# Patient Record
Sex: FEMALE | Race: WHITE | NOT HISPANIC OR LATINO | Employment: OTHER | URBAN - METROPOLITAN AREA
[De-identification: names, ages, dates, MRNs, and addresses within clinical notes are randomized per-mention and may not be internally consistent; named-entity substitution may affect disease eponyms.]

---

## 2017-02-21 ENCOUNTER — APPOINTMENT (OUTPATIENT)
Dept: MEDICAL GROUP | Facility: MEDICAL CENTER | Age: 77
End: 2017-02-21
Payer: MEDICARE

## 2017-03-16 ENCOUNTER — HOSPITAL ENCOUNTER (OUTPATIENT)
Dept: LAB | Facility: MEDICAL CENTER | Age: 77
End: 2017-03-16
Attending: INTERNAL MEDICINE
Payer: MEDICARE

## 2017-03-16 DIAGNOSIS — E03.9 HYPOTHYROIDISM, UNSPECIFIED TYPE: ICD-10-CM

## 2017-03-16 DIAGNOSIS — I10 ESSENTIAL HYPERTENSION: ICD-10-CM

## 2017-03-16 DIAGNOSIS — N39.0 URINARY TRACT INFECTION WITHOUT HEMATURIA, SITE UNSPECIFIED: ICD-10-CM

## 2017-03-16 DIAGNOSIS — E78.5 HYPERLIPIDEMIA, UNSPECIFIED HYPERLIPIDEMIA TYPE: ICD-10-CM

## 2017-03-16 LAB
ANION GAP SERPL CALC-SCNC: 8 MMOL/L (ref 0–11.9)
APPEARANCE UR: ABNORMAL
BILIRUB UR QL STRIP.AUTO: NEGATIVE
BUN SERPL-MCNC: 15 MG/DL (ref 8–22)
CALCIUM SERPL-MCNC: 9.1 MG/DL (ref 8.5–10.5)
CHLORIDE SERPL-SCNC: 105 MMOL/L (ref 96–112)
CHOLEST SERPL-MCNC: 142 MG/DL (ref 100–199)
CO2 SERPL-SCNC: 23 MMOL/L (ref 20–33)
COLOR UR AUTO: YELLOW
CREAT SERPL-MCNC: 0.97 MG/DL (ref 0.5–1.4)
CULTURE IF INDICATED INDCX: YES UA CULTURE
EPITHELIAL CELLS 1715: ABNORMAL /HPF
GLUCOSE SERPL-MCNC: 78 MG/DL (ref 65–99)
GLUCOSE UR STRIP.AUTO-MCNC: NEGATIVE MG/DL
HDLC SERPL-MCNC: 42 MG/DL
KETONES UR STRIP.AUTO-MCNC: NEGATIVE MG/DL
LDLC SERPL CALC-MCNC: 76 MG/DL
LEUKOCYTE ESTERASE UR QL STRIP.AUTO: ABNORMAL
MICRO URNS: ABNORMAL
MUCOUS THREADS URNS QL MICRO: ABNORMAL /HPF
NITRITE UR QL STRIP.AUTO: NEGATIVE
PH UR: 5.5 [PH]
POTASSIUM SERPL-SCNC: 3.6 MMOL/L (ref 3.6–5.5)
PROT UR QL STRIP: NEGATIVE MG/DL
RBC #/AREA URNS HPF: ABNORMAL /HPF
RBC UR QL AUTO: NEGATIVE
SODIUM SERPL-SCNC: 136 MMOL/L (ref 135–145)
SP GR UR STRIP.AUTO: 1.01
TRIGL SERPL-MCNC: 121 MG/DL (ref 0–149)
TSH SERPL DL<=0.005 MIU/L-ACNC: 13.02 UIU/ML (ref 0.3–3.7)
WBC #/AREA URNS HPF: ABNORMAL /HPF

## 2017-03-16 PROCEDURE — 87086 URINE CULTURE/COLONY COUNT: CPT

## 2017-03-16 PROCEDURE — 84443 ASSAY THYROID STIM HORMONE: CPT

## 2017-03-16 PROCEDURE — 36415 COLL VENOUS BLD VENIPUNCTURE: CPT

## 2017-03-16 PROCEDURE — 80048 BASIC METABOLIC PNL TOTAL CA: CPT

## 2017-03-16 PROCEDURE — 81001 URINALYSIS AUTO W/SCOPE: CPT

## 2017-03-16 PROCEDURE — 80061 LIPID PANEL: CPT

## 2017-03-17 ENCOUNTER — OFFICE VISIT (OUTPATIENT)
Dept: MEDICAL GROUP | Facility: MEDICAL CENTER | Age: 77
End: 2017-03-17
Payer: MEDICARE

## 2017-03-17 VITALS
WEIGHT: 212 LBS | TEMPERATURE: 97.6 F | HEIGHT: 62 IN | DIASTOLIC BLOOD PRESSURE: 78 MMHG | BODY MASS INDEX: 39.01 KG/M2 | SYSTOLIC BLOOD PRESSURE: 134 MMHG | OXYGEN SATURATION: 97 % | HEART RATE: 86 BPM | RESPIRATION RATE: 16 BRPM

## 2017-03-17 DIAGNOSIS — R30.0 DYSURIA: ICD-10-CM

## 2017-03-17 DIAGNOSIS — I10 ESSENTIAL HYPERTENSION: ICD-10-CM

## 2017-03-17 DIAGNOSIS — R73.9 HYPERGLYCEMIA: ICD-10-CM

## 2017-03-17 DIAGNOSIS — E66.9 OBESITY (BMI 30-39.9): ICD-10-CM

## 2017-03-17 DIAGNOSIS — J30.1 HAY FEVER: ICD-10-CM

## 2017-03-17 DIAGNOSIS — E03.9 HYPOTHYROIDISM, UNSPECIFIED TYPE: ICD-10-CM

## 2017-03-17 DIAGNOSIS — R10.11 RUQ PAIN: ICD-10-CM

## 2017-03-17 DIAGNOSIS — G47.30 SLEEP APNEA, UNSPECIFIED TYPE: ICD-10-CM

## 2017-03-17 DIAGNOSIS — F03.90 DEMENTIA WITHOUT BEHAVIORAL DISTURBANCE, UNSPECIFIED DEMENTIA TYPE: ICD-10-CM

## 2017-03-17 DIAGNOSIS — E78.5 HYPERLIPIDEMIA, UNSPECIFIED HYPERLIPIDEMIA TYPE: ICD-10-CM

## 2017-03-17 DIAGNOSIS — G60.9 IDIOPATHIC PERIPHERAL NEUROPATHY: ICD-10-CM

## 2017-03-17 PROCEDURE — 1036F TOBACCO NON-USER: CPT | Performed by: INTERNAL MEDICINE

## 2017-03-17 PROCEDURE — G8482 FLU IMMUNIZE ORDER/ADMIN: HCPCS | Performed by: INTERNAL MEDICINE

## 2017-03-17 PROCEDURE — 1101F PT FALLS ASSESS-DOCD LE1/YR: CPT | Performed by: INTERNAL MEDICINE

## 2017-03-17 PROCEDURE — 4040F PNEUMOC VAC/ADMIN/RCVD: CPT | Performed by: INTERNAL MEDICINE

## 2017-03-17 PROCEDURE — G8419 CALC BMI OUT NRM PARAM NOF/U: HCPCS | Performed by: INTERNAL MEDICINE

## 2017-03-17 PROCEDURE — G8432 DEP SCR NOT DOC, RNG: HCPCS | Performed by: INTERNAL MEDICINE

## 2017-03-17 PROCEDURE — 99214 OFFICE O/P EST MOD 30 MIN: CPT | Performed by: INTERNAL MEDICINE

## 2017-03-17 RX ORDER — LEVOTHYROXINE SODIUM 137 UG/1
137 TABLET ORAL
Qty: 90 TAB | Refills: 3 | Status: SHIPPED | OUTPATIENT
Start: 2017-03-17 | End: 2018-04-21 | Stop reason: SDUPTHER

## 2017-03-17 NOTE — ASSESSMENT & PLAN NOTE
She denies any significant further dysuria. There are a few red cells and a few white cells in the urine but otherwise no significant abnormality.

## 2017-03-17 NOTE — ASSESSMENT & PLAN NOTE
Blood sugar was elevated at 109 previously but currently is 70. She has tried to cut back a little on sweets.

## 2017-03-17 NOTE — ASSESSMENT & PLAN NOTE
She remains significantly overweight with BMI 38.78. We reviewed the importance of weight loss program. She is not very careful about her diet and does not exercise much. She will be more active with this spring weather.

## 2017-03-17 NOTE — MR AVS SNAPSHOT
"Margaret Lockhart   3/17/2017 8:20 AM   Office Visit   MRN: [id number]    Department:  [address] [place] [address]   Dept Phone:  [phone]    Description:  Female : 1940   Provider:  Rodrigo Ozuna M.D.           Reason for Visit     Follow-Up discuss labs      Allergies as of 3/17/2017     Allergen Noted Reactions    Neomycin-Polymyxin B 06/10/2008   Rash    Sulfa Drugs 2011         You were diagnosed with     Essential hypertension   [4957822]   No medication change. Continue low-salt diet. Work at weight loss and exercise.    Hypothyroidism, unspecified type   [4679315]   Increase levothyroxine to 137 µg daily. Check TSH at next visit.    Hyperlipidemia, unspecified hyperlipidemia type   [7622123]   No medication change. We reviewed appropriate diet, weight loss, and exercise.    Dysuria   [788.1.ICD-9-CM]   If she has further urinary complaints, she will contact us and we will check another urinalysis.    RUQ pain   [917836]   She will pay attention to aggravating and alleviating factors and look for other patterns. She will keep us informed.    Hyperglycemia   [911304]   She will avoid concentrated sweets. She will work at weight loss.    Obesity (BMI 30-39.9)   [043280]   We discussed importance of weight loss including appropriate diet and exercise.    Dementia without behavioral disturbance, unspecified dementia type   [0741245]   She will report progression of any further memory disturbances.    Hay fever   [659434]   Continue antihistamine as needed.    Idiopathic peripheral neuropathy   [614618]   She was cautioned to check her feet each evening when she removes her socks.    Sleep apnea, unspecified type   [9117438]   We discussed importance of adequate treatment of sleep apnea.      Vital Signs     Blood Pressure Pulse Temperature Respirations Height Weight    134/78 mmHg 86 36.4 °C (97.6 °F) 16 1.575 m (5' 2\") 96.163 kg (212 lb)    Body Mass Index Oxygen Saturation Smoking Status         "    38.77 kg/m2 97% Former Smoker         Basic Information     Date Of Birth Sex Race Ethnicity Preferred Language    1940 Female White Non- English      Problem List              ICD-10-CM Priority Class Noted - Resolved    Dementia F03.90 Low  5/29/2011 - Present    Hypothyroidism E03.9 Low  5/29/2011 - Present    Peripheral neuropathy (CMS-HCC) G62.9 Low  5/29/2011 - Present    Routine general medical examination at a health care facility Z00.00   7/20/2015 - Present    Obesity (BMI 30-39.9) E66.9   7/20/2015 - Present    Sleep apnea G47.30   7/20/2015 - Present    Flu vaccine need Z23   11/24/2015 - Present    Hyperlipidemia E78.5   11/24/2015 - Present    Essential hypertension I10   11/24/2015 - Present    Hyperglycemia R73.9   11/24/2015 - Present    RUQ pain R10.11   2/29/2016 - Present    Hay fever J30.1   4/19/2016 - Present    Need for varicella vaccine Z23   10/18/2016 - Present    Dysuria R30.0   10/18/2016 - Present    Upper respiratory tract infection J06.9   11/16/2016 - Present      Health Maintenance        Date Due Completion Dates    MAMMOGRAM 11/24/2016 11/24/2015 (Postponed), 6/22/2010, 6/22/2010, 1/9/2008, 1/9/2008    Override on 11/24/2015: Postponed (Has not had one since 2010)    IMM PNEUMOCOCCAL 65+ (ADULT) LOW/MEDIUM RISK SERIES (2 of 2 - PPSV23) 4/19/2017 4/19/2016    BONE DENSITY 11/24/2020 11/24/2015 (Postponed), 6/22/2010, 1/9/2008    Override on 11/24/2015: Postponed (Has not had one since 2010)    IMM DTaP/Tdap/Td Vaccine (2 - Td) 5/6/2023 5/6/2013    COLONOSCOPY 11/24/2025 11/24/2015 (Postponed)    Override on 11/24/2015: Postponed (Does not remember)            Current Immunizations     13-VALENT PCV PREVNAR 4/19/2016    Influenza TIV (IM) 10/1/2010    Influenza Vaccine Adult HD 9/27/2016, 11/24/2015  8:50 AM, 11/13/2013    Influenza Vaccine Quad Inj (Preserved) 11/9/2012, 10/9/2011    Tdap Vaccine 5/6/2013    Varicella Vaccine Live 10/18/2016      Below and/or  attached are the medications your provider expects you to take. Review all of your home medications and newly ordered medications with your provider and/or pharmacist. Follow medication instructions as directed by your provider and/or pharmacist. Please keep your medication list with you and share with your provider. Update the information when medications are discontinued, doses are changed, or new medications (including over-the-counter products) are added; and carry medication information at all times in the event of emergency situations     Allergies:  NEOMYCIN-POLYMYXIN B - Rash     SULFA DRUGS - (reactions not documented)               Medications  Valid as of: March 17, 2017 -  8:42 AM    Generic Name Brand Name Tablet Size Instructions for use    Acetaminophen (Tab) TYLENOL 500 MG Take 500 mg by mouth 3 times a day as needed. Indications: Pain        Aspirin (Tab) aspirin 81 MG Take 81 mg by mouth every morning.        Aspirin-Acetaminophen-Caffeine (Tab) EXCEDRIN 250-250-65 MG Take 1 Tab by mouth 2 times a day as needed. Indications: Mild Pain        Atorvastatin Calcium (Tab) LIPITOR 10 MG TAKE ONE TABLET BY MOUTH ONE TIME DAILY        Ciprofloxacin HCl (Tab) CIPRO 500 MG Take 1 Tab by mouth 2 times a day.        Doxycycline Hyclate (Tab) VIBRAMYCIN 100 MG Take 1 Tab by mouth 2 times a day.        Fenofibrate (Tab) TRIGLIDE 160 MG TAKE ONE TABLET BY MOUTH ONE TIME DAILY        Influenza Vac Split High-Dose (Suspension Prefilled Syringe) FLUZONE HIGH-DOSE 0.5 ML 0.5 mg by Intramuscular route Once.        Lactobacillus Rhamnosus (GG) (Cap) CULTURELLE 10 B CELL Take 1 Cap by mouth every day.        Levothyroxine Sodium (Tab) SYNTHROID 137 MCG Take 1 Tab by mouth Every morning on an empty stomach.        Lisinopril (Tab) PRINIVIL 10 MG TAKE ONE TABLET BY MOUTH ONE TIME DAILY        Potassium Chloride (Cap CR) Potassium Chloride 10 MEQ Take 10 mEq by mouth 2 Times a Day.        Triamterene-HCTZ (Tab)  MAXZIDE-25/DYAZIDE 37.5-25 MG Take one tablet by mouth twice daily        .                 Medicines prescribed today were sent to:     [id number] [place] [facility] [place], [place] - [address]    [address] [facility] [place] [address]    Phone: [phone] Fax: [phone]    Open 24 Hours?: No      Medication refill instructions:       If your prescription bottle indicates you have medication refills left, it is not necessary to call your provider’s office. Please contact your pharmacy and they will refill your medication.    If your prescription bottle indicates you do not have any refills left, you may request refills at any time through one of the following ways: The online [employer] system (except Urgent Care), by calling your provider’s office, or by asking your pharmacy to contact your provider’s office with a refill request. Medication refills are processed only during regular business hours and may not be available until the next business day. Your provider may request additional information or to have a follow-up visit with you prior to refilling your medication.   *Please Note: Medication refills are assigned a new Rx number when refilled electronically. Your pharmacy may indicate that no refills were authorized even though a new prescription for the same medication is available at the pharmacy. Please request the medicine by name with the pharmacy before contacting your provider for a refill.        Your To Do List     Future Labs/Procedures Complete By Expires    BASIC METABOLIC PANEL  As directed 3/18/2018    LIPID PROFILE  As directed 3/18/2018    TSH  As directed 3/18/2018         "[employer]t Access Code: Activation code not generated  Current [employer] Status: Active

## 2017-03-17 NOTE — ASSESSMENT & PLAN NOTE
She again reports episodes of mild right upper quadrant abdominal discomfort. She is not aware of aggravating or alleviating factors. She had no recent change in bowel habits. She otherwise denies indigestion.

## 2017-03-17 NOTE — ASSESSMENT & PLAN NOTE
She continues atorvastatin and fenofibrate. She is not very careful about her diet. She remains overweight. Cholesterol is 142, triglycerides 121, HDL 42, LDL 76.

## 2017-03-17 NOTE — ASSESSMENT & PLAN NOTE
She continues lisinopril and Dyazide for her blood pressure which is fairly stable. She denies lightheadedness.

## 2017-03-17 NOTE — PROGRESS NOTES
Subjective:     Chief Complaint   Patient presents with   • Follow-Up     discuss labs     Margaret Garcia is a 76 y.o. female here today for follow-up of her various health problems.    Dementia  She reports no significant further recent memory changes. She thinks she is fairly stable.    Dysuria  She denies any significant further dysuria. There are a few red cells and a few white cells in the urine but otherwise no significant abnormality.    Essential hypertension  She continues lisinopril and Dyazide for her blood pressure which is fairly stable. She denies lightheadedness.    Hay fever  She has mild hayfever symptoms that she treats with OTC antihistamine.    Hyperglycemia  Blood sugar was elevated at 109 previously but currently is 70. She has tried to cut back a little on sweets.    Hyperlipidemia  She continues atorvastatin and fenofibrate. She is not very careful about her diet. She remains overweight. Cholesterol is 142, triglycerides 121, HDL 42, LDL 76.    Hypothyroidism  She continues levothyroxine 112 µg daily. TSH however is elevated at 13.02.    Obesity (BMI 30-39.9)  She remains significantly overweight with BMI 38.78. We reviewed the importance of weight loss program. She is not very careful about her diet and does not exercise much. She will be more active with this spring weather.    Peripheral neuropathy  She reports that her peripheral neuropathy is not significantly changed from previous.    RUQ pain  She again reports episodes of mild right upper quadrant abdominal discomfort. She is not aware of aggravating or alleviating factors. She had no recent change in bowel habits. She otherwise denies indigestion.    Sleep apnea  Sleep apnea is unchanged.       Diagnoses of Essential hypertension, Hypothyroidism, unspecified type, Hyperlipidemia, unspecified hyperlipidemia type, Dysuria, RUQ pain, Hyperglycemia, Obesity (BMI 30-39.9), Dementia without behavioral disturbance, unspecified dementia type,  Hay fever, Idiopathic peripheral neuropathy, and Sleep apnea, unspecified type were pertinent to this visit.    Allergies: Neomycin-polymyxin b and Sulfa drugs  Current medicines (including changes today)  Current Outpatient Prescriptions   Medication Sig Dispense Refill   • levothyroxine (SYNTHROID) 137 MCG Tab Take 1 Tab by mouth Every morning on an empty stomach. 90 Tab 3   • lisinopril (PRINIVIL) 10 MG Tab TAKE ONE TABLET BY MOUTH ONE TIME DAILY 90 Tab 3   • atorvastatin (LIPITOR) 10 MG Tab TAKE ONE TABLET BY MOUTH ONE TIME DAILY 90 Tab 3   • fenofibrate (TRIGLIDE) 160 MG tablet TAKE ONE TABLET BY MOUTH ONE TIME DAILY 90 Tab 3   • acetaminophen (TYLENOL) 500 MG TABS Take 500 mg by mouth 3 times a day as needed. Indications: Pain     • aspirin 81 MG tablet Take 81 mg by mouth every morning.     • doxycycline (VIBRAMYCIN) 100 MG Tab Take 1 Tab by mouth 2 times a day. 20 Tab 0   • FLUZONE HIGH-DOSE 0.5 ML Suspension Prefilled Syringe injection 0.5 mg by Intramuscular route Once.  0   • triamterene-hctz (MAXZIDE-25/DYAZIDE) 37.5-25 MG TABS Take one tablet by mouth twice daily 180 Tab 3   • ciprofloxacin (CIPRO) 500 MG TABS Take 1 Tab by mouth 2 times a day. (Patient not taking: Reported on 10/18/2016) 10 Each 0   • lactobacillus rhamnosus, GG, (CULTURELLE) 10 B CELL CAPS Take 1 Cap by mouth every day. (Patient not taking: Reported on 10/18/2016) 14 Cap 0   • asa/apap/caffeine (EXCEDRIN) 250-250-65 MG TABS Take 1 Tab by mouth 2 times a day as needed. Indications: Mild Pain     • Potassium Chloride 10 MEQ CPCR Take 10 mEq by mouth 2 Times a Day.       No current facility-administered medications for this visit.       She  has a past medical history of Pneumonia; Neuropathy; High cholesterol; Hypothyroidism; Hypertension; Migraines; ASTHMA; Fall; Heart burn; Indigestion; Arthritis; Urinary bladder disorder; UTI (urinary tract infection); Snoring; Sleep apnea; Routine general medical examination at a [facility] care  "facility (7/20/2015); Obesity (BMI 30-39.9) (7/20/2015); URI (upper respiratory infection) (11/4/2015); Hyperglycemia (11/24/2015); Hay fever (4/19/2016); and Dysuria (10/18/2016).  Health Maintenance: She will be getting a mammogram in the near future.  ROS    Patient denies significant change in strength, weight or appetite.  No significant lightheadedness or headaches.  No change in vision, hearing, or swallowing.  No new dyspnea, coughing, chest pain, or palpitations.  No indigestion, abdominal pain, or change in bowel habits. Occasional right upper quadrant abdominal discomfort as noted. No recent changes.   No change in urinating.  No new ankle swelling.       Objective:     PE:  /78 mmHg  Pulse 86  Temp(Src) 36.4 °C (97.6 °F)  Resp 16  Ht 1.575 m (5' 2\")  Wt 96.163 kg (212 lb)  BMI 38.77 kg/m2  SpO2 97%   Neck is supple without significant lymphadenopathy or masses.  Lungs are clear with normal breath sounds without wheezes or rales .  Cardiovascular: peripheral circulation is satisfactory, heart sounds are unchanged and unremarkable.  Abdomen is soft, without masses or tenderness, with normal bowel sounds.  Extremities are without significant edema, cyanosis or deformity.      Assessment and Plan:   The following treatment plan was discussed  1. Essential hypertension  levothyroxine (SYNTHROID) 137 MCG Tab    BASIC METABOLIC PANEL    No medication change. Continue low-salt diet. Work at weight loss and exercise.   2. Hypothyroidism, unspecified type  TSH    Increase levothyroxine to 137 µg daily. Check TSH at next visit.   3. Hyperlipidemia, unspecified hyperlipidemia type  LIPID PROFILE    No medication change. We reviewed appropriate diet, weight loss, and exercise.   4. Dysuria      If she has further urinary complaints, she will contact us and we will check another urinalysis.   5. RUQ pain      She will pay attention to aggravating and alleviating factors and look for other patterns. She " will keep us informed.   6. Hyperglycemia      She will avoid concentrated sweets. She will work at weight loss.   7. Obesity (BMI 30-39.9)      We discussed importance of weight loss including appropriate diet and exercise.   8. Dementia without behavioral disturbance, unspecified dementia type      She will report progression of any further memory disturbances.   9. Hay fever      Continue antihistamine as needed.   10. Idiopathic peripheral neuropathy      She was cautioned to check her feet each evening when she removes her socks.   11. Sleep apnea, unspecified type      We discussed importance of adequate treatment of sleep apnea.       Followup: Return in about 7 months (around 10/17/2017), or H&P, for Long.

## 2017-03-18 LAB
BACTERIA UR CULT: NORMAL
SIGNIFICANT IND 70042: NORMAL
SOURCE SOURCE: NORMAL

## 2017-06-23 RX ORDER — FENOFIBRATE 160 MG/1
TABLET ORAL
Qty: 90 TAB | Refills: 3 | Status: SHIPPED | OUTPATIENT
Start: 2017-06-23 | End: 2018-09-29 | Stop reason: SDUPTHER

## 2017-07-24 RX ORDER — ATORVASTATIN CALCIUM 10 MG/1
TABLET, FILM COATED ORAL
Qty: 90 TAB | Refills: 3 | Status: SHIPPED | OUTPATIENT
Start: 2017-07-24 | End: 2018-12-18 | Stop reason: SDUPTHER

## 2017-09-20 ENCOUNTER — PATIENT MESSAGE (OUTPATIENT)
Dept: HEALTH INFORMATION MANAGEMENT | Facility: OTHER | Age: 77
End: 2017-09-20

## 2017-09-28 ENCOUNTER — PATIENT OUTREACH (OUTPATIENT)
Dept: HEALTH INFORMATION MANAGEMENT | Facility: OTHER | Age: 77
End: 2017-09-28

## 2017-09-28 NOTE — PROGRESS NOTES
Outcome: Left Message    Please transfer to Patient Outreach Team at [phone] when patient returns call.    Web[employer] Checked & Epic Updated:  yes    [facility] Verified: yes    Attempt # 1

## 2017-09-29 ENCOUNTER — HOSPITAL ENCOUNTER (OUTPATIENT)
Dept: LAB | Facility: MEDICAL CENTER | Age: 77
End: 2017-09-29
Attending: INTERNAL MEDICINE
Payer: MEDICARE

## 2017-09-29 DIAGNOSIS — E78.5 HYPERLIPIDEMIA, UNSPECIFIED HYPERLIPIDEMIA TYPE: ICD-10-CM

## 2017-09-29 DIAGNOSIS — I10 ESSENTIAL HYPERTENSION: ICD-10-CM

## 2017-09-29 DIAGNOSIS — E03.9 HYPOTHYROIDISM, UNSPECIFIED TYPE: ICD-10-CM

## 2017-09-29 LAB
ANION GAP SERPL CALC-SCNC: 7 MMOL/L (ref 0–11.9)
BUN SERPL-MCNC: 18 MG/DL (ref 8–22)
CALCIUM SERPL-MCNC: 9.7 MG/DL (ref 8.5–10.5)
CHLORIDE SERPL-SCNC: 106 MMOL/L (ref 96–112)
CHOLEST SERPL-MCNC: 119 MG/DL (ref 100–199)
CO2 SERPL-SCNC: 22 MMOL/L (ref 20–33)
CREAT SERPL-MCNC: 0.88 MG/DL (ref 0.5–1.4)
GFR SERPL CREATININE-BSD FRML MDRD: >60 ML/MIN/1.73 M 2
GLUCOSE SERPL-MCNC: 92 MG/DL (ref 65–99)
HDLC SERPL-MCNC: 41 MG/DL
LDLC SERPL CALC-MCNC: 54 MG/DL
POTASSIUM SERPL-SCNC: 4.2 MMOL/L (ref 3.6–5.5)
SODIUM SERPL-SCNC: 135 MMOL/L (ref 135–145)
TRIGL SERPL-MCNC: 118 MG/DL (ref 0–149)
TSH SERPL DL<=0.005 MIU/L-ACNC: 2.39 UIU/ML (ref 0.3–3.7)

## 2017-09-29 PROCEDURE — 36415 COLL VENOUS BLD VENIPUNCTURE: CPT

## 2017-09-29 PROCEDURE — 84443 ASSAY THYROID STIM HORMONE: CPT

## 2017-09-29 PROCEDURE — 80061 LIPID PANEL: CPT

## 2017-09-29 PROCEDURE — 80048 BASIC METABOLIC PNL TOTAL CA: CPT

## 2017-10-02 NOTE — PROGRESS NOTES
Outcome: Left Message    Please transfer to Patient Outreach Team at [phone] when patient returns call.      Attempt # 2

## 2017-10-03 NOTE — PROGRESS NOTES
Outcome: Left Message    Please transfer to Patient Outreach Team at [phone] when patient returns call.      Attempt #3

## 2017-10-04 NOTE — PROGRESS NOTES
Outcome: Left Message    Please transfer to Patient Outreach Team at [phone] when patient returns call.      Attempt #4

## 2017-11-06 ENCOUNTER — HOSPITAL ENCOUNTER (OUTPATIENT)
Facility: MEDICAL CENTER | Age: 77
End: 2017-11-06
Attending: NURSE PRACTITIONER
Payer: MEDICARE

## 2017-11-06 ENCOUNTER — OFFICE VISIT (OUTPATIENT)
Dept: MEDICAL GROUP | Facility: PHYSICIAN GROUP | Age: 77
End: 2017-11-06
Payer: MEDICARE

## 2017-11-06 VITALS
SYSTOLIC BLOOD PRESSURE: 134 MMHG | DIASTOLIC BLOOD PRESSURE: 76 MMHG | OXYGEN SATURATION: 98 % | HEART RATE: 85 BPM | BODY MASS INDEX: 36.19 KG/M2 | WEIGHT: 212 LBS | TEMPERATURE: 98.4 F | RESPIRATION RATE: 16 BRPM | HEIGHT: 64 IN

## 2017-11-06 DIAGNOSIS — R10.9 FLANK PAIN: ICD-10-CM

## 2017-11-06 DIAGNOSIS — R30.0 DYSURIA: ICD-10-CM

## 2017-11-06 LAB
APPEARANCE UR: CLEAR
BILIRUB UR STRIP-MCNC: NORMAL MG/DL
COLOR UR AUTO: YELLOW
GLUCOSE UR STRIP.AUTO-MCNC: NORMAL MG/DL
KETONES UR STRIP.AUTO-MCNC: NORMAL MG/DL
LEUKOCYTE ESTERASE UR QL STRIP.AUTO: NORMAL
NITRITE UR QL STRIP.AUTO: NORMAL
PH UR STRIP.AUTO: 6 [PH] (ref 5–8)
PROT UR QL STRIP: NORMAL MG/DL
RBC UR QL AUTO: NORMAL
SP GR UR STRIP.AUTO: 1.01
UROBILINOGEN UR STRIP-MCNC: NORMAL MG/DL

## 2017-11-06 PROCEDURE — 99214 OFFICE O/P EST MOD 30 MIN: CPT | Performed by: NURSE PRACTITIONER

## 2017-11-06 PROCEDURE — 81002 URINALYSIS NONAUTO W/O SCOPE: CPT | Performed by: NURSE PRACTITIONER

## 2017-11-06 PROCEDURE — 87086 URINE CULTURE/COLONY COUNT: CPT

## 2017-11-06 RX ORDER — NITROFURANTOIN 25; 75 MG/1; MG/1
100 CAPSULE ORAL 2 TIMES DAILY
Qty: 20 CAP | Refills: 0 | Status: SHIPPED | OUTPATIENT
Start: 2017-11-06 | End: 2017-12-06

## 2017-11-06 ASSESSMENT — PATIENT HEALTH QUESTIONNAIRE - PHQ9: CLINICAL INTERPRETATION OF PHQ2 SCORE: 0

## 2017-11-06 NOTE — ASSESSMENT & PLAN NOTE
Patient states she has bilateral flank pain that started on Saturday evening.  No fever, no chills.  Positive for nausea. Denies burning but has frequency .  No blood noted in urine.  States her last uti was in March.  Sees Dr. Ozuna

## 2017-11-06 NOTE — Clinical Note
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  No fever.  POCT urine with moderate 2+ bacteria.  Culture sent.  Macrobid bid.  I see that she has an appointment with you on 11/14/17 It was apleasure to see her today CALI Ramos.

## 2017-11-06 NOTE — PROGRESS NOTES
Chief Complaint   Patient presents with   • Flank Pain   • Nausea       Subjective:   Margaret Garcia is a 77 y.o. female Patient of Dr. Ozuna here today for evaluation and management of:    Dysuria  Patient states she has bilateral flank pain that started on Saturday evening.  No fever, no chills.  Positive for nausea. Denies burning but has frequency .  No blood noted in urine.  States her last uti was in March.  Sees Dr. Ozuna         Current medicines (including changes today)  Current Outpatient Prescriptions   Medication Sig Dispense Refill   • nitrofurantoin monohydr macro (MACROBID) 100 MG Cap Take 1 Cap by mouth 2 times a day. 20 Cap 0   • atorvastatin (LIPITOR) 10 MG Tab TAKE ONE TABLET BY MOUTH ONE TIME DAILY 90 Tab 3   • fenofibrate (TRIGLIDE) 160 MG tablet TAKE ONE TABLET BY MOUTH ONE TIME DAILY 90 Tab 3   • levothyroxine (SYNTHROID) 137 MCG Tab Take 1 Tab by mouth Every morning on an empty stomach. 90 Tab 3   • lisinopril (PRINIVIL) 10 MG Tab TAKE ONE TABLET BY MOUTH ONE TIME DAILY 90 Tab 3   • acetaminophen (TYLENOL) 500 MG TABS Take 500 mg by mouth 3 times a day as needed. Indications: Pain     • aspirin 81 MG tablet Take 81 mg by mouth every morning.     • triamterene-hctz (MAXZIDE-25/DYAZIDE) 37.5-25 MG TABS Take one tablet by mouth twice daily 180 Tab 3     No current facility-administered medications for this visit.      She  has a past medical history of Arthritis; ASTHMA; Dysuria (10/18/2016); Fall; Hay fever (4/19/2016); Heart burn; High cholesterol; UTI (urinary tract infection); Hyperglycemia (11/24/2015); Hypertension; Hypothyroidism; Indigestion; Migraines; Neuropathy; Obesity (BMI 30-39.9) (7/20/2015); Pneumonia; Routine general medical examination at a health care facility (7/20/2015); Sleep apnea; Snoring; URI (upper respiratory infection) (11/4/2015); and Urinary bladder disorder.    Kait stated in history of present illness  No chest pain, no shortness of breath, no abdominal  "pain       Objective:     Blood pressure 134/76, pulse 85, temperature 36.9 °C (98.4 °F), resp. rate 16, height 1.613 m (5' 3.5\"), weight 96.2 kg (212 lb), SpO2 98 %. Body mass index is 36.97 kg/m². Afebrile  Physical Exam:  Constitutional: Alert, no distress.  Skin: Warm, dry, good turgor,no cyanosis, no rashes in visible areas.  Eye: Equal, round and reactive, conjunctiva clear, lids normal.  Ears: No tenderness, no discharge.  External canals are without any significant edema or erythema.    Gross auditory acuity is intact.  Nose: symmetrical without tenderness, no discharge.  Mouth/Throat: lips without lesion.  Oropharynx clear.    Neck: Trachea midline, no masses, no obvious thyroid enlargement..  Range of motion within normal limits.  Neuro: Cranial nerves 2-12 grossly intact.  No sensory deficit.  Respiratory: Unlabored respiratory effort, lungs clear to auscultation, no wheezes, no ronchi.  Cardiovascular: Normal S1, S2, no murmur, no edema.  Abdomen: Soft, non-tender, no masses, no guarding,  no hepatosplenomegaly. No specific CVA tenderness but low backache reported and tenderness on deep palpation  Psych: Alert and oriented x3, normal affect and mood and judgement.        Assessment and Plan:   The following treatment plan was discussed    1. Flank pain  This is a new problem to me. Acute. Urinalysis in the office showed 2+ leukocytes and trace of blood. Culture sent. Macrobid 100 mg by mouth twice a day ×10 days. Patient has a follow-up appointment with her PCP on the 14th. ED precautions reviewed with patient. Monitor. Will call patient with results of culture. Also will send a copy of the culture results to Dr. Ozuna.  - POCT Urinalysis  - URINE CULTURE(NEW); Future    2. Dysuria  See problem #1.  - URINE CULTURE(NEW); Future      Followup: Return in about 2 weeks (around 11/20/2017) for UTI.         "

## 2017-11-08 ENCOUNTER — TELEPHONE (OUTPATIENT)
Dept: MEDICAL GROUP | Facility: PHYSICIAN GROUP | Age: 77
End: 2017-11-08

## 2017-11-08 LAB
BACTERIA UR CULT: NORMAL
SIGNIFICANT IND 70042: NORMAL
SITE SITE: NORMAL
SOURCE SOURCE: NORMAL

## 2017-11-08 NOTE — TELEPHONE ENCOUNTER
----- Message from MERCEDES Ramos sent at 11/8/2017 10:44 AM PST -----  Margaret  The culture is back.  Please finish the macrobit that we ordered.  Hope you are feeling better.  MERCEDES Ramos

## 2017-11-10 ENCOUNTER — TELEPHONE (OUTPATIENT)
Dept: MEDICAL GROUP | Facility: MEDICAL CENTER | Age: 77
End: 2017-11-10

## 2017-11-10 NOTE — TELEPHONE ENCOUNTER
Phone Number Called: [phone] (home)       Message: returned patients call regarding an appt.    Left Message for patient to call back: yes

## 2017-11-10 NOTE — TELEPHONE ENCOUNTER
Phone Number Called: ***    Message: ***    Left Message for patient to call back: {YES/NO:63}

## 2017-11-13 ENCOUNTER — TELEPHONE (OUTPATIENT)
Dept: MEDICAL GROUP | Facility: MEDICAL CENTER | Age: 77
End: 2017-11-13

## 2017-11-13 NOTE — TELEPHONE ENCOUNTER
Left message for patient to call back regarding [facility] pre-visit planning. Please transfer call to [phone].

## 2017-11-13 NOTE — TELEPHONE ENCOUNTER
Left message for patient to call back regarding [facility] pre-visit planning. Please transfer call to [phone].

## 2017-11-14 ENCOUNTER — OFFICE VISIT (OUTPATIENT)
Dept: MEDICAL GROUP | Facility: MEDICAL CENTER | Age: 77
End: 2017-11-14
Payer: MEDICARE

## 2017-11-14 VITALS
OXYGEN SATURATION: 93 % | HEART RATE: 93 BPM | TEMPERATURE: 98.3 F | HEIGHT: 61 IN | WEIGHT: 210.98 LBS | RESPIRATION RATE: 16 BRPM | BODY MASS INDEX: 39.83 KG/M2 | DIASTOLIC BLOOD PRESSURE: 64 MMHG | SYSTOLIC BLOOD PRESSURE: 130 MMHG

## 2017-11-14 DIAGNOSIS — E03.9 HYPOTHYROIDISM, UNSPECIFIED TYPE: ICD-10-CM

## 2017-11-14 DIAGNOSIS — R10.11 RUQ PAIN: ICD-10-CM

## 2017-11-14 DIAGNOSIS — Z91.81 AT MODERATE RISK FOR FALL: ICD-10-CM

## 2017-11-14 DIAGNOSIS — R30.0 DYSURIA: ICD-10-CM

## 2017-11-14 DIAGNOSIS — G47.30 SLEEP APNEA, UNSPECIFIED TYPE: ICD-10-CM

## 2017-11-14 DIAGNOSIS — Z23 NEED FOR PNEUMOCOCCAL VACCINE: ICD-10-CM

## 2017-11-14 DIAGNOSIS — R10.9 FLANK PAIN: ICD-10-CM

## 2017-11-14 DIAGNOSIS — I10 ESSENTIAL HYPERTENSION: ICD-10-CM

## 2017-11-14 DIAGNOSIS — E78.5 HYPERLIPIDEMIA, UNSPECIFIED HYPERLIPIDEMIA TYPE: ICD-10-CM

## 2017-11-14 DIAGNOSIS — R73.9 HYPERGLYCEMIA: ICD-10-CM

## 2017-11-14 DIAGNOSIS — G60.9 IDIOPATHIC PERIPHERAL NEUROPATHY: ICD-10-CM

## 2017-11-14 DIAGNOSIS — Z00.00 ROUTINE GENERAL MEDICAL EXAMINATION AT A HEALTH CARE FACILITY: ICD-10-CM

## 2017-11-14 DIAGNOSIS — F03.90 DEMENTIA WITHOUT BEHAVIORAL DISTURBANCE, UNSPECIFIED DEMENTIA TYPE: ICD-10-CM

## 2017-11-14 LAB
APPEARANCE UR: NORMAL
BILIRUB UR STRIP-MCNC: NORMAL MG/DL
COLOR UR AUTO: NORMAL
GLUCOSE UR STRIP.AUTO-MCNC: NORMAL MG/DL
KETONES UR STRIP.AUTO-MCNC: NORMAL MG/DL
LEUKOCYTE ESTERASE UR QL STRIP.AUTO: NORMAL
NITRITE UR QL STRIP.AUTO: NORMAL
PH UR STRIP.AUTO: 6 [PH] (ref 5–8)
PROT UR QL STRIP: NORMAL MG/DL
RBC UR QL AUTO: NORMAL
SP GR UR STRIP.AUTO: 1.01
UROBILINOGEN UR STRIP-MCNC: NORMAL MG/DL

## 2017-11-14 PROCEDURE — G0439 PPPS, SUBSEQ VISIT: HCPCS | Mod: 25 | Performed by: INTERNAL MEDICINE

## 2017-11-14 PROCEDURE — 90732 PPSV23 VACC 2 YRS+ SUBQ/IM: CPT | Performed by: INTERNAL MEDICINE

## 2017-11-14 PROCEDURE — 81002 URINALYSIS NONAUTO W/O SCOPE: CPT | Performed by: INTERNAL MEDICINE

## 2017-11-14 PROCEDURE — G0009 ADMIN PNEUMOCOCCAL VACCINE: HCPCS | Performed by: INTERNAL MEDICINE

## 2017-11-14 RX ORDER — LATANOPROST 50 UG/ML
SOLUTION/ DROPS OPHTHALMIC
Refills: 4 | COMMUNITY
Start: 2017-09-25 | End: 2021-07-19

## 2017-11-14 ASSESSMENT — PATIENT HEALTH QUESTIONNAIRE - PHQ9: CLINICAL INTERPRETATION OF PHQ2 SCORE: 0

## 2017-11-14 ASSESSMENT — PAIN SCALES - GENERAL: PAINLEVEL: 6=MODERATE PAIN

## 2017-11-14 NOTE — ASSESSMENT & PLAN NOTE
She remains significantly overweight with BMI 40.39 area. She does not exercise. She is not careful about her diet.

## 2017-11-14 NOTE — ASSESSMENT & PLAN NOTE
She has hyperlipidemia for which she takes atorvastatin. Cholesterol is 119, triglycerides 118, HDL 41, LDL 54. She is not particularly careful about her diet. She is overweight. We briefly reviewed appropriate diet. She will continue the atorvastatin.

## 2017-11-14 NOTE — ASSESSMENT & PLAN NOTE
She has sleep apnea but refuses any further evaluation or treatment. We briefly reviewed the importance of adequate treatment of this.

## 2017-11-14 NOTE — ASSESSMENT & PLAN NOTE
She continues lisinopril without difficulty. Blood pressure is satisfactory. She denies lightheadedness or headaches. We reviewed low salt diet.

## 2017-11-14 NOTE — ASSESSMENT & PLAN NOTE
She has some right upper quadrant abdominal discomfort that she's had for 1-2 weeks. She's actually had this previously. She is not aware of any aggravating or alleviating factors. She has had her gallbladder removed. We will check a hepatic panel.

## 2017-11-14 NOTE — PROGRESS NOTES
Chief Complaint   Patient presents with   • Annual Wellness Visit         HPI:  Margaret is a 77 y.o. here for Medicare Annual Wellness Visit, she is accompanied by her granddaughter.    Patient Active Problem List    Diagnosis Date Noted   • Dementia 05/29/2011     Priority: Low   • Hypothyroidism 05/29/2011     Priority: Low   • Peripheral neuropathy (CMS-HCC) 05/29/2011     Priority: Low   • Upper respiratory tract infection 11/16/2016   • Need for varicella vaccine 10/18/2016   • Dysuria 10/18/2016   • Hay fever 04/19/2016   • RUQ pain 02/29/2016   • Flu vaccine need 11/24/2015   • Hyperlipidemia 11/24/2015   • Essential hypertension 11/24/2015   • Hyperglycemia 11/24/2015   • Routine general medical examination at a health care facility 07/20/2015   • Obesity (BMI 30-39.9) 07/20/2015   • Sleep apnea 07/20/2015       Current Outpatient Prescriptions   Medication Sig Dispense Refill   • latanoprost (XALATAN) 0.005 % Solution INSTILL 1 DROP INTO BOTH EYES EVERY EVENING AT BEDTIME  4   • nitrofurantoin monohydr macro (MACROBID) 100 MG Cap Take 1 Cap by mouth 2 times a day. 20 Cap 0   • atorvastatin (LIPITOR) 10 MG Tab TAKE ONE TABLET BY MOUTH ONE TIME DAILY 90 Tab 3   • fenofibrate (TRIGLIDE) 160 MG tablet TAKE ONE TABLET BY MOUTH ONE TIME DAILY 90 Tab 3   • levothyroxine (SYNTHROID) 137 MCG Tab Take 1 Tab by mouth Every morning on an empty stomach. 90 Tab 3   • lisinopril (PRINIVIL) 10 MG Tab TAKE ONE TABLET BY MOUTH ONE TIME DAILY 90 Tab 3   • acetaminophen (TYLENOL) 500 MG TABS Take 500 mg by mouth 3 times a day as needed. Indications: Pain     • aspirin 81 MG tablet Take 81 mg by mouth every morning.     • triamterene-hctz (MAXZIDE-25/DYAZIDE) 37.5-25 MG TABS Take one tablet by mouth twice daily (Patient not taking: Reported on 11/14/2017) 180 Tab 3     No current facility-administered medications for this visit.         Patient is taking medications as noted in medication list.  Current supplements as per  medication list.     Allergies: Food; Neomycin-polymyxin b; Pcn [penicillins]; and Sulfa drugs    Current social contact/activities: Visit with family and friends,  bible study wkly/ Walking 10-15 minutes 2-3x per wk    Is patient current with immunizations? No, due for PNEUMOVAX (PPSV23). Patient is interested in receiving PNEUMOVAX (PPSV23) today.    She  reports that she has been smoking Cigarettes.  She has a 42.00 pack-year smoking history. She has never used smokeless tobacco. She reports that she does not drink alcohol or use drugs.  Ready to quit: Not Answered  Counseling given: Not Answered        DPA/Advanced directive: Patient has Advanced Directive, but it is not on file. Instructed to bring in a copy to scan into their chart.    ROS:    Gait: PT has a magana and a 4 wheel walker with a seat but does not use them.    Ostomy: no   Other tubes: no   Amputations: no   Chronic oxygen use no   Last eye exam  6 months ago   Wears hearing aids: no   : Reports incontinence. Frequency and urgency mild leakage.      Depression Screening    Little interest or pleasure in doing things?  0 - not at all  Feeling down, depressed, or hopeless? 0 - not at all  Patient Health Questionnaire Score: 0    If depressive symptoms identified deferred to follow up visit unless specifically addressed in assessment and plan.    Interpretation of PHQ-9 Total Score   Score Severity   1-4 No Depression   5-9 Mild Depression   10-14 Moderate Depression   15-19 Moderately Severe Depression   20-27 Severe Depression    Screening for Cognitive Impairment    Three Minute Recall (apple, watch, thais)  2/3    Draw clock face with all 12 numbers set to the hand to show 10 minutes past 11 o'clock  1 4/5  If cognitive concerns identified, deferred for follow up unless specifically addressed in assessment and plan.    Fall Risk Assessment    Has the patient had two or more falls in the last year or any fall with injury in the last year?  Yes  If  fall risk identified, deferred for follow up unless specifically addressed in assessment and plan.    Safety Assessment    Throw rugs on floor.  No  Handrails on all stairs.  Yes  Good lighting in all hallways.  Yes  Difficulty hearing.  No  Patient counseled about all safety risks that were identified.    Functional Assessment ADLs    Are there any barriers preventing you from cooking for yourself or meeting nutritional needs?  No.    Are there any barriers preventing you from driving safely or obtaining transportation?  No.    Are there any barriers preventing you from using a telephone or calling for help?  No.    Are there any barriers preventing you from shopping?  No.    Are there any barriers preventing you from taking care of your own finances?  No.    Are there any barriers preventing you from managing your medications?  No.    Are you currently engaging any exercise or physical activity?  Yes.       Health Maintenance Summary                MAMMOGRAM Overdue 11/24/2016      Postponed 11/24/2015 Has not had one since 2010     Patient has more history with this topic...    IMM PNEUMOCOCCAL 65+ (ADULT) LOW/MEDIUM RISK SERIES Overdue 4/19/2017      Done 4/19/2016 Imm Admin: Pneumococcal Conjugate Vaccine (Prevnar/PCV-13)    IMM INFLUENZA Overdue 9/1/2017      Done 9/27/2016 Imm Admin: Influenza Vaccine Adult HD     Patient has more history with this topic...    Annual Wellness Visit Overdue 10/19/2017      Not specified 10/18/2016     BONE DENSITY Next Due 11/24/2020      Postponed 11/24/2015 Has not had one since 2010     Patient has more history with this topic...    IMM DTaP/Tdap/Td Vaccine Next Due 5/6/2023      Done 5/6/2013 Imm Admin: Tdap Vaccine    COLONOSCOPY Next Due 11/24/2025      Postponed 11/24/2015 Does not remember          Patient Care Team:  Rodrigo Ozuna M.D. as PCP - General  Tello Chilel M.D. as Consulting Physician (Ophthalmology)    Social History   Substance Use Topics   •  "Smoking status: Current Every Day Smoker     Packs/day: 0.75     Years: 56.00     Types: Cigarettes   • Smokeless tobacco: Never Used      Comment: started smoking at age 12   • Alcohol use No     Family History   Problem Relation Age of Onset   • Stroke Mother    • Hyperlipidemia Mother    • Hypertension Mother    • Arthritis Mother    • Diabetes Father    • Lung Disease Father      pneumonia   • Arthritis Sister    • Rheumatologic Disease Sister    • Hypertension Sister    • Hyperlipidemia Sister    • Other Sister      Anusha/Fibermyalgia   • Hyperlipidemia Brother    • Hypertension Brother    • Lung Disease Maternal Grandmother      pneumonia   • Stroke Maternal Grandfather    • Cancer Maternal Grandfather      skin    • No Known Problems Paternal Grandmother    • No Known Problems Paternal Grandfather      She  has a past medical history of Arthritis; ASTHMA; Dysuria (10/18/2016); Fall; Hay fever (4/19/2016); Heart burn; High cholesterol; UTI (urinary tract infection); Hyperglycemia (11/24/2015); Hypertension; Hypothyroidism; Indigestion; Migraines; Neuropathy; Obesity (BMI 30-39.9) (7/20/2015); Pneumonia; Routine general medical examination at a health care facility (7/20/2015); Sleep apnea; Snoring; URI (upper respiratory infection) (11/4/2015); and Urinary bladder disorder.   Past Surgical History:   Procedure Laterality Date   • NIRU BY LAPAROSCOPY  9/2/2011    Performed by KAYLEIGH PORRAS at SURGERY SAME DAY [facility] ORS   • LUMBAR DECOMPRESSION  6/29/2009    Performed by ANG YAN at SURGERY [facility] ORS   • LUMBAR LAMINECTOMY DISKECTOMY  6/29/2009    Performed by ANG YAN at SURGERY [facility] ORS   • ABDOMINAL HYSTERECTOMY TOTAL     • GYN SURGERY      hysterectomy   • HEMORRHOIDECTOMY             Exam:     Blood pressure 130/64, pulse 93, temperature 36.8 °C (98.3 °F), resp. rate 16, height 1.539 m (5' 0.6\"), weight 95.7 kg (210 lb 15.7 oz), SpO2 93 %. Body mass index is 40.39 " kg/m².    Hearing excellent.    Dentition fair  Alert, oriented in no acute distress.  Eye contact is good, speech goal directed, affect calm.Neck is supple and without lymphadenopathy. Lungs are clear without rales or wheeze. Cardiovascular peripheral circulation is satisfactory. Heart sounds are unremarkable. Abdomen is soft and without masses or tenderness. There are normal bowel sounds. Extremities are without significant edema, cyanosis, or deformity. Neurologic exam was unremarkable.      Assessment and Plan. The following treatment and monitoring plan is recommended:    Sleep apnea  She has sleep apnea but refuses any further evaluation or treatment. We briefly reviewed the importance of adequate treatment of this.    RUQ pain  She has some right upper quadrant abdominal discomfort that she's had for 1-2 weeks. She's actually had this previously. She is not aware of any aggravating or alleviating factors. She has had her gallbladder removed. We will check a hepatic panel.    Peripheral neuropathy  She reports that her peripheral neuropathy is unchanged and not really bothering her.    Hypothyroidism  She continues levothyroxine. Clinically she is euthyroid.    Hyperlipidemia  She has hyperlipidemia for which she takes atorvastatin. Cholesterol is 119, triglycerides 118, HDL 41, LDL 54. She is not particularly careful about her diet. She is overweight. We briefly reviewed appropriate diet. She will continue the atorvastatin.    Hyperglycemia  She has had elevated blood sugars in the past but currently blood sugar is normal.    Flank pain  She recently had some flank pain. She went to urgent care and was told that she had a urinary infection. This was treated with nitrofurantoin. She still has some mild discomfort. We checked a repeat urinalysis today which does not show evidence of infection.    Essential hypertension  She continues lisinopril without difficulty. Blood pressure is satisfactory. She denies  lightheadedness or headaches. We reviewed low salt diet.    Dementia  She reports that her dementia is stable.    Class 3 obesity due to excess calories without serious comorbidity with body mass index (BMI) of 40.0 to 44.9 in adult (CMS-[facility], located within [facility])  She remains significantly overweight with BMI 40.39 area. She does not exercise. She is not careful about her diet.    At moderate risk for fall  She has fallen occasionally. We took this occasion to discuss importance of not following and things she could do to avoid falling.            Services suggested: No services needed at this time  Health Care Screening recommendations as per orders if indicated.  Referrals offered: PT/OT/Nutrition counseling/Behavioral Health/Smoking cessation as per orders if indicated.    Discussion today about general wellness and lifestyle habits:    · Prevent falls and reduce trip hazards; Cautioned about securing or removing rugs.  · Have a working fire alarm and carbon monoxide detector;   · Engage in regular physical activity and social activities       Follow-up: 4 months or sooner as needed..

## 2017-11-14 NOTE — ASSESSMENT & PLAN NOTE
She has fallen occasionally. We took this occasion to discuss importance of not following and things she could do to avoid falling.

## 2017-11-14 NOTE — ASSESSMENT & PLAN NOTE
She recently had some flank pain. She went to urgent care and was told that she had a urinary infection. This was treated with nitrofurantoin. She still has some mild discomfort. We checked a repeat urinalysis today which does not show evidence of infection.

## 2017-11-15 ENCOUNTER — HOSPITAL ENCOUNTER (OUTPATIENT)
Dept: LAB | Facility: MEDICAL CENTER | Age: 77
End: 2017-11-15
Attending: INTERNAL MEDICINE
Payer: MEDICARE

## 2017-11-15 DIAGNOSIS — R10.11 RUQ PAIN: ICD-10-CM

## 2017-11-15 LAB
ALBUMIN SERPL BCP-MCNC: 3.5 G/DL (ref 3.2–4.9)
ALP SERPL-CCNC: 62 U/L (ref 30–99)
ALT SERPL-CCNC: 13 U/L (ref 2–50)
AST SERPL-CCNC: 16 U/L (ref 12–45)
BILIRUB CONJ SERPL-MCNC: 0.2 MG/DL (ref 0.1–0.5)
BILIRUB INDIRECT SERPL-MCNC: 0.5 MG/DL (ref 0–1)
BILIRUB SERPL-MCNC: 0.7 MG/DL (ref 0.1–1.5)
PROT SERPL-MCNC: 7 G/DL (ref 6–8.2)

## 2017-11-15 PROCEDURE — 80076 HEPATIC FUNCTION PANEL: CPT

## 2017-11-15 PROCEDURE — 36415 COLL VENOUS BLD VENIPUNCTURE: CPT

## 2017-11-20 ENCOUNTER — TELEPHONE (OUTPATIENT)
Dept: MEDICAL GROUP | Facility: MEDICAL CENTER | Age: 77
End: 2017-11-20

## 2017-11-20 DIAGNOSIS — Z72.0 TOBACCO USE: ICD-10-CM

## 2017-11-20 NOTE — TELEPHONE ENCOUNTER
1. Caller Name: Margaret                      Call Back Number: [phone] (home)        2. Message: patient would like to discuss her lab results, and a referral to quit smoking she wants to attend the classes    3. Patient approves office to leave a detailed voicemail/MyChart message: N\A

## 2017-11-23 DIAGNOSIS — I10 ESSENTIAL HYPERTENSION: ICD-10-CM

## 2017-11-27 RX ORDER — LISINOPRIL 10 MG/1
TABLET ORAL
Qty: 90 TAB | Refills: 3 | Status: SHIPPED | OUTPATIENT
Start: 2017-11-27 | End: 2018-12-18 | Stop reason: SDUPTHER

## 2017-12-06 ENCOUNTER — NON-PROVIDER VISIT (OUTPATIENT)
Dept: VASCULAR LAB | Facility: MEDICAL CENTER | Age: 77
End: 2017-12-06
Attending: INTERNAL MEDICINE
Payer: MEDICARE

## 2017-12-06 ENCOUNTER — HOSPITAL ENCOUNTER (OUTPATIENT)
Dept: OTHER | Facility: MEDICAL CENTER | Age: 77
End: 2017-12-06
Attending: INTERNAL MEDICINE
Payer: MEDICARE

## 2017-12-06 PROCEDURE — S9453 SMOKING CESSATION CLASS: HCPCS

## 2017-12-06 PROCEDURE — 99407 BEHAV CHNG SMOKING > 10 MIN: CPT | Performed by: PHARMACIST

## 2017-12-06 RX ORDER — VARENICLINE TARTRATE 1 MG/1
1 TABLET, FILM COATED ORAL 2 TIMES DAILY
Qty: 60 TAB | Refills: 3 | Status: SHIPPED | OUTPATIENT
Start: 2017-12-06 | End: 2019-12-11

## 2017-12-06 NOTE — PROGRESS NOTES
"Outpatient Pharmacotherapy Program         Smoking Cessation Note        Visit time 9:06am to 9:18am    Reason for Visit: Patient presents for smoking cessation pharmacotherapy  Initial Visit    HPI:    Age at Which Started Smoking: \"Very young age\"  Average number of cigarettes smoked per day: 1/2 ppd  Additional Smoking Hx: First cigarette within 30 minutes of waking and right before bed.  Pertinent Psych Hx: None  Recent quit attempts: Quit for six years and resumed smoking ~10 years ago.  Medications reviewed and reconciled     Assessment:    Tobacco use disorder, willing to make quit attempt with a combination of pharmacological and behavioral therapy.    Plan:    Behavioral Modification Recommended: Specifically [facility] Smoking Cessation Classes    Quit Date: Within the next two weeks.  Encouraged to set a definitive date, even if date is same as next follow up visit.    Chantix            Start Pack  Patient quite eager to quit smoking as she sees this a burden to her health and well being.  Praised patient for taking these steps to improve overall health.  She does not have a definitive quit date, but is agreeable to making the date of next visit her official quit date.   She states she has no specific triggers, just an \"overall bad habit\" she would like to see gone.  She has tried bupropion in the past and felt it helped very little.   No history of psychiatric illness.  Willing to begin chantix and set quit date of 12-20-17.    Potential side-effects of all prescribed pharmacotherapy reviewed with patient who verbalizes understanding of the risks and benefits of this therapy.    Follow-up :  In pharmacotherapy clinic:  Date: Wednesday December 20, 2017 @ 10:30 am    Lior Harper    cc:  Dr Michael Bloch    [facility] [id number] used in order to bill claim properly               "

## 2017-12-13 ENCOUNTER — HOSPITAL ENCOUNTER (OUTPATIENT)
Dept: OTHER | Facility: MEDICAL CENTER | Age: 77
End: 2017-12-13
Attending: INTERNAL MEDICINE
Payer: MEDICARE

## 2017-12-13 PROCEDURE — S9453 SMOKING CESSATION CLASS: HCPCS

## 2017-12-13 NOTE — RESPIRATORY CARE
"Patient attended week 2 Quit Tobacco class from 11 am to 12 pm.  Patient has not quit smoking. Patient has set a Quit Date for 12/18/2017.Patient plans on using Chantix. This week we covered \"Why Do I Smoke?\" The Smokers ([place]) Self-Test and review the results. The patient received the second part of their personalized Tobacco Quit Plan and Trigger List to complete.  We will see her back next week for week 3.  "

## 2017-12-27 ENCOUNTER — HOSPITAL ENCOUNTER (OUTPATIENT)
Dept: OTHER | Facility: MEDICAL CENTER | Age: 77
End: 2017-12-27
Attending: INTERNAL MEDICINE
Payer: MEDICARE

## 2017-12-27 PROCEDURE — S9453 SMOKING CESSATION CLASS: HCPCS

## 2018-01-30 ENCOUNTER — HOSPITAL ENCOUNTER (OUTPATIENT)
Facility: MEDICAL CENTER | Age: 78
End: 2018-01-30
Attending: NURSE PRACTITIONER
Payer: MEDICARE

## 2018-01-30 PROCEDURE — 87186 SC STD MICRODIL/AGAR DIL: CPT

## 2018-01-30 PROCEDURE — 87086 URINE CULTURE/COLONY COUNT: CPT

## 2018-01-30 PROCEDURE — 87077 CULTURE AEROBIC IDENTIFY: CPT

## 2018-02-02 LAB
BACTERIA UR CULT: ABNORMAL
BACTERIA UR CULT: ABNORMAL
SIGNIFICANT IND 70042: ABNORMAL
SITE SITE: ABNORMAL
SOURCE SOURCE: ABNORMAL

## 2018-03-14 ENCOUNTER — PATIENT OUTREACH (OUTPATIENT)
Dept: HEALTH INFORMATION MANAGEMENT | Facility: OTHER | Age: 78
End: 2018-03-14

## 2018-03-14 NOTE — PROGRESS NOTES
1. Attempt #:FINAL    2. [facility] Verified: yes    3. Verify PCP: yes    4. Care Team Updated:       •   DME Company (gait device, O2, CPAP, etc.): NO       •   Other Specialists (eye doctor, derm, GYN, cardiology, endo, etc): NO    5.  Reviewed/Updated the following with patient:       •   Communication Preference Obtained? YES       •   Preferred Pharmacy? YES       •   Preferred Lab? YES       •   Family History (document living status of immediate family members and if + hx of cancer, diabetes, hypertension, hyperlipidemia, heart attack, stroke) YES. Was Abstract Encounter opened and chart updated? YES    6. [employer] Activation: already active    7. [employer] Daniel: yes    8. Annual Wellness Visit Scheduling  Scheduling Status:Scheduled      9. Care Gap Scheduling (Attempt to Schedule EACH Overdue Care Gap!)     Health Maintenance Due   Topic Date Due   • MAMMOGRAM  11/24/2016        Scheduled patient for Annual Wellness Visit and Mammogram    10. Patient was advised: “This is a free wellness visit. The provider will screen for medical conditions to help you stay healthy. If you have other concerns to address you may be asked to discuss these at a separate visit or there may be an additional fee.”     11. Patient was informed to arrive 15 min prior to their scheduled appointment and bring in their medication bottles.

## 2018-03-20 ENCOUNTER — APPOINTMENT (OUTPATIENT)
Dept: MEDICAL GROUP | Facility: MEDICAL CENTER | Age: 78
End: 2018-03-20
Payer: MEDICARE

## 2018-04-09 ENCOUNTER — HOSPITAL ENCOUNTER (OUTPATIENT)
Dept: RADIOLOGY | Facility: MEDICAL CENTER | Age: 78
End: 2018-04-09
Attending: INTERNAL MEDICINE
Payer: MEDICARE

## 2018-04-09 DIAGNOSIS — Z12.39 SCREENING FOR BREAST CANCER: ICD-10-CM

## 2018-04-09 PROCEDURE — 77063 BREAST TOMOSYNTHESIS BI: CPT

## 2018-04-21 DIAGNOSIS — I10 ESSENTIAL HYPERTENSION: ICD-10-CM

## 2018-04-23 RX ORDER — LEVOTHYROXINE SODIUM 137 UG/1
TABLET ORAL
Qty: 90 TAB | Refills: 3 | Status: SHIPPED | OUTPATIENT
Start: 2018-04-23 | End: 2019-09-23 | Stop reason: SDUPTHER

## 2018-04-27 ENCOUNTER — OFFICE VISIT (OUTPATIENT)
Dept: MEDICAL GROUP | Facility: MEDICAL CENTER | Age: 78
End: 2018-04-27
Payer: MEDICARE

## 2018-04-27 VITALS
HEART RATE: 79 BPM | BODY MASS INDEX: 38.46 KG/M2 | DIASTOLIC BLOOD PRESSURE: 76 MMHG | HEIGHT: 62 IN | RESPIRATION RATE: 16 BRPM | TEMPERATURE: 98.3 F | OXYGEN SATURATION: 92 % | WEIGHT: 209 LBS | SYSTOLIC BLOOD PRESSURE: 124 MMHG

## 2018-04-27 DIAGNOSIS — I10 ESSENTIAL HYPERTENSION: ICD-10-CM

## 2018-04-27 DIAGNOSIS — F03.90 DEMENTIA WITHOUT BEHAVIORAL DISTURBANCE, UNSPECIFIED DEMENTIA TYPE: ICD-10-CM

## 2018-04-27 DIAGNOSIS — Z91.81 AT MODERATE RISK FOR FALL: ICD-10-CM

## 2018-04-27 DIAGNOSIS — G60.9 IDIOPATHIC PERIPHERAL NEUROPATHY: ICD-10-CM

## 2018-04-27 DIAGNOSIS — E03.9 HYPOTHYROIDISM, UNSPECIFIED TYPE: ICD-10-CM

## 2018-04-27 DIAGNOSIS — R73.9 HYPERGLYCEMIA: ICD-10-CM

## 2018-04-27 DIAGNOSIS — R60.9 EDEMA, UNSPECIFIED TYPE: ICD-10-CM

## 2018-04-27 DIAGNOSIS — E66.9 OBESITY (BMI 30-39.9): ICD-10-CM

## 2018-04-27 DIAGNOSIS — R60.1 GENERALIZED EDEMA: ICD-10-CM

## 2018-04-27 DIAGNOSIS — E78.5 HYPERLIPIDEMIA, UNSPECIFIED HYPERLIPIDEMIA TYPE: ICD-10-CM

## 2018-04-27 DIAGNOSIS — R10.9 FLANK PAIN: ICD-10-CM

## 2018-04-27 DIAGNOSIS — G47.30 SLEEP APNEA, UNSPECIFIED TYPE: ICD-10-CM

## 2018-04-27 DIAGNOSIS — Z00.00 ROUTINE GENERAL MEDICAL EXAMINATION AT A HEALTH CARE FACILITY: ICD-10-CM

## 2018-04-27 DIAGNOSIS — E66.01 MORBID OBESITY (HCC): ICD-10-CM

## 2018-04-27 PROCEDURE — G0439 PPPS, SUBSEQ VISIT: HCPCS | Performed by: INTERNAL MEDICINE

## 2018-04-27 RX ORDER — TRIAMTERENE AND HYDROCHLOROTHIAZIDE 37.5; 25 MG/1; MG/1
1 TABLET ORAL
Qty: 30 TAB | Refills: 0 | Status: SHIPPED | OUTPATIENT
Start: 2018-04-27 | End: 2018-05-23 | Stop reason: SDUPTHER

## 2018-04-27 ASSESSMENT — PATIENT HEALTH QUESTIONNAIRE - PHQ9: CLINICAL INTERPRETATION OF PHQ2 SCORE: 0

## 2018-04-27 ASSESSMENT — ACTIVITIES OF DAILY LIVING (ADL): BATHING_REQUIRES_ASSISTANCE: 0

## 2018-04-27 NOTE — PROGRESS NOTES
Chief Complaint   Patient presents with   • Annual Wellness Visit         HPI:  Margaret is a 77 y.o. here for Medicare Annual Wellness Visit    Patient Active Problem List    Diagnosis Date Noted   • Dementia 05/29/2011     Priority: Low   • Hypothyroidism 05/29/2011     Priority: Low   • Peripheral neuropathy (CMS-HCC) 05/29/2011     Priority: Low   • Generalized edema 04/27/2018   • Morbid obesity (CMS-HCC) 04/27/2018   • Body mass index 40.0-44.9, adult (CMS-HCC) 04/27/2018   • Flank pain 11/14/2017   • At moderate risk for fall 11/14/2017   • Need for varicella vaccine 10/18/2016   • Dysuria 10/18/2016   • Hay fever 04/19/2016   • RUQ pain 02/29/2016   • Flu vaccine need 11/24/2015   • Hyperlipidemia 11/24/2015   • Essential hypertension 11/24/2015   • Hyperglycemia 11/24/2015   • Routine general medical examination at a health care facility 07/20/2015   • Obesity (BMI 30-39.9) 07/20/2015   • Sleep apnea 07/20/2015       Current Outpatient Prescriptions   Medication Sig Dispense Refill   • triamterene-hctz (MAXZIDE-25/DYAZIDE) 37.5-25 MG Tab Take 1 Tab by mouth 1 time daily as needed. 30 Tab 0   • levothyroxine (SYNTHROID) 137 MCG Tab TAKE 1 TABLET BY MOUTH EVERY MORNING ON AN EMPTY STOMACH. 90 Tab 3   • lisinopril (PRINIVIL) 10 MG Tab TAKE 1 TABLET BY MOUTH EVERY DAY 90 Tab 3   • atorvastatin (LIPITOR) 10 MG Tab TAKE ONE TABLET BY MOUTH ONE TIME DAILY 90 Tab 3   • fenofibrate (TRIGLIDE) 160 MG tablet TAKE ONE TABLET BY MOUTH ONE TIME DAILY 90 Tab 3   • aspirin 81 MG tablet Take 81 mg by mouth every morning.     • varenicline (CHANTIX STARTING MONTH PAK) 0.5 MG X 11 & 1 MG X 42 tablet Take according to package instructions 56 Tab 0   • varenicline (CHANTIX) 1 MG tablet Take 1 Tab by mouth 2 times a day. 60 Tab 3   • latanoprost (XALATAN) 0.005 % Solution INSTILL 1 DROP INTO BOTH EYES EVERY EVENING AT BEDTIME  4   • acetaminophen (TYLENOL) 500 MG TABS Take 500 mg by mouth 3 times a day as needed. Indications: Pain        No current facility-administered medications for this visit.         Patient is taking medications as noted in medication list.  Current supplements as per medication list.     Allergies: Food; Neomycin-polymyxin b; Pcn [penicillins]; and Sulfa drugs    Current social contact/activities: Pt visits with friends and family, weeekly bible study, bernadine with the girls once a month   Patient's perception of their health: excellent    Is patient current with immunizations? Yes.    She  reports that she has been smoking Cigarettes.  She has a 28.00 pack-year smoking history. She has never used smokeless tobacco. She reports that she does not drink alcohol or use drugs.  Ready to quit: No  Counseling given: Yes        DPA/Advanced directive: Patient has Advanced Directive, but it is not on file. Instructed to bring in a copy to scan into their chart.    ROS:    Gait: Uses a cane   Ostomy: No   Other tubes: No   Amputations: No   Chronic oxygen use: No   Last eye exam: 2 weeks ago   Wears hearing aids: No   : Reports urinary leakage during the last 6 months that has somewhat interfered with their daily activities or sleep.      Depression Screening  Little interest or pleasure in doing things?  0 - not at all  Feeling down, depressed, or hopeless? 0 - not at all  Patient Health Questionnaire Score: 0    If depressive symptoms identified deferred to follow up visit unless specifically addressed in assessment and plan.    Interpretation of PHQ-9 Total Score   Score Severity   1-4 No Depression   5-9 Mild Depression   10-14 Moderate Depression   15-19 Moderately Severe Depression   20-27 Severe Depression    Screening for Cognitive Impairment  Three Minute Recall (apple, watch, thais)  3/3 Village, Kitchen, Baby  Draw clock face with all 12 numbers set to the hand to show 10 minutes past 11 o'clock    5/5 Time 3:40  If cognitive concerns identified, deferred for follow up unless specifically addressed in assessment and  plan.    Fall Risk Assessment  Has the patient had two or more falls in the last year or any fall with injury in the last year?  Yes  If fall risk identified, deferred for follow up unless specifically addressed in assessment and plan.    Safety Assessment  Throw rugs on floor.  No  Handrails on all stairs.  Yes  Good lighting in all hallways.  Yes  Difficulty hearing.  No  Patient counseled about all safety risks that were identified.    Functional Assessment ADLs  Are there any barriers preventing you from cooking for yourself or meeting nutritional needs?  No.    Are there any barriers preventing you from driving safely or obtaining transportation?  No.    Are there any barriers preventing you from using a telephone or calling for help?  No.    Are there any barriers preventing you from shopping?  No.    Are there any barriers preventing you from taking care of your own finances?  No.    Are there any barriers preventing you from managing your medications?  No.    Are there any barriers preventing you from showering, bathing or dressing yourself?  No.    Are you currently engaging any exercise or physical activity?  Yes.  Pt reports walking weekly  For 40 mins    Health Maintenance Summary                Annual Wellness Visit Next Due 11/15/2018      Not specified 11/14/2017      Patient has more history with this topic...    MAMMOGRAM Next Due 4/9/2019      Done 4/9/2018 MA-MAMMO SCREENING BILAT W/TOMOSYNTHESIS W/CAD     Patient has more history with this topic...    BONE DENSITY Next Due 11/24/2020      Postponed 11/24/2015 Has not had one since 2010     Patient has more history with this topic...    IMM DTaP/Tdap/Td Vaccine Next Due 5/6/2023      Done 5/6/2013 Imm Admin: Tdap Vaccine    COLONOSCOPY Next Due 11/24/2025      Postponed 11/24/2015 Does not remember          Patient Care Team:  Rodrigo Ozuna M.D. as PCP - General  Tello Chilel M.D. as Consulting Physician (Ophthalmology)    Social History    Substance Use Topics   • Smoking status: Current Every Day Smoker     Packs/day: 0.50     Years: 56.00     Types: Cigarettes   • Smokeless tobacco: Never Used      Comment: started smoking at age 12   • Alcohol use No     Family History   Problem Relation Age of Onset   • Stroke Mother    • Hyperlipidemia Mother    • Hypertension Mother    • Arthritis Mother    • Diabetes Father    • Lung Disease Father      pneumonia   • Arthritis Sister    • Rheumatologic Disease Sister    • Hypertension Sister    • Hyperlipidemia Sister    • Other Sister      Anusha/Fibermyalgia   • Hyperlipidemia Brother    • Hypertension Brother    • Arthritis Brother    • Lung Disease Maternal Grandmother      pneumonia   • Stroke Maternal Grandfather    • Cancer Maternal Grandfather      skin    • No Known Problems Paternal Grandmother    • No Known Problems Paternal Grandfather    • Hyperlipidemia Daughter    • Diabetes Daughter    • No Known Problems Son      She  has a past medical history of Arthritis; ASTHMA; Body mass index 40.0-44.9, adult (CMS-[facility]) (4/27/2018); Dysuria (10/18/2016); Fall; Generalized edema (4/27/2018); Hay fever (4/19/2016); Heart burn; High cholesterol; UTI (urinary tract infection); Hyperglycemia (11/24/2015); Hypertension; Hypothyroidism; Indigestion; Migraines; Neuropathy; Obesity (BMI 30-39.9) (7/20/2015); Pneumonia; Routine general medical examination at a health care facility (7/20/2015); Sleep apnea; Snoring; URI (upper respiratory infection) (11/4/2015); and Urinary bladder disorder.   Past Surgical History:   Procedure Laterality Date   • NIRU BY LAPAROSCOPY  9/2/2011    Performed by KAYLEIGH PORRAS at SURGERY SAME DAY [facility] ORS   • LUMBAR DECOMPRESSION  6/29/2009    Performed by ANG YAN at SURGERY [facility] ORS   • LUMBAR LAMINECTOMY DISKECTOMY  6/29/2009    Performed by ANG YAN at SURGERY [facility] ORS   • ABDOMINAL HYSTERECTOMY TOTAL     • GYN SURGERY      hysterectomy   •  "HEMORRHOIDECTOMY           Exam:   Blood pressure 124/76, pulse 79, temperature 36.8 °C (98.3 °F), resp. rate 16, height 1.57 m (5' 1.81\"), weight 94.8 kg (209 lb), SpO2 92 %. Body mass index is 38.46 kg/m².    Hearing excellent.    Dentition upper dentures  Alert, oriented in no acute distress.  Eye contact is good, speech goal directed, affect calm.  Neck is supple and without lymphadenopathy.  Lungs are clear without significant rales or wheeze.  Cardiovascular peripheral circulation is satisfactory.  Heart sounds are unremarkable.  Abdomen is soft without obvious masses or tenderness.  There are normal bowel sounds.  Breasts, pelvic, and rectal exams were not performed.  Extremities had 1+ pedal edema bilaterally.  There was no calf or thigh tenderness to palpation.  Homans sign was negative.  Brief neurologic exam was unremarkable with essentially normal sensation, strength, gait, and cerebellar functions.      Assessment and Plan. The following treatment and monitoring plan is recommended:    Obesity (BMI 30-39.9)  She remains significantly overweight with BMI 38.46.  She is quite sedentary.  She is not very careful about her diet.    Sleep apnea  She has obstructive sleep apnea but refuses to use CPAP.  She understands the significance of this.    Hyperlipidemia  She has not had a recent lipid panel.  Lipid panel in September however was satisfactory.  She continues taking atorvastatin without difficulty along with fenofibrate.    Essential hypertension  Blood pressure today is satisfactory.  She continues lisinopril 10 mg daily.    Hyperglycemia  She has a history of hyperglycemia but has not had a recent sugar drawn.  Glucose in September was satisfactory.    At moderate risk for fall  She reports that she has fallen.  We reviewed a regimen to avoid falling including use of a cane.    Dementia  Her dementia remains mild.  Her granddaughter who accompanies her reports that it is " stable.    Hypothyroidism  She continues levothyroxine.  Clinically she is euthyroid.  We will check TSH at least yearly.  Last TSH in September was satisfactory.    Peripheral neuropathy  She has mild peripheral neuropathy symptoms that are unchanged recently.  She was encouraged to be sure to wear good shoes.    Generalized edema  Over the last couple of days she has noticed swelling of both feet.  She does not think she has been getting more salt than usual but is uncertain.  She is quite sedentary.  She leaves her legs dependent.  She has had this problem in the past for which she takes Dyazide occasionally.  We will check a BMP.    At this time she does not have morbid obesity and her body mass index is lower than 40.0.    Services suggested: No services needed at this time  Health Care Screening: Age-appropriate preventive services recommended by USPTF and ACIP covered by Medicare were discussed today. Services ordered if indicated and agreed upon by the patient.  Referrals offered: PT/OT/Nutrition counseling/Behavioral Health/Smoking cessation as per orders if indicated.    Discussion today about general wellness and lifestyle habits:    · Prevent falls and reduce trip hazards; Cautioned about securing or removing rugs.  · Have a working fire alarm and carbon monoxide detector;   · Engage in regular physical activity and social activities     Follow-up: 4 months if not sooner.

## 2018-04-27 NOTE — ASSESSMENT & PLAN NOTE
She has not had a recent lipid panel.  Lipid panel in September however was satisfactory.  She continues taking atorvastatin without difficulty along with fenofibrate.

## 2018-04-27 NOTE — ASSESSMENT & PLAN NOTE
She remains significantly overweight with BMI 38.46.  She is quite sedentary.  She is not very careful about her diet.

## 2018-04-27 NOTE — ASSESSMENT & PLAN NOTE
She continues levothyroxine.  Clinically she is euthyroid.  We will check TSH at least yearly.  Last TSH in September was satisfactory.

## 2018-04-27 NOTE — ASSESSMENT & PLAN NOTE
She has obstructive sleep apnea but refuses to use CPAP.  She understands the significance of this.

## 2018-04-27 NOTE — ASSESSMENT & PLAN NOTE
She has a history of hyperglycemia but has not had a recent sugar drawn.  Glucose in September was satisfactory.

## 2018-04-27 NOTE — ASSESSMENT & PLAN NOTE
She has mild peripheral neuropathy symptoms that are unchanged recently.  She was encouraged to be sure to wear good shoes.

## 2018-04-27 NOTE — ASSESSMENT & PLAN NOTE
Over the last couple of days she has noticed swelling of both feet.  She does not think she has been getting more salt than usual but is uncertain.  She is quite sedentary.  She believes her legs dependent.  She has had this problem in the past for which she takes Dyazide occasionally.

## 2018-05-01 ENCOUNTER — HOSPITAL ENCOUNTER (OUTPATIENT)
Dept: LAB | Facility: MEDICAL CENTER | Age: 78
End: 2018-05-01
Attending: INTERNAL MEDICINE
Payer: MEDICARE

## 2018-05-01 DIAGNOSIS — I10 ESSENTIAL HYPERTENSION: ICD-10-CM

## 2018-05-01 LAB
ANION GAP SERPL CALC-SCNC: 10 MMOL/L (ref 0–11.9)
BUN SERPL-MCNC: 19 MG/DL (ref 8–22)
CALCIUM SERPL-MCNC: 10 MG/DL (ref 8.5–10.5)
CHLORIDE SERPL-SCNC: 103 MMOL/L (ref 96–112)
CO2 SERPL-SCNC: 21 MMOL/L (ref 20–33)
CREAT SERPL-MCNC: 0.89 MG/DL (ref 0.5–1.4)
GLUCOSE SERPL-MCNC: 101 MG/DL (ref 65–99)
POTASSIUM SERPL-SCNC: 4.5 MMOL/L (ref 3.6–5.5)
SODIUM SERPL-SCNC: 134 MMOL/L (ref 135–145)

## 2018-05-01 PROCEDURE — 80048 BASIC METABOLIC PNL TOTAL CA: CPT

## 2018-05-01 PROCEDURE — 36415 COLL VENOUS BLD VENIPUNCTURE: CPT

## 2018-05-23 DIAGNOSIS — R60.9 EDEMA, UNSPECIFIED TYPE: ICD-10-CM

## 2018-05-24 RX ORDER — TRIAMTERENE AND HYDROCHLOROTHIAZIDE 37.5; 25 MG/1; MG/1
1 TABLET ORAL
Qty: 90 TAB | Refills: 0 | Status: SHIPPED | OUTPATIENT
Start: 2018-05-24 | End: 2018-08-31 | Stop reason: SDUPTHER

## 2018-06-05 ENCOUNTER — HOSPITAL ENCOUNTER (OUTPATIENT)
Dept: RADIOLOGY | Facility: MEDICAL CENTER | Age: 78
End: 2018-06-05
Attending: NURSE PRACTITIONER
Payer: MEDICARE

## 2018-06-05 DIAGNOSIS — R31.9 HEMATURIA, UNSPECIFIED TYPE: ICD-10-CM

## 2018-06-05 PROCEDURE — 700117 HCHG RX CONTRAST REV CODE 255: Performed by: NURSE PRACTITIONER

## 2018-06-05 PROCEDURE — 74178 CT ABD&PLV WO CNTR FLWD CNTR: CPT

## 2018-06-05 RX ADMIN — IOHEXOL 100 ML: 350 INJECTION, SOLUTION INTRAVENOUS at 10:29

## 2019-02-01 ENCOUNTER — HOSPITAL ENCOUNTER (OUTPATIENT)
Dept: LAB | Facility: MEDICAL CENTER | Age: 79
End: 2019-02-01
Attending: INTERNAL MEDICINE
Payer: MEDICARE

## 2019-02-01 DIAGNOSIS — E78.5 HYPERLIPIDEMIA, UNSPECIFIED HYPERLIPIDEMIA TYPE: ICD-10-CM

## 2019-02-01 DIAGNOSIS — I10 ESSENTIAL HYPERTENSION: ICD-10-CM

## 2019-02-01 DIAGNOSIS — E03.9 HYPOTHYROIDISM, UNSPECIFIED TYPE: ICD-10-CM

## 2019-02-01 LAB
ANION GAP SERPL CALC-SCNC: 6 MMOL/L (ref 0–11.9)
BUN SERPL-MCNC: 13 MG/DL (ref 8–22)
CALCIUM SERPL-MCNC: 9.4 MG/DL (ref 8.5–10.5)
CHLORIDE SERPL-SCNC: 105 MMOL/L (ref 96–112)
CHOLEST SERPL-MCNC: 110 MG/DL (ref 100–199)
CO2 SERPL-SCNC: 23 MMOL/L (ref 20–33)
CREAT SERPL-MCNC: 0.87 MG/DL (ref 0.5–1.4)
FASTING STATUS PATIENT QL REPORTED: NORMAL
GLUCOSE SERPL-MCNC: 102 MG/DL (ref 65–99)
HDLC SERPL-MCNC: 36 MG/DL
LDLC SERPL CALC-MCNC: 50 MG/DL
POTASSIUM SERPL-SCNC: 4 MMOL/L (ref 3.6–5.5)
SODIUM SERPL-SCNC: 134 MMOL/L (ref 135–145)
TRIGL SERPL-MCNC: 118 MG/DL (ref 0–149)
TSH SERPL DL<=0.005 MIU/L-ACNC: 1.38 UIU/ML (ref 0.38–5.33)

## 2019-02-01 PROCEDURE — 36415 COLL VENOUS BLD VENIPUNCTURE: CPT

## 2019-02-01 PROCEDURE — 84443 ASSAY THYROID STIM HORMONE: CPT

## 2019-02-01 PROCEDURE — 80048 BASIC METABOLIC PNL TOTAL CA: CPT

## 2019-02-01 PROCEDURE — 80061 LIPID PANEL: CPT

## 2019-03-08 ENCOUNTER — OFFICE VISIT (OUTPATIENT)
Dept: URGENT CARE | Facility: PHYSICIAN GROUP | Age: 79
End: 2019-03-08
Payer: MEDICARE

## 2019-03-08 ENCOUNTER — HOSPITAL ENCOUNTER (OUTPATIENT)
Facility: MEDICAL CENTER | Age: 79
End: 2019-03-08
Attending: NURSE PRACTITIONER
Payer: MEDICARE

## 2019-03-08 VITALS
HEIGHT: 63 IN | WEIGHT: 212 LBS | TEMPERATURE: 97.4 F | HEART RATE: 86 BPM | OXYGEN SATURATION: 98 % | BODY MASS INDEX: 37.56 KG/M2 | DIASTOLIC BLOOD PRESSURE: 74 MMHG | SYSTOLIC BLOOD PRESSURE: 128 MMHG

## 2019-03-08 DIAGNOSIS — R10.9 FLANK PAIN: ICD-10-CM

## 2019-03-08 DIAGNOSIS — M79.89 LEG SWELLING: ICD-10-CM

## 2019-03-08 DIAGNOSIS — R10.9 FLANK PAIN: Primary | ICD-10-CM

## 2019-03-08 LAB
APPEARANCE UR: CLEAR
BILIRUB UR STRIP-MCNC: NEGATIVE MG/DL
COLOR UR AUTO: YELLOW
GLUCOSE UR STRIP.AUTO-MCNC: NEGATIVE MG/DL
KETONES UR STRIP.AUTO-MCNC: NEGATIVE MG/DL
LEUKOCYTE ESTERASE UR QL STRIP.AUTO: NEGATIVE
NITRITE UR QL STRIP.AUTO: NEGATIVE
PH UR STRIP.AUTO: 5.5 [PH] (ref 5–8)
PROT UR QL STRIP: NEGATIVE MG/DL
RBC UR QL AUTO: NEGATIVE
SP GR UR STRIP.AUTO: 1.01
UROBILINOGEN UR STRIP-MCNC: 1 MG/DL

## 2019-03-08 PROCEDURE — 87086 URINE CULTURE/COLONY COUNT: CPT

## 2019-03-08 PROCEDURE — 81002 URINALYSIS NONAUTO W/O SCOPE: CPT | Performed by: NURSE PRACTITIONER

## 2019-03-08 PROCEDURE — 99213 OFFICE O/P EST LOW 20 MIN: CPT | Performed by: NURSE PRACTITIONER

## 2019-03-08 ASSESSMENT — ENCOUNTER SYMPTOMS
WEAKNESS: 0
CARDIOVASCULAR NEGATIVE: 1
RESPIRATORY NEGATIVE: 1
BACK PAIN: 1
VOMITING: 0
ABDOMINAL PAIN: 0
SHORTNESS OF BREATH: 0
NAUSEA: 0
FLANK PAIN: 1
FEVER: 0

## 2019-03-08 ASSESSMENT — PAIN SCALES - GENERAL: PAINLEVEL: 4=SLIGHT-MODERATE PAIN

## 2019-03-08 NOTE — PROGRESS NOTES
Subjective:     Margaret Garcia is a 78 y.o. female who presents for Pain (pain in kidney area x 3 days, discolored urine)       Back Pain   This is a new problem. The problem has been gradually improving since onset. The pain is mild. Pertinent negatives include no abdominal pain, chest pain, dysuria, fever or weakness.   Patient reports discolored urine and bilateral flank pain which has been improving. Denies dysuria, frequency, urgency, fevers, nausea, or vomiting. Pt reports hx of UTIs. Also reporting bilateral lower leg swelling x 1 day. Reports a history of bilateral lower extremity edema that has required diuretics in the past. Denies chest pain or SOB. Had labs done 2/1/2019 ordered by her PCP which were unremarkable.    PMH:  has a past medical history of Arthritis; ASTHMA; Body mass index 40.0-44.9, adult (HCC) (4/27/2018); Dysuria (10/18/2016); Fall; Generalized edema (4/27/2018); Hay fever (4/19/2016); Heart burn; High cholesterol; UTI (urinary tract infection); Hyperglycemia (11/24/2015); Hypertension; Hypothyroidism; Indigestion; Migraines; Neuropathy; Obesity (BMI 30-39.9) (7/20/2015); Pneumonia; Routine general medical examination at a health care facility (7/20/2015); Sleep apnea; Snoring; URI (upper respiratory infection) (11/4/2015); and Urinary bladder disorder.    MEDS:   Current Outpatient Prescriptions:   •  lisinopril (PRINIVIL) 10 MG Tab, TAKE 1 TABLET BY MOUTH EVERY DAY, Disp: 90 Tab, Rfl: 3  •  atorvastatin (LIPITOR) 10 MG Tab, TAKE ONE TABLET BY MOUTH ONE TIME DAILY, Disp: 90 Tab, Rfl: 3  •  fenofibrate (TRIGLIDE) 160 MG tablet, TAKE ONE TABLET BY MOUTH ONE TIME DAILY, Disp: 90 Tab, Rfl: 3  •  levothyroxine (SYNTHROID) 137 MCG Tab, TAKE 1 TABLET BY MOUTH EVERY MORNING ON AN EMPTY STOMACH., Disp: 90 Tab, Rfl: 3  •  acetaminophen (TYLENOL) 500 MG TABS, Take 500 mg by mouth 3 times a day as needed. Indications: Pain, Disp: , Rfl:   •  aspirin 81 MG tablet, Take 81 mg by mouth every morning.,  Disp: , Rfl:   •  triamterene/hctz (MAXZIDE-25/DYAZIDE) 37.5-25 MG Cap, TAKE ONE CAPSULE BY MOUTH EVERY DAY AS NEEDED (Patient not taking: Reported on 3/8/2019), Disp: 90 Cap, Rfl: 3  •  varenicline (CHANTIX STARTING MONTH PAK) 0.5 MG X 11 & 1 MG X 42 tablet, Take according to package instructions (Patient not taking: Reported on 3/8/2019), Disp: 56 Tab, Rfl: 0  •  varenicline (CHANTIX) 1 MG tablet, Take 1 Tab by mouth 2 times a day. (Patient not taking: Reported on 3/8/2019), Disp: 60 Tab, Rfl: 3  •  latanoprost (XALATAN) 0.005 % Solution, INSTILL 1 DROP INTO BOTH EYES EVERY EVENING AT BEDTIME, Disp: , Rfl: 4    ALLERGIES:   Allergies   Allergen Reactions   • Food Hives and Nausea     oranges   • Neomycin-Polymyxin B Rash   • Pcn [Penicillins]    • Sulfa Drugs      SURGHX:   Past Surgical History:   Procedure Laterality Date   • NIRU BY LAPAROSCOPY  9/2/2011    Performed by KAYLEIGH PORRAS at SURGERY SAME DAY [facility] ORS   • LUMBAR DECOMPRESSION  6/29/2009    Performed by ANG YAN at SURGERY [facility] ORS   • LUMBAR LAMINECTOMY DISKECTOMY  6/29/2009    Performed by ANG YAN at SURGERY [facility] ORS   • ABDOMINAL HYSTERECTOMY TOTAL     • GYN SURGERY      hysterectomy   • HEMORRHOIDECTOMY       SOCHX:  reports that she has been smoking Cigarettes.  She has a 28.00 pack-year smoking history. She has never used smokeless tobacco. She reports that she does not drink alcohol or use drugs.     FH: Reviewed with patient, not pertinent to this visit.     Review of Systems   Constitutional: Negative for fever.   Respiratory: Negative.  Negative for shortness of breath.    Cardiovascular: Negative.  Negative for chest pain.   Gastrointestinal: Negative for abdominal pain, nausea and vomiting.   Genitourinary: Positive for flank pain. Negative for dysuria.   Musculoskeletal: Positive for back pain.   Neurological: Negative for weakness.   All other systems reviewed and are negative.    Objective:     /74  "(BP Location: Left arm, Patient Position: Sitting, BP Cuff Size: Adult)   Pulse 86   Temp 36.3 °C (97.4 °F) (Temporal)   Ht 1.6 m (5' 3\")   Wt 96.2 kg (212 lb)   SpO2 98%   BMI 37.55 kg/m²     Physical Exam   Constitutional: She is oriented to person, place, and time. She appears well-developed and well-nourished. She is cooperative.  Non-toxic appearance. No distress.   HENT:   Head: Normocephalic and atraumatic.   Right Ear: External ear normal.   Left Ear: External ear normal.   Nose: Nose normal.   Mouth/Throat: Mucous membranes are normal.   Eyes: Conjunctivae and EOM are normal.   Neck: Normal range of motion.   Cardiovascular: Normal rate, regular rhythm, normal heart sounds and normal pulses.    Pulmonary/Chest: Effort normal and breath sounds normal. No respiratory distress. She has no decreased breath sounds.   Abdominal: Soft. Bowel sounds are normal. There is no tenderness. There is no CVA tenderness.   Musculoskeletal: Normal range of motion. She exhibits edema (bilateral lower leg mild). She exhibits no deformity.   Neurological: She is alert and oriented to person, place, and time. She has normal strength. No sensory deficit.   Skin: Skin is warm, dry and intact. Capillary refill takes less than 2 seconds.   Psychiatric: She has a normal mood and affect. Her behavior is normal.   Vitals reviewed.    UA:    Component Results     Component Value Ref Range & Units Status   POC Color Yellow  Negative Final   POC Appearance Clear  Negative Final   POC Leukocyte Esterase Negative  Negative Final   POC Nitrites Negative  Negative Final   POC Urobiligen 1.0  Negative (0.2) mg/dL Final   POC Protein Negative  Negative mg/dL Final   POC Urine PH 5.5  5.0 - 8.0 Final   POC Blood Negative  Negative Final   POC Specific Gravity 1.010  <1.005 - >1.030 Final   POC Ketones Negative  Negative mg/dL Final   POC Bilirubin Negative  Negative mg/dL Final   POC Glucose Negative  Negative mg/dL Final      "   Assessment/Plan:     1. Flank pain  - POCT Urinalysis  - URINE CULTURE(NEW); Future    2. Leg swelling    UA unremarkable for UTI. Urine culture pending. History of leg edema. Currently asymptomatic. Advised keeping legs elevated on pillows. Recommended short-term follow-up with PCP.     Patient advised to: Return for 1) Symptoms don't improve or worsen, or go to ER, 2) Follow up with primary care in 7-10 days.    Differential diagnosis, natural history, supportive care, and indications for immediate follow-up discussed. All questions answered. Patient agrees with the plan of care.

## 2019-03-11 LAB
BACTERIA UR CULT: NORMAL
SIGNIFICANT IND 70042: NORMAL
SITE SITE: NORMAL
SOURCE SOURCE: NORMAL

## 2019-04-26 ENCOUNTER — PATIENT OUTREACH (OUTPATIENT)
Dept: HEALTH INFORMATION MANAGEMENT | Facility: OTHER | Age: 79
End: 2019-04-26

## 2019-04-26 NOTE — PROGRESS NOTES
1. HealthConnect Verified: yes    2. Verify PCP: yes    3. Review and add  to Care Team: yes    4. WebIZ Checked & Epic Updated: Yes  WebIZ Recommendations: SHINGRIX (Shingles)  Is patient due for Tdap? NO  Is patient due for Shingles? NO    5. Reviewed/Updated the following with patient:       •   Communication Preference Obtained? YES  • [facility] Activation: already active       •   E-Mail Address Obtained? YES       •   Appointment Day and Time Preferences? NO       •   Preferred Pharmacy? YES       •   Preferred Lab? YES    6. Care Gap Scheduling (Attempt to Schedule EACH Overdue Care Gap!)    Scheduled patient for Mammogram

## 2019-04-29 ENCOUNTER — OFFICE VISIT (OUTPATIENT)
Dept: MEDICAL GROUP | Facility: MEDICAL CENTER | Age: 79
End: 2019-04-29
Payer: MEDICARE

## 2019-04-29 VITALS
HEIGHT: 63 IN | WEIGHT: 209 LBS | OXYGEN SATURATION: 99 % | HEART RATE: 82 BPM | BODY MASS INDEX: 37.03 KG/M2 | TEMPERATURE: 97.2 F | SYSTOLIC BLOOD PRESSURE: 122 MMHG | DIASTOLIC BLOOD PRESSURE: 72 MMHG

## 2019-04-29 DIAGNOSIS — R60.1 GENERALIZED EDEMA: ICD-10-CM

## 2019-04-29 DIAGNOSIS — Z72.0 TOBACCO USE: ICD-10-CM

## 2019-04-29 DIAGNOSIS — I10 ESSENTIAL HYPERTENSION: ICD-10-CM

## 2019-04-29 DIAGNOSIS — R60.0 EDEMA LEG: ICD-10-CM

## 2019-04-29 DIAGNOSIS — E03.9 HYPOTHYROIDISM, UNSPECIFIED TYPE: ICD-10-CM

## 2019-04-29 DIAGNOSIS — R60.9 EDEMA, UNSPECIFIED TYPE: ICD-10-CM

## 2019-04-29 DIAGNOSIS — E66.9 OBESITY (BMI 30-39.9): ICD-10-CM

## 2019-04-29 PROCEDURE — 99214 OFFICE O/P EST MOD 30 MIN: CPT | Performed by: INTERNAL MEDICINE

## 2019-04-29 RX ORDER — POTASSIUM CHLORIDE 750 MG/1
10 TABLET, EXTENDED RELEASE ORAL DAILY
Qty: 30 EACH | Refills: 0 | Status: SHIPPED | OUTPATIENT
Start: 2019-04-29 | End: 2019-05-22 | Stop reason: SDUPTHER

## 2019-04-29 RX ORDER — TRIAMTERENE AND HYDROCHLOROTHIAZIDE 37.5; 25 MG/1; MG/1
1 CAPSULE ORAL
Qty: 90 CAP | Refills: 3 | Status: SHIPPED | OUTPATIENT
Start: 2019-04-29 | End: 2020-06-29

## 2019-04-29 ASSESSMENT — PATIENT HEALTH QUESTIONNAIRE - PHQ9: CLINICAL INTERPRETATION OF PHQ2 SCORE: 0

## 2019-04-30 ENCOUNTER — APPOINTMENT (OUTPATIENT)
Dept: RADIOLOGY | Facility: MEDICAL CENTER | Age: 79
End: 2019-04-30
Attending: NURSE PRACTITIONER
Payer: MEDICARE

## 2019-04-30 NOTE — ASSESSMENT & PLAN NOTE
She reports that over the last week she has noticed edema of her hands and feet along with some mild exertional dyspnea.  Today she thinks the edema is a little better.  She has had bouts of edema previously that have been treated with diuretics successfully.  She reports that her mother and sister have had the same problem.  She denies any chest pain or palpitations.  She does not think she has been getting any more salt than usual.  She reports that she feels better if she lays down.

## 2019-04-30 NOTE — PROGRESS NOTES
Subjective:     Chief Complaint   Patient presents with   • Eye Swelling     x1 week   • Hand Swelling     x1 week   • Foot Swelling     x1 week    • Shortness of Breath     x1 week    • Hypertension     Margaret Garcia is a 78 y.o. female here today for evaluation of edema especially in her legs but also to some extent in her hands and face.  She is accompanied by her daughter.    Essential hypertension  She has hypertension for which she is taking lisinopril with good control of blood pressure.  She was taking Dyazide but has stopped that.  She denies lightheadedness or headaches.    Generalized edema  She reports that over the last week she has noticed edema of her hands and feet along with some mild exertional dyspnea.  Today she thinks the edema is a little better.  She has had bouts of edema previously that have been treated with diuretics successfully.  She reports that her mother and sister have had the same problem.  She denies any chest pain or palpitations.  She does not think she has been getting any more salt than usual.  She reports that she feels better if she lays down.    Hypothyroidism  She has a history of hypothyroidism for which she takes levothyroxine.  She has not skipped any doses of that medication recently.  She did have a normal TSH in February.  Clinically she is euthyroid.    Obesity (BMI 30-39.9)  She remains quite obese with BMI 37.02.  Actually her weight is down 3 pounds compared to March.    Tobacco use  She unfortunately still smokes but reports that she has set a quit date and she thinks she can actually quit.       Diagnoses of Edema, unspecified type, Hypothyroidism, unspecified type, Obesity (BMI 30-39.9), Essential hypertension, Generalized edema, Tobacco use, and Edema leg were pertinent to this visit.    Allergies: Food; Neomycin-polymyxin b; Pcn [penicillins]; and Sulfa drugs  Current medicines (including changes today)  Current Outpatient Prescriptions   Medication Sig  Dispense Refill   • triamterene/hctz (MAXZIDE-25/DYAZIDE) 37.5-25 MG Cap Take 1 Cap by mouth every day. 90 Cap 3   • potassium chloride SA (KLOR-CON M10) 10 MEQ Tab CR Take 1 Tab by mouth every day for 30 days. 30 Each 0   • lisinopril (PRINIVIL) 10 MG Tab TAKE 1 TABLET BY MOUTH EVERY DAY 90 Tab 3   • atorvastatin (LIPITOR) 10 MG Tab TAKE ONE TABLET BY MOUTH ONE TIME DAILY 90 Tab 3   • fenofibrate (TRIGLIDE) 160 MG tablet TAKE ONE TABLET BY MOUTH ONE TIME DAILY 90 Tab 3   • levothyroxine (SYNTHROID) 137 MCG Tab TAKE 1 TABLET BY MOUTH EVERY MORNING ON AN EMPTY STOMACH. 90 Tab 3   • acetaminophen (TYLENOL) 500 MG TABS Take 500 mg by mouth 3 times a day as needed. Indications: Pain     • aspirin 81 MG tablet Take 81 mg by mouth every morning.     • varenicline (CHANTIX STARTING MONTH PAK) 0.5 MG X 11 & 1 MG X 42 tablet Take according to package instructions (Patient not taking: Reported on 3/8/2019) 56 Tab 0   • varenicline (CHANTIX) 1 MG tablet Take 1 Tab by mouth 2 times a day. (Patient not taking: Reported on 3/8/2019) 60 Tab 3   • latanoprost (XALATAN) 0.005 % Solution INSTILL 1 DROP INTO BOTH EYES EVERY EVENING AT BEDTIME  4     No current facility-administered medications for this visit.        She  has a past medical history of Arthritis; ASTHMA; Body mass index 40.0-44.9, adult ([facility]) (4/27/2018); Dysuria (10/18/2016); Fall; Generalized edema (4/27/2018); Hay fever (4/19/2016); Heart burn; High cholesterol; UTI (urinary tract infection); Hyperglycemia (11/24/2015); Hypertension; Hypothyroidism; Indigestion; Migraines; Neuropathy ([facility]); Obesity (BMI 30-39.9) (7/20/2015); Pneumonia; Routine general medical examination at a health care facility (7/20/2015); Sleep apnea; Snoring; Tobacco use (4/29/2019); URI (upper respiratory infection) (11/4/2015); and Urinary bladder disorder.    ROS    Patient denies significant change in strength, weight or appetite.  No significant lightheadedness or headaches.  No change in  "vision, hearing, or swallowing.  No new dyspnea, coughing, chest pain, or palpitations.  She does have some chronic dyspnea that might be slightly worse she thinks.  No indigestion, abdominal pain, or change in bowel habits.  No change in urinating.  Edema most prominently in her lower legs and hands and to some extent her face..       Objective:     PE:  /72 (BP Location: Left arm, Patient Position: Sitting)   Pulse 82   Temp 36.2 °C (97.2 °F)   Ht 1.6 m (5' 3\")   Wt 94.8 kg (209 lb)   SpO2 99%   BMI 37.02 kg/m²    Neck is supple without significant lymphadenopathy or masses.  Lungs are clear with normal breath sounds without wheezes or rales .  Cardiovascular: peripheral circulation is satisfactory, heart sounds are unchanged and unremarkable.  Abdomen is soft, without masses or tenderness, with normal bowel sounds.  Extremities reveal 2+ ankle edema and trace to 1+ edema in her hands.  Her face appears puffy.    Assessment and Plan:   The following treatment plan was discussed  1. Edema, unspecified type  Basic Metabolic Panel    EC-ECHOCARDIOGRAM LTD W/O CONT    triamterene/hctz (MAXZIDE-25/DYAZIDE) 37.5-25 MG Cap    She was educated again in a low-salt diet.  She will restart taking Dyazide.  When sitting she will get her legs elevated.  We need to check renal and thyroid function.we also will check an echocardiogram to evaluate cardiac function.   2. Hypothyroidism, unspecified type  TSH    With a new onset of edema, we will check another TSH.   3. Obesity (BMI 30-39.9)      She again was encouraged in a weight loss program.   4. Essential hypertension      Continue lisinopril and low-salt diet and restart Dyazide.  She was cautioned about low blood pressure.   5. Generalized edema     6. Tobacco use      She was strongly encouraged to stop smoking.   7. Edema leg  potassium chloride SA (KLOR-CON M10) 10 MEQ Tab CR       Followup: She has an appointment with us next week and will keep that " appointment.

## 2019-04-30 NOTE — ASSESSMENT & PLAN NOTE
She has hypertension for which she is taking lisinopril with good control of blood pressure.  She was taking Dyazide but has stopped that.  She denies lightheadedness or headaches.

## 2019-04-30 NOTE — ASSESSMENT & PLAN NOTE
She has a history of hypothyroidism for which she takes levothyroxine.  She has not skipped any doses of that medication recently.  She did have a normal TSH in February.  Clinically she is euthyroid.

## 2019-04-30 NOTE — ASSESSMENT & PLAN NOTE
She unfortunately still smokes but reports that she has set a quit date and she thinks she can actually quit.

## 2019-05-06 DIAGNOSIS — I10 ESSENTIAL HYPERTENSION: ICD-10-CM

## 2019-05-06 RX ORDER — ATORVASTATIN CALCIUM 10 MG/1
10 TABLET, FILM COATED ORAL DAILY
Qty: 100 TAB | Refills: 3 | Status: SHIPPED | OUTPATIENT
Start: 2019-05-06 | End: 2020-07-10

## 2019-05-06 RX ORDER — LISINOPRIL 10 MG/1
10 TABLET ORAL
Qty: 100 TAB | Refills: 3 | Status: SHIPPED | OUTPATIENT
Start: 2019-05-06 | End: 2020-07-10

## 2019-05-09 ENCOUNTER — HOSPITAL ENCOUNTER (OUTPATIENT)
Dept: RADIOLOGY | Facility: MEDICAL CENTER | Age: 79
End: 2019-05-09
Attending: NURSE PRACTITIONER
Payer: MEDICARE

## 2019-05-09 ENCOUNTER — HOSPITAL ENCOUNTER (OUTPATIENT)
Dept: LAB | Facility: MEDICAL CENTER | Age: 79
End: 2019-05-09
Attending: INTERNAL MEDICINE
Payer: MEDICARE

## 2019-05-09 ENCOUNTER — HOSPITAL ENCOUNTER (OUTPATIENT)
Dept: RADIOLOGY | Facility: MEDICAL CENTER | Age: 79
End: 2019-05-09
Attending: INTERNAL MEDICINE
Payer: MEDICARE

## 2019-05-09 DIAGNOSIS — M54.5 LOW BACK PAIN, UNSPECIFIED BACK PAIN LATERALITY, UNSPECIFIED CHRONICITY, WITH SCIATICA PRESENCE UNSPECIFIED: ICD-10-CM

## 2019-05-09 DIAGNOSIS — Z12.39 BREAST CANCER SCREENING: ICD-10-CM

## 2019-05-09 DIAGNOSIS — E03.9 HYPOTHYROIDISM, UNSPECIFIED TYPE: ICD-10-CM

## 2019-05-09 DIAGNOSIS — R60.9 EDEMA, UNSPECIFIED TYPE: ICD-10-CM

## 2019-05-09 LAB
ANION GAP SERPL CALC-SCNC: 9 MMOL/L (ref 0–11.9)
BUN SERPL-MCNC: 28 MG/DL (ref 8–22)
CALCIUM SERPL-MCNC: 9.7 MG/DL (ref 8.5–10.5)
CHLORIDE SERPL-SCNC: 105 MMOL/L (ref 96–112)
CO2 SERPL-SCNC: 21 MMOL/L (ref 20–33)
CREAT SERPL-MCNC: 1.01 MG/DL (ref 0.5–1.4)
GLUCOSE SERPL-MCNC: 93 MG/DL (ref 65–99)
POTASSIUM SERPL-SCNC: 4.4 MMOL/L (ref 3.6–5.5)
SODIUM SERPL-SCNC: 135 MMOL/L (ref 135–145)
TSH SERPL DL<=0.005 MIU/L-ACNC: 1.62 UIU/ML (ref 0.38–5.33)

## 2019-05-09 PROCEDURE — 36415 COLL VENOUS BLD VENIPUNCTURE: CPT

## 2019-05-09 PROCEDURE — 72148 MRI LUMBAR SPINE W/O DYE: CPT

## 2019-05-09 PROCEDURE — 77063 BREAST TOMOSYNTHESIS BI: CPT

## 2019-05-09 PROCEDURE — 72110 X-RAY EXAM L-2 SPINE 4/>VWS: CPT

## 2019-05-09 PROCEDURE — 80048 BASIC METABOLIC PNL TOTAL CA: CPT

## 2019-05-09 PROCEDURE — 84443 ASSAY THYROID STIM HORMONE: CPT

## 2019-05-13 ENCOUNTER — OFFICE VISIT (OUTPATIENT)
Dept: MEDICAL GROUP | Facility: MEDICAL CENTER | Age: 79
End: 2019-05-13
Payer: MEDICARE

## 2019-05-13 VITALS
TEMPERATURE: 98.5 F | OXYGEN SATURATION: 99 % | BODY MASS INDEX: 36.5 KG/M2 | WEIGHT: 206 LBS | DIASTOLIC BLOOD PRESSURE: 78 MMHG | HEART RATE: 82 BPM | HEIGHT: 63 IN | SYSTOLIC BLOOD PRESSURE: 120 MMHG

## 2019-05-13 DIAGNOSIS — I10 ESSENTIAL HYPERTENSION: ICD-10-CM

## 2019-05-13 DIAGNOSIS — R73.9 HYPERGLYCEMIA: ICD-10-CM

## 2019-05-13 DIAGNOSIS — Z72.0 TOBACCO USE: ICD-10-CM

## 2019-05-13 DIAGNOSIS — R53.83 OTHER FATIGUE: ICD-10-CM

## 2019-05-13 DIAGNOSIS — R60.1 GENERALIZED EDEMA: ICD-10-CM

## 2019-05-13 DIAGNOSIS — E66.9 OBESITY (BMI 30-39.9): ICD-10-CM

## 2019-05-13 DIAGNOSIS — N18.30 CHRONIC RENAL IMPAIRMENT, STAGE 3 (MODERATE) (HCC): ICD-10-CM

## 2019-05-13 PROCEDURE — 99214 OFFICE O/P EST MOD 30 MIN: CPT | Performed by: INTERNAL MEDICINE

## 2019-05-14 NOTE — PROGRESS NOTES
Subjective:     Chief Complaint   Patient presents with   • Labs Only     Margaret Garcia is a 78 y.o. female here today for follow-up of her hypertension and edema and evaluation of hyperglycemia and fatigue and renal insufficiency.  She is accompanied by her daughter.    Chronic renal impairment, stage 3 (moderate) (HCC)  Her most recent GFR is 53.  She is not very good at keeping up with drinking her fluids.  She is not taking any nephrotoxic medications.    Essential hypertension  Blood pressure is satisfactory.  She has lost 3 pounds.  She denies lightheadedness or headaches.    Generalized edema  She reports that she feels like her edema has improved quite a bit.  As noted, she has lost 3 pounds.  She is tolerating the Dyazide without difficulty.  She denies lightheadedness.    Hyperglycemia  Blood sugar was elevated previously but current glucose is 93.    Obesity (BMI 30-39.9)  She remains significantly overweight with BMI 36.49.  Weight is down 3 pounds from 2 weeks ago.    Other fatigue  She continues to report some fatigue.  TSH is normal at 1.62.  She denies a history of anemia.    Tobacco use  She unfortunately still smokes and thinks she might quit some time in the near future.       Diagnoses of Essential hypertension, Other fatigue, Chronic renal impairment, stage 3 (moderate) (HCC), Obesity (BMI 30-39.9), Hyperglycemia, Generalized edema, and Tobacco use were pertinent to this visit.    Allergies: Food; Neomycin-polymyxin b; Pcn [penicillins]; and Sulfa drugs  Current medicines (including changes today)  Current Outpatient Prescriptions   Medication Sig Dispense Refill   • lisinopril (PRINIVIL) 10 MG Tab Take 1 Tab by mouth every day. 100 Tab 3   • atorvastatin (LIPITOR) 10 MG Tab Take 1 Tab by mouth every day. 100 Tab 3   • triamterene/hctz (MAXZIDE-25/DYAZIDE) 37.5-25 MG Cap Take 1 Cap by mouth every day. 90 Cap 3   • potassium chloride SA (KLOR-CON M10) 10 MEQ Tab CR Take 1 Tab by mouth every day  for 30 days. 30 Each 0   • fenofibrate (TRIGLIDE) 160 MG tablet TAKE ONE TABLET BY MOUTH ONE TIME DAILY 90 Tab 3   • levothyroxine (SYNTHROID) 137 MCG Tab TAKE 1 TABLET BY MOUTH EVERY MORNING ON AN EMPTY STOMACH. 90 Tab 3   • latanoprost (XALATAN) 0.005 % Solution INSTILL 1 DROP INTO BOTH EYES EVERY EVENING AT BEDTIME  4   • acetaminophen (TYLENOL) 500 MG TABS Take 500 mg by mouth 3 times a day as needed. Indications: Pain     • aspirin 81 MG tablet Take 81 mg by mouth every morning.     • varenicline (CHANTIX STARTING MONTH PAK) 0.5 MG X 11 & 1 MG X 42 tablet Take according to package instructions (Patient not taking: Reported on 5/13/2019) 56 Tab 0   • varenicline (CHANTIX) 1 MG tablet Take 1 Tab by mouth 2 times a day. (Patient not taking: Reported on 5/13/2019) 60 Tab 3     No current facility-administered medications for this visit.        She  has a past medical history of Arthritis; ASTHMA; Body mass index 40.0-44.9, adult ([facility]) (4/27/2018); Chronic renal impairment, stage 3 (moderate) ([facility]) (5/13/2019); Dysuria (10/18/2016); Fall; Generalized edema (4/27/2018); Hay fever (4/19/2016); Heart burn; High cholesterol; UTI (urinary tract infection); Hyperglycemia (11/24/2015); Hypertension; Hypothyroidism; Indigestion; Migraines; Neuropathy ([facility]); Obesity (BMI 30-39.9) (7/20/2015); Pneumonia; Routine general medical examination at a health care facility (7/20/2015); Sleep apnea; Snoring; Tobacco use (4/29/2019); URI (upper respiratory infection) (11/4/2015); and Urinary bladder disorder.    ROS    Patient denies significant change in strength, weight or appetite.  No significant lightheadedness or headaches.  No change in vision, hearing, or swallowing.  No new dyspnea, coughing, chest pain, or palpitations.  No indigestion, abdominal pain, or change in bowel habits.  No change in urinating.  No new ankle swelling.       Objective:     PE:  /78 (BP Location: Left arm, Patient Position: Sitting)   Pulse 82   " Temp 36.9 °C (98.5 °F)   Ht 1.6 m (5' 3\")   Wt 93.4 kg (206 lb)   SpO2 99%   BMI 36.49 kg/m²    Neck is supple without significant lymphadenopathy or masses.  Lungs are clear with normal breath sounds without wheezes or rales .  Cardiovascular: peripheral circulation is satisfactory, heart sounds are unchanged and unremarkable.  Abdomen is soft, without masses or tenderness, with normal bowel sounds.  Extremities are without significant edema, cyanosis or deformity.      Assessment and Plan:   The following treatment plan was discussed  1. Essential hypertension  Basic Metabolic Panel    No medication change.  Continue following low-salt diet which we discussed.  Work at further weight loss.   2. Other fatigue  CBC WITH DIFFERENTIAL    She was encouraged to exercise as tolerated.  Lose weight.  Check CBC at next visit.   3. Chronic renal impairment, stage 3 (moderate) (HCC)  Basic Metabolic Panel    She was strongly encouraged to keep up with her fluids.  We will recheck renal function at next visit.   4. Obesity (BMI 30-39.9)      She was encouraged in a weight loss program.   5. Hyperglycemia      She was encouraged to avoid concentrated sweets.   6. Generalized edema      For now she will continue Dyazide.  She was further encouraged and low-salt diet.   7. Tobacco use      She was strongly encouraged again to stop use of tobacco.       Followup: Return in about 3 months (around 8/13/2019).           "

## 2019-05-20 ENCOUNTER — APPOINTMENT (OUTPATIENT)
Dept: CARDIOLOGY | Facility: MEDICAL CENTER | Age: 79
End: 2019-05-20
Attending: INTERNAL MEDICINE
Payer: MEDICARE

## 2019-06-06 ENCOUNTER — HOSPITAL ENCOUNTER (OUTPATIENT)
Dept: CARDIOLOGY | Facility: MEDICAL CENTER | Age: 79
End: 2019-06-06
Attending: INTERNAL MEDICINE
Payer: MEDICARE

## 2019-06-06 DIAGNOSIS — R60.9 EDEMA, UNSPECIFIED TYPE: ICD-10-CM

## 2019-06-06 LAB
LV EJECT FRACT  99904: 60
LV EJECT FRACT MOD 2C 99903: 54.94
LV EJECT FRACT MOD 4C 99902: 69.87
LV EJECT FRACT MOD BP 99901: 62.25

## 2019-06-06 PROCEDURE — 93306 TTE W/DOPPLER COMPLETE: CPT | Mod: 26 | Performed by: INTERNAL MEDICINE

## 2019-06-06 PROCEDURE — 93306 TTE W/DOPPLER COMPLETE: CPT

## 2019-08-14 NOTE — ASSESSMENT & PLAN NOTE
Blood pressure is satisfactory.  She has lost 3 pounds.  She denies lightheadedness or headaches.  
Blood sugar was elevated previously but current glucose is 93.  
Her most recent GFR is 53.  She is not very good at keeping up with drinking her fluids.  She is not taking any nephrotoxic medications.  
She continues to report some fatigue.  TSH is normal at 1.62.  She denies a history of anemia.  
She remains significantly overweight with BMI 36.49.  Weight is down 3 pounds from 2 weeks ago.  
She reports that she feels like her edema has improved quite a bit.  As noted, she has lost 3 pounds.  She is tolerating the Dyazide without difficulty.  She denies lightheadedness.  
She unfortunately still smokes and thinks she might quit some time in the near future.  
Home

## 2019-08-17 ENCOUNTER — HOSPITAL ENCOUNTER (OUTPATIENT)
Dept: LAB | Facility: MEDICAL CENTER | Age: 79
End: 2019-08-17
Attending: INTERNAL MEDICINE
Payer: MEDICARE

## 2019-08-17 DIAGNOSIS — N18.30 CHRONIC RENAL IMPAIRMENT, STAGE 3 (MODERATE) (HCC): ICD-10-CM

## 2019-08-17 DIAGNOSIS — R53.83 OTHER FATIGUE: ICD-10-CM

## 2019-08-17 DIAGNOSIS — I10 ESSENTIAL HYPERTENSION: ICD-10-CM

## 2019-08-17 LAB
ANION GAP SERPL CALC-SCNC: 8 MMOL/L (ref 0–11.9)
BASOPHILS # BLD AUTO: 1.2 % (ref 0–1.8)
BASOPHILS # BLD: 0.08 K/UL (ref 0–0.12)
BUN SERPL-MCNC: 32 MG/DL (ref 8–22)
CALCIUM SERPL-MCNC: 9.7 MG/DL (ref 8.5–10.5)
CHLORIDE SERPL-SCNC: 108 MMOL/L (ref 96–112)
CO2 SERPL-SCNC: 22 MMOL/L (ref 20–33)
CREAT SERPL-MCNC: 1.22 MG/DL (ref 0.5–1.4)
EOSINOPHIL # BLD AUTO: 0.39 K/UL (ref 0–0.51)
EOSINOPHIL NFR BLD: 5.8 % (ref 0–6.9)
ERYTHROCYTE [DISTWIDTH] IN BLOOD BY AUTOMATED COUNT: 45.9 FL (ref 35.9–50)
FASTING STATUS PATIENT QL REPORTED: NORMAL
GLUCOSE SERPL-MCNC: 97 MG/DL (ref 65–99)
HCT VFR BLD AUTO: 43.9 % (ref 37–47)
HGB BLD-MCNC: 14.5 G/DL (ref 12–16)
IMM GRANULOCYTES # BLD AUTO: 0.05 K/UL (ref 0–0.11)
IMM GRANULOCYTES NFR BLD AUTO: 0.7 % (ref 0–0.9)
LYMPHOCYTES # BLD AUTO: 2.18 K/UL (ref 1–4.8)
LYMPHOCYTES NFR BLD: 32.2 % (ref 22–41)
MCH RBC QN AUTO: 31.5 PG (ref 27–33)
MCHC RBC AUTO-ENTMCNC: 33 G/DL (ref 33.6–35)
MCV RBC AUTO: 95.4 FL (ref 81.4–97.8)
MONOCYTES # BLD AUTO: 0.73 K/UL (ref 0–0.85)
MONOCYTES NFR BLD AUTO: 10.8 % (ref 0–13.4)
NEUTROPHILS # BLD AUTO: 3.33 K/UL (ref 2–7.15)
NEUTROPHILS NFR BLD: 49.3 % (ref 44–72)
NRBC # BLD AUTO: 0 K/UL
NRBC BLD-RTO: 0 /100 WBC
PLATELET # BLD AUTO: 296 K/UL (ref 164–446)
PMV BLD AUTO: 9.2 FL (ref 9–12.9)
POTASSIUM SERPL-SCNC: 3.7 MMOL/L (ref 3.6–5.5)
RBC # BLD AUTO: 4.6 M/UL (ref 4.2–5.4)
SODIUM SERPL-SCNC: 138 MMOL/L (ref 135–145)
WBC # BLD AUTO: 6.8 K/UL (ref 4.8–10.8)

## 2019-08-17 PROCEDURE — 85025 COMPLETE CBC W/AUTO DIFF WBC: CPT

## 2019-08-17 PROCEDURE — 36415 COLL VENOUS BLD VENIPUNCTURE: CPT

## 2019-08-17 PROCEDURE — 80048 BASIC METABOLIC PNL TOTAL CA: CPT

## 2019-08-20 ENCOUNTER — OFFICE VISIT (OUTPATIENT)
Dept: MEDICAL GROUP | Facility: MEDICAL CENTER | Age: 79
End: 2019-08-20
Payer: MEDICARE

## 2019-08-20 VITALS
TEMPERATURE: 97.7 F | DIASTOLIC BLOOD PRESSURE: 76 MMHG | SYSTOLIC BLOOD PRESSURE: 124 MMHG | HEART RATE: 67 BPM | HEIGHT: 63 IN | OXYGEN SATURATION: 99 % | BODY MASS INDEX: 36.93 KG/M2 | WEIGHT: 208.4 LBS

## 2019-08-20 DIAGNOSIS — E03.9 HYPOTHYROIDISM, UNSPECIFIED TYPE: ICD-10-CM

## 2019-08-20 DIAGNOSIS — R73.9 HYPERGLYCEMIA: ICD-10-CM

## 2019-08-20 DIAGNOSIS — I10 ESSENTIAL HYPERTENSION: ICD-10-CM

## 2019-08-20 DIAGNOSIS — E66.9 OBESITY (BMI 30-39.9): ICD-10-CM

## 2019-08-20 DIAGNOSIS — E78.5 HYPERLIPIDEMIA, UNSPECIFIED HYPERLIPIDEMIA TYPE: ICD-10-CM

## 2019-08-20 DIAGNOSIS — N18.30 CHRONIC RENAL IMPAIRMENT, STAGE 3 (MODERATE) (HCC): ICD-10-CM

## 2019-08-20 DIAGNOSIS — Z72.0 TOBACCO USE: ICD-10-CM

## 2019-08-20 DIAGNOSIS — F03.90 DEMENTIA WITHOUT BEHAVIORAL DISTURBANCE, UNSPECIFIED DEMENTIA TYPE: ICD-10-CM

## 2019-08-20 PROCEDURE — 99214 OFFICE O/P EST MOD 30 MIN: CPT | Performed by: INTERNAL MEDICINE

## 2019-08-20 PROCEDURE — 8041 PR SCP AHA: Performed by: INTERNAL MEDICINE

## 2019-08-20 NOTE — PROGRESS NOTES
Subjective:       Margaret Garcia is a 78 y.o. female here today for follow-up of her hypertension and hyperlipidemia and hyperglycemia and tobacco use and chronic renal insufficiency and dementia and hypothyroidism and obesity.    Obesity (BMI 30-39.9)  She remains significantly overweight with BMI 36.92.  She does not exercise significantly.    Hyperlipidemia  She continues atorvastatin and great.  Prior lipid panel was in fairly good range.  She is not very careful about her diet.  She remains significantly overweight.    Essential hypertension  Blood pressure is under good control with lisinopril and Dyazide.  She denies significant lightheadedness.  She is not particularly careful about avoiding salt.    Hyperglycemia  Blood sugar has been elevated but most recent glucose is 97.  She does eat sweets.    Tobacco use  She unfortunately still smokes and is not particularly interested in quitting.  Her great granddaughter however has been pushing her to quit because her house stinks.    Chronic renal impairment, stage 3 (moderate) (HCC)  She has chronic renal insufficiency with most recent and are 42.  She is not particularly careful about remaining well-hydrated.    Dementia  She reports that her dementia or memory disturbance problem remains fairly stable and mild.    Hypothyroidism  She continues levothyroxine and is clinically euthyroid.       Diagnoses of Essential hypertension, Hyperlipidemia, unspecified hyperlipidemia type, Tobacco use, Hyperglycemia, Chronic renal impairment, stage 3 (moderate) (HCC), Obesity (BMI 30-39.9), Dementia without behavioral disturbance, unspecified dementia type, and Hypothyroidism, unspecified type were pertinent to this visit.    Allergies: Food; Neomycin-polymyxin b; Pcn [penicillins]; and Sulfa drugs  Current medicines (including changes today)  Current Outpatient Medications   Medication Sig Dispense Refill   • Mirabegron ER 25 MG TABLET SR 24 HR Take 25 mg by mouth every  day. 30 Tab 11   • potassium chloride SA (K-DUR) 10 MEQ Tab CR TAKE 1 TABLET BY MOUTH EVERY  Tab 3   • lisinopril (PRINIVIL) 10 MG Tab Take 1 Tab by mouth every day. 100 Tab 3   • atorvastatin (LIPITOR) 10 MG Tab Take 1 Tab by mouth every day. 100 Tab 3   • triamterene/hctz (MAXZIDE-25/DYAZIDE) 37.5-25 MG Cap Take 1 Cap by mouth every day. 90 Cap 3   • fenofibrate (TRIGLIDE) 160 MG tablet TAKE ONE TABLET BY MOUTH ONE TIME DAILY 90 Tab 3   • levothyroxine (SYNTHROID) 137 MCG Tab TAKE 1 TABLET BY MOUTH EVERY MORNING ON AN EMPTY STOMACH. 90 Tab 3   • varenicline (CHANTIX STARTING MONTH PAK) 0.5 MG X 11 & 1 MG X 42 tablet Take according to package instructions (Patient not taking: Reported on 5/13/2019) 56 Tab 0   • varenicline (CHANTIX) 1 MG tablet Take 1 Tab by mouth 2 times a day. (Patient not taking: Reported on 5/13/2019) 60 Tab 3   • latanoprost (XALATAN) 0.005 % Solution INSTILL 1 DROP INTO BOTH EYES EVERY EVENING AT BEDTIME  4   • acetaminophen (TYLENOL) 500 MG TABS Take 500 mg by mouth 3 times a day as needed. Indications: Pain     • aspirin 81 MG tablet Take 81 mg by mouth every morning.       No current facility-administered medications for this visit.        She  has a past medical history of Arthritis, ASTHMA, Body mass index 40.0-44.9, adult ([facility]) (4/27/2018), Chronic renal impairment, stage 3 (moderate) ([facility]) (5/13/2019), Dysuria (10/18/2016), Fall, Generalized edema (4/27/2018), Hay fever (4/19/2016), Heart burn, High cholesterol, UTI (urinary tract infection), Hyperglycemia (11/24/2015), Hypertension, Hypothyroidism, Indigestion, Migraines, Neuropathy ([facility]), Obesity (BMI 30-39.9) (7/20/2015), Pneumonia, Routine general medical examination at a health care facility (7/20/2015), Sleep apnea, Snoring, Tobacco use (4/29/2019), URI (upper respiratory infection) (11/4/2015), and Urinary bladder disorder.      Annual Health Assessment Questions:    1.  Are you currently engaging in any exercise or  "physical activity? Yes    2.  How would you describe your mood or emotional well-being today? good    3.  Have you had any falls in the last year? No    4.  Have you noticed any problems with your balance or had difficulty walking? Yes    5.  In the last six months have you experienced any leakage of urine? Yes    6. DPA/Advanced Directive: Patient does not have an Advanced Directive.  A packet and workshop information was given on Advanced Directives.    ROS    Patient denies significant change in strength, weight or appetite.  No significant lightheadedness or headaches.  No change in vision, hearing, or swallowing.  No new dyspnea, coughing, chest pain, or palpitations.  No indigestion, abdominal pain, or change in bowel habits.  No change in urinating.  No new ankle swelling.       Objective:     PE:  /76 (BP Location: Left arm, Patient Position: Sitting, BP Cuff Size: Adult)   Pulse 67   Temp 36.5 °C (97.7 °F) (Temporal)   Ht 1.6 m (5' 3\")   Wt 94.5 kg (208 lb 6.4 oz)   SpO2 99%   BMI 36.92 kg/m²    Neck is supple without significant lymphadenopathy or masses.  Lungs are clear with normal breath sounds without wheezes or rales .  Cardiovascular: peripheral circulation is satisfactory, heart sounds are unchanged and unremarkable.  Abdomen is soft, without masses or tenderness, with normal bowel sounds.  Extremities are without significant edema, cyanosis or deformity.      Assessment and Plan:   The following treatment plan was discussed  1. Essential hypertension  Basic Metabolic Panel    No medication change.  Work on low-salt diet as well as weight loss and exercise.   2. Hyperlipidemia, unspecified hyperlipidemia type  Lipid Profile    No medication change.  Get back onto more appropriate diet and work at weight loss and exercise.   3. Tobacco use      She was strongly encouraged to stop use of tobacco.  We discussed various techniques of quitting.   4. Hyperglycemia      Continue avoidance of " concentrated sweets.   5. Chronic renal impairment, stage 3 (moderate) (HCC)      She will remain well-hydrated.  She will avoid nephrotoxic medications.   6. Obesity (BMI 30-39.9)      She was strongly encouraged in weight loss program including appropriate diet and exercise.   7. Dementia without behavioral disturbance, unspecified dementia type      She will report any significant deterioration in mental capacity.   8. Hypothyroidism, unspecified type      Continue levothyroxine, check TSH at least yearly.       Followup: Return in about 4 months (around 12/20/2019), or health  wellness, for Long.

## 2019-08-20 NOTE — ASSESSMENT & PLAN NOTE
She unfortunately still smokes and is not particularly interested in quitting.  Her great granddaughter however has been pushing her to quit because her house stinks.

## 2019-08-20 NOTE — ASSESSMENT & PLAN NOTE
She continues atorvastatin and great.  Prior lipid panel was in fairly good range.  She is not very careful about her diet.  She remains significantly overweight.

## 2019-08-20 NOTE — ASSESSMENT & PLAN NOTE
Blood pressure is under good control with lisinopril and Dyazide.  She denies significant lightheadedness.  She is not particularly careful about avoiding salt.

## 2019-08-20 NOTE — ASSESSMENT & PLAN NOTE
She has chronic renal insufficiency with most recent and are 42.  She is not particularly careful about remaining well-hydrated.

## 2019-08-20 NOTE — PROGRESS NOTES
Annual Health Assessment Questions:    1.  Are you currently engaging in any exercise or physical activity? Yes    2.  How would you describe your mood or emotional well-being today? good    3.  Have you had any falls in the last year? No    4.  Have you noticed any problems with your balance or had difficulty walking? Yes    5.  In the last six months have you experienced any leakage of urine? Yes    6. DPA/Advanced Directive: Patient does not have an Advanced Directive.  A packet and workshop information was given on Advanced Directives.

## 2019-09-09 ENCOUNTER — OFFICE VISIT (OUTPATIENT)
Dept: MEDICAL GROUP | Facility: MEDICAL CENTER | Age: 79
End: 2019-09-09
Payer: MEDICARE

## 2019-09-09 VITALS
OXYGEN SATURATION: 100 % | HEIGHT: 63 IN | BODY MASS INDEX: 35.97 KG/M2 | DIASTOLIC BLOOD PRESSURE: 70 MMHG | HEART RATE: 78 BPM | SYSTOLIC BLOOD PRESSURE: 120 MMHG | TEMPERATURE: 97.3 F | WEIGHT: 203 LBS

## 2019-09-09 DIAGNOSIS — E66.9 OBESITY (BMI 30-39.9): ICD-10-CM

## 2019-09-09 DIAGNOSIS — K55.9 ISCHEMIC BOWEL DISEASE (HCC): ICD-10-CM

## 2019-09-09 DIAGNOSIS — Z72.0 TOBACCO USE: ICD-10-CM

## 2019-09-09 DIAGNOSIS — I10 ESSENTIAL HYPERTENSION: ICD-10-CM

## 2019-09-09 PROCEDURE — 99214 OFFICE O/P EST MOD 30 MIN: CPT | Performed by: INTERNAL MEDICINE

## 2019-09-09 RX ORDER — ASPIRIN 81 MG/1
TABLET, CHEWABLE ORAL
Refills: 0 | COMMUNITY
Start: 2019-08-28 | End: 2021-07-30

## 2019-09-09 RX ORDER — METRONIDAZOLE 500 MG/1
500 TABLET ORAL
Refills: 0 | COMMUNITY
Start: 2019-08-28 | End: 2019-11-02

## 2019-09-09 RX ORDER — CIPROFLOXACIN 250 MG/1
250 TABLET, FILM COATED ORAL
Refills: 0 | COMMUNITY
Start: 2019-08-28 | End: 2019-11-02

## 2019-09-09 RX ORDER — FENOFIBRATE 145 MG/1
145 TABLET, COATED ORAL
Refills: 0 | COMMUNITY
Start: 2019-08-28 | End: 2019-11-06

## 2019-09-09 RX ORDER — ATORVASTATIN CALCIUM 40 MG/1
40 TABLET, FILM COATED ORAL
Refills: 0 | COMMUNITY
Start: 2019-08-28 | End: 2019-12-11

## 2019-09-09 RX ORDER — INFLUENZA VACCINE, ADJUVANTED 15; 15; 15 UG/.5ML; UG/.5ML; UG/.5ML
INJECTION, SUSPENSION INTRAMUSCULAR
Refills: 0 | COMMUNITY
Start: 2019-08-23 | End: 2019-12-11

## 2019-09-09 NOTE — ASSESSMENT & PLAN NOTE
She remains significantly overweight with BMI 35.96.  Actually since last month she has lost 5 pounds.  Between now and then however she was in the hospital with abdominal pain that was caused apparently by ischemic bowel.

## 2019-09-09 NOTE — PROGRESS NOTES
Subjective:     Chief Complaint   Patient presents with   • Hospital Follow-up   • Hypertension   • Medication Management     Wants to try Chantix again      Margaret Garcia is a 78 y.o. female here today for follow-up from recent hospitalization at [facility] for abdominal pain that was found to be ischemic bowel.  We are following up also hypertension and her obesity and her tobacco use.    Obesity (BMI 30-39.9)  She remains significantly overweight with BMI 35.96.  Actually since last month she has lost 5 pounds.  Between now and then however she was in the hospital with abdominal pain that was caused apparently by ischemic bowel.    Essential hypertension  Hypertension is fairly well controlled with lisinopril and Dyazide.  She denies lightheadedness or headaches.    Tobacco use  She still smokes.  She was on Chantix previously and would like to get back on that.  She is more interested in quitting now since she had the attack of ischemic bowel.    Ischemic bowel disease (HCC)  She was hospitalized at [facility] from August 24-28 with abdominal pain that was found to be from ischemic colitis.  She is scheduled to see a vascular surgeon.  She still smokes but now is more serious about quitting.  Although her cholesterol has been satisfactory at 110 with triglycerides 118, HDL 36, LDL 50, her dose of atorvastatin has been increased to 40 mg daily.  She currently has no further abdominal pain.  She is having regular bowel movements.  She was started on a low fiber diet.       Diagnoses of Ischemic bowel disease (HCC), Essential hypertension, Obesity (BMI 30-39.9), and Tobacco use were pertinent to this visit.    Allergies: Food; Neomycin-polymyxin b; Pcn [penicillins]; and Sulfa drugs  Current medicines (including changes today)  Current Outpatient Medications   Medication Sig Dispense Refill   • varenicline (CHANTIX STARTING MONTH PAK) 0.5 MG X 11 & 1 MG X 42 tablet Take  according to package instructions 56 Tab 0   • varenicline (CHANTIX STARTING MONTH CRISTELA) 0.5 MG X 11 & 1 MG X 42 tablet Take according to package instructions 56 Tab 0   • potassium chloride SA (K-DUR) 10 MEQ Tab CR TAKE 1 TABLET BY MOUTH EVERY  Tab 3   • lisinopril (PRINIVIL) 10 MG Tab Take 1 Tab by mouth every day. 100 Tab 3   • atorvastatin (LIPITOR) 10 MG Tab Take 1 Tab by mouth every day. 100 Tab 3   • triamterene/hctz (MAXZIDE-25/DYAZIDE) 37.5-25 MG Cap Take 1 Cap by mouth every day. 90 Cap 3   • fenofibrate (TRIGLIDE) 160 MG tablet TAKE ONE TABLET BY MOUTH ONE TIME DAILY 90 Tab 3   • levothyroxine (SYNTHROID) 137 MCG Tab TAKE 1 TABLET BY MOUTH EVERY MORNING ON AN EMPTY STOMACH. 90 Tab 3   • latanoprost (XALATAN) 0.005 % Solution INSTILL 1 DROP INTO BOTH EYES EVERY EVENING AT BEDTIME  4   • acetaminophen (TYLENOL) 500 MG TABS Take 500 mg by mouth 3 times a day as needed. Indications: Pain     • aspirin 81 MG tablet Take 81 mg by mouth every morning.     • Mirabegron ER 25 MG TABLET SR 24 HR Take 25 mg by mouth every day. (Patient not taking: Reported on 9/9/2019) 30 Tab 11   • varenicline (CHANTIX) 1 MG tablet Take 1 Tab by mouth 2 times a day. (Patient not taking: Reported on 5/13/2019) 60 Tab 3     No current facility-administered medications for this visit.        She  has a past medical history of Arthritis, ASTHMA, Body mass index 40.0-44.9, adult ([facility]) (4/27/2018), Chronic renal impairment, stage 3 (moderate) ([facility]) (5/13/2019), Dysuria (10/18/2016), Fall, Generalized edema (4/27/2018), Hay fever (4/19/2016), Heart burn, High cholesterol, UTI (urinary tract infection), Hyperglycemia (11/24/2015), Hypertension, Hypothyroidism, Indigestion, Ischemic bowel disease ([facility]) (9/9/2019), Migraines, Neuropathy ([facility]), Obesity (BMI 30-39.9) (7/20/2015), Pneumonia, Routine general medical examination at a health care facility (7/20/2015), Sleep apnea, Snoring, Tobacco use (4/29/2019), URI (upper respiratory  "infection) (11/4/2015), and Urinary bladder disorder.    ROS    Patient denies significant change in strength, weight or appetite.  No significant lightheadedness or headaches.  No change in vision, hearing, or swallowing.  No new dyspnea, coughing, chest pain, or palpitations.  No indigestion, abdominal pain, or change in bowel habits.  No change in urinating.  No new ankle swelling.       Objective:     PE:  /70 (BP Location: Left arm, Patient Position: Sitting)   Pulse 78   Temp 36.3 °C (97.3 °F)   Ht 1.6 m (5' 3\")   Wt 92.1 kg (203 lb)   SpO2 100%   BMI 35.96 kg/m²    Neck is supple without significant lymphadenopathy or masses.  Lungs are clear with normal breath sounds without wheezes or rales .  Cardiovascular: peripheral circulation is satisfactory, heart sounds are unchanged and unremarkable.  Abdomen is soft, without masses or tenderness, with normal bowel sounds.  Extremities are without significant edema, cyanosis or deformity.      Assessment and Plan:   The following treatment plan was discussed  1. Ischemic bowel disease (HCC)      Proceed with vascular surgeon consult.  Continue low fiber diet for now.  Stop smoking.   2. Essential hypertension      No medication change.  Continue low-salt diet.   3. Obesity (BMI 30-39.9)      Work at weight loss with appropriate diet and exercise as tolerated.   4. Tobacco use      We again discussed importance of quitting.  I sent in another prescription for Chantix.  We discussed techniques of quitting.       Followup: Return in about 4 weeks (around 10/7/2019) for Short.     She definitely is homebound in that it is very difficult for her to get around.  She definitely will benefit from PT and probably OT.  She probably will benefit from medication management also.  I certainly agree with home health.           "

## 2019-09-09 NOTE — ASSESSMENT & PLAN NOTE
She was hospitalized at [facility] from August 24-28 with abdominal pain that was found to be from ischemic colitis.  She is scheduled to see a vascular surgeon.  She still smokes but now is more serious about quitting.  Although her cholesterol has been satisfactory at 110 with triglycerides 118, HDL 36, LDL 50, her dose of atorvastatin has been increased to 40 mg daily.  She currently has no further abdominal pain.  She is having regular bowel movements.  She was started on a low fiber diet.

## 2019-09-09 NOTE — ASSESSMENT & PLAN NOTE
She still smokes.  She was on Chantix previously and would like to get back on that.  She is more interested in quitting now since she had the attack of ischemic bowel.

## 2019-09-09 NOTE — ASSESSMENT & PLAN NOTE
Hypertension is fairly well controlled with lisinopril and Dyazide.  She denies lightheadedness or headaches.

## 2019-09-23 DIAGNOSIS — I10 ESSENTIAL HYPERTENSION: ICD-10-CM

## 2019-09-23 RX ORDER — LEVOTHYROXINE SODIUM 137 UG/1
137 TABLET ORAL EVERY MORNING
Qty: 90 TAB | Refills: 3 | Status: SHIPPED | OUTPATIENT
Start: 2019-09-23 | End: 2020-11-02

## 2019-10-14 ENCOUNTER — OFFICE VISIT (OUTPATIENT)
Dept: MEDICAL GROUP | Facility: MEDICAL CENTER | Age: 79
End: 2019-10-14
Payer: MEDICARE

## 2019-10-14 VITALS
HEIGHT: 62 IN | SYSTOLIC BLOOD PRESSURE: 126 MMHG | WEIGHT: 204 LBS | RESPIRATION RATE: 15 BRPM | OXYGEN SATURATION: 99 % | TEMPERATURE: 98.3 F | DIASTOLIC BLOOD PRESSURE: 68 MMHG | BODY MASS INDEX: 37.54 KG/M2 | HEART RATE: 82 BPM

## 2019-10-14 DIAGNOSIS — R11.0 NAUSEA: ICD-10-CM

## 2019-10-14 DIAGNOSIS — Z91.81 AT MODERATE RISK FOR FALL: ICD-10-CM

## 2019-10-14 DIAGNOSIS — E66.9 OBESITY (BMI 30-39.9): ICD-10-CM

## 2019-10-14 DIAGNOSIS — R73.9 HYPERGLYCEMIA: ICD-10-CM

## 2019-10-14 DIAGNOSIS — Z72.0 TOBACCO USE: ICD-10-CM

## 2019-10-14 DIAGNOSIS — I10 ESSENTIAL HYPERTENSION: ICD-10-CM

## 2019-10-14 DIAGNOSIS — R10.11 RUQ PAIN: ICD-10-CM

## 2019-10-14 DIAGNOSIS — R10.11 RIGHT UPPER QUADRANT ABDOMINAL PAIN: ICD-10-CM

## 2019-10-14 DIAGNOSIS — F03.90 DEMENTIA WITHOUT BEHAVIORAL DISTURBANCE, UNSPECIFIED DEMENTIA TYPE: ICD-10-CM

## 2019-10-14 PROCEDURE — 99214 OFFICE O/P EST MOD 30 MIN: CPT | Performed by: INTERNAL MEDICINE

## 2019-10-14 NOTE — PROGRESS NOTES
Subjective:     Chief Complaint   Patient presents with   • Nausea     Since Hospitalization     Margaret Garcia is a 79 y.o. female here today for follow-up of her hypertension and obesity and hyperglycemia and abdominal discomfort and deconditioning and tobacco use and nausea.    Obesity (BMI 30-39.9)  She remains significantly overweight with BMI 37.31.  She does not exercise.  She is not particularly careful about her diet.    Essential hypertension  Her blood pressure remains under fairly good control with lisinopril and Dyazide.  She denies significant lightheadedness.    Hyperglycemia  She is not particularly careful about avoiding sweets.  She does not exercise.    RUQ pain  She again notes right mid abdominal discomfort.  She is not particularly aware of aggravating or alleviating factors.  This does not seem affected by food.  She reports no recent change in bowel habits.  She does however report that she has nausea practically all of the time.  If she takes Zofran 4 mg once a day, the nausea resolves.  Dr. Bell does not think she has ischemic bowel and has referred her to GI for further evaluation..    At moderate risk for fall  She remains a little unsteady on her feet.  She does not exercise.  She is rather sedentary.  She has not fallen since her last appointment.    Tobacco use  She still is smoking but is down to 1/2 pack/day.  She also is using Chantix.  She does not seem very interested in quitting.    Nausea  As noted above she has nausea almost all of the time but if she takes Zofran 4 mg once a day, the nausea is relieved for the rest of the day.    Dementia  Her daughter reports that the dementia is about the same.  A significant component of this seems to be laziness.       Diagnoses of Right upper quadrant abdominal pain, Nausea, At moderate risk for fall, Essential hypertension, Dementia without behavioral disturbance, unspecified dementia type (HCC), Obesity (BMI 30-39.9), Tobacco use,  Hyperglycemia, and RUQ pain were pertinent to this visit.    Allergies: Food; Neomycin-polymyxin b; Pcn [penicillins]; and Sulfa drugs  Current medicines (including changes today)  Current Outpatient Medications   Medication Sig Dispense Refill   • levothyroxine (SYNTHROID) 137 MCG Tab Take 1 Tab by mouth every morning. ON A EMPTY STOMACH 90 Tab 3   • aspirin (ASA) 81 MG Chew Tab chewable tablet CHEW ONE TABLET BY MOUTH DAILY  0   • fenofibrate (TRICOR) 145 MG Tab Take 145 mg by mouth every day.  0   • potassium chloride SA (K-DUR) 10 MEQ Tab CR TAKE 1 TABLET BY MOUTH EVERY  Tab 3   • lisinopril (PRINIVIL) 10 MG Tab Take 1 Tab by mouth every day. 100 Tab 3   • atorvastatin (LIPITOR) 10 MG Tab Take 1 Tab by mouth every day. 100 Tab 3   • triamterene/hctz (MAXZIDE-25/DYAZIDE) 37.5-25 MG Cap Take 1 Cap by mouth every day. 90 Cap 3   • varenicline (CHANTIX) 1 MG tablet Take 1 Tab by mouth 2 times a day. 60 Tab 3   • latanoprost (XALATAN) 0.005 % Solution INSTILL 1 DROP INTO BOTH EYES EVERY EVENING AT BEDTIME  4   • acetaminophen (TYLENOL) 500 MG TABS Take 500 mg by mouth 3 times a day as needed. Indications: Pain     • aspirin 81 MG tablet Take 81 mg by mouth every morning.     • ondansetron (ZOFRAN) 4 MG Tab tablet TAKE 1 TABLET BY MOUTH EVERY 6 HOURS AS NEEDED FOR NAUSEA AND VOMITING 20 Tab 3   • varenicline (CHANTIX STARTING MONTH CRISTELA) 0.5 MG X 11 & 1 MG X 42 tablet Take according to package instructions (Patient not taking: Reported on 10/14/2019) 56 Tab 0   • varenicline (CHANTIX STARTING MONTH PAK) 0.5 MG X 11 & 1 MG X 42 tablet Take according to package instructions (Patient not taking: Reported on 10/14/2019) 56 Tab 0   • FLUAD 0.5 ML Suspension Prefilled Syringe TO BE ADMINISTERED BY PHARMACIST FOR IMMUNIZATION  0   • atorvastatin (LIPITOR) 40 MG Tab Take 40 mg by mouth every day.  0   • ciprofloxacin (CIPRO) 250 MG Tab Take 250 mg by mouth. FOR 3 DAYS  0   • metroNIDAZOLE (FLAGYL) 500 MG Tab Take 500 mg  "by mouth. FOR 3 DAYS  0   • Mirabegron ER 25 MG TABLET SR 24 HR Take 25 mg by mouth every day. (Patient not taking: Reported on 9/9/2019) 30 Tab 11   • fenofibrate (TRIGLIDE) 160 MG tablet TAKE ONE TABLET BY MOUTH ONE TIME DAILY (Patient not taking: Reported on 10/14/2019) 90 Tab 3     No current facility-administered medications for this visit.        She  has a past medical history of Arthritis, ASTHMA, Body mass index 40.0-44.9, adult ([facility]) (4/27/2018), Chronic renal impairment, stage 3 (moderate) ([facility]) (5/13/2019), Dysuria (10/18/2016), Fall, Generalized edema (4/27/2018), Hay fever (4/19/2016), Heart burn, High cholesterol, UTI (urinary tract infection), Hyperglycemia (11/24/2015), Hypertension, Hypothyroidism, Indigestion, Ischemic bowel disease ([facility]) (9/9/2019), Migraines, Nausea (10/14/2019), Neuropathy ([facility]), Obesity (BMI 30-39.9) (7/20/2015), Pneumonia, Routine general medical examination at a health care facility (7/20/2015), Sleep apnea, Snoring, Tobacco use (4/29/2019), URI (upper respiratory infection) (11/4/2015), and Urinary bladder disorder.    ROS    Patient denies significant change in strength, weight or appetite.  No significant lightheadedness or headaches.  No change in vision, hearing, or swallowing.  No new dyspnea, coughing, chest pain, or palpitations.  No indigestion, abdominal pain, or change in bowel habits except as noted above with some discomfort and the right upper mid abdomen and nausea that is fairly easily treated with Zofran 1 tablet daily.  No recent change in bowel habits..  No change in urinating.  No new ankle swelling.       Objective:     PE:  /68 (BP Location: Left arm, Patient Position: Sitting, BP Cuff Size: Adult)   Pulse 82   Temp 36.8 °C (98.3 °F) (Temporal)   Resp 15   Ht 1.575 m (5' 2\")   Wt 92.5 kg (204 lb)   SpO2 99%   BMI 37.31 kg/m²    Neck is supple without significant lymphadenopathy or masses.  Lungs are clear with normal breath sounds without " wheezes or rales .  Cardiovascular: peripheral circulation is satisfactory, heart sounds are unchanged and unremarkable.  Abdomen is soft, without masses or tenderness, with normal bowel sounds.  Extremities are without significant edema, cyanosis or deformity.      Assessment and Plan:   The following treatment plan was discussed  1. Right upper quadrant abdominal pain  REFERRAL TO GASTROENTEROLOGY    Further evaluation per GI.  They were encouraged to pay attention to aggravating and alleviating factors and write that down.   2. Nausea  REFERRAL TO GASTROENTEROLOGY    Continue Zofran 1 tablet daily.   3. At moderate risk for fall      She was encouraged to build up her strength and do some balancing exercises.   4. Essential hypertension      No medication change.  Follow low-salt diet.   5. Dementia without behavioral disturbance, unspecified dementia type (HCC)      She was encouraged to do some form of physical exercise and work also at mental exercise.   6. Obesity (BMI 30-39.9)      She was encouraged in a weight loss program with appropriate diet and exercise.   7. Tobacco use      She was strongly admonished to stop use of tobacco completely.   8. Hyperglycemia      She was encouraged to avoid concentrated sweets.  She was encouraged in an exercise program.   9. RUQ pain         Followup: 3 months if not sooner and periodic health evaluation at that time.

## 2019-10-14 NOTE — ASSESSMENT & PLAN NOTE
As noted above she has nausea almost all of the time but if she takes Zofran 4 mg once a day, the nausea is relieved for the rest of the day.

## 2019-10-14 NOTE — ASSESSMENT & PLAN NOTE
Her blood pressure remains under fairly good control with lisinopril and Dyazide.  She denies significant lightheadedness.

## 2019-10-14 NOTE — ASSESSMENT & PLAN NOTE
She remains a little unsteady on her feet.  She does not exercise.  She is rather sedentary.  She has not fallen since her last appointment.

## 2019-10-14 NOTE — ASSESSMENT & PLAN NOTE
She still is smoking but is down to 1/2 pack/day.  She also is using Chantix.  She does not seem very interested in quitting.

## 2019-10-14 NOTE — ASSESSMENT & PLAN NOTE
She remains significantly overweight with BMI 37.31.  She does not exercise.  She is not particularly careful about her diet.

## 2019-10-14 NOTE — ASSESSMENT & PLAN NOTE
Her daughter reports that the dementia is about the same.  A significant component of this seems to be laziness.

## 2019-10-14 NOTE — ASSESSMENT & PLAN NOTE
She again notes right mid abdominal discomfort.  She is not particularly aware of aggravating or alleviating factors.  This does not seem affected by food.  She reports no recent change in bowel habits.  She does however report that she has nausea practically all of the time.  If she takes Zofran 4 mg once a day, the nausea resolves.  Dr. Bell does not think she has ischemic bowel and has referred her to GI for further evaluation..

## 2019-11-02 ENCOUNTER — OFFICE VISIT (OUTPATIENT)
Dept: URGENT CARE | Facility: PHYSICIAN GROUP | Age: 79
End: 2019-11-02
Payer: MEDICARE

## 2019-11-02 ENCOUNTER — HOSPITAL ENCOUNTER (OUTPATIENT)
Facility: MEDICAL CENTER | Age: 79
End: 2019-11-02
Attending: PHYSICIAN ASSISTANT
Payer: MEDICARE

## 2019-11-02 VITALS
SYSTOLIC BLOOD PRESSURE: 130 MMHG | TEMPERATURE: 97.6 F | OXYGEN SATURATION: 98 % | BODY MASS INDEX: 37.17 KG/M2 | DIASTOLIC BLOOD PRESSURE: 60 MMHG | HEIGHT: 62 IN | WEIGHT: 202 LBS | HEART RATE: 90 BPM

## 2019-11-02 DIAGNOSIS — N12 PYELONEPHRITIS: Primary | ICD-10-CM

## 2019-11-02 DIAGNOSIS — N12 PYELONEPHRITIS: ICD-10-CM

## 2019-11-02 LAB
APPEARANCE UR: NORMAL
BILIRUB UR STRIP-MCNC: NEGATIVE MG/DL
COLOR UR AUTO: YELLOW
GLUCOSE UR STRIP.AUTO-MCNC: NEGATIVE MG/DL
KETONES UR STRIP.AUTO-MCNC: NEGATIVE MG/DL
LEUKOCYTE ESTERASE UR QL STRIP.AUTO: NEGATIVE
NITRITE UR QL STRIP.AUTO: NEGATIVE
PH UR STRIP.AUTO: 5 [PH] (ref 5–8)
PROT UR QL STRIP: NEGATIVE MG/DL
RBC UR QL AUTO: NORMAL
SP GR UR STRIP.AUTO: 1.02
UROBILINOGEN UR STRIP-MCNC: 0.2 MG/DL

## 2019-11-02 PROCEDURE — 87086 URINE CULTURE/COLONY COUNT: CPT

## 2019-11-02 PROCEDURE — 99214 OFFICE O/P EST MOD 30 MIN: CPT | Performed by: PHYSICIAN ASSISTANT

## 2019-11-02 PROCEDURE — 81002 URINALYSIS NONAUTO W/O SCOPE: CPT | Performed by: PHYSICIAN ASSISTANT

## 2019-11-02 RX ORDER — CIPROFLOXACIN 500 MG/1
500 TABLET, FILM COATED ORAL EVERY 12 HOURS
Qty: 14 TAB | Refills: 0 | Status: SHIPPED | OUTPATIENT
Start: 2019-11-02 | End: 2019-11-09

## 2019-11-02 RX ORDER — CEFTRIAXONE 1 G/1
1 INJECTION, POWDER, FOR SOLUTION INTRAMUSCULAR; INTRAVENOUS ONCE
Status: COMPLETED | OUTPATIENT
Start: 2019-11-02 | End: 2019-11-02

## 2019-11-02 RX ADMIN — Medication 2.1 ML: at 12:18

## 2019-11-02 RX ADMIN — CEFTRIAXONE 1 G: 1 INJECTION, POWDER, FOR SOLUTION INTRAMUSCULAR; INTRAVENOUS at 12:17

## 2019-11-02 ASSESSMENT — ENCOUNTER SYMPTOMS
NAUSEA: 0
VOMITING: 0
FEVER: 0
FLANK PAIN: 1
CHILLS: 0

## 2019-11-02 NOTE — PROGRESS NOTES
Subjective:   Margaret Garcia is a 79 y.o. female who presents for UTI (Pt reports lower back pain. onset one day. Pt denes, hematuria, dysuria, discharge or foul odor.)        This is a new problem.  Patient presents complaining of bilateral flank pain and fatigue x1 day.  She denies hematuria, dysuria, change in frequency, increased urgency.  She states that she has had numerous kidney infections in the past and has been hospitalized for these.  States that only signs of UTI are typically flank pain and changes in cognition.  She has not noticed any changes in mental status.  Her daughter who is present with her also states that her behavior is quite normal.  Denies fevers, chills, nausea, vomiting.  No other aggravating or alleviating factors.  Patient has been drinking increased fluids.  She does urinate a lot, but states that she is not urinating more than usual.    Review of Systems   Constitutional: Positive for malaise/fatigue. Negative for chills and fever.   Gastrointestinal: Negative for nausea and vomiting.   Genitourinary: Positive for flank pain. Negative for dysuria, frequency, hematuria and urgency.       PMH:  has a past medical history of Arthritis, ASTHMA, Body mass index 40.0-44.9, adult ([facility]) (4/27/2018), Chronic renal impairment, stage 3 (moderate) ([facility]) (5/13/2019), Dysuria (10/18/2016), Fall, Generalized edema (4/27/2018), Hay fever (4/19/2016), Heart burn, High cholesterol, UTI (urinary tract infection), Hyperglycemia (11/24/2015), Hypertension, Hypothyroidism, Indigestion, Ischemic bowel disease ([facility]) (9/9/2019), Migraines, Nausea (10/14/2019), Neuropathy ([facility]), Obesity (BMI 30-39.9) (7/20/2015), Pneumonia, Routine general medical examination at a health care facility (7/20/2015), Sleep apnea, Snoring, Tobacco use (4/29/2019), URI (upper respiratory infection) (11/4/2015), and Urinary bladder disorder.  MEDS:   Current Outpatient Medications:   •  ciprofloxacin (CIPRO) 500 MG Tab, Take 1  Tab by mouth every 12 hours for 7 days., Disp: 14 Tab, Rfl: 0  •  ondansetron (ZOFRAN) 4 MG Tab tablet, TAKE 1 TABLET BY MOUTH EVERY 6 HOURS AS NEEDED FOR NAUSEA AND VOMITING, Disp: 20 Tab, Rfl: 3  •  levothyroxine (SYNTHROID) 137 MCG Tab, Take 1 Tab by mouth every morning. ON A EMPTY STOMACH, Disp: 90 Tab, Rfl: 3  •  fenofibrate (TRICOR) 145 MG Tab, Take 145 mg by mouth every day., Disp: , Rfl: 0  •  lisinopril (PRINIVIL) 10 MG Tab, Take 1 Tab by mouth every day., Disp: 100 Tab, Rfl: 3  •  atorvastatin (LIPITOR) 10 MG Tab, Take 1 Tab by mouth every day., Disp: 100 Tab, Rfl: 3  •  triamterene/hctz (MAXZIDE-25/DYAZIDE) 37.5-25 MG Cap, Take 1 Cap by mouth every day., Disp: 90 Cap, Rfl: 3  •  latanoprost (XALATAN) 0.005 % Solution, INSTILL 1 DROP INTO BOTH EYES EVERY EVENING AT BEDTIME, Disp: , Rfl: 4  •  acetaminophen (TYLENOL) 500 MG TABS, Take 500 mg by mouth 3 times a day as needed. Indications: Pain, Disp: , Rfl:   •  aspirin 81 MG tablet, Take 81 mg by mouth every morning., Disp: , Rfl:   •  varenicline (CHANTIX STARTING MONTH CRISTELA) 0.5 MG X 11 & 1 MG X 42 tablet, Take according to package instructions (Patient not taking: Reported on 10/14/2019), Disp: 56 Tab, Rfl: 0  •  varenicline (CHANTIX STARTING MONTH PAK) 0.5 MG X 11 & 1 MG X 42 tablet, Take according to package instructions (Patient not taking: Reported on 10/14/2019), Disp: 56 Tab, Rfl: 0  •  aspirin (ASA) 81 MG Chew Tab chewable tablet, CHEW ONE TABLET BY MOUTH DAILY, Disp: , Rfl: 0  •  FLUAD 0.5 ML Suspension Prefilled Syringe, TO BE ADMINISTERED BY PHARMACIST FOR IMMUNIZATION, Disp: , Rfl: 0  •  atorvastatin (LIPITOR) 40 MG Tab, Take 40 mg by mouth every day., Disp: , Rfl: 0  •  Mirabegron ER 25 MG TABLET SR 24 HR, Take 25 mg by mouth every day. (Patient not taking: Reported on 9/9/2019), Disp: 30 Tab, Rfl: 11  •  potassium chloride SA (K-DUR) 10 MEQ Tab CR, TAKE 1 TABLET BY MOUTH EVERY DAY, Disp: 100 Tab, Rfl: 3  •  fenofibrate (TRIGLIDE) 160 MG  "tablet, TAKE ONE TABLET BY MOUTH ONE TIME DAILY (Patient not taking: Reported on 10/14/2019), Disp: 90 Tab, Rfl: 3  •  varenicline (CHANTIX) 1 MG tablet, Take 1 Tab by mouth 2 times a day. (Patient not taking: Reported on 11/2/2019), Disp: 60 Tab, Rfl: 3    Current Facility-Administered Medications:   •  cefTRIAXone (ROCEPHIN) injection 1 g, 1 g, Intramuscular, Once, Santiago Lombardo P.A.-C.  •  lidocaine (XYLOCAINE) 1 % injection 2.1 mL, 2.1 mL, Injection, Once, Santiago Lombardo P.A.-C.  ALLERGIES:   Allergies   Allergen Reactions   • Food Hives and Nausea     oranges   • Neomycin-Polymyxin B Rash   • Pcn [Penicillins]    • Sulfa Drugs      SURGHX:   Past Surgical History:   Procedure Laterality Date   • NIRU BY LAPAROSCOPY  9/2/2011    Performed by KAYLEIGH PORRAS at SURGERY SAME DAY [facility]   • LUMBAR DECOMPRESSION  6/29/2009    Performed by ANG YAN at SURGERY [facility]   • LUMBAR LAMINECTOMY DISKECTOMY  6/29/2009    Performed by ANG YAN at SURGERY [facility]   • ABDOMINAL HYSTERECTOMY TOTAL     • GYN SURGERY      hysterectomy   • HEMORRHOIDECTOMY       SOCHX:  reports that she has been smoking cigarettes. She has a 14.00 pack-year smoking history. She has never used smokeless tobacco. She reports that she does not drink alcohol or use drugs.  FH: Family history was reviewed, no pertinent findings to report   Objective:   /60 (BP Location: Left arm, Patient Position: Sitting, BP Cuff Size: Large adult)   Pulse 90   Temp 36.4 °C (97.6 °F) (Temporal)   Ht 1.575 m (5' 2\")   Wt 91.6 kg (202 lb)   SpO2 98%   BMI 36.95 kg/m²   Physical Exam   Constitutional: She is oriented to person, place, and time. She appears well-developed and well-nourished.  Non-toxic appearance. No distress.   HENT:   Head: Normocephalic and atraumatic.   Right Ear: External ear normal.   Left Ear: External ear normal.   Nose: Nose normal.   Eyes: Conjunctivae and lids are normal.   Neck: Neck supple. "   Cardiovascular: Normal rate and regular rhythm.   Pulmonary/Chest: Effort normal and breath sounds normal. No respiratory distress.   Abdominal: Soft. Normal appearance. There is no tenderness. There is CVA tenderness (right more than left). There is no rigidity, no rebound and no guarding.   Musculoskeletal:   Normal range of motion. Exhibits no edema and no tenderness.    Neurological: She is alert and oriented to person, place, and time. No cranial nerve deficit or sensory deficit.   Skin: Skin is warm, dry and intact. Capillary refill takes less than 2 seconds.   Psychiatric: She has a normal mood and affect. Her speech is normal and behavior is normal. Judgment and thought content normal. Cognition and memory are normal.   Vitals reviewed.        Assessment/Plan:   1. Pyelonephritis  - Urine Culture; Future  - ciprofloxacin (CIPRO) 500 MG Tab; Take 1 Tab by mouth every 12 hours for 7 days.  Dispense: 14 Tab; Refill: 0  - cefTRIAXone (ROCEPHIN) injection 1 g  - lidocaine (XYLOCAINE) 1 % injection 2.1 mL  - POCT Urinalysis    Results for orders placed or performed in visit on 11/02/19   POCT Urinalysis   Result Value Ref Range    POC Color YELLOW Negative    POC Appearance TURBID Negative    POC Leukocyte Esterase NEGATIVE Negative    POC Nitrites NEGATIVE Negative    POC Urobiligen 0.2 Negative (0.2) mg/dL    POC Protein NEGATIVE Negative mg/dL    POC Urine PH 5.0 5.0 - 8.0    POC Blood TRACE-INTACT Negative    POC Specific Gravity 1.025 <1.005 - >1.030    POC Ketones NEGATIVE Negative mg/dL    POC Bilirubin NEGATIVE Negative mg/dL    POC Glucose NEGATIVE Negative mg/dL     History and presentation concerning for pyelonephritis.  Patient denies history of kidney stones and current symptoms consistent with other episodes of pyelo.  I will treat and send urine for culture.  Continue to drink plenty of fluids.  Strict ED precautions.    Patient does have history of CKD-stage III.  Labs reviewed.  GFR from  8/17/2019 is 42.  Antibiotics do not require renal dose suggesting.  If patient's symptoms do not improve in 48 hours or do not fully resolve she is advised that she needs to be medically reevaluated.  If she experiences increased fatigue, fevers, worsening pain, changes in cognition or other concerning symptoms she should go to the ED for reevaluation.  Both patient and daughter verbalized understanding of follow-up and ED precautions.    Differential diagnosis, natural history, supportive care, and indications for immediate follow-up discussed.

## 2019-11-04 LAB
BACTERIA UR CULT: NORMAL
SIGNIFICANT IND 70042: NORMAL
SITE SITE: NORMAL
SOURCE SOURCE: NORMAL

## 2019-11-04 NOTE — RESULT ENCOUNTER NOTE
Patient contacted with results.  I received her voicemail.  She was advised that culture is negative and that if her symptoms worsen.all or are not improved that she does need to be reevaluated.  She should contact PCP or me with any questions or concerns but was advised that I will be out of the office for the next 2 days.

## 2019-11-06 RX ORDER — FENOFIBRATE 160 MG/1
TABLET ORAL
Qty: 100 TAB | Refills: 3 | Status: SHIPPED | OUTPATIENT
Start: 2019-11-06 | End: 2020-12-29

## 2019-12-06 ENCOUNTER — HOSPITAL ENCOUNTER (OUTPATIENT)
Dept: CARDIOLOGY | Facility: MEDICAL CENTER | Age: 79
End: 2019-12-06
Attending: INTERNAL MEDICINE
Payer: MEDICARE

## 2019-12-06 DIAGNOSIS — R10.13 ABDOMINAL PAIN, EPIGASTRIC: ICD-10-CM

## 2019-12-06 DIAGNOSIS — R11.2 NAUSEA AND VOMITING, INTRACTABILITY OF VOMITING NOT SPECIFIED, UNSPECIFIED VOMITING TYPE: ICD-10-CM

## 2019-12-06 DIAGNOSIS — E66.9 OBESITY, UNSPECIFIED CLASSIFICATION, UNSPECIFIED OBESITY TYPE, UNSPECIFIED WHETHER SERIOUS COMORBIDITY PRESENT: ICD-10-CM

## 2019-12-06 LAB
LV EJECT FRACT  99904: 65
LV EJECT FRACT MOD 2C 99903: 70.07
LV EJECT FRACT MOD 4C 99902: 70.91
LV EJECT FRACT MOD BP 99901: 70.61

## 2019-12-06 PROCEDURE — 93306 TTE W/DOPPLER COMPLETE: CPT | Mod: 26 | Performed by: INTERNAL MEDICINE

## 2019-12-06 PROCEDURE — 93306 TTE W/DOPPLER COMPLETE: CPT

## 2019-12-11 VITALS — HEIGHT: 62 IN | WEIGHT: 201.28 LBS | BODY MASS INDEX: 37.04 KG/M2

## 2019-12-11 DIAGNOSIS — Z01.812 PRE-OPERATIVE LABORATORY EXAMINATION: ICD-10-CM

## 2019-12-11 DIAGNOSIS — Z01.810 PRE-OPERATIVE CARDIOVASCULAR EXAMINATION: ICD-10-CM

## 2019-12-11 LAB
ANION GAP SERPL CALC-SCNC: 9 MMOL/L (ref 0–11.9)
BUN SERPL-MCNC: 21 MG/DL (ref 8–22)
CALCIUM SERPL-MCNC: 9.2 MG/DL (ref 8.5–10.5)
CHLORIDE SERPL-SCNC: 108 MMOL/L (ref 96–112)
CO2 SERPL-SCNC: 20 MMOL/L (ref 20–33)
CREAT SERPL-MCNC: 1.3 MG/DL (ref 0.5–1.4)
EKG IMPRESSION: NORMAL
ERYTHROCYTE [DISTWIDTH] IN BLOOD BY AUTOMATED COUNT: 46.2 FL (ref 35.9–50)
GLUCOSE SERPL-MCNC: 106 MG/DL (ref 65–99)
HCT VFR BLD AUTO: 41.1 % (ref 37–47)
HGB BLD-MCNC: 13.7 G/DL (ref 12–16)
MCH RBC QN AUTO: 31.6 PG (ref 27–33)
MCHC RBC AUTO-ENTMCNC: 33.3 G/DL (ref 33.6–35)
MCV RBC AUTO: 94.9 FL (ref 81.4–97.8)
PLATELET # BLD AUTO: 328 K/UL (ref 164–446)
PMV BLD AUTO: 9.5 FL (ref 9–12.9)
POTASSIUM SERPL-SCNC: 4.3 MMOL/L (ref 3.6–5.5)
RBC # BLD AUTO: 4.33 M/UL (ref 4.2–5.4)
SODIUM SERPL-SCNC: 137 MMOL/L (ref 135–145)
WBC # BLD AUTO: 6.7 K/UL (ref 4.8–10.8)

## 2019-12-11 PROCEDURE — 85027 COMPLETE CBC AUTOMATED: CPT

## 2019-12-11 PROCEDURE — 80048 BASIC METABOLIC PNL TOTAL CA: CPT

## 2019-12-11 PROCEDURE — 36415 COLL VENOUS BLD VENIPUNCTURE: CPT

## 2019-12-11 RX ORDER — IBUPROFEN 200 MG
200 TABLET ORAL EVERY 6 HOURS PRN
COMMUNITY
End: 2021-07-19

## 2019-12-11 NOTE — OR NURSING
Pre admit apt: Pt. Instructed to continue regularly prescribed medications through day before surgery.  Instructed to take the following medications, the day of surgery, with a sip of water per anesthesia protocol:tylenol, levothyroxine

## 2019-12-13 ENCOUNTER — HOSPITAL ENCOUNTER (OUTPATIENT)
Facility: MEDICAL CENTER | Age: 79
End: 2019-12-13
Attending: INTERNAL MEDICINE | Admitting: INTERNAL MEDICINE
Payer: MEDICARE

## 2019-12-13 DIAGNOSIS — Z01.812 PRE-OPERATIVE LABORATORY EXAMINATION: ICD-10-CM

## 2019-12-13 DIAGNOSIS — Z01.810 PRE-OPERATIVE CARDIOVASCULAR EXAMINATION: ICD-10-CM

## 2019-12-13 LAB — PATHOLOGY CONSULT NOTE: NORMAL

## 2019-12-13 PROCEDURE — 160204 HCHG ENDO MINUTES - 1ST 30 MINS LEVEL 5: Performed by: INTERNAL MEDICINE

## 2019-12-13 PROCEDURE — 160025 RECOVERY II MINUTES (STATS): Performed by: INTERNAL MEDICINE

## 2019-12-13 PROCEDURE — 160209 HCHG ENDO MINUTES - EA ADDL 1 MIN LEVEL 5: Performed by: INTERNAL MEDICINE

## 2019-12-13 PROCEDURE — 700111 HCHG RX REV CODE 636 W/ 250 OVERRIDE (IP)

## 2019-12-13 PROCEDURE — 160046 HCHG PACU - 1ST 60 MINS PHASE II: Performed by: INTERNAL MEDICINE

## 2019-12-13 PROCEDURE — 700101 HCHG RX REV CODE 250

## 2019-12-13 PROCEDURE — 500066 HCHG BITE BLOCK, ECT: Performed by: INTERNAL MEDICINE

## 2019-12-13 PROCEDURE — 160009 HCHG ANES TIME/MIN: Performed by: INTERNAL MEDICINE

## 2019-12-13 PROCEDURE — 88305 TISSUE EXAM BY PATHOLOGIST: CPT

## 2019-12-13 PROCEDURE — 160002 HCHG RECOVERY MINUTES (STAT): Performed by: INTERNAL MEDICINE

## 2019-12-13 PROCEDURE — 160048 HCHG OR STATISTICAL LEVEL 1-5: Performed by: INTERNAL MEDICINE

## 2019-12-13 PROCEDURE — 88312 SPECIAL STAINS GROUP 1: CPT

## 2019-12-13 PROCEDURE — 160035 HCHG PACU - 1ST 60 MINS PHASE I: Performed by: INTERNAL MEDICINE

## 2019-12-13 NOTE — OP REPORT
Date of Procedure: 12/13/19     Procedure performed:  1.EGD with Biopsy   2.Colonoscopy   ====================================  Anesthesiologist:  Dr.English ANDERSON   Surgeon: Scottie Angel M.D.   -----------------------------------------------------------------  Preoperative diagnosis:   1. Abdominal pain   2. Abnormal imaging of the abdomen     Postoperative diagnosis:  1. Gastric ulcer   2. Irregular Z line   3. Right sided diverticulosis   ------------------------------------------------------------------    Impression:    EGD:     The Squamo-columnar junction was measured at 36 cm.  The gastric folds were measured at 36 cm  The diaphragmatic hiatus was measured at 36 cm  The Z line appear slightly irregular with few small islands of murcia's appearing mucosa   -s/p biopsy     Colonoscopy:      ANTRUM: A 6 mm linear partially healed ulcer was found in the antrum with associated erythema and erosions.  -S/p Biopsy     Recommendations:    1. I will follow up pathology.   2. Rpt screening colonoscopy in 10 yrs   3.Cont PPI for now  4.Follow up in the clinic as directed / Tre us PRN       ====================================  Indication:   Abdominal pain with h/o rectal bleeding.CT abd reveal colitis     -------------------------    Medications:  The patient was performed under general anesthesia with monitored anesthesia care.  Propofol sedation was administered under discretion of anesthesiology.  Please refer to their notes for more details.     Universal Precautions:  [place] Precautions were followed throughout the entire procedure.  The patient was continuously monitored on Blood pressure, pulse oximetry, and heart rate monitoring though out the procedure and afterwards. A time out was taken prior to the start of the procedure to identify the patient's name, date of birth, ID badge, and appropriate procedure site/procedure type.    Informed Consent:  Informed consent was obtained from the patient after the  risks, benefits, and indications were explained to the patient in detail.  The alternatives to the procedure were explained as well.  Risks such as bleeding, infection, perforation, complications with sedation, and need for open surgery for complications were explained in detail prior to obtaining consent.  The patient was given opportunity to ask questions.  If need be these same risks, benefits, indication, and complications were explained to the patients family member prior to obtaining consent for the procedure.      EGD & COLONOSCOPY  Description of procedure:    Patient was brought to endoscopy operating room and met by staff and physicians.  After procedure was confirmed and appropriate procedure was identified preparations were made for sedation.  A time out was taken to identify the patients name, procedure site/type, and ID badge.  The patient was then sedated, and an oral bite block was placed between the incisors.  When the patient was appropriately sedated the GIF Olympus forward-viewing upper endoscope was used to perform the procedure.    The endoscope was passed under direct visualization to the level of the 2nd portion of the duodenum.    Hypopharynx:  Limited views of the hypopharynx, vocal cords and piriform sinuses revealed no abnormalities.These views were limited and were not comprehensive as a flexible endoscopy was used.    Esophagus:  The lining of the upper esophagus was normal in appearance. There was no difficulty passing the endoscope.    The Squamo-columnar junction was measured at 36 cm.  The gastric folds were measured at 36 cm  The diaphragmatic hiatus was measured at 36 cm  The Z line appear slightly irregular with few small islands of murcia's appearing mucosa   This was biopsied using a cold forceps     There was no evidence of Murcia's, esophagitis or hiatal hernia.       Stomach:  Careful examination was made of the body and antrum of the stomach.  Detailed retroflexed views  and photographs were obtained of the Fundus and cardia as well.  BODY: No abnormalities identified  ANTRUM: A 6 mm linear partially healed ulcer was found in the antrum with associated erythema and erosions. This was biopsied using a cold forceps    Fundus: no abnormalities identified  Cardia: no abnormalities identified    Duodenum:  The endoscope was passed through the pylorus into the duodenal bulb which was examined in detail. Further advancement allowed for examination of the 1st and 2nd portion of the duodenum.  DUODENAL BULB: Normal in appearance  1st & 2nd PORTION OF DUODENUM:  Normal in appearance    ====Colonoscopy======  Patient remained under anesthesia.  The bed was rotated for the colonoscopy.  The Upper endoscope was removed from the central processor and taken for cleaning.  A clean colonoscope was attached to the central processor for colonoscopy.  Patients ID and records were checked to confirm second procedure - colonoscopy.    The perianal skin appear erythematous , with evidence of chronic irritation   A linear 4 cm erosion with active oozing was seen extending posteriorly from the posterior aspect of the anal orifice       The colonoscope was passed under direct visualization to the level of the cecum.  The cecum was identified by the ileocecal valve and the appendiceal orifice.  Photographs were obtained.  Attempts were made to intubate the terminal ileum in retrograde.  No colonic masses appreciated on rectal exam.    Terminal Ileum intubation:  Not done     In order to further evaluate for right sided lesions - retroflexed views within the right colon were attempted.  See photographs.    Right sided retroflexion: Done       The colonoscope was carefully withdrawn with attention directed to all folds, haustra, and mucosa with attention directed to all folds, haustra, and mucosa.    PREP QUALITY:  Adequate  WITHDRAWAL TIME (FOR SCREENING COLONOSCOPY):    FINDINGS:   Cecum:  unremarkable  Ascending colon: Few small mouthed diverticula seen in the ascending colon   Transverse colon: unremarkable  Descending colon: unremarkable  Sigmoid colon: unremarkable    The rectum was then evaluated in forward and retroflexion.    Rectum: Normal

## 2020-03-24 RX ORDER — ONDANSETRON 4 MG/1
TABLET, FILM COATED ORAL
Qty: 20 TAB | Refills: 3 | Status: SHIPPED | OUTPATIENT
Start: 2020-03-24 | End: 2020-09-02

## 2020-03-26 ENCOUNTER — OFFICE VISIT (OUTPATIENT)
Dept: URGENT CARE | Facility: PHYSICIAN GROUP | Age: 80
End: 2020-03-26
Payer: MEDICARE

## 2020-03-26 ENCOUNTER — HOSPITAL ENCOUNTER (OUTPATIENT)
Facility: MEDICAL CENTER | Age: 80
End: 2020-03-26
Attending: NURSE PRACTITIONER
Payer: MEDICARE

## 2020-03-26 VITALS
WEIGHT: 197 LBS | OXYGEN SATURATION: 99 % | BODY MASS INDEX: 36.25 KG/M2 | HEIGHT: 62 IN | TEMPERATURE: 96.8 F | HEART RATE: 82 BPM | RESPIRATION RATE: 16 BRPM | DIASTOLIC BLOOD PRESSURE: 56 MMHG | SYSTOLIC BLOOD PRESSURE: 102 MMHG

## 2020-03-26 DIAGNOSIS — R30.0 DYSURIA: ICD-10-CM

## 2020-03-26 DIAGNOSIS — N10 PYELONEPHRITIS, ACUTE: ICD-10-CM

## 2020-03-26 LAB
APPEARANCE UR: CLEAR
BILIRUB UR STRIP-MCNC: NEGATIVE MG/DL
COLOR UR AUTO: YELLOW
GLUCOSE UR STRIP.AUTO-MCNC: NEGATIVE MG/DL
KETONES UR STRIP.AUTO-MCNC: NEGATIVE MG/DL
LEUKOCYTE ESTERASE UR QL STRIP.AUTO: NEGATIVE
NITRITE UR QL STRIP.AUTO: POSITIVE
PH UR STRIP.AUTO: 5 [PH] (ref 5–8)
PROT UR QL STRIP: NEGATIVE MG/DL
RBC UR QL AUTO: NEGATIVE
SP GR UR STRIP.AUTO: 1.02
UROBILINOGEN UR STRIP-MCNC: 0.2 MG/DL

## 2020-03-26 PROCEDURE — 87086 URINE CULTURE/COLONY COUNT: CPT

## 2020-03-26 PROCEDURE — 99214 OFFICE O/P EST MOD 30 MIN: CPT | Performed by: NURSE PRACTITIONER

## 2020-03-26 PROCEDURE — 81002 URINALYSIS NONAUTO W/O SCOPE: CPT | Performed by: NURSE PRACTITIONER

## 2020-03-26 RX ORDER — CEFDINIR 300 MG/1
300 CAPSULE ORAL 2 TIMES DAILY
Qty: 20 CAP | Refills: 0 | Status: SHIPPED | OUTPATIENT
Start: 2020-03-26 | End: 2020-08-28

## 2020-03-26 RX ORDER — KETOROLAC TROMETHAMINE 30 MG/ML
30 INJECTION, SOLUTION INTRAMUSCULAR; INTRAVENOUS ONCE
Status: COMPLETED | OUTPATIENT
Start: 2020-03-26 | End: 2020-03-26

## 2020-03-26 RX ADMIN — KETOROLAC TROMETHAMINE 30 MG: 30 INJECTION, SOLUTION INTRAMUSCULAR; INTRAVENOUS at 10:12

## 2020-03-26 ASSESSMENT — ENCOUNTER SYMPTOMS
VOMITING: 0
HEADACHES: 0
NAUSEA: 1
ABDOMINAL PAIN: 1
ORTHOPNEA: 0
FEVER: 0
FLANK PAIN: 1
DIARRHEA: 0
DIZZINESS: 1
BACK PAIN: 1
CHILLS: 0

## 2020-03-26 NOTE — PROGRESS NOTES
Subjective:      Margaret Garcia is a 79 y.o. female who presents with Dysuria            HPI New. 79 year old female with right flank pain for 2 days as well as nausea. She denies fever, chills, vomiting or diarrhea. She denies any urinary symptoms but reports history of pyelo. Her daughter wanted her to go to ED however patient is refusing at this time due to Covid-19. Her vitals here are stable at this time and she is afebrile.  She has taken some tylenol for this.          Food; Neomycin-polymyxin b; Pcn [penicillins]; and Sulfa drugs  Current Outpatient Medications on File Prior to Visit   Medication Sig Dispense Refill   • fenofibrate (TRIGLIDE) 160 MG tablet TAKE 1 TABLET BY MOUTH EVERY  Tab 3   • levothyroxine (SYNTHROID) 137 MCG Tab Take 1 Tab by mouth every morning. ON A EMPTY STOMACH 90 Tab 3   • aspirin (ASA) 81 MG Chew Tab chewable tablet CHEW ONE TABLET BY MOUTH DAILY  0   • lisinopril (PRINIVIL) 10 MG Tab Take 1 Tab by mouth every day. 100 Tab 3   • atorvastatin (LIPITOR) 10 MG Tab Take 1 Tab by mouth every day. 100 Tab 3   • acetaminophen (TYLENOL) 500 MG TABS Take 500 mg by mouth 3 times a day as needed. Indications: Pain     • ondansetron (ZOFRAN) 4 MG Tab tablet TAKE 1 TABLET BY MOUTH EVERY 6 HOURS AS NEEDED FOR NAUSEA AND VOMITING 20 Tab 3   • ibuprofen (MOTRIN) 200 MG Tab Take 200 mg by mouth every 6 hours as needed.     • Mirabegron ER 25 MG TABLET SR 24 HR Take 25 mg by mouth every day. (Patient not taking: Reported on 9/9/2019) 30 Tab 11   • potassium chloride SA (K-DUR) 10 MEQ Tab CR TAKE 1 TABLET BY MOUTH EVERY  Tab 3   • triamterene/hctz (MAXZIDE-25/DYAZIDE) 37.5-25 MG Cap Take 1 Cap by mouth every day. 90 Cap 3   • latanoprost (XALATAN) 0.005 % Solution INSTILL 1 DROP INTO BOTH EYES EVERY EVENING AT BEDTIME  4     No current facility-administered medications on file prior to visit.      Social History     Socioeconomic History   • Marital status: Single     Spouse name:  Not on file   • Number of children: Not on file   • Years of education: Not on file   • Highest education level: Not on file   Occupational History   • Not on file   Social Needs   • Financial resource strain: Not on file   • Food insecurity     Worry: Not on file     Inability: Not on file   • Transportation needs     Medical: Not on file     Non-medical: Not on file   Tobacco Use   • Smoking status: Current Every Day Smoker     Packs/day: 1.00     Years: 56.00     Pack years: 56.00     Types: Cigarettes   • Smokeless tobacco: Never Used   • Tobacco comment: started smoking at age 12, little mor than half a pack   Substance and Sexual Activity   • Alcohol use: No   • Drug use: No   • Sexual activity: Never     Partners: Male     Birth control/protection: Post-Menopausal   Lifestyle   • Physical activity     Days per week: Not on file     Minutes per session: Not on file   • Stress: Not on file   Relationships   • Social connections     Talks on phone: Not on file     Gets together: Not on file     Attends Holiness service: Not on file     Active member of club or organization: Not on file     Attends meetings of clubs or organizations: Not on file     Relationship status: Not on file   • Intimate partner violence     Fear of current or ex partner: Not on file     Emotionally abused: Not on file     Physically abused: Not on file     Forced sexual activity: Not on file   Other Topics Concern   • Not on file   Social History Narrative   • Not on file     Breast Cancer-related family history is not on file.    Review of Systems   Constitutional: Negative for chills and fever.   Cardiovascular: Negative for chest pain and orthopnea.   Gastrointestinal: Positive for abdominal pain and nausea. Negative for diarrhea and vomiting.   Genitourinary: Positive for flank pain. Negative for dysuria, frequency, hematuria and urgency.   Musculoskeletal: Positive for back pain.   Neurological: Positive for dizziness. Negative for  "headaches.          Objective:     /56   Pulse 82   Temp 36 °C (96.8 °F) (Temporal)   Resp 16   Ht 1.575 m (5' 2\")   Wt 89.4 kg (197 lb)   SpO2 99%   BMI 36.03 kg/m²      Physical Exam  Vitals signs reviewed.   Constitutional:       General: She is not in acute distress.     Appearance: She is well-developed.   Cardiovascular:      Rate and Rhythm: Normal rate and regular rhythm.      Heart sounds: Normal heart sounds. No murmur.   Pulmonary:      Effort: Pulmonary effort is normal. No respiratory distress.      Breath sounds: Normal breath sounds.   Abdominal:      General: Bowel sounds are normal.      Palpations: Abdomen is soft.      Tenderness: There is abdominal tenderness. There is right CVA tenderness.      Comments: She has tenderness along side as well.   Musculoskeletal: Normal range of motion.      Comments: Moves all 4 extremities normally   Skin:     General: Skin is warm and dry.   Neurological:      Mental Status: She is alert and oriented to person, place, and time.   Psychiatric:         Behavior: Behavior normal.         Thought Content: Thought content normal.                 Assessment/Plan:       1. Pyelonephritis, acute  cefTRIAXone (ROCEPHIN) 500 mg, lidocaine (XYLOCAINE) 1 % 1.8 mL for IM use    cefdinir (OMNICEF) 300 MG Cap    ketorolac (TORADOL) injection 30 mg   2. Dysuria  POCT Urinalysis    URINE CULTURE(NEW)     Urine with positive nitrites and leuks.  Rocephin and omnicef.  toradol  VERY STRICT ED precautions reviewed with both patient and daughter (vomiting, worsening of symptoms, fever). They have voiced understanding of this.    "

## 2020-03-28 LAB
BACTERIA UR CULT: NORMAL
SIGNIFICANT IND 70042: NORMAL
SITE SITE: NORMAL
SOURCE SOURCE: NORMAL

## 2020-06-05 ENCOUNTER — APPOINTMENT (OUTPATIENT)
Dept: MEDICAL GROUP | Facility: MEDICAL CENTER | Age: 80
End: 2020-06-05
Payer: MEDICARE

## 2020-06-05 ENCOUNTER — OFFICE VISIT (OUTPATIENT)
Dept: MEDICAL GROUP | Facility: MEDICAL CENTER | Age: 80
End: 2020-06-05
Payer: MEDICARE

## 2020-06-05 VITALS
TEMPERATURE: 97.3 F | OXYGEN SATURATION: 100 % | SYSTOLIC BLOOD PRESSURE: 118 MMHG | HEIGHT: 62 IN | HEART RATE: 78 BPM | WEIGHT: 199 LBS | BODY MASS INDEX: 36.62 KG/M2 | DIASTOLIC BLOOD PRESSURE: 70 MMHG

## 2020-06-05 DIAGNOSIS — E66.9 OBESITY (BMI 30-39.9): ICD-10-CM

## 2020-06-05 DIAGNOSIS — G47.30 SLEEP APNEA, UNSPECIFIED TYPE: ICD-10-CM

## 2020-06-05 DIAGNOSIS — R55 SYNCOPE, UNSPECIFIED SYNCOPE TYPE: ICD-10-CM

## 2020-06-05 DIAGNOSIS — Z72.0 TOBACCO USE: ICD-10-CM

## 2020-06-05 DIAGNOSIS — I10 ESSENTIAL HYPERTENSION: ICD-10-CM

## 2020-06-05 PROCEDURE — 99214 OFFICE O/P EST MOD 30 MIN: CPT | Performed by: INTERNAL MEDICINE

## 2020-06-05 ASSESSMENT — PATIENT HEALTH QUESTIONNAIRE - PHQ9: CLINICAL INTERPRETATION OF PHQ2 SCORE: 0

## 2020-06-05 NOTE — ASSESSMENT & PLAN NOTE
She has hypertension for which she is on lisinopril and Dyazide.  She denies lightheadedness.  Blood pressure currently is quite satisfactory.

## 2020-06-05 NOTE — PROGRESS NOTES
Subjective:     Chief Complaint   Patient presents with   • Follow-Up     [facility]     Margaret Garcia is a 79 y.o. female here today for Follow-up from recent syncopal episode and she wants referral to cardiology for an abnormal echocardiogram.    Obesity (BMI 30-39.9)  She remains significantly overweight with BMI 36.4.  Weight is fairly stable.    Sleep apnea  She does have a history of sleep apnea.  She does have some fatigue during the day.    Essential hypertension  She has hypertension for which she is on lisinopril and Dyazide.  She denies lightheadedness.  Blood pressure currently is quite satisfactory.    Tobacco use  She unfortunately still smokes about a pack per day.  She is not interested in quitting.    Syncope  She reports that on May 27 she was in [place].  It was a hot day with temperature over 100.  She was standing beside the car smoking when she suddenly lost consciousness.  She denied lightheadedness or palpitations or feelings of near syncope.  She was evaluated at [facility] overnight.  Heart rhythm was good.CT and CT angiograms were normal.She had an echocardiogram.  She was told that the echocardiogram showed some abnormality and that she should see a cardiologist.  The echo report that we have however is not mention a significant abnormality.  She denies other recent syncopal episodes or palpitations.  She thinks that she remains well-hydrated.  As noted, she is on antihypertensive medications, lisinopril and Dyazide.  She is quite overweight.       Diagnoses of Syncope, unspecified syncope type, Obesity (BMI 30-39.9), Sleep apnea, unspecified type, Essential hypertension, and Tobacco use were pertinent to this visit.    Allergies: Food; Neomycin-polymyxin b; Pcn [penicillins]; and Sulfa drugs  Current medicines (including changes today)  Current Outpatient Medications   Medication Sig Dispense Refill   • ondansetron (ZOFRAN) 4 MG Tab tablet TAKE 1 TABLET BY MOUTH EVERY 6 HOURS AS  NEEDED FOR NAUSEA AND VOMITING 20 Tab 3   • ibuprofen (MOTRIN) 200 MG Tab Take 200 mg by mouth every 6 hours as needed.     • fenofibrate (TRIGLIDE) 160 MG tablet TAKE 1 TABLET BY MOUTH EVERY  Tab 3   • levothyroxine (SYNTHROID) 137 MCG Tab Take 1 Tab by mouth every morning. ON A EMPTY STOMACH 90 Tab 3   • aspirin (ASA) 81 MG Chew Tab chewable tablet CHEW ONE TABLET BY MOUTH DAILY  0   • potassium chloride SA (K-DUR) 10 MEQ Tab CR TAKE 1 TABLET BY MOUTH EVERY  Tab 3   • lisinopril (PRINIVIL) 10 MG Tab Take 1 Tab by mouth every day. 100 Tab 3   • atorvastatin (LIPITOR) 10 MG Tab Take 1 Tab by mouth every day. 100 Tab 3   • triamterene/hctz (MAXZIDE-25/DYAZIDE) 37.5-25 MG Cap Take 1 Cap by mouth every day. 90 Cap 3   • latanoprost (XALATAN) 0.005 % Solution INSTILL 1 DROP INTO BOTH EYES EVERY EVENING AT BEDTIME  4   • acetaminophen (TYLENOL) 500 MG TABS Take 500 mg by mouth 3 times a day as needed. Indications: Pain     • cefdinir (OMNICEF) 300 MG Cap Take 1 Cap by mouth 2 times a day. (Patient not taking: Reported on 6/5/2020) 20 Cap 0   • Mirabegron ER 25 MG TABLET SR 24 HR Take 25 mg by mouth every day. (Patient not taking: Reported on 9/9/2019) 30 Tab 11     No current facility-administered medications for this visit.        She  has a past medical history of Arthritis, ASTHMA, Body mass index 40.0-44.9, adult ([facility]) (4/27/2018), Chronic renal impairment, stage 3 (moderate) ([facility]) (5/13/2019), Dental disorder, Dysuria (10/18/2016), Fall, Generalized edema (4/27/2018), Glaucoma, Hay fever (4/19/2016), Heart burn, High cholesterol, UTI (urinary tract infection), Hyperglycemia (11/24/2015), Hypertension, Hypothyroidism, Indigestion, Ischemic bowel disease ([facility]) (9/9/2019), Migraines, Nausea (10/14/2019), Neuropathy ([facility]), Obesity (BMI 30-39.9) (7/20/2015), Pneumonia, Routine general medical examination at a health care facility (7/20/2015), Sleep apnea (12/2019), Snoring, Syncope (6/5/2020), Tobacco use  "(4/29/2019), URI (upper respiratory infection) (11/4/2015), and Urinary bladder disorder.  Health Maintenance:   ROS  Patient denies significant change in strength, weight or appetite.  No significant lightheadedness or headaches.  No change in vision, hearing, or swallowing.  No new dyspnea, coughing, chest pain, or palpitations.  No indigestion, abdominal pain, or change in bowel habits.  No change in urinating.  No new ankle swelling.       Objective:     PE:  /70 (BP Location: Left arm, Patient Position: Sitting)   Pulse 78   Temp 36.3 °C (97.3 °F)   Ht 1.575 m (5' 2\")   Wt 90.3 kg (199 lb)   SpO2 100%   BMI 36.40 kg/m²    Neck is supple without significant lymphadenopathy or masses.  Lungs are clear with normal breath sounds without wheezes or rales .  Cardiovascular: peripheral circulation is satisfactory, heart sounds are unchanged and unremarkable.  Abdomen is soft, without masses or tenderness, with normal bowel sounds.  Extremities are without significant edema, cyanosis or deformity.      Assessment and Plan:   The following treatment plan was discussed  1. Syncope, unspecified syncope type  REFERRAL TO CARDIOLOGY    Refer to cardiology.She will report any palpitations or near syncope symptoms or loss of consciousness.She will not drive.   2. Obesity (BMI 30-39.9)      She was encouraged in a weight loss program including appropriate diet and exercise.   3. Sleep apnea, unspecified type      She was encouraged to Use CPAP.   4. Essential hypertension      She will check her blood pressures at home and if they remain low, decrease lisinopril to 5 mg or hold Dyazide.   5. Tobacco use      She was strongly encouraged to stop use of tobacco.       Followup: 2 to 3 months for health  wellness evaluation.           "

## 2020-06-05 NOTE — ASSESSMENT & PLAN NOTE
She reports that on May 27 she was in [place].  It was a hot day with temperature over 100.  She was standing beside the car smoking when she suddenly lost consciousness.  She denied lightheadedness or palpitations or feelings of near syncope.  She was evaluated at [facility] overnight.  Heart rhythm was good.CT and CT angiograms were normal.She had an echocardiogram.  She was told that the echocardiogram showed some abnormality and that she should see a cardiologist.  The echo report that we have however is not mention a significant abnormality.  She denies other recent syncopal episodes or palpitations.  She thinks that she remains well-hydrated.  As noted, she is on antihypertensive medications, lisinopril and Dyazide.  She is quite overweight.

## 2020-06-29 DIAGNOSIS — R60.0 EDEMA LEG: ICD-10-CM

## 2020-06-29 DIAGNOSIS — R60.9 EDEMA, UNSPECIFIED TYPE: ICD-10-CM

## 2020-06-29 RX ORDER — TRIAMTERENE AND HYDROCHLOROTHIAZIDE 37.5; 25 MG/1; MG/1
CAPSULE ORAL
Qty: 90 CAP | Refills: 3 | Status: SHIPPED | OUTPATIENT
Start: 2020-06-29 | End: 2021-06-18

## 2020-06-29 RX ORDER — POTASSIUM CHLORIDE 750 MG/1
TABLET, EXTENDED RELEASE ORAL
Qty: 90 TAB | Refills: 4 | Status: SHIPPED | OUTPATIENT
Start: 2020-06-29 | End: 2020-09-02

## 2020-07-10 ENCOUNTER — TELEPHONE (OUTPATIENT)
Dept: MEDICAL GROUP | Facility: MEDICAL CENTER | Age: 80
End: 2020-07-10

## 2020-07-10 DIAGNOSIS — I10 ESSENTIAL HYPERTENSION: ICD-10-CM

## 2020-07-10 RX ORDER — LISINOPRIL 10 MG/1
TABLET ORAL
Qty: 100 TAB | Refills: 3 | Status: SHIPPED | OUTPATIENT
Start: 2020-07-10 | End: 2021-07-22

## 2020-07-10 RX ORDER — PROMETHAZINE HYDROCHLORIDE 25 MG/1
25 TABLET ORAL EVERY 6 HOURS PRN
Qty: 30 TAB | Refills: 0 | Status: SHIPPED | OUTPATIENT
Start: 2020-07-10 | End: 2020-12-14

## 2020-07-10 RX ORDER — ATORVASTATIN CALCIUM 10 MG/1
TABLET, FILM COATED ORAL
Qty: 100 TAB | Refills: 3 | Status: SHIPPED | OUTPATIENT
Start: 2020-07-10 | End: 2021-07-22

## 2020-07-10 NOTE — TELEPHONE ENCOUNTER
She will call [phone] to follow-up on the cardiology consultation.  We will try her on Phenergan for the nausea.

## 2020-07-10 NOTE — TELEPHONE ENCOUNTER
Patient would like an echocardiogram order placed. She stated she should have been called but they have not contacted her yet. Also her zofran medication is not covered by her insurance and would like to see if there is something else you can send in.

## 2020-07-23 ENCOUNTER — HOSPITAL ENCOUNTER (OUTPATIENT)
Dept: LAB | Facility: MEDICAL CENTER | Age: 80
End: 2020-07-23
Attending: INTERNAL MEDICINE
Payer: MEDICARE

## 2020-07-23 DIAGNOSIS — I10 ESSENTIAL HYPERTENSION: ICD-10-CM

## 2020-07-23 DIAGNOSIS — E78.5 HYPERLIPIDEMIA, UNSPECIFIED HYPERLIPIDEMIA TYPE: ICD-10-CM

## 2020-07-23 LAB
ANION GAP SERPL CALC-SCNC: 14 MMOL/L (ref 7–16)
BUN SERPL-MCNC: 22 MG/DL (ref 8–22)
CALCIUM SERPL-MCNC: 9.1 MG/DL (ref 8.5–10.5)
CHLORIDE SERPL-SCNC: 99 MMOL/L (ref 96–112)
CHOLEST SERPL-MCNC: 106 MG/DL (ref 100–199)
CO2 SERPL-SCNC: 19 MMOL/L (ref 20–33)
CREAT SERPL-MCNC: 1.34 MG/DL (ref 0.5–1.4)
GLUCOSE SERPL-MCNC: 87 MG/DL (ref 65–99)
HDLC SERPL-MCNC: 32 MG/DL
LDLC SERPL CALC-MCNC: 39 MG/DL
POTASSIUM SERPL-SCNC: 3.9 MMOL/L (ref 3.6–5.5)
SODIUM SERPL-SCNC: 132 MMOL/L (ref 135–145)
TRIGL SERPL-MCNC: 176 MG/DL (ref 0–149)

## 2020-07-23 PROCEDURE — 80048 BASIC METABOLIC PNL TOTAL CA: CPT

## 2020-07-23 PROCEDURE — 80061 LIPID PANEL: CPT

## 2020-07-23 PROCEDURE — 36415 COLL VENOUS BLD VENIPUNCTURE: CPT

## 2020-07-27 ENCOUNTER — TELEPHONE (OUTPATIENT)
Dept: MEDICAL GROUP | Facility: MEDICAL CENTER | Age: 80
End: 2020-07-27

## 2020-07-27 NOTE — TELEPHONE ENCOUNTER
Pt called and LM that you wanted her to have an ECHO however I do not see the order?  Please advise thank you

## 2020-08-19 ENCOUNTER — OFFICE VISIT (OUTPATIENT)
Dept: URGENT CARE | Facility: PHYSICIAN GROUP | Age: 80
End: 2020-08-19
Payer: MEDICARE

## 2020-08-19 VITALS
SYSTOLIC BLOOD PRESSURE: 108 MMHG | TEMPERATURE: 98 F | OXYGEN SATURATION: 100 % | BODY MASS INDEX: 33.66 KG/M2 | HEIGHT: 63 IN | DIASTOLIC BLOOD PRESSURE: 68 MMHG | WEIGHT: 190 LBS | HEART RATE: 78 BPM | RESPIRATION RATE: 22 BRPM

## 2020-08-19 DIAGNOSIS — R30.0 DYSURIA: ICD-10-CM

## 2020-08-19 DIAGNOSIS — R10.9 FLANK PAIN: ICD-10-CM

## 2020-08-19 LAB
APPEARANCE UR: CLEAR
BILIRUB UR STRIP-MCNC: NORMAL MG/DL
COLOR UR AUTO: YELLOW
GLUCOSE UR STRIP.AUTO-MCNC: NORMAL MG/DL
KETONES UR STRIP.AUTO-MCNC: NORMAL MG/DL
LEUKOCYTE ESTERASE UR QL STRIP.AUTO: NORMAL
NITRITE UR QL STRIP.AUTO: NORMAL
PH UR STRIP.AUTO: 5 [PH] (ref 5–8)
PROT UR QL STRIP: NORMAL MG/DL
RBC UR QL AUTO: NORMAL
SP GR UR STRIP.AUTO: 1.02
UROBILINOGEN UR STRIP-MCNC: 0.2 MG/DL

## 2020-08-19 PROCEDURE — 81002 URINALYSIS NONAUTO W/O SCOPE: CPT | Performed by: NURSE PRACTITIONER

## 2020-08-19 PROCEDURE — 99214 OFFICE O/P EST MOD 30 MIN: CPT | Performed by: NURSE PRACTITIONER

## 2020-08-19 RX ORDER — CEFDINIR 300 MG/1
300 CAPSULE ORAL 2 TIMES DAILY
Qty: 14 CAP | Refills: 0 | Status: SHIPPED | OUTPATIENT
Start: 2020-08-19 | End: 2020-08-26

## 2020-08-19 RX ORDER — PHENAZOPYRIDINE HYDROCHLORIDE 200 MG/1
200 TABLET, FILM COATED ORAL 3 TIMES DAILY PRN
Qty: 10 TAB | Refills: 0 | Status: SHIPPED | OUTPATIENT
Start: 2020-08-19 | End: 2021-05-08

## 2020-08-19 ASSESSMENT — PAIN SCALES - GENERAL: PAINLEVEL: 8=MODERATE-SEVERE PAIN

## 2020-08-19 ASSESSMENT — ENCOUNTER SYMPTOMS
CHILLS: 0
FLANK PAIN: 0
BACK PAIN: 1
FEVER: 0

## 2020-08-19 NOTE — PROGRESS NOTES
Subjective:      Margaret Garcia is a 79 y.o. female who presents with Back Pain (R/L side back pain, started this morning)    Past Medical History:   Diagnosis Date   • Arthritis     knees, and hips-osteo   • ASTHMA    • Body mass index 40.0-44.9, adult ([facility]) 4/27/2018   • Chronic renal impairment, stage 3 (moderate) ([facility]) 5/13/2019   • Dental disorder     upper   • Dysuria 10/18/2016   • Fall    • Generalized edema 4/27/2018   • Glaucoma     bilateral   • Hay fever 4/19/2016   • Heart burn    • High cholesterol    • Hx: UTI (urinary tract infection)    • Hyperglycemia 11/24/2015   • Hypertension    • Hypothyroidism    • Indigestion    • Ischemic bowel disease ([facility]) 9/9/2019   • Migraines     pt has similar symptoms with migraines not for a long time though   • Nausea 10/14/2019   • Neuropathy ([facility])    • Obesity (BMI 30-39.9) 7/20/2015   • Pneumonia    • Routine general medical examination at a health care facility 7/20/2015    7/20/15   • Sleep apnea 12/2019    Had sleep study, didn't tolerate cpap   • Snoring    • Syncope 6/5/2020   • Tobacco use 4/29/2019   • URI (upper respiratory infection) 11/4/2015   • Urinary bladder disorder     hx of uti's     Social History     Socioeconomic History   • Marital status: Single     Spouse name: Not on file   • Number of children: Not on file   • Years of education: Not on file   • Highest education level: Not on file   Occupational History   • Not on file   Social Needs   • Financial resource strain: Not on file   • Food insecurity     Worry: Not on file     Inability: Not on file   • Transportation needs     Medical: Not on file     Non-medical: Not on file   Tobacco Use   • Smoking status: Current Every Day Smoker     Packs/day: 1.00     Years: 56.00     Pack years: 56.00     Types: Cigarettes   • Smokeless tobacco: Never Used   • Tobacco comment: started smoking at age 12, little mor than half a pack   Substance and Sexual Activity   • Alcohol use: No   • Drug use: No    • Sexual activity: Never     Partners: Male     Birth control/protection: Post-Menopausal   Lifestyle   • Physical activity     Days per week: Not on file     Minutes per session: Not on file   • Stress: Not on file   Relationships   • Social connections     Talks on phone: Not on file     Gets together: Not on file     Attends Christian service: Not on file     Active member of club or organization: Not on file     Attends meetings of clubs or organizations: Not on file     Relationship status: Not on file   • Intimate partner violence     Fear of current or ex partner: Not on file     Emotionally abused: Not on file     Physically abused: Not on file     Forced sexual activity: Not on file   Other Topics Concern   • Not on file   Social History Narrative   • Not on file     Family History   Problem Relation Age of Onset   • Stroke Mother    • Hyperlipidemia Mother    • Hypertension Mother    • Arthritis Mother    • Diabetes Father    • Lung Disease Father         pneumonia   • Arthritis Sister    • Rheumatologic Disease Sister    • Hypertension Sister    • Hyperlipidemia Sister    • Other Sister         Anusha/Fibermyalgia   • Hyperlipidemia Brother    • Hypertension Brother    • Arthritis Brother    • Lung Disease Maternal Grandmother         pneumonia   • Stroke Maternal Grandfather    • Cancer Maternal Grandfather         skin    • No Known Problems Paternal Grandmother    • No Known Problems Paternal Grandfather    • Hyperlipidemia Daughter    • Diabetes Daughter    • No Known Problems Son        Allergie: Food, Neomycin-polymyxin b, Pcn [penicillins], Penicillin g, and Sulfa drugs    Patient is an 79-year-old female with positive prior history of urinary tract infections and pyelonephritis.  Patient states she is having urgency and dysuria.  Endorses bilateral flank pain.  Lower back pain is reproduced with certain ranges of motion per patient.          Back Pain  This is a new problem. The current episode  "started yesterday. The problem occurs constantly. Pain location: flank. The quality of the pain is described as aching. The pain does not radiate. Pertinent negatives include no dysuria or fever. She has tried nothing for the symptoms. The treatment provided no relief.       Review of Systems   Constitutional: Negative for chills and fever.   Genitourinary: Negative for dysuria, flank pain, frequency, hematuria and urgency.   Musculoskeletal: Positive for back pain.   All other systems reviewed and are negative.         Objective:     /68   Pulse 78   Temp 36.7 °C (98 °F) (Temporal)   Resp (!) 22   Ht 1.588 m (5' 2.5\")   Wt 86.2 kg (190 lb)   SpO2 100%   BMI 34.20 kg/m²      Physical Exam  Vitals signs reviewed.   Constitutional:       Appearance: Normal appearance.   Cardiovascular:      Rate and Rhythm: Normal rate and regular rhythm.      Heart sounds: Normal heart sounds.   Pulmonary:      Effort: Pulmonary effort is normal.   Abdominal:      General: Abdomen is flat.      Tenderness: There is no right CVA tenderness, left CVA tenderness, guarding or rebound.      Comments: No CVA tenderness to the right or left side on percussion.  Pain is reproduced with range of motion including twisting   Musculoskeletal: Normal range of motion.   Skin:     General: Skin is warm and dry.   Neurological:      Mental Status: She is alert.   Psychiatric:         Mood and Affect: Mood normal.         Behavior: Behavior normal.         Thought Content: Thought content normal.         Judgment: Judgment normal.       UA: Small amount of leukocytes, trace hemolyzed blood          Assessment/Plan:   Dysuria  History of cystitis and pyelonephritis    Urine culture  Omnicef p.o.  Pyridium as needed  Push fluids  Strict ER precautions given for fever greater than 100.5, nausea/vomiting, developing flank pain or worsening of symptoms     There are no diagnoses linked to this encounter.    "

## 2020-08-24 RX ORDER — MIRABEGRON 25 MG/1
25 TABLET, FILM COATED, EXTENDED RELEASE ORAL DAILY
Qty: 30 TAB | Refills: 11 | Status: SHIPPED | OUTPATIENT
Start: 2020-08-24 | End: 2021-07-30

## 2020-08-28 ENCOUNTER — OFFICE VISIT (OUTPATIENT)
Dept: URGENT CARE | Facility: PHYSICIAN GROUP | Age: 80
End: 2020-08-28
Payer: MEDICARE

## 2020-08-28 ENCOUNTER — TELEPHONE (OUTPATIENT)
Dept: MEDICAL GROUP | Facility: MEDICAL CENTER | Age: 80
End: 2020-08-28

## 2020-08-28 ENCOUNTER — HOSPITAL ENCOUNTER (OUTPATIENT)
Facility: MEDICAL CENTER | Age: 80
End: 2020-08-28
Attending: FAMILY MEDICINE
Payer: MEDICARE

## 2020-08-28 VITALS
BODY MASS INDEX: 35.87 KG/M2 | HEART RATE: 85 BPM | DIASTOLIC BLOOD PRESSURE: 60 MMHG | TEMPERATURE: 97.8 F | SYSTOLIC BLOOD PRESSURE: 110 MMHG | RESPIRATION RATE: 22 BRPM | WEIGHT: 190 LBS | HEIGHT: 61 IN | OXYGEN SATURATION: 98 %

## 2020-08-28 DIAGNOSIS — N30.00 ACUTE CYSTITIS WITHOUT HEMATURIA: ICD-10-CM

## 2020-08-28 LAB
APPEARANCE UR: CLEAR
BILIRUB UR STRIP-MCNC: NORMAL MG/DL
COLOR UR AUTO: NORMAL
GLUCOSE UR STRIP.AUTO-MCNC: NORMAL MG/DL
KETONES UR STRIP.AUTO-MCNC: NORMAL MG/DL
LEUKOCYTE ESTERASE UR QL STRIP.AUTO: NORMAL
NITRITE UR QL STRIP.AUTO: NORMAL
PH UR STRIP.AUTO: 5 [PH] (ref 5–8)
PROT UR QL STRIP: NORMAL MG/DL
RBC UR QL AUTO: NORMAL
SP GR UR STRIP.AUTO: 1.01
UROBILINOGEN UR STRIP-MCNC: NORMAL MG/DL

## 2020-08-28 PROCEDURE — 99214 OFFICE O/P EST MOD 30 MIN: CPT | Performed by: FAMILY MEDICINE

## 2020-08-28 PROCEDURE — 81002 URINALYSIS NONAUTO W/O SCOPE: CPT | Performed by: FAMILY MEDICINE

## 2020-08-28 PROCEDURE — 87086 URINE CULTURE/COLONY COUNT: CPT

## 2020-08-28 RX ORDER — CIPROFLOXACIN 500 MG/1
500 TABLET, FILM COATED ORAL EVERY 12 HOURS
Qty: 14 TAB | Refills: 0 | Status: SHIPPED | OUTPATIENT
Start: 2020-08-28 | End: 2020-09-04

## 2020-08-28 ASSESSMENT — ENCOUNTER SYMPTOMS
BACK PAIN: 1
FEVER: 0
SHORTNESS OF BREATH: 0
VOMITING: 0

## 2020-08-28 NOTE — PROGRESS NOTES
Subjective:     Margaret Garcia is a 79 y.o. female who presents for Dysuria (lower back pain, came to  8/19 )    HPI  Pt presents for evaluation of a worsening problem   Pt with dysuria and low back pain for the past 10 days   Was seen in office same day the symptoms started and started on Omnicef  POC urine showed small LE and trace blood, and unfortunately culture was intended by provider, but never completed   Medication helped a little, however now worsening again  Having some pain in the right flank   Having dysuria worst at the end of urination   Dysuria is a burning type pain and can be moderate to severe  Has no associated abdominal pains or fevers    Review of Systems   Constitutional: Negative for fever.   Respiratory: Negative for shortness of breath.    Cardiovascular: Negative for chest pain.   Gastrointestinal: Negative for vomiting.   Genitourinary: Positive for dysuria.   Musculoskeletal: Positive for back pain.   Skin: Negative for rash.     PMH:  has a past medical history of Arthritis, ASTHMA, Body mass index 40.0-44.9, adult ([facility]) (4/27/2018), Chronic renal impairment, stage 3 (moderate) ([facility]) (5/13/2019), Dental disorder, Dysuria (10/18/2016), Fall, Generalized edema (4/27/2018), Glaucoma, Hay fever (4/19/2016), Heart burn, High cholesterol, UTI (urinary tract infection), Hyperglycemia (11/24/2015), Hypertension, Hypothyroidism, Indigestion, Ischemic bowel disease ([facility]) (9/9/2019), Migraines, Nausea (10/14/2019), Neuropathy ([facility]), Obesity (BMI 30-39.9) (7/20/2015), Pneumonia, Routine general medical examination at a health care facility (7/20/2015), Sleep apnea (12/2019), Snoring, Syncope (6/5/2020), Tobacco use (4/29/2019), URI (upper respiratory infection) (11/4/2015), and Urinary bladder disorder.  MEDS:   Current Outpatient Medications:   •  Mirabegron ER (MYRBETRIQ) 25 MG TABLET SR 24 HR, Take 25 mg by mouth every day., Disp: 30 Tab, Rfl: 11  •  phenazopyridine (PYRIDIUM) 200 MG Tab,  Take 1 Tab by mouth 3 times a day as needed., Disp: 10 Tab, Rfl: 0  •  lisinopril (PRINIVIL) 10 MG Tab, TAKE 1 TABLET BY MOUTH EVERY DAY, Disp: 100 Tab, Rfl: 3  •  atorvastatin (LIPITOR) 10 MG Tab, TAKE 1 TABLET BY MOUTH EVERY DAY, Disp: 100 Tab, Rfl: 3  •  promethazine (PHENERGAN) 25 MG Tab, Take 1 Tab by mouth every 6 hours as needed for Nausea/Vomiting., Disp: 30 Tab, Rfl: 0  •  potassium chloride SA (K-DUR) 10 MEQ Tab CR, TAKE 1 TABLET BY MOUTH EVERY DAY, Disp: 90 Tab, Rfl: 4  •  triamterene/hctz (MAXZIDE-25/DYAZIDE) 37.5-25 MG Cap, TAKE 1 CAPSULE BY MOUTH EVERY DAY, Disp: 90 Cap, Rfl: 3  •  ondansetron (ZOFRAN) 4 MG Tab tablet, TAKE 1 TABLET BY MOUTH EVERY 6 HOURS AS NEEDED FOR NAUSEA AND VOMITING, Disp: 20 Tab, Rfl: 3  •  ibuprofen (MOTRIN) 200 MG Tab, Take 200 mg by mouth every 6 hours as needed., Disp: , Rfl:   •  fenofibrate (TRIGLIDE) 160 MG tablet, TAKE 1 TABLET BY MOUTH EVERY DAY, Disp: 100 Tab, Rfl: 3  •  levothyroxine (SYNTHROID) 137 MCG Tab, Take 1 Tab by mouth every morning. ON A EMPTY STOMACH, Disp: 90 Tab, Rfl: 3  •  aspirin (ASA) 81 MG Chew Tab chewable tablet, CHEW ONE TABLET BY MOUTH DAILY, Disp: , Rfl: 0  •  latanoprost (XALATAN) 0.005 % Solution, INSTILL 1 DROP INTO BOTH EYES EVERY EVENING AT BEDTIME, Disp: , Rfl: 4  •  acetaminophen (TYLENOL) 500 MG TABS, Take 500 mg by mouth 3 times a day as needed. Indications: Pain, Disp: , Rfl:   ALLERGIES:   Allergies   Allergen Reactions   • Food Hives and Nausea     oranges   • Neomycin-Polymyxin B Rash   • Pcn [Penicillins]    • Penicillin G Rash   • Sulfa Drugs      SURGHX:   Past Surgical History:   Procedure Laterality Date   • GASTROSCOPY  12/13/2019    Procedure: GASTROSCOPY;  Surgeon: Scottie Angel M.D.;  Location: SURGERY [facility];  Service: Gastroenterology   • COLONOSCOPY  12/13/2019    Procedure: COLONOSCOPY;  Surgeon: Scottie Angel M.D.;  Location: SURGERY [facility];  Service: Gastroenterology   • NIRU BY LAPAROSCOPY   "9/2/2011    Performed by KAYLEIGH PORRAS at SURGERY SAME DAY [facility] ORS   • LUMBAR DECOMPRESSION  6/29/2009    Performed by ANG YAN at SURGERY [facility] ORS   • LUMBAR LAMINECTOMY DISKECTOMY  6/29/2009    Performed by ANG YAN at SURGERY [facility] ORS   • ABDOMINAL HYSTERECTOMY TOTAL     • GYN SURGERY      hysterectomy   • HEMORRHOIDECTOMY       SOCHX:  reports that she has been smoking cigarettes. She has a 56.00 pack-year smoking history. She has never used smokeless tobacco. She reports that she does not drink alcohol or use drugs.  FH: Family history was reviewed, not contributing to acute illness     Objective:   /60   Pulse 85   Temp 36.6 °C (97.8 °F) (Temporal)   Resp (!) 22   Ht 1.549 m (5' 1\")   Wt 86.2 kg (190 lb)   SpO2 98%   BMI 35.90 kg/m²     Physical Exam  Constitutional:       General: She is not in acute distress.     Appearance: She is well-developed. She is not diaphoretic.   HENT:      Head: Normocephalic and atraumatic.   Pulmonary:      Effort: Pulmonary effort is normal.   Abdominal:      General: Abdomen is flat. Bowel sounds are normal. There is no distension.      Palpations: Abdomen is soft.      Tenderness: There is no right CVA tenderness, left CVA tenderness, guarding or rebound.      Comments: + Mild suprapubic tenderness   Skin:     General: Skin is warm and dry.      Findings: No erythema.   Neurological:      Mental Status: She is alert and oriented to person, place, and time.      Sensory: No sensory deficit.   Psychiatric:         Mood and Affect: Mood normal.         Behavior: Behavior normal.         Thought Content: Thought content normal.         Judgment: Judgment normal.       Assessment/Plan:   Assessment    1. Acute cystitis without hematuria  - POCT Urinalysis  - Urine Culture; Future  - ciprofloxacin (CIPRO) 500 MG Tab; Take 1 Tab by mouth every 12 hours for 7 days.  Dispense: 14 Tab; Refill: 0    Patient with acute cystitis.  Has chronic " low back pain without reproducible CVA tenderness in office and do not suspect pyelonephritis.  Will treat with Cipro and send urine for culture.  Follow-up as needed.

## 2020-08-31 LAB
BACTERIA UR CULT: NORMAL
SIGNIFICANT IND 70042: NORMAL
SITE SITE: NORMAL
SOURCE SOURCE: NORMAL

## 2020-09-02 ENCOUNTER — OFFICE VISIT (OUTPATIENT)
Dept: MEDICAL GROUP | Facility: MEDICAL CENTER | Age: 80
End: 2020-09-02
Payer: MEDICARE

## 2020-09-02 VITALS
BODY MASS INDEX: 36.82 KG/M2 | DIASTOLIC BLOOD PRESSURE: 72 MMHG | SYSTOLIC BLOOD PRESSURE: 108 MMHG | OXYGEN SATURATION: 100 % | TEMPERATURE: 98.7 F | WEIGHT: 195 LBS | HEIGHT: 61 IN | RESPIRATION RATE: 16 BRPM | HEART RATE: 87 BPM

## 2020-09-02 DIAGNOSIS — M54.50 CHRONIC MIDLINE LOW BACK PAIN WITHOUT SCIATICA: ICD-10-CM

## 2020-09-02 DIAGNOSIS — E66.9 OBESITY (BMI 30-39.9): ICD-10-CM

## 2020-09-02 DIAGNOSIS — G89.29 CHRONIC MIDLINE LOW BACK PAIN WITHOUT SCIATICA: ICD-10-CM

## 2020-09-02 DIAGNOSIS — I10 ESSENTIAL HYPERTENSION: ICD-10-CM

## 2020-09-02 PROCEDURE — 99213 OFFICE O/P EST LOW 20 MIN: CPT | Performed by: INTERNAL MEDICINE

## 2020-09-02 RX ORDER — ONDANSETRON 4 MG/1
4 TABLET, ORALLY DISINTEGRATING ORAL PRN
COMMUNITY
End: 2022-07-20

## 2020-09-02 RX ORDER — POTASSIUM CHLORIDE 750 MG/1
10 TABLET, FILM COATED, EXTENDED RELEASE ORAL
COMMUNITY
End: 2022-04-01

## 2020-09-02 RX ORDER — PANTOPRAZOLE SODIUM 40 MG/1
40 TABLET, DELAYED RELEASE ORAL
COMMUNITY
End: 2022-01-25 | Stop reason: SDUPTHER

## 2020-09-03 NOTE — ASSESSMENT & PLAN NOTE
She has chronic midline low back pain that has been present for quite some time and is gotten worse recently.  She originally went to the neurosurgeons who sent her to Dr. Jeffery for pain control.  He sent her back to the neurosurgeons and they referred her back to Dr. Jeffery.  Apparently she cannot get into see Dr. Jeffery in the near future.  Her pain continues and she wonders what she could try next.  She reports that the pain is from degenerative disks in the lumbar region.

## 2020-09-03 NOTE — ASSESSMENT & PLAN NOTE
She remains significantly overweight with BMI 36.84.  Actually she has gained apparently 5 pounds in the last few days.

## 2020-09-03 NOTE — PROGRESS NOTES
Subjective:     Chief Complaint   Patient presents with   • Back Pain     Margaret Garcia is a 79 y.o. female here today for Follow-up of midline low back pain that seems to be worsening as well as follow-up of her hypertension.    Obesity (BMI 30-39.9)  She remains significantly overweight with BMI 36.84.  Actually she has gained apparently 5 pounds in the last few days.    Essential hypertension  Her hypertension remains under good control.  She denies significant lightheadedness.    Chronic midline low back pain without sciatica  She has chronic midline low back pain that has been present for quite some time and is gotten worse recently.  She originally went to the neurosurgeons who sent her to Dr. Jeffery for pain control.  He sent her back to the neurosurgeons and they referred her back to Dr. Jeffery.  Apparently she cannot get into see Dr. Jeffery in the near future.  Her pain continues and she wonders what she could try next.  She reports that the pain is from degenerative disks in the lumbar region.       Diagnoses of Essential hypertension, Obesity (BMI 30-39.9), and Chronic midline low back pain without sciatica were pertinent to this visit.    Allergies: Food, Neomycin-polymyxin b, Pcn [penicillins], Penicillin g, and Sulfa drugs  Current medicines (including changes today)  Current Outpatient Medications   Medication Sig Dispense Refill   • Mirabegron ER (MYRBETRIQ) 25 MG TABLET SR 24 HR Take 25 mg by mouth every day. 30 Tab 11   • phenazopyridine (PYRIDIUM) 200 MG Tab Take 1 Tab by mouth 3 times a day as needed. 10 Tab 0   • lisinopril (PRINIVIL) 10 MG Tab TAKE 1 TABLET BY MOUTH EVERY  Tab 3   • atorvastatin (LIPITOR) 10 MG Tab TAKE 1 TABLET BY MOUTH EVERY  Tab 3   • promethazine (PHENERGAN) 25 MG Tab Take 1 Tab by mouth every 6 hours as needed for Nausea/Vomiting. 30 Tab 0   • potassium chloride SA (K-DUR) 10 MEQ Tab CR TAKE 1 TABLET BY MOUTH EVERY DAY 90 Tab 4   • triamterene/hctz  (MAXZIDE-25/DYAZIDE) 37.5-25 MG Cap TAKE 1 CAPSULE BY MOUTH EVERY DAY 90 Cap 3   • ibuprofen (MOTRIN) 200 MG Tab Take 200 mg by mouth every 6 hours as needed.     • fenofibrate (TRIGLIDE) 160 MG tablet TAKE 1 TABLET BY MOUTH EVERY  Tab 3   • levothyroxine (SYNTHROID) 137 MCG Tab Take 1 Tab by mouth every morning. ON A EMPTY STOMACH 90 Tab 3   • aspirin (ASA) 81 MG Chew Tab chewable tablet CHEW ONE TABLET BY MOUTH DAILY  0   • latanoprost (XALATAN) 0.005 % Solution INSTILL 1 DROP INTO BOTH EYES EVERY EVENING AT BEDTIME  4   • ciprofloxacin (CIPRO) 500 MG Tab Take 1 Tab by mouth every 12 hours for 7 days. 14 Tab 0   • ondansetron (ZOFRAN) 4 MG Tab tablet TAKE 1 TABLET BY MOUTH EVERY 6 HOURS AS NEEDED FOR NAUSEA AND VOMITING 20 Tab 3   • acetaminophen (TYLENOL) 500 MG TABS Take 500 mg by mouth 3 times a day as needed. Indications: Pain       No current facility-administered medications for this visit.        She  has a past medical history of Arthritis, ASTHMA, Body mass index 40.0-44.9, adult ([facility]) (4/27/2018), Chronic renal impairment, stage 3 (moderate) ([facility]) (5/13/2019), Dental disorder, Dysuria (10/18/2016), Fall, Generalized edema (4/27/2018), Glaucoma, Hay fever (4/19/2016), Heart burn, High cholesterol, UTI (urinary tract infection), Hyperglycemia (11/24/2015), Hypertension, Hypothyroidism, Indigestion, Ischemic bowel disease ([facility]) (9/9/2019), Migraines, Nausea (10/14/2019), Neuropathy ([facility]), Obesity (BMI 30-39.9) (7/20/2015), Pneumonia, Routine general medical examination at a health care facility (7/20/2015), Sleep apnea (12/2019), Snoring, Syncope (6/5/2020), Tobacco use (4/29/2019), URI (upper respiratory infection) (11/4/2015), and Urinary bladder disorder.    ROS    Patient denies significant change in strength, weight or appetite.  No significant lightheadedness or headaches.  No change in vision, hearing, or swallowing.  No new dyspnea, coughing, chest pain, or palpitations.  No indigestion,  "abdominal pain, or change in bowel habits.  No change in urinating.  No new ankle swelling.       Objective:     PE:  /72 (BP Location: Left arm)   Pulse 87   Temp 37.1 °C (98.7 °F)   Resp 16   Ht 1.549 m (5' 1\")   Wt 88.5 kg (195 lb)   SpO2 100%   BMI 36.84 kg/m²   Neck is supple without significant lymphadenopathy or masses.  Lungs are clear with normal breath sounds without wheezes or rales .  Cardiovascular: peripheral circulation is satisfactory, heart sounds are unchanged and unremarkable.  Abdomen is soft, without masses or tenderness, with normal bowel sounds.  Extremities are without significant edema, cyanosis or deformity.      Assessment and Plan:   The following treatment plan was discussed  1. Essential hypertension      Continue same regimen, report any lightheadedness.  We may need to cut back on medication.   2. Obesity (BMI 30-39.9)      She was encouraged to check her weight daily and report any significant changes.  She was encouraged generally in a weight loss program.   3. Chronic midline low back pain without sciatica      Try Motrin with acetaminophen Not to exceed 3000 mg of acetaminophen.  We reviewed side effects to Motrin. salon pas lidocaine, or Voltaren gel.  Report back to Dr. Jeffery and neurosurgeons.       Followup: Return in about 4 months (around 1/2/2021), or health  wellness, for Long.           "

## 2020-09-09 ENCOUNTER — TELEPHONE (OUTPATIENT)
Dept: CARDIOLOGY | Facility: MEDICAL CENTER | Age: 80
End: 2020-09-09

## 2020-09-09 NOTE — TELEPHONE ENCOUNTER
Spoke with patient stated that she was seen at [facility] emergency room and had a eco  Done I will fax medical records request to have them fax to us.

## 2020-09-16 ENCOUNTER — OFFICE VISIT (OUTPATIENT)
Dept: CARDIOLOGY | Facility: MEDICAL CENTER | Age: 80
End: 2020-09-16
Payer: MEDICARE

## 2020-09-16 VITALS
BODY MASS INDEX: 36.25 KG/M2 | HEART RATE: 84 BPM | WEIGHT: 192 LBS | OXYGEN SATURATION: 100 % | DIASTOLIC BLOOD PRESSURE: 44 MMHG | SYSTOLIC BLOOD PRESSURE: 120 MMHG | HEIGHT: 61 IN

## 2020-09-16 DIAGNOSIS — I10 ESSENTIAL HYPERTENSION: ICD-10-CM

## 2020-09-16 DIAGNOSIS — Z72.0 TOBACCO USE: ICD-10-CM

## 2020-09-16 DIAGNOSIS — E78.5 HYPERLIPIDEMIA, UNSPECIFIED HYPERLIPIDEMIA TYPE: ICD-10-CM

## 2020-09-16 DIAGNOSIS — G47.30 SLEEP APNEA, UNSPECIFIED TYPE: ICD-10-CM

## 2020-09-16 DIAGNOSIS — R60.1 GENERALIZED EDEMA: ICD-10-CM

## 2020-09-16 DIAGNOSIS — R53.83 OTHER FATIGUE: ICD-10-CM

## 2020-09-16 DIAGNOSIS — R73.9 HYPERGLYCEMIA: ICD-10-CM

## 2020-09-16 DIAGNOSIS — I35.0 NONRHEUMATIC AORTIC VALVE STENOSIS: ICD-10-CM

## 2020-09-16 DIAGNOSIS — R55 SYNCOPE, UNSPECIFIED SYNCOPE TYPE: ICD-10-CM

## 2020-09-16 PROCEDURE — 99204 OFFICE O/P NEW MOD 45 MIN: CPT | Performed by: INTERNAL MEDICINE

## 2020-09-16 ASSESSMENT — ENCOUNTER SYMPTOMS
GASTROINTESTINAL NEGATIVE: 1
HEMOPTYSIS: 0
CONSTITUTIONAL NEGATIVE: 1
STRIDOR: 0
WEAKNESS: 0
DIZZINESS: 0
ORTHOPNEA: 0
CLAUDICATION: 0
PALPITATIONS: 0
EYES NEGATIVE: 1
LOSS OF CONSCIOUSNESS: 0
SORE THROAT: 0
WHEEZING: 0
COUGH: 0
NEUROLOGICAL NEGATIVE: 1
CHILLS: 0
PND: 0
RESPIRATORY NEGATIVE: 1
SPUTUM PRODUCTION: 0
CARDIOVASCULAR NEGATIVE: 1
SHORTNESS OF BREATH: 0
FEVER: 0
BRUISES/BLEEDS EASILY: 0
MUSCULOSKELETAL NEGATIVE: 1

## 2020-09-16 NOTE — PROGRESS NOTES
Chief Complaint   Patient presents with   • HTN (Controlled)     follow up       Subjective:   Margaret Garcia is a 79 y.o. female who presents today as a new consultation for syncope and aortic stenosis.  She is an 79-year-old female who takes diuretics and lisinopril for high blood pressure.  She was down in the [facility] area when outside was on a degrees out and had a syncopal episode where she fell on her face.  She was admitted to [facility] overnight.  They did not find an etiology for her syncope however they did an echo ECG CT scan.  The only remarkable finding was mild aortic stenosis on her echocardiogram.  She does smoke 1 pack/day for 65 years.  On exam she does have a murmur which is difficult to characterize given her COPD.  She is had no recurrent syncope.  Her blood pressure is controlled.  She has no extremity edema.  She never gets chest pain.    Past Medical History:   Diagnosis Date   • Arthritis     knees, and hips-osteo   • ASTHMA    • Body mass index 40.0-44.9, adult ([facility]) 4/27/2018   • Chronic renal impairment, stage 3 (moderate) ([facility]) 5/13/2019   • Dental disorder     upper   • Dysuria 10/18/2016   • Fall    • Generalized edema 4/27/2018   • Glaucoma     bilateral   • Hay fever 4/19/2016   • Heart burn    • High cholesterol    • Hx: UTI (urinary tract infection)    • Hyperglycemia 11/24/2015   • Hypertension    • Hypothyroidism    • Indigestion    • Ischemic bowel disease ([facility]) 9/9/2019   • Migraines     pt has similar symptoms with migraines not for a long time though   • Nausea 10/14/2019   • Neuropathy ([facility])    • Obesity (BMI 30-39.9) 7/20/2015   • Pneumonia    • Routine general medical examination at a health care facility 7/20/2015    7/20/15   • Sleep apnea 12/2019    Had sleep study, didn't tolerate cpap   • Snoring    • Syncope 6/5/2020   • Tobacco use 4/29/2019   • URI (upper respiratory infection) 11/4/2015   • Urinary bladder disorder     hx of uti's     Past  Surgical History:   Procedure Laterality Date   • GASTROSCOPY  12/13/2019    Procedure: GASTROSCOPY;  Surgeon: Ang Angel M.D.;  Location: SURGERY [facility];  Service: Gastroenterology   • COLONOSCOPY  12/13/2019    Procedure: COLONOSCOPY;  Surgeon: Ang Angel M.D.;  Location: SURGERY [facility];  Service: Gastroenterology   • NIRU BY LAPAROSCOPY  9/2/2011    Performed by KAYLEIGH PORRAS at SURGERY SAME DAY [facility] ORS   • LUMBAR DECOMPRESSION  6/29/2009    Performed by ANG YAN at SURGERY [facility] ORS   • LUMBAR LAMINECTOMY DISKECTOMY  6/29/2009    Performed by ANG YAN at SURGERY [facility] ORS   • ABDOMINAL HYSTERECTOMY TOTAL     • GYN SURGERY      hysterectomy   • HEMORRHOIDECTOMY       Family History   Problem Relation Age of Onset   • Stroke Mother    • Hyperlipidemia Mother    • Hypertension Mother    • Arthritis Mother    • Diabetes Father    • Lung Disease Father         pneumonia   • Arthritis Sister    • Rheumatologic Disease Sister    • Hypertension Sister    • Hyperlipidemia Sister    • Other Sister         Anusha/Fibermyalgia   • Hyperlipidemia Brother    • Hypertension Brother    • Arthritis Brother    • Lung Disease Maternal Grandmother         pneumonia   • Stroke Maternal Grandfather    • Cancer Maternal Grandfather         skin    • No Known Problems Paternal Grandmother    • No Known Problems Paternal Grandfather    • Hyperlipidemia Daughter    • Diabetes Daughter    • No Known Problems Son      Social History     Socioeconomic History   • Marital status: Single     Spouse name: Not on file   • Number of children: Not on file   • Years of education: Not on file   • Highest education level: Not on file   Occupational History   • Not on file   Social Needs   • Financial resource strain: Not on file   • Food insecurity     Worry: Not on file     Inability: Not on file   • Transportation needs     Medical: Not on file     Non-medical: Not on file   Tobacco Use    • Smoking status: Current Every Day Smoker     Packs/day: 1.00     Years: 56.00     Pack years: 56.00     Types: Cigarettes   • Smokeless tobacco: Never Used   • Tobacco comment: started smoking at age 12, little mor than half a pack   Substance and Sexual Activity   • Alcohol use: No   • Drug use: No   • Sexual activity: Never     Partners: Male     Birth control/protection: Post-Menopausal   Lifestyle   • Physical activity     Days per week: Not on file     Minutes per session: Not on file   • Stress: Not on file   Relationships   • Social connections     Talks on phone: Not on file     Gets together: Not on file     Attends [employer] service: Not on file     Active member of club or organization: Not on file     Attends meetings of clubs or organizations: Not on file     Relationship status: Not on file   • Intimate partner violence     Fear of current or ex partner: Not on file     Emotionally abused: Not on file     Physically abused: Not on file     Forced sexual activity: Not on file   Other Topics Concern   • Not on file   Social History Narrative   • Not on file     Allergies   Allergen Reactions   • Food Hives and Nausea     oranges   • Neomycin-Polymyxin B Rash   • Pcn [Penicillins]    • Penicillin G Rash   • Sulfa Drugs      Outpatient Encounter Medications as of 9/16/2020   Medication Sig Dispense Refill   • ondansetron (ZOFRAN ODT) 4 MG TABLET DISPERSIBLE Take 4 mg by mouth as needed. Prn nausea and or vomiting     • pantoprazole (PROTONIX) 40 MG Tablet Delayed Response Take 40 mg by mouth.     • potassium chloride ER (KLOR-CON) 10 MEQ tablet Take 10 mEq by mouth.     • Mirabegron ER (MYRBETRIQ) 25 MG TABLET SR 24 HR Take 25 mg by mouth every day. 30 Tab 11   • lisinopril (PRINIVIL) 10 MG Tab TAKE 1 TABLET BY MOUTH EVERY  Tab 3   • atorvastatin (LIPITOR) 10 MG Tab TAKE 1 TABLET BY MOUTH EVERY  Tab 3   • triamterene/hctz (MAXZIDE-25/DYAZIDE) 37.5-25 MG Cap TAKE 1 CAPSULE BY MOUTH EVERY  "DAY 90 Cap 3   • ibuprofen (MOTRIN) 200 MG Tab Take 200 mg by mouth every 6 hours as needed.     • fenofibrate (TRIGLIDE) 160 MG tablet TAKE 1 TABLET BY MOUTH EVERY  Tab 3   • levothyroxine (SYNTHROID) 137 MCG Tab Take 1 Tab by mouth every morning. ON A EMPTY STOMACH 90 Tab 3   • aspirin (ASA) 81 MG Chew Tab chewable tablet CHEW ONE TABLET BY MOUTH DAILY  0   • latanoprost (XALATAN) 0.005 % Solution INSTILL 1 DROP INTO BOTH EYES EVERY EVENING AT BEDTIME  4   • acetaminophen (TYLENOL) 500 MG TABS Take 500 mg by mouth 3 times a day as needed. Indications: Pain     • phenazopyridine (PYRIDIUM) 200 MG Tab Take 1 Tab by mouth 3 times a day as needed. (Patient not taking: Reported on 9/16/2020) 10 Tab 0   • promethazine (PHENERGAN) 25 MG Tab Take 1 Tab by mouth every 6 hours as needed for Nausea/Vomiting. (Patient not taking: Reported on 9/16/2020) 30 Tab 0     No facility-administered encounter medications on file as of 9/16/2020.      Review of Systems   Constitutional: Negative.  Negative for chills, fever and malaise/fatigue.   HENT: Negative.  Negative for sore throat.    Eyes: Negative.    Respiratory: Negative.  Negative for cough, hemoptysis, sputum production, shortness of breath, wheezing and stridor.    Cardiovascular: Negative.  Negative for chest pain, palpitations, orthopnea, claudication, leg swelling and PND.   Gastrointestinal: Negative.    Genitourinary: Negative.    Musculoskeletal: Negative.    Skin: Negative.    Neurological: Negative.  Negative for dizziness, loss of consciousness and weakness.   Endo/Heme/Allergies: Negative.  Does not bruise/bleed easily.   All other systems reviewed and are negative.       Objective:   /44 (BP Location: Left arm, Patient Position: Sitting)   Pulse 84   Ht 1.549 m (5' 1\")   Wt 87.1 kg (192 lb)   SpO2 100%   BMI 36.28 kg/m²     Physical Exam   Constitutional: She appears well-developed and well-nourished. No distress.   HENT:   Head: Normocephalic " and atraumatic.   Right Ear: External ear normal.   Left Ear: External ear normal.   Nose: Nose normal.   Mouth/Throat: No oropharyngeal exudate.   Eyes: Pupils are equal, round, and reactive to light. Conjunctivae and EOM are normal. Right eye exhibits no discharge. Left eye exhibits no discharge. No scleral icterus.   Neck: Neck supple. No JVD present.   Cardiovascular: Normal rate, regular rhythm and intact distal pulses. Exam reveals no gallop and no friction rub.   Murmur heard.  Pulmonary/Chest: Effort normal. No stridor. No respiratory distress. She has no wheezes. She has no rales. She exhibits no tenderness.   Abdominal: Soft. She exhibits no distension. There is no guarding.   Musculoskeletal: Normal range of motion.         General: No tenderness, deformity or edema.   Neurological: She is alert. She has normal reflexes. She displays normal reflexes. No cranial nerve deficit. She exhibits normal muscle tone. Coordination normal.   Skin: Skin is warm and dry. No rash noted. She is not diaphoretic. No erythema. No pallor.   Psychiatric: She has a normal mood and affect. Her behavior is normal. Judgment and thought content normal.   Nursing note and vitals reviewed.    Outside medical records: [facility] admission review and evaluated as above.      Assessment:     1. Essential hypertension     2. Generalized edema     3. Hyperglycemia  EC-ECHOCARDIOGRAM COMPLETE W/O CONT   4. Hyperlipidemia, unspecified hyperlipidemia type  EC-ECHOCARDIOGRAM COMPLETE W/O CONT   5. Other fatigue  EC-ECHOCARDIOGRAM COMPLETE W/O CONT   6. Syncope, unspecified syncope type  EC-ECHOCARDIOGRAM COMPLETE W/O CONT   7. Sleep apnea, unspecified type     8. Tobacco use     9. Nonrheumatic aortic valve stenosis         Medical Decision Making:  Today's Assessment / Status / Plan:     79-year-old female with high blood pressure on diuretics and lisinopril with a single syncopal event tobacco abuse and aortic stenosis.  For her syncope we  discussed the etiologies and the fact there is been no recurrence.  This does sound orthostatic in description and therefore we will hold off on further work-up.  For her aortic stenosis given the lack of ability to see the images and the prior echocardiogram as well as her murmur I like to get a repeat echocardiogram.  For her high blood pressure we will not stop these medications.  I did spend 5 minutes counseling her regarding smoking and smoking cessation.

## 2020-10-06 ENCOUNTER — HOSPITAL ENCOUNTER (OUTPATIENT)
Dept: CARDIOLOGY | Facility: MEDICAL CENTER | Age: 80
End: 2020-10-06
Attending: INTERNAL MEDICINE
Payer: MEDICARE

## 2020-10-06 DIAGNOSIS — R53.83 OTHER FATIGUE: ICD-10-CM

## 2020-10-06 DIAGNOSIS — E78.5 HYPERLIPIDEMIA, UNSPECIFIED HYPERLIPIDEMIA TYPE: ICD-10-CM

## 2020-10-06 DIAGNOSIS — R73.9 HYPERGLYCEMIA: ICD-10-CM

## 2020-10-06 DIAGNOSIS — R55 SYNCOPE, UNSPECIFIED SYNCOPE TYPE: ICD-10-CM

## 2020-10-06 PROCEDURE — 93306 TTE W/DOPPLER COMPLETE: CPT

## 2020-10-07 LAB
LV EJECT FRACT  99904: 65
LV EJECT FRACT MOD 2C 99903: 60.17
LV EJECT FRACT MOD 4C 99902: 72.94
LV EJECT FRACT MOD BP 99901: 66.62

## 2020-10-07 PROCEDURE — 93306 TTE W/DOPPLER COMPLETE: CPT | Mod: 26 | Performed by: INTERNAL MEDICINE

## 2020-10-30 DIAGNOSIS — I10 ESSENTIAL HYPERTENSION: ICD-10-CM

## 2020-11-02 RX ORDER — LEVOTHYROXINE SODIUM 137 UG/1
TABLET ORAL
Qty: 90 TAB | Refills: 3 | Status: SHIPPED | OUTPATIENT
Start: 2020-11-02 | End: 2021-10-25

## 2020-12-03 ENCOUNTER — HOSPITAL ENCOUNTER (OUTPATIENT)
Facility: MEDICAL CENTER | Age: 80
End: 2020-12-03
Attending: NURSE PRACTITIONER
Payer: MEDICARE

## 2020-12-03 ENCOUNTER — OFFICE VISIT (OUTPATIENT)
Dept: URGENT CARE | Facility: PHYSICIAN GROUP | Age: 80
End: 2020-12-03
Payer: MEDICARE

## 2020-12-03 VITALS
DIASTOLIC BLOOD PRESSURE: 64 MMHG | BODY MASS INDEX: 36.62 KG/M2 | OXYGEN SATURATION: 100 % | SYSTOLIC BLOOD PRESSURE: 114 MMHG | WEIGHT: 199 LBS | RESPIRATION RATE: 18 BRPM | TEMPERATURE: 97.3 F | HEART RATE: 83 BPM | HEIGHT: 62 IN

## 2020-12-03 DIAGNOSIS — N39.0 RECURRENT UTI: ICD-10-CM

## 2020-12-03 DIAGNOSIS — R39.89 SUSPECTED UTI: ICD-10-CM

## 2020-12-03 PROCEDURE — 87086 URINE CULTURE/COLONY COUNT: CPT

## 2020-12-03 PROCEDURE — 87077 CULTURE AEROBIC IDENTIFY: CPT

## 2020-12-03 PROCEDURE — 87186 SC STD MICRODIL/AGAR DIL: CPT

## 2020-12-03 PROCEDURE — 99214 OFFICE O/P EST MOD 30 MIN: CPT | Performed by: NURSE PRACTITIONER

## 2020-12-03 PROCEDURE — 81002 URINALYSIS NONAUTO W/O SCOPE: CPT | Performed by: NURSE PRACTITIONER

## 2020-12-03 RX ORDER — CIPROFLOXACIN 500 MG/1
500 TABLET, FILM COATED ORAL 2 TIMES DAILY
Qty: 14 TAB | Refills: 0 | Status: SHIPPED | OUTPATIENT
Start: 2020-12-03 | End: 2020-12-10

## 2020-12-03 ASSESSMENT — ENCOUNTER SYMPTOMS
MEMORY LOSS: 0
TINGLING: 0
FLANK PAIN: 1
SORE THROAT: 0
COUGH: 0
BACK PAIN: 0
CHILLS: 0
ORTHOPNEA: 0
NAUSEA: 1
VOMITING: 0
PALPITATIONS: 0
SHORTNESS OF BREATH: 0
CONSTIPATION: 0
HEARTBURN: 0
WHEEZING: 0
FEVER: 0
HEADACHES: 0
DIARRHEA: 0
MYALGIAS: 0
DIZZINESS: 0

## 2020-12-03 NOTE — PROGRESS NOTES
"Subjective:      Margaret Garcia is a 80 y.o. female who presents with Dysuria (freq, back pain, x1 day)            Dysuria   This is a new problem. Episode onset: In the past 2 days. The problem occurs every urination. The problem has been gradually worsening. The quality of the pain is described as burning. The pain is at a severity of 5/10. The pain is moderate. There has been no fever. She is not sexually active. There is no history of pyelonephritis. Associated symptoms include flank pain, frequency, hesitancy, nausea and urgency. Pertinent negatives include no chills, discharge, hematuria or vomiting. She has tried increased fluids for the symptoms. The treatment provided no relief. Her past medical history is significant for recurrent UTIs.     Patient reports hx of frequent UTI's. Has been followed by urology in the past.  Requesting referral back to see them again.  Review of Systems   Constitutional: Negative for chills, fever and malaise/fatigue.   HENT: Negative for ear pain and sore throat.    Respiratory: Negative for cough, shortness of breath and wheezing.    Cardiovascular: Negative for chest pain, palpitations, orthopnea and leg swelling.   Gastrointestinal: Positive for nausea. Negative for constipation, diarrhea, heartburn and vomiting.   Genitourinary: Positive for dysuria, flank pain, frequency, hesitancy and urgency. Negative for hematuria.   Musculoskeletal: Negative for back pain, joint pain and myalgias.   Skin: Negative for rash.   Neurological: Negative for dizziness, tingling and headaches.   Psychiatric/Behavioral: Negative for memory loss and suicidal ideas.   All other systems reviewed and are negative.         Objective:     /64   Pulse 83   Temp 36.3 °C (97.3 °F) (Temporal)   Resp 18   Ht 1.575 m (5' 2\")   Wt 90.3 kg (199 lb)   SpO2 100%   BMI 36.40 kg/m²      Physical Exam  Vitals signs reviewed.   Constitutional:       General: She is not in acute distress.     " Appearance: Normal appearance.   HENT:      Head: Normocephalic and atraumatic.      Right Ear: External ear normal.      Left Ear: External ear normal.   Eyes:      Pupils: Pupils are equal, round, and reactive to light.   Neck:      Musculoskeletal: Normal range of motion and neck supple.   Cardiovascular:      Rate and Rhythm: Normal rate and regular rhythm.      Pulses: Normal pulses.      Heart sounds: No friction rub. No gallop.    Pulmonary:      Effort: Pulmonary effort is normal. No respiratory distress.      Breath sounds: Normal breath sounds.   Abdominal:      General: Bowel sounds are normal. There is no distension.      Palpations: Abdomen is soft. There is no mass.      Tenderness: There is abdominal tenderness in the suprapubic area. There is no right CVA tenderness or left CVA tenderness.   Musculoskeletal: Normal range of motion.         General: No tenderness.      Right lower leg: No edema.      Left lower leg: No edema.   Skin:     General: Skin is warm and dry.   Neurological:      Mental Status: She is alert and oriented to person, place, and time.   Psychiatric:         Mood and Affect: Mood normal.         Behavior: Behavior normal.                 Assessment/Plan:        1. Suspected UTI  POCT Urinalysis    URINE CULTURE(NEW)    ciprofloxacin (CIPRO) 500 MG Tab   2. Recurrent UTI  REFERRAL TO UROLOGY     Discussed risk versus benefits of Ciprofloxacin.  Patient and daughter state that this is the only medication that has worked for her UTI.  Verbalized understanding risk.  Examination, patient reports flank pain however there is no CVA tenderness.  Trace leuks on UA.  Will send for culture and treat accordingly.  Advised probiotics.  Rest, fluids encouraged.  AVS with medical info given.  Pt was in full understanding and agreement with the plan.  Differential diagnosis, natural history, supportive care, and indications for immediate follow-up discussed. All questions answered. Patient  agrees with the plan of care.  Follow-up as needed if symptoms worsen or fail to improve.  ER precautions given regarding pyelonephritis including fevers greater than 101 and, vomiting and dehydration, increased back pain.

## 2020-12-14 RX ORDER — PROMETHAZINE HYDROCHLORIDE 25 MG/1
TABLET ORAL
Qty: 30 TAB | Refills: 0 | Status: SHIPPED | OUTPATIENT
Start: 2020-12-14 | End: 2021-08-11

## 2020-12-18 ENCOUNTER — HOSPITAL ENCOUNTER (OUTPATIENT)
Dept: LAB | Facility: MEDICAL CENTER | Age: 80
End: 2020-12-18
Attending: INTERNAL MEDICINE
Payer: MEDICARE

## 2020-12-18 LAB
ALBUMIN SERPL BCP-MCNC: 3.9 G/DL (ref 3.2–4.9)
ALBUMIN/GLOB SERPL: 1.5 G/DL
ALP SERPL-CCNC: 54 U/L (ref 30–99)
ALT SERPL-CCNC: 14 U/L (ref 2–50)
ANION GAP SERPL CALC-SCNC: 10 MMOL/L (ref 7–16)
AST SERPL-CCNC: 15 U/L (ref 12–45)
BASOPHILS # BLD AUTO: 0.7 % (ref 0–1.8)
BASOPHILS # BLD: 0.03 K/UL (ref 0–0.12)
BILIRUB SERPL-MCNC: 0.2 MG/DL (ref 0.1–1.5)
BUN SERPL-MCNC: 27 MG/DL (ref 8–22)
CALCIUM SERPL-MCNC: 9.4 MG/DL (ref 8.5–10.5)
CHLORIDE SERPL-SCNC: 106 MMOL/L (ref 96–112)
CO2 SERPL-SCNC: 19 MMOL/L (ref 20–33)
CREAT SERPL-MCNC: 1.57 MG/DL (ref 0.5–1.4)
CRP SERPL HS-MCNC: 0.19 MG/DL (ref 0–0.75)
EOSINOPHIL # BLD AUTO: 0.18 K/UL (ref 0–0.51)
EOSINOPHIL NFR BLD: 4.2 % (ref 0–6.9)
ERYTHROCYTE [DISTWIDTH] IN BLOOD BY AUTOMATED COUNT: 49.1 FL (ref 35.9–50)
GLOBULIN SER CALC-MCNC: 2.6 G/DL (ref 1.9–3.5)
GLUCOSE SERPL-MCNC: 83 MG/DL (ref 65–99)
HCT VFR BLD AUTO: 39.4 % (ref 37–47)
HGB BLD-MCNC: 12.8 G/DL (ref 12–16)
IMM GRANULOCYTES # BLD AUTO: 0.03 K/UL (ref 0–0.11)
IMM GRANULOCYTES NFR BLD AUTO: 0.7 % (ref 0–0.9)
LYMPHOCYTES # BLD AUTO: 1.11 K/UL (ref 1–4.8)
LYMPHOCYTES NFR BLD: 25.6 % (ref 22–41)
MCH RBC QN AUTO: 32.3 PG (ref 27–33)
MCHC RBC AUTO-ENTMCNC: 32.5 G/DL (ref 33.6–35)
MCV RBC AUTO: 99.5 FL (ref 81.4–97.8)
MONOCYTES # BLD AUTO: 0.4 K/UL (ref 0–0.85)
MONOCYTES NFR BLD AUTO: 9.2 % (ref 0–13.4)
NEUTROPHILS # BLD AUTO: 2.58 K/UL (ref 2–7.15)
NEUTROPHILS NFR BLD: 59.6 % (ref 44–72)
NRBC # BLD AUTO: 0 K/UL
NRBC BLD-RTO: 0 /100 WBC
PLATELET # BLD AUTO: 282 K/UL (ref 164–446)
PMV BLD AUTO: 9.6 FL (ref 9–12.9)
POTASSIUM SERPL-SCNC: 4.5 MMOL/L (ref 3.6–5.5)
PROT SERPL-MCNC: 6.5 G/DL (ref 6–8.2)
RBC # BLD AUTO: 3.96 M/UL (ref 4.2–5.4)
SODIUM SERPL-SCNC: 135 MMOL/L (ref 135–145)
WBC # BLD AUTO: 4.3 K/UL (ref 4.8–10.8)

## 2020-12-18 PROCEDURE — 80053 COMPREHEN METABOLIC PANEL: CPT

## 2020-12-18 PROCEDURE — 86140 C-REACTIVE PROTEIN: CPT

## 2020-12-18 PROCEDURE — 82784 ASSAY IGA/IGD/IGG/IGM EACH: CPT

## 2020-12-18 PROCEDURE — 85025 COMPLETE CBC W/AUTO DIFF WBC: CPT

## 2020-12-18 PROCEDURE — 84439 ASSAY OF FREE THYROXINE: CPT

## 2020-12-18 PROCEDURE — 84443 ASSAY THYROID STIM HORMONE: CPT

## 2020-12-18 PROCEDURE — 36415 COLL VENOUS BLD VENIPUNCTURE: CPT

## 2020-12-18 PROCEDURE — 83516 IMMUNOASSAY NONANTIBODY: CPT

## 2020-12-19 LAB
T4 FREE SERPL-MCNC: 2.4 NG/DL (ref 0.93–1.7)
TSH SERPL DL<=0.005 MIU/L-ACNC: 0.03 UIU/ML (ref 0.38–5.33)

## 2020-12-20 LAB — IGA SERPL-MCNC: 113 MG/DL (ref 68–408)

## 2020-12-21 LAB — GLIADIN PEPTIDE+TTG IGA+IGG SER QL IA: 5 UNITS (ref 0–19)

## 2020-12-28 ENCOUNTER — TELEMEDICINE (OUTPATIENT)
Dept: MEDICAL GROUP | Facility: MEDICAL CENTER | Age: 80
End: 2020-12-28
Payer: MEDICARE

## 2020-12-28 VITALS — BODY MASS INDEX: 33.86 KG/M2 | HEIGHT: 62 IN | WEIGHT: 184 LBS

## 2020-12-28 DIAGNOSIS — R73.9 HYPERGLYCEMIA: ICD-10-CM

## 2020-12-28 DIAGNOSIS — M54.50 CHRONIC MIDLINE LOW BACK PAIN WITHOUT SCIATICA: ICD-10-CM

## 2020-12-28 DIAGNOSIS — R53.83 OTHER FATIGUE: ICD-10-CM

## 2020-12-28 DIAGNOSIS — R11.0 NAUSEA: ICD-10-CM

## 2020-12-28 DIAGNOSIS — G89.29 CHRONIC MIDLINE LOW BACK PAIN WITHOUT SCIATICA: ICD-10-CM

## 2020-12-28 DIAGNOSIS — N18.32 CHRONIC RENAL IMPAIRMENT, STAGE 3B: ICD-10-CM

## 2020-12-28 DIAGNOSIS — I10 ESSENTIAL HYPERTENSION: ICD-10-CM

## 2020-12-28 DIAGNOSIS — R60.1 GENERALIZED EDEMA: ICD-10-CM

## 2020-12-28 DIAGNOSIS — E66.9 OBESITY (BMI 30-39.9): ICD-10-CM

## 2020-12-28 PROCEDURE — 99214 OFFICE O/P EST MOD 30 MIN: CPT | Mod: 95,CR | Performed by: INTERNAL MEDICINE

## 2020-12-28 ASSESSMENT — FIBROSIS 4 INDEX: FIB4 SCORE: 1.17

## 2020-12-29 RX ORDER — FENOFIBRATE 160 MG/1
TABLET ORAL
Qty: 90 TAB | Refills: 4 | Status: SHIPPED | OUTPATIENT
Start: 2020-12-29 | End: 2022-01-19

## 2020-12-29 NOTE — PROGRESS NOTES
Virtual Visit: Established Patient   This visit was conducted via Zoom  using secure and encrypted videoconferencing technology. The patient was in a private location in the state of [place].    The patient's identity was confirmed and verbal consent was obtained for this virtual visit.      CC:The urologist told her that she needed to see a nephrologist because of worsening renal failure.She has persistent nausea as well as ankle edema and hypertension is under fairly good control.  She remains quite overweight but is slowly losing weight.  She is fatigued.                                                                                                                                      HPI:   Margaret presents today with the following.    1. Chronic renal impairment, stage 3b  GFR is 32.  She continues taking Dyazide daily.  She is not particularly careful about the salt in her diet.  She does have some ankle swelling.    2. Chronic midline low back pain without sciatica  Her chronic back pain is essentially unchanged.  She takes Norco 1 or 2 tablets daily for that.  She is followed for that by Dr. Jeffery.    3. Essential hypertension  She reports that her hypertension is under good control.  She does not particularly have lightheadedness but she does at times feel a little unsteady on her feet.    4. Generalized edema  She has noticed leg edema as noted above.  She does not elevate her legs when sitting.  She is not particularly careful about avoiding salt.    5. Hyperglycemia  She has had elevated blood sugars but most recent glucose was normal at 83.    6. Nausea  She intermittently has nausea for which she takes Phenergan.  She is not particularly aware of whether the Norco is causing nausea.  She continues to slowly lose some weight.    7. Obesity (BMI 30-39.9)  She still remains overweight with BMI 33.65 but as noted she is slowly losing weight.  She does not have much appetite.    8. Other fatigue  She tends  to feel fatigued much of the time.  She does have a history of hypothyroidism and she is on levothyroxine.  TSH is a little low at 0.034 and free T4 is mildly elevated at 2.4.      Patient Active Problem List    Diagnosis Date Noted   • Dementia (HCC) 05/29/2011     Priority: Low   • Hypothyroidism 05/29/2011     Priority: Low   • Peripheral neuropathy 05/29/2011     Priority: Low   • Nonrheumatic aortic valve stenosis 09/16/2020   • Chronic midline low back pain without sciatica 09/02/2020   • Syncope 06/05/2020   • Nausea 10/14/2019   • Ischemic bowel disease (HCC) 09/09/2019   • Other fatigue 05/13/2019   • Chronic renal impairment, stage 3 (moderate) 05/13/2019   • Tobacco use 04/29/2019   • Generalized edema 04/27/2018   • Morbid obesity (HCC) 04/27/2018   • Flank pain 11/14/2017   • At moderate risk for fall 11/14/2017   • Need for varicella vaccine 10/18/2016   • Dysuria 10/18/2016   • Hay fever 04/19/2016   • RUQ pain 02/29/2016   • Flu vaccine need 11/24/2015   • Hyperlipidemia 11/24/2015   • Essential hypertension 11/24/2015   • Hyperglycemia 11/24/2015   • Routine general medical examination at a health care facility 07/20/2015   • Obesity (BMI 30-39.9) 07/20/2015   • Sleep apnea 07/20/2015       Current Outpatient Medications   Medication Sig Dispense Refill   • promethazine (PHENERGAN) 25 MG Tab TAKE 1 TABLET BY MOUTH EVERY 6 HOURS AS NEEDED FOR NAUSEA/VOMITING 30 Tab 0   • levothyroxine (SYNTHROID) 137 MCG Tab TAKE 1 TABLET BY MOUTH EVERY MORNING ON A EMPTY STOMACH 90 Tab 3   • ondansetron (ZOFRAN ODT) 4 MG TABLET DISPERSIBLE Take 4 mg by mouth as needed. Prn nausea and or vomiting     • pantoprazole (PROTONIX) 40 MG Tablet Delayed Response Take 40 mg by mouth.     • potassium chloride ER (KLOR-CON) 10 MEQ tablet Take 10 mEq by mouth.     • Mirabegron ER (MYRBETRIQ) 25 MG TABLET SR 24 HR Take 25 mg by mouth every day. 30 Tab 11   • phenazopyridine (PYRIDIUM) 200 MG Tab Take 1 Tab by mouth 3 times a day  "as needed. (Patient not taking: Reported on 9/16/2020) 10 Tab 0   • lisinopril (PRINIVIL) 10 MG Tab TAKE 1 TABLET BY MOUTH EVERY  Tab 3   • atorvastatin (LIPITOR) 10 MG Tab TAKE 1 TABLET BY MOUTH EVERY  Tab 3   • triamterene/hctz (MAXZIDE-25/DYAZIDE) 37.5-25 MG Cap TAKE 1 CAPSULE BY MOUTH EVERY DAY 90 Cap 3   • ibuprofen (MOTRIN) 200 MG Tab Take 200 mg by mouth every 6 hours as needed.     • fenofibrate (TRIGLIDE) 160 MG tablet TAKE 1 TABLET BY MOUTH EVERY  Tab 3   • aspirin (ASA) 81 MG Chew Tab chewable tablet CHEW ONE TABLET BY MOUTH DAILY  0   • latanoprost (XALATAN) 0.005 % Solution INSTILL 1 DROP INTO BOTH EYES EVERY EVENING AT BEDTIME  4   • acetaminophen (TYLENOL) 500 MG TABS Take 500 mg by mouth 3 times a day as needed. Indications: Pain       No current facility-administered medications for this visit.          Allergies as of 12/28/2020 - Reviewed 12/03/2020   Allergen Reaction Noted   • Food Hives and Nausea 11/14/2017   • Neomycin-polymyxin b Rash 06/10/2008   • Pcn [penicillins]  11/06/2017   • Penicillin g Rash 05/27/2020   • Sulfa drugs  08/30/2011        ROS:  Denies, chest pain, Shortness of breath Or palpitations.  She denies vertigo or significant new headaches.  She denies significant indigestion.  She does occasionally get some right lower quadrant abdominal discomfort.  That is treated with Norco and Motrin periodically.  She denies recent change in urinating.  She does have the above-noted ankle edema.    Ht 1.575 m (5' 2\")   Wt 83.5 kg (184 lb) Comment: patient stated  BMI 33.65 kg/m²       Physical Exam:  Constitutional: Alert, no distress, well-groomed.  Skin: No rashes in visible areas.  Eye: Round. Conjunctiva clear, lNo icterus.   ENMT: Lips pink without lesions, good dentition, moist mucous membranes. Phonation normal.  Neck: No masses, no thyromegaly. Moves freely without pain.  CV: Pulse as reported by patient  Respiratory: Unlabored respiratory effort, no cough " or audible wheeze  Psych: Alert and oriented x3, normal affect and mood.          Assessment and Plan.   80 y.o. female with the following issues.    1. Chronic renal impairment, stage 3b  She was encouraged to remain well-hydrated and to follow a low-salt diet so she could cut back on the Dyazide.  We will arrange for nephrology consult.  - REFERRAL TO NEPHROLOGY    2. Chronic midline low back pain without sciatica  Follow-up regularly with Dr. Jeffery.    3. Essential hypertension  Follow low-salt diet.    4. Generalized edema  Follow low-salt diet.  Elevate legs when sitting.    5. Hyperglycemia  Continue with avoidance of sweets especially on an empty stomach.    6. Nausea  Continue Phenergan for now.Continue Phenergan for now.    7. Obesity (BMI 30-39.9)  Check weight regularly and report back to us.    8. Other fatigue  Continue same dose of levothyroxine for now.  Follow-up with nephrology.      Follow-up in 2 to 3 months or sooner as needed and depending on results from nephrology.

## 2021-01-06 NOTE — TELEPHONE ENCOUNTER
1. Caller Name:Margaret Gracia                          Call Back Number: [phone] (home)       Patient called this morning, she states in her last appt you suggested to get an EKG but there is no order for it, and would like to know if you can please order it          How would the patient prefer to be contacted with a response: Phone call OK to leave a detailed message    
1

## 2021-01-11 DIAGNOSIS — Z23 NEED FOR VACCINATION: ICD-10-CM

## 2021-01-27 ENCOUNTER — TELEMEDICINE (OUTPATIENT)
Dept: NEPHROLOGY | Facility: MEDICAL CENTER | Age: 81
End: 2021-01-27
Payer: MEDICARE

## 2021-01-27 DIAGNOSIS — R19.7 DIARRHEA, UNSPECIFIED TYPE: ICD-10-CM

## 2021-01-27 DIAGNOSIS — G89.29 CHRONIC BACK PAIN, UNSPECIFIED BACK LOCATION, UNSPECIFIED BACK PAIN LATERALITY: ICD-10-CM

## 2021-01-27 DIAGNOSIS — I10 ESSENTIAL HYPERTENSION: ICD-10-CM

## 2021-01-27 DIAGNOSIS — M54.9 CHRONIC BACK PAIN, UNSPECIFIED BACK LOCATION, UNSPECIFIED BACK PAIN LATERALITY: ICD-10-CM

## 2021-01-27 DIAGNOSIS — N17.9 AKI (ACUTE KIDNEY INJURY) (HCC): ICD-10-CM

## 2021-01-27 PROCEDURE — 99204 OFFICE O/P NEW MOD 45 MIN: CPT | Performed by: INTERNAL MEDICINE

## 2021-01-27 ASSESSMENT — ENCOUNTER SYMPTOMS
DIARRHEA: 1
BACK PAIN: 1
VOMITING: 0
COUGH: 0
CHILLS: 0
SHORTNESS OF BREATH: 0
NAUSEA: 0
FEVER: 0

## 2021-01-27 NOTE — PROGRESS NOTES
Telemedicine: New Patient   This evaluation was conducted via Zoom using secure and encrypted videoconferencing technology. The patient was in a private location in the state of [place].    The patient's identity was confirmed and verbal consent was obtained for this virtual visit.    Subjective:     CC:   Chief Complaint   Patient presents with   • Hypertension   • Chronic Kidney Disease       Margaret Garcia is a 80 y.o. female presenting to establish care and to discuss the evaluation and management of: Acute kidney injury  The patient is an 80-year-old lady history of longstanding hypertension, chronic back pain, and chronic NSAID use, recently developed diarrhea for the last 3 months, patient has been evaluated by GI.  No recent exposure to IV contrast  She was found to have acute kidney injury, her creatinine increased to 1.5 mg/dL, patient has no hematuria no dysuria  Her blood pressure has been well controlled in the 110-120 range systolically  No chest pain no shortness of breath  Review of Systems   Constitutional: Negative for chills, fever and malaise/fatigue.   Respiratory: Negative for cough and shortness of breath.    Cardiovascular: Negative for chest pain and leg swelling.   Gastrointestinal: Positive for diarrhea. Negative for nausea and vomiting.   Genitourinary: Negative for dysuria, frequency and urgency.   Musculoskeletal: Positive for back pain.   All other systems reviewed and are negative.        Allergies   Allergen Reactions   • Food Hives and Nausea     oranges   • Neomycin-Polymyxin B Rash   • Pcn [Penicillins]    • Penicillin G Rash   • Sulfa Drugs        Current medicines (including changes today)  Current Outpatient Medications   Medication Sig Dispense Refill   • fenofibrate (TRIGLIDE) 160 MG tablet TAKE 1 TABLET BY MOUTH EVERY DAY 90 Tab 4   • promethazine (PHENERGAN) 25 MG Tab TAKE 1 TABLET BY MOUTH EVERY 6 HOURS AS NEEDED FOR NAUSEA/VOMITING 30 Tab 0   • levothyroxine (SYNTHROID)  137 MCG Tab TAKE 1 TABLET BY MOUTH EVERY MORNING ON A EMPTY STOMACH 90 Tab 3   • ondansetron (ZOFRAN ODT) 4 MG TABLET DISPERSIBLE Take 4 mg by mouth as needed. Prn nausea and or vomiting     • pantoprazole (PROTONIX) 40 MG Tablet Delayed Response Take 40 mg by mouth.     • potassium chloride ER (KLOR-CON) 10 MEQ tablet Take 10 mEq by mouth.     • Mirabegron ER (MYRBETRIQ) 25 MG TABLET SR 24 HR Take 25 mg by mouth every day. 30 Tab 11   • phenazopyridine (PYRIDIUM) 200 MG Tab Take 1 Tab by mouth 3 times a day as needed. (Patient not taking: Reported on 9/16/2020) 10 Tab 0   • lisinopril (PRINIVIL) 10 MG Tab TAKE 1 TABLET BY MOUTH EVERY  Tab 3   • atorvastatin (LIPITOR) 10 MG Tab TAKE 1 TABLET BY MOUTH EVERY  Tab 3   • triamterene/hctz (MAXZIDE-25/DYAZIDE) 37.5-25 MG Cap TAKE 1 CAPSULE BY MOUTH EVERY DAY 90 Cap 3   • ibuprofen (MOTRIN) 200 MG Tab Take 200 mg by mouth every 6 hours as needed.     • aspirin (ASA) 81 MG Chew Tab chewable tablet CHEW ONE TABLET BY MOUTH DAILY  0   • latanoprost (XALATAN) 0.005 % Solution INSTILL 1 DROP INTO BOTH EYES EVERY EVENING AT BEDTIME  4   • acetaminophen (TYLENOL) 500 MG TABS Take 500 mg by mouth 3 times a day as needed. Indications: Pain       No current facility-administered medications for this visit.        She  has a past medical history of Arthritis, ASTHMA, Body mass index 40.0-44.9, adult ([facility]) (4/27/2018), Chronic renal impairment, stage 3 (moderate) (5/13/2019), Dental disorder, Dysuria (10/18/2016), Fall, Generalized edema (4/27/2018), Glaucoma, Hay fever (4/19/2016), Heart burn, High cholesterol, UTI (urinary tract infection), Hyperglycemia (11/24/2015), Hypertension, Hypothyroidism, Indigestion, Ischemic bowel disease ([facility]) (9/9/2019), Migraines, Nausea (10/14/2019), Neuropathy ([facility]), Obesity (BMI 30-39.9) (7/20/2015), Pneumonia, Routine general medical examination at a health care facility (7/20/2015), Sleep apnea (12/2019), Snoring, Syncope (6/5/2020),  Tobacco use (2019), URI (upper respiratory infection) (2015), and Urinary bladder disorder.  She  has a past surgical history that includes gyn surgery; lumbar decompression (2009); hemorrhoidectomy; abdominal hysterectomy total; jodi by laparoscopy (2011); lumbar laminectomy diskectomy (2009); gastroscopy (2019); and colonoscopy (2019).      Family History   Problem Relation Age of Onset   • Stroke Mother    • Hyperlipidemia Mother    • Hypertension Mother    • Arthritis Mother    • Diabetes Father    • Lung Disease Father         pneumonia   • Arthritis Sister    • Rheumatologic Disease Sister    • Hypertension Sister    • Hyperlipidemia Sister    • Other Sister         Anusha/Fibermyalgia   • Hyperlipidemia Brother    • Hypertension Brother    • Arthritis Brother    • Lung Disease Maternal Grandmother         pneumonia   • Stroke Maternal Grandfather    • Cancer Maternal Grandfather         skin    • No Known Problems Paternal Grandmother    • No Known Problems Paternal Grandfather    • Hyperlipidemia Daughter    • Diabetes Daughter    • No Known Problems Son      Family Status   Relation Name Status   • Mo 87  at age 87        CVA   • Fa mid 60  at age 70        pneumonia   • Sis Neil 72 Alive   • Bro Jorge L 76 Alive   • MGMo 78  at age 78        pneumonia   • MGFa 66  at age 66   • PGMo     • PGFa     • Jennifer Madrigal 62 Alive   • Jennifer butler 52 Alive   • Son Arjun 59 Alive       Patient Active Problem List    Diagnosis Date Noted   • Dementia (HCC) 2011     Priority: Low   • Hypothyroidism 2011     Priority: Low   • Peripheral neuropathy 2011     Priority: Low   • Nonrheumatic aortic valve stenosis 2020   • Chronic midline low back pain without sciatica 2020   • Syncope 2020   • Nausea 10/14/2019   • Ischemic bowel disease (HCC) 2019   • Other fatigue 2019   • Chronic renal  impairment, stage 3 (moderate) 05/13/2019   • Tobacco use 04/29/2019   • Generalized edema 04/27/2018   • Morbid obesity (HCC) 04/27/2018   • Flank pain 11/14/2017   • At moderate risk for fall 11/14/2017   • Need for varicella vaccine 10/18/2016   • Dysuria 10/18/2016   • Hay fever 04/19/2016   • RUQ pain 02/29/2016   • Flu vaccine need 11/24/2015   • Hyperlipidemia 11/24/2015   • Essential hypertension 11/24/2015   • Hyperglycemia 11/24/2015   • Routine general medical examination at a health care facility 07/20/2015   • Obesity (BMI 30-39.9) 07/20/2015   • Sleep apnea 07/20/2015          Objective:   There were no vitals taken for this visit.    Physical Exam  Vitals obtained by patient:    Physical Exam:  Constitutional: Alert, no distress, well-groomed.  Skin: No rashes in visible areas.  Eye: Round. Conjunctiva clear, lids normal. No icterus.   ENMT: Lips pink without lesions, good dentition, moist mucous membranes. Phonation normal.  Neck: No masses, no thyromegaly. Moves freely without pain.  CV: Pulse as reported by patient  Respiratory: Unlabored respiratory effort, no cough or audible wheeze  Psych: Alert and oriented x3, normal affect and mood.   Past Medical History:   Diagnosis Date   • Arthritis     knees, and hips-osteo   • ASTHMA    • Body mass index 40.0-44.9, adult ([facility]) 4/27/2018   • Chronic renal impairment, stage 3 (moderate) 5/13/2019   • Dental disorder     upper   • Dysuria 10/18/2016   • Fall    • Generalized edema 4/27/2018   • Glaucoma     bilateral   • Hay fever 4/19/2016   • Heart burn    • High cholesterol    • Hx: UTI (urinary tract infection)    • Hyperglycemia 11/24/2015   • Hypertension    • Hypothyroidism    • Indigestion    • Ischemic bowel disease (HCC) 9/9/2019   • Migraines     pt has similar symptoms with migraines not for a long time though   • Nausea 10/14/2019   • Neuropathy ([facility])    • Obesity (BMI 30-39.9) 7/20/2015   • Pneumonia    • Routine general medical examination  at a health care facility 7/20/2015    7/20/15   • Sleep apnea 12/2019    Had sleep study, didn't tolerate cpap   • Snoring    • Syncope 6/5/2020   • Tobacco use 4/29/2019   • URI (upper respiratory infection) 11/4/2015   • Urinary bladder disorder     hx of uti's     Social History     Socioeconomic History   • Marital status: Single     Spouse name: Not on file   • Number of children: Not on file   • Years of education: Not on file   • Highest education level: Not on file   Occupational History   • Not on file   Social Needs   • Financial resource strain: Not on file   • Food insecurity     Worry: Not on file     Inability: Not on file   • Transportation needs     Medical: Not on file     Non-medical: Not on file   Tobacco Use   • Smoking status: Current Every Day Smoker     Packs/day: 1.00     Years: 56.00     Pack years: 56.00     Types: Cigarettes   • Smokeless tobacco: Never Used   • Tobacco comment: started smoking at age 12, little mor than half a pack   Substance and Sexual Activity   • Alcohol use: No   • Drug use: No   • Sexual activity: Never     Partners: Male     Birth control/protection: Post-Menopausal   Lifestyle   • Physical activity     Days per week: Not on file     Minutes per session: Not on file   • Stress: Not on file   Relationships   • Social connections     Talks on phone: Not on file     Gets together: Not on file     Attends Oriental orthodox service: Not on file     Active member of club or organization: Not on file     Attends meetings of clubs or organizations: Not on file     Relationship status: Not on file   • Intimate partner violence     Fear of current or ex partner: Not on file     Emotionally abused: Not on file     Physically abused: Not on file     Forced sexual activity: Not on file   Other Topics Concern   • Not on file   Social History Narrative   • Not on file     Family History   Problem Relation Age of Onset   • Stroke Mother    • Hyperlipidemia Mother    • Hypertension Mother     • Arthritis Mother    • Diabetes Father    • Lung Disease Father         pneumonia   • Arthritis Sister    • Rheumatologic Disease Sister    • Hypertension Sister    • Hyperlipidemia Sister    • Other Sister         Anusha/Fibermyalgia   • Hyperlipidemia Brother    • Hypertension Brother    • Arthritis Brother    • Lung Disease Maternal Grandmother         pneumonia   • Stroke Maternal Grandfather    • Cancer Maternal Grandfather         skin    • No Known Problems Paternal Grandmother    • No Known Problems Paternal Grandfather    • Hyperlipidemia Daughter    • Diabetes Daughter    • No Known Problems Son      Recent Labs     07/23/20  0843 12/18/20  1236   ALBUMIN  --  3.9   HDL 32*  --    TRIGLYCERIDE 176*  --    SODIUM 132* 135   POTASSIUM 3.9 4.5   CHLORIDE 99 106   CO2 19* 19*   BUN 22 27*   CREATININE 1.34 1.57*     I have reviewed the abdominal CT scan results from January 2021, which showed no hydronephrosis    Assessment and Plan:   The following treatment plan was discussed:     1. SHARIFA (acute kidney injury) (HCC)  The etiology is probably multifactorial secondary to volume depletion from diarrhea while patient is on chronic NSAIDs use and diuretics  Patient has no uremic symptoms  No acute need for dialysis  Recommend to discontinue NSAIDs  Stop diuretics  Check blood pressure at home regularly and call us with the results to see if we need to restart diuretics or even use them on as-needed basis  Recheck labs in 2 to 3 months  Avoid nephrotoxins  Renal dose on medication  2. Essential hypertension  Controlled  Discontinue diuretics  Continue low-sodium diet      3. Chronic back pain, unspecified back location, unspecified back pain laterality  Avoid nephrotoxins like NSAIDs  4. Diarrhea, unspecified type    Follow-up with GI    Follow-up: Return in about 3 months (around 4/27/2021).

## 2021-03-03 ENCOUNTER — HOSPITAL ENCOUNTER (OUTPATIENT)
Facility: MEDICAL CENTER | Age: 81
End: 2021-03-03
Attending: PHYSICIAN ASSISTANT
Payer: MEDICARE

## 2021-03-03 ENCOUNTER — OFFICE VISIT (OUTPATIENT)
Dept: URGENT CARE | Facility: PHYSICIAN GROUP | Age: 81
End: 2021-03-03
Payer: MEDICARE

## 2021-03-03 VITALS
TEMPERATURE: 97.8 F | OXYGEN SATURATION: 100 % | HEART RATE: 80 BPM | BODY MASS INDEX: 36.44 KG/M2 | RESPIRATION RATE: 18 BRPM | SYSTOLIC BLOOD PRESSURE: 158 MMHG | WEIGHT: 198 LBS | DIASTOLIC BLOOD PRESSURE: 80 MMHG | HEIGHT: 62 IN

## 2021-03-03 DIAGNOSIS — R30.0 DYSURIA: ICD-10-CM

## 2021-03-03 DIAGNOSIS — N30.00 ACUTE CYSTITIS WITHOUT HEMATURIA: ICD-10-CM

## 2021-03-03 DIAGNOSIS — Z87.440 HISTORY OF RECURRENT UTI (URINARY TRACT INFECTION): ICD-10-CM

## 2021-03-03 LAB
APPEARANCE UR: NORMAL
BILIRUB UR STRIP-MCNC: NORMAL MG/DL
COLOR UR AUTO: YELLOW
GLUCOSE UR STRIP.AUTO-MCNC: NORMAL MG/DL
KETONES UR STRIP.AUTO-MCNC: NORMAL MG/DL
LEUKOCYTE ESTERASE UR QL STRIP.AUTO: NORMAL
NITRITE UR QL STRIP.AUTO: NORMAL
PH UR STRIP.AUTO: 5 [PH] (ref 5–8)
PROT UR QL STRIP: NORMAL MG/DL
RBC UR QL AUTO: NORMAL
SP GR UR STRIP.AUTO: 1.02
UROBILINOGEN UR STRIP-MCNC: 0.2 MG/DL

## 2021-03-03 PROCEDURE — 99214 OFFICE O/P EST MOD 30 MIN: CPT | Performed by: PHYSICIAN ASSISTANT

## 2021-03-03 PROCEDURE — 87086 URINE CULTURE/COLONY COUNT: CPT

## 2021-03-03 PROCEDURE — 87186 SC STD MICRODIL/AGAR DIL: CPT

## 2021-03-03 PROCEDURE — 81002 URINALYSIS NONAUTO W/O SCOPE: CPT | Performed by: PHYSICIAN ASSISTANT

## 2021-03-03 PROCEDURE — 87077 CULTURE AEROBIC IDENTIFY: CPT | Mod: 91

## 2021-03-03 RX ORDER — CIPROFLOXACIN 250 MG/1
250 TABLET, FILM COATED ORAL 2 TIMES DAILY
Qty: 14 TABLET | Refills: 0 | Status: SHIPPED | OUTPATIENT
Start: 2021-03-03 | End: 2021-03-10

## 2021-03-03 ASSESSMENT — ENCOUNTER SYMPTOMS
FEVER: 0
CHILLS: 0
DIARRHEA: 0
FLANK PAIN: 1
VOMITING: 0
NAUSEA: 0
ABDOMINAL PAIN: 0

## 2021-03-03 ASSESSMENT — FIBROSIS 4 INDEX: FIB4 SCORE: 1.17

## 2021-03-03 NOTE — PROGRESS NOTES
"Subjective:   Margaret Garcia  is a 80 y.o. female who presents for Dysuria (onset yeserday)      Dysuria   Associated symptoms include flank pain, frequency and urgency. Pertinent negatives include no chills, hematuria, nausea or vomiting.   Patient comes clinic with family member present noting right flank pain and urinary urgency worsening her classic symptoms for recurrence of urinary tract infection.  She denies fevers chills nausea vomiting.  She denies much dysuria.  She denies abdominal pain.  Review of chart demonstrates past medical history of cultures positive with similar constellation of symptoms.  Patient reports well treated historically with Cipro.  Patient denies much change in urinary frequency.  Denies hematuria.  Notes a mild symptoms as early urinary tract infections as in the past.  Notes remote history of hospitalization with pyelonephritis and describes significantly more pain, body aches, lower urinary symptoms.    Review of Systems   Constitutional: Negative for chills and fever.   Gastrointestinal: Negative for abdominal pain, diarrhea, nausea and vomiting.   Genitourinary: Positive for flank pain, frequency and urgency. Negative for dysuria and hematuria.   Skin: Negative for rash.       Allergies   Allergen Reactions   • Food Hives and Nausea     oranges   • Neomycin-Polymyxin B Rash   • Pcn [Penicillins]    • Penicillin G Rash   • Sulfa Drugs         Objective:   /80   Pulse 80   Temp 36.6 °C (97.8 °F)   Resp 18   Ht 1.562 m (5' 1.5\")   Wt 89.8 kg (198 lb)   SpO2 100%   BMI 36.81 kg/m²     Physical Exam  Vitals and nursing note reviewed.   Constitutional:       General: She is not in acute distress.     Appearance: She is well-developed. She is not diaphoretic.   HENT:      Head: Normocephalic and atraumatic.      Right Ear: External ear normal.      Left Ear: External ear normal.      Nose: Nose normal.   Eyes:      General: Lids are normal. No scleral icterus.        " Right eye: No discharge.         Left eye: No discharge.      Conjunctiva/sclera: Conjunctivae normal.   Pulmonary:      Effort: Pulmonary effort is normal. No respiratory distress.   Abdominal:      Tenderness: There is right CVA tenderness ( mildly). There is no left CVA tenderness.   Musculoskeletal:         General: Normal range of motion.      Cervical back: Neck supple.   Skin:     General: Skin is warm and dry.      Coloration: Skin is not pale.      Findings: No erythema.   Neurological:      Mental Status: She is alert and oriented to person, place, and time. She is not disoriented.   Psychiatric:         Speech: Speech normal.         Behavior: Behavior normal.       Results for orders placed or performed in visit on 03/03/21   POCT Urinalysis   Result Value Ref Range    POC Color yellow Negative    POC Appearance cloudy Negative    POC Leukocyte Esterase small Negative    POC Nitrites neg Negative    POC Urobiligen 0.2 Negative (0.2) mg/dL    POC Protein neg Negative mg/dL    POC Urine PH 5.0 5.0 - 8.0    POC Blood neg Negative    POC Specific Gravity 1.025 <1.005 - >1.030    POC Ketones trace Negative mg/dL    POC Bilirubin neg Negative mg/dL    POC Glucose neg Negative mg/dL     Urine cx pending    Assessment/Plan:   1. Dysuria  - POCT Urinalysis  - URINE CULTURE(NEW); Future    2. History of recurrent UTI (urinary tract infection)  - URINE CULTURE(NEW); Future  - ciprofloxacin (CIPRO) 250 MG Tab; Take 1 tablet by mouth 2 times a day for 7 days.  Dispense: 14 tablet; Refill: 0    3. Acute cystitis without hematuria  - ciprofloxacin (CIPRO) 250 MG Tab; Take 1 tablet by mouth 2 times a day for 7 days.  Dispense: 14 tablet; Refill: 0  Supportive care is reviewed with patient/caregiver - recommend to push PO fluids and electrolytes, nsaids/tylenol, rest, full course of abx, probiotics w/ abx, azo/cran, observe for resolution  Return to clinic with lack of resolution or progression of symptoms.  Will call if  change of abx is indicated by urine cx  Ok to communicate results via MyChart    I have worn an N95 mask, gloves and eye protection for the entire encounter with this patient.     Differential diagnosis, natural history, supportive care, and indications for immediate follow-up discussed.

## 2021-03-05 ENCOUNTER — OFFICE VISIT (OUTPATIENT)
Dept: URGENT CARE | Facility: PHYSICIAN GROUP | Age: 81
End: 2021-03-05
Payer: MEDICARE

## 2021-03-05 VITALS
OXYGEN SATURATION: 99 % | TEMPERATURE: 97.5 F | DIASTOLIC BLOOD PRESSURE: 64 MMHG | WEIGHT: 198 LBS | HEIGHT: 62 IN | SYSTOLIC BLOOD PRESSURE: 128 MMHG | HEART RATE: 60 BPM | RESPIRATION RATE: 18 BRPM | BODY MASS INDEX: 36.44 KG/M2

## 2021-03-05 DIAGNOSIS — B96.20 PYELONEPHRITIS DUE TO ESCHERICHIA COLI: ICD-10-CM

## 2021-03-05 DIAGNOSIS — N28.9 IMPAIRED RENAL FUNCTION: ICD-10-CM

## 2021-03-05 DIAGNOSIS — N12 PYELONEPHRITIS DUE TO ESCHERICHIA COLI: ICD-10-CM

## 2021-03-05 DIAGNOSIS — N39.0 E. COLI UTI (URINARY TRACT INFECTION): ICD-10-CM

## 2021-03-05 DIAGNOSIS — B96.20 E. COLI UTI (URINARY TRACT INFECTION): ICD-10-CM

## 2021-03-05 PROBLEM — N39.41 URGE INCONTINENCE OF URINE: Status: ACTIVE | Noted: 2020-12-14

## 2021-03-05 PROCEDURE — 99214 OFFICE O/P EST MOD 30 MIN: CPT | Performed by: NURSE PRACTITIONER

## 2021-03-05 ASSESSMENT — ENCOUNTER SYMPTOMS
CHILLS: 0
HEADACHES: 0
DIARRHEA: 1
ABDOMINAL PAIN: 0
NAUSEA: 0
VOMITING: 0
FLANK PAIN: 1
FEVER: 0

## 2021-03-05 ASSESSMENT — FIBROSIS 4 INDEX: FIB4 SCORE: 1.17

## 2021-03-05 NOTE — PROGRESS NOTES
Subjective:     Margaret Garcia is a 80 y.o. female who presents for UTI (x1 week, back pain, worse pain than 3/3/21 when she was seen )      UTI  Pertinent negatives include no abdominal pain, chills, fever, headaches, nausea or vomiting.     Pt presents for evaluation of an existing problem.  Margaret is a pleasant 80-year-old female who presents to urgent care today with her family member.  She was seen in the clinic 2 days ago and treated for a UTI.  Urinary culture was positive for E. coli.  She was started on Cipro which she has been taking as prescribed.  She notes increased right-sided flank pain today.  Her pain level is an 8/10.  She notes that she does have chronic back pain but this is abnormal for her.  Her dysuria, urgency and frequency have improved.  She denies any fever or chills.  She is concerned that she is developing a kidney infection for which she has had in the past.  She does use hydrocodone which is prescribed by her pain management doctor for her back pain.  She has not used anything in addition to the pain medication already prescribed.  Pertinent medical history includes abnormal renal function for which she is following up with a nephrologist for.    Review of Systems   Constitutional: Negative for chills, fever and malaise/fatigue.   Gastrointestinal: Positive for diarrhea. Negative for abdominal pain, nausea and vomiting.   Genitourinary: Positive for flank pain. Negative for dysuria, frequency, hematuria and urgency.   Neurological: Negative for headaches.       PMH:   Past Medical History:   Diagnosis Date   • Arthritis     knees, and hips-osteo   • ASTHMA    • Body mass index 40.0-44.9, adult (HCC) 4/27/2018   • Chronic renal impairment, stage 3 (moderate) 5/13/2019   • Dental disorder     upper   • Dysuria 10/18/2016   • Fall    • Generalized edema 4/27/2018   • Glaucoma     bilateral   • Hay fever 4/19/2016   • Heart burn    • High cholesterol    • Hx: UTI (urinary tract  infection)    • Hyperglycemia 11/24/2015   • Hypertension    • Hypothyroidism    • Indigestion    • Ischemic bowel disease (HCC) 9/9/2019   • Migraines     pt has similar symptoms with migraines not for a long time though   • Nausea 10/14/2019   • Neuropathy (HCC)    • Obesity (BMI 30-39.9) 7/20/2015   • Pneumonia    • Routine general medical examination at a health care facility 7/20/2015    7/20/15   • Sleep apnea 12/2019    Had sleep study, didn't tolerate cpap   • Snoring    • Syncope 6/5/2020   • Tobacco use 4/29/2019   • URI (upper respiratory infection) 11/4/2015   • Urinary bladder disorder     hx of uti's     ALLERGIES:   Allergies   Allergen Reactions   • Food Hives and Nausea     oranges   • Neomycin-Polymyxin B Rash   • Pcn [Penicillins]    • Penicillin G Rash   • Sulfa Drugs      SURGHX:   Past Surgical History:   Procedure Laterality Date   • GASTROSCOPY  12/13/2019    Procedure: GASTROSCOPY;  Surgeon: Ang Angel M.D.;  Location: SURGERY [facility];  Service: Gastroenterology   • COLONOSCOPY  12/13/2019    Procedure: COLONOSCOPY;  Surgeon: Ang Angel M.D.;  Location: [facility];  Service: Gastroenterology   • NIRU BY LAPAROSCOPY  9/2/2011    Performed by KAYLEIGH PORRAS at SURGERY SAME DAY [facility] ORS   • LUMBAR DECOMPRESSION  6/29/2009    Performed by ANG YAN at SURGERY [facility] ORS   • LUMBAR LAMINECTOMY DISKECTOMY  6/29/2009    Performed by ANG YAN at SURGERY [facility] ORS   • ABDOMINAL HYSTERECTOMY TOTAL     • GYN SURGERY      hysterectomy   • HEMORRHOIDECTOMY       SOCHX:   Social History     Socioeconomic History   • Marital status: Single     Spouse name: Not on file   • Number of children: Not on file   • Years of education: Not on file   • Highest education level: Not on file   Occupational History   • Not on file   Tobacco Use   • Smoking status: Current Every Day Smoker     Packs/day: 1.00     Years: 56.00     Pack years: 56.00     Types:  Cigarettes   • Smokeless tobacco: Never Used   • Tobacco comment: started smoking at age 12, little mor than half a pack   Substance and Sexual Activity   • Alcohol use: No   • Drug use: No   • Sexual activity: Never     Partners: Male     Birth control/protection: Post-Menopausal   Other Topics Concern   • Not on file   Social History Narrative   • Not on file     Social Determinants of Health     Financial Resource Strain:    • Difficulty of Paying Living Expenses:    Food Insecurity:    • Worried About Running Out of Food in the Last Year:    • Ran Out of Food in the Last Year:    Transportation Needs:    • Lack of Transportation (Medical):    • Lack of Transportation (Non-Medical):    Physical Activity:    • Days of Exercise per Week:    • Minutes of Exercise per Session:    Stress:    • Feeling of Stress :    Social Connections:    • Frequency of Communication with Friends and Family:    • Frequency of Social Gatherings with Friends and Family:    • Attends Sikhism Services:    • Active Member of Clubs or Organizations:    • Attends Club or Organization Meetings:    • Marital Status:    Intimate Partner Violence:    • Fear of Current or Ex-Partner:    • Emotionally Abused:    • Physically Abused:    • Sexually Abused:      FH:   Family History   Problem Relation Age of Onset   • Stroke Mother    • Hyperlipidemia Mother    • Hypertension Mother    • Arthritis Mother    • Diabetes Father    • Lung Disease Father         pneumonia   • Arthritis Sister    • Rheumatologic Disease Sister    • Hypertension Sister    • Hyperlipidemia Sister    • Other Sister         Anusha/Fibermyalgia   • Hyperlipidemia Brother    • Hypertension Brother    • Arthritis Brother    • Lung Disease Maternal Grandmother         pneumonia   • Stroke Maternal Grandfather    • Cancer Maternal Grandfather         skin    • No Known Problems Paternal Grandmother    • No Known Problems Paternal Grandfather    • Hyperlipidemia Daughter    •  "Diabetes Daughter    • No Known Problems Son          Objective:   /64   Pulse 60   Temp 36.4 °C (97.5 °F) (Temporal)   Resp 18   Ht 1.562 m (5' 1.5\")   Wt 89.8 kg (198 lb)   SpO2 99%   BMI 36.81 kg/m²     Physical Exam  Vitals and nursing note reviewed.   Constitutional:       General: She is not in acute distress.     Appearance: Normal appearance. She is not ill-appearing.   HENT:      Head: Normocephalic and atraumatic.      Right Ear: External ear normal.      Left Ear: External ear normal.      Nose: No congestion or rhinorrhea.      Mouth/Throat:      Mouth: Mucous membranes are moist.   Eyes:      Extraocular Movements: Extraocular movements intact.      Pupils: Pupils are equal, round, and reactive to light.   Cardiovascular:      Rate and Rhythm: Normal rate and regular rhythm.      Pulses: Normal pulses.      Heart sounds: Normal heart sounds.   Pulmonary:      Effort: Pulmonary effort is normal.      Breath sounds: Normal breath sounds.   Abdominal:      General: Abdomen is flat. Bowel sounds are normal. There is no distension.      Palpations: Abdomen is soft. There is no mass.      Tenderness: There is no abdominal tenderness. There is no right CVA tenderness, left CVA tenderness, guarding or rebound.      Hernia: No hernia is present.   Musculoskeletal:         General: Normal range of motion.      Cervical back: Normal range of motion and neck supple.   Skin:     General: Skin is warm and dry.      Capillary Refill: Capillary refill takes less than 2 seconds.   Neurological:      General: No focal deficit present.      Mental Status: She is alert and oriented to person, place, and time. Mental status is at baseline.   Psychiatric:         Mood and Affect: Mood normal.         Behavior: Behavior normal.         Thought Content: Thought content normal.         Judgment: Judgment normal.         Assessment/Plan:   Assessment    1. Pyelonephritis due to Escherichia coli  cefTRIAXone " (ROCEPHIN) 1 g, lidocaine (XYLOCAINE) 1 % 3.6 mL for IM use   2. Impaired renal function     3. E. coli UTI (urinary tract infection)       POCT urine not collected as she was positive for UTI 2 days ago.  Rocephin injection 1 g given in clinic for concern over pyelonephritis.  She did wait in the clinic for 15 minutes following injection and she tolerated this well.  The bacteria did show sensitivity to Cipro so she will remain on this antibiotic.  Follow-up in clinic or in ER for worsening pain, fever, chills, nausea or vomiting.  She verbalized understanding.  AVS handout given and reviewed with patient. Pt educated on red flags and when to seek treatment back in ER or UC.

## 2021-03-09 ENCOUNTER — HOSPITAL ENCOUNTER (EMERGENCY)
Facility: MEDICAL CENTER | Age: 81
End: 2021-03-09
Attending: EMERGENCY MEDICINE
Payer: MEDICARE

## 2021-03-09 VITALS
SYSTOLIC BLOOD PRESSURE: 108 MMHG | HEIGHT: 62 IN | BODY MASS INDEX: 34.44 KG/M2 | RESPIRATION RATE: 18 BRPM | DIASTOLIC BLOOD PRESSURE: 54 MMHG | WEIGHT: 187.17 LBS | HEART RATE: 69 BPM | OXYGEN SATURATION: 99 % | TEMPERATURE: 97.9 F

## 2021-03-09 DIAGNOSIS — R10.9 FLANK PAIN: ICD-10-CM

## 2021-03-09 LAB
ALBUMIN SERPL BCP-MCNC: 4 G/DL (ref 3.2–4.9)
ALBUMIN/GLOB SERPL: 1.3 G/DL
ALP SERPL-CCNC: 86 U/L (ref 30–99)
ALT SERPL-CCNC: 13 U/L (ref 2–50)
ANION GAP SERPL CALC-SCNC: 12 MMOL/L (ref 7–16)
APPEARANCE UR: CLEAR
AST SERPL-CCNC: 19 U/L (ref 12–45)
BASOPHILS # BLD AUTO: 0.7 % (ref 0–1.8)
BASOPHILS # BLD: 0.03 K/UL (ref 0–0.12)
BILIRUB SERPL-MCNC: 0.3 MG/DL (ref 0.1–1.5)
BILIRUB UR QL STRIP.AUTO: NEGATIVE
BUN SERPL-MCNC: 26 MG/DL (ref 8–22)
CALCIUM SERPL-MCNC: 9.4 MG/DL (ref 8.5–10.5)
CHLORIDE SERPL-SCNC: 101 MMOL/L (ref 96–112)
CO2 SERPL-SCNC: 20 MMOL/L (ref 20–33)
COLOR UR: YELLOW
CREAT SERPL-MCNC: 1.13 MG/DL (ref 0.5–1.4)
EOSINOPHIL # BLD AUTO: 0.13 K/UL (ref 0–0.51)
EOSINOPHIL NFR BLD: 3.1 % (ref 0–6.9)
ERYTHROCYTE [DISTWIDTH] IN BLOOD BY AUTOMATED COUNT: 46.6 FL (ref 35.9–50)
GLOBULIN SER CALC-MCNC: 3 G/DL (ref 1.9–3.5)
GLUCOSE SERPL-MCNC: 87 MG/DL (ref 65–99)
GLUCOSE UR STRIP.AUTO-MCNC: NEGATIVE MG/DL
HCT VFR BLD AUTO: 44.5 % (ref 37–47)
HGB BLD-MCNC: 14.7 G/DL (ref 12–16)
IMM GRANULOCYTES # BLD AUTO: 0.05 K/UL (ref 0–0.11)
IMM GRANULOCYTES NFR BLD AUTO: 1.2 % (ref 0–0.9)
KETONES UR STRIP.AUTO-MCNC: NEGATIVE MG/DL
LEUKOCYTE ESTERASE UR QL STRIP.AUTO: NEGATIVE
LIPASE SERPL-CCNC: 30 U/L (ref 11–82)
LYMPHOCYTES # BLD AUTO: 1.11 K/UL (ref 1–4.8)
LYMPHOCYTES NFR BLD: 26.3 % (ref 22–41)
MCH RBC QN AUTO: 32.2 PG (ref 27–33)
MCHC RBC AUTO-ENTMCNC: 33 G/DL (ref 33.6–35)
MCV RBC AUTO: 97.4 FL (ref 81.4–97.8)
MICRO URNS: NORMAL
MONOCYTES # BLD AUTO: 0.52 K/UL (ref 0–0.85)
MONOCYTES NFR BLD AUTO: 12.3 % (ref 0–13.4)
NEUTROPHILS # BLD AUTO: 2.38 K/UL (ref 2–7.15)
NEUTROPHILS NFR BLD: 56.4 % (ref 44–72)
NITRITE UR QL STRIP.AUTO: NEGATIVE
NRBC # BLD AUTO: 0 K/UL
NRBC BLD-RTO: 0 /100 WBC
PH UR STRIP.AUTO: 5 [PH] (ref 5–8)
PLATELET # BLD AUTO: 247 K/UL (ref 164–446)
PMV BLD AUTO: 9.3 FL (ref 9–12.9)
POTASSIUM SERPL-SCNC: 4.3 MMOL/L (ref 3.6–5.5)
PROT SERPL-MCNC: 7 G/DL (ref 6–8.2)
PROT UR QL STRIP: NEGATIVE MG/DL
RBC # BLD AUTO: 4.57 M/UL (ref 4.2–5.4)
RBC UR QL AUTO: NEGATIVE
SODIUM SERPL-SCNC: 133 MMOL/L (ref 135–145)
SP GR UR STRIP.AUTO: 1.01
UROBILINOGEN UR STRIP.AUTO-MCNC: 0.2 MG/DL
WBC # BLD AUTO: 4.2 K/UL (ref 4.8–10.8)

## 2021-03-09 PROCEDURE — 700111 HCHG RX REV CODE 636 W/ 250 OVERRIDE (IP): Performed by: EMERGENCY MEDICINE

## 2021-03-09 PROCEDURE — 83690 ASSAY OF LIPASE: CPT

## 2021-03-09 PROCEDURE — 96374 THER/PROPH/DIAG INJ IV PUSH: CPT

## 2021-03-09 PROCEDURE — 80053 COMPREHEN METABOLIC PANEL: CPT

## 2021-03-09 PROCEDURE — 81003 URINALYSIS AUTO W/O SCOPE: CPT

## 2021-03-09 PROCEDURE — 85025 COMPLETE CBC W/AUTO DIFF WBC: CPT

## 2021-03-09 PROCEDURE — 36415 COLL VENOUS BLD VENIPUNCTURE: CPT

## 2021-03-09 PROCEDURE — 99284 EMERGENCY DEPT VISIT MOD MDM: CPT

## 2021-03-09 PROCEDURE — 96375 TX/PRO/DX INJ NEW DRUG ADDON: CPT

## 2021-03-09 RX ORDER — MORPHINE SULFATE 4 MG/ML
4 INJECTION, SOLUTION INTRAMUSCULAR; INTRAVENOUS ONCE
Status: COMPLETED | OUTPATIENT
Start: 2021-03-09 | End: 2021-03-09

## 2021-03-09 RX ORDER — ONDANSETRON 2 MG/ML
4 INJECTION INTRAMUSCULAR; INTRAVENOUS ONCE
Status: COMPLETED | OUTPATIENT
Start: 2021-03-09 | End: 2021-03-09

## 2021-03-09 RX ADMIN — MORPHINE SULFATE 4 MG: 4 INJECTION INTRAVENOUS at 13:32

## 2021-03-09 RX ADMIN — ONDANSETRON 4 MG: 2 INJECTION INTRAMUSCULAR; INTRAVENOUS at 13:32

## 2021-03-09 ASSESSMENT — ENCOUNTER SYMPTOMS
FLANK PAIN: 1
VOMITING: 0
DIARRHEA: 1
HEADACHES: 0
DIZZINESS: 0
NAUSEA: 1
SHORTNESS OF BREATH: 0
BACK PAIN: 1
CHILLS: 0
FEVER: 0
ABDOMINAL PAIN: 0
ROS GI COMMENTS: CHRONIC

## 2021-03-09 ASSESSMENT — FIBROSIS 4 INDEX: FIB4 SCORE: 1.17

## 2021-03-09 NOTE — ED NOTES
Discharge instructions given to pt. Prescriptions unchanged. Pt educated, verbalizes understanding. All belongings accounted for. Pt ambulated out of ED with steady gait to go home with family. PIV removed and dressing applied.

## 2021-03-09 NOTE — ED PROVIDER NOTES
ED Provider Note    ED Provider Note    Primary care provider: Rodrigo Ozuna M.D.  Means of arrival: POV  History obtained from: Patient, Granddaughter  History limited by: None    CHIEF COMPLAINT  Chief Complaint   Patient presents with   • Flank Pain     right side since  3/1, seen at  and started on Cipro BID 3/3 then again on 3/5 and given Rocephin IM, now presents here with unrelieved pain.         LUIS MIGUEL Garcia is a 80 y.o. female who presents to the Emergency Department accompanied by her granddaughter who gives much of the history.  This is an 80-year-old female who presents with right sided flank pain.  She had this pain going back to December 3 of last year.  At that time, she was treated for urinary tract infection.  She subsequently had persistent pain which has led to follow-up with urology, follow-up with GI, Dr. Courtney, who she had an endoscopy and a colonoscopy with.  She reports no significant findings.  She has had an outpatient CT scan of the abdomen and pelvis her daughter has the results of this on her phone.  She has also followed up with nephrology, Dr. Najjar.  Other than UTIs, that have been appropriately treated, no other etiologies have been identified as cause of her pain.  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  She is on Norco twice daily for chronic pain.  She can is a longtime smoker.  Has history of a cholecystectomy as well as a hysterectomy.  She denies any recent fever.  She has chronic, persistent intermittent nausea, no vomiting.  No change in bowel habits.  No chest pain or shortness of breath.  She has chronic lower extremity edema for which she takes a diuretic as well as potassium supplement.  Recently, she was seen in the urgent care and treated with oral Cipro then return to the urgent care with persistent pain prompting an injection of IM Rocephin and continued oral antibiotics.  Today she is here with  persistent pain.    REVIEW OF SYSTEMS  Review of Systems   Constitutional: Negative for chills and fever.   HENT: Negative for congestion.    Respiratory: Negative for shortness of breath.    Cardiovascular: Negative for chest pain.   Gastrointestinal: Positive for diarrhea and nausea. Negative for abdominal pain and vomiting.        Chronic   Genitourinary: Positive for flank pain. Negative for dysuria, frequency, hematuria and urgency.   Musculoskeletal: Positive for back pain.        Chronic   Neurological: Negative for dizziness and headaches.   All other systems reviewed and are negative.      PAST MEDICAL HISTORY   has a past medical history of Arthritis, ASTHMA, Body mass index 40.0-44.9, adult ([facility]) (4/27/2018), Chronic renal impairment, stage 3 (moderate) (5/13/2019), Dental disorder, Dysuria (10/18/2016), Fall, Generalized edema (4/27/2018), Glaucoma, Hay fever (4/19/2016), Heart burn, High cholesterol, UTI (urinary tract infection), Hyperglycemia (11/24/2015), Hypertension, Hypothyroidism, Indigestion, Ischemic bowel disease ([facility]) (9/9/2019), Migraines, Nausea (10/14/2019), Neuropathy ([facility]), Obesity (BMI 30-39.9) (7/20/2015), Pneumonia, Routine general medical examination at a health care facility (7/20/2015), Sleep apnea (12/2019), Snoring, Syncope (6/5/2020), Tobacco use (4/29/2019), URI (upper respiratory infection) (11/4/2015), and Urinary bladder disorder.    SURGICAL HISTORY   has a past surgical history that includes gyn surgery; lumbar decompression (6/29/2009); hemorrhoidectomy; abdominal hysterectomy total; jodi by laparoscopy (9/2/2011); lumbar laminectomy diskectomy (6/29/2009); gastroscopy (12/13/2019); and colonoscopy (12/13/2019).    SOCIAL HISTORY  Social History     Tobacco Use   • Smoking status: Current Every Day Smoker     Packs/day: 1.00     Years: 56.00     Pack years: 56.00     Types: Cigarettes   • Smokeless tobacco: Never Used   • Tobacco comment: started smoking at age 12,  little mor than half a pack   Substance Use Topics   • Alcohol use: No   • Drug use: No      Social History     Substance and Sexual Activity   Drug Use No       FAMILY HISTORY  Family History   Problem Relation Age of Onset   • Stroke Mother    • Hyperlipidemia Mother    • Hypertension Mother    • Arthritis Mother    • Diabetes Father    • Lung Disease Father         pneumonia   • Arthritis Sister    • Rheumatologic Disease Sister    • Hypertension Sister    • Hyperlipidemia Sister    • Other Sister         Anusha/Fibermyalgia   • Hyperlipidemia Brother    • Hypertension Brother    • Arthritis Brother    • Lung Disease Maternal Grandmother         pneumonia   • Stroke Maternal Grandfather    • Cancer Maternal Grandfather         skin    • No Known Problems Paternal Grandmother    • No Known Problems Paternal Grandfather    • Hyperlipidemia Daughter    • Diabetes Daughter    • No Known Problems Son        CURRENT MEDICATIONS  Home Medications     Reviewed by Fay Atkinson R.N. (Registered Nurse) on 03/09/21 at 1116  Med List Status: Partial   Medication Last Dose Status   acetaminophen (TYLENOL) 500 MG TABS  Active   aspirin (ASA) 81 MG Chew Tab chewable tablet  Active   atorvastatin (LIPITOR) 10 MG Tab  Active   ciprofloxacin (CIPRO) 250 MG Tab  Active   fenofibrate (TRIGLIDE) 160 MG tablet  Active   ibuprofen (MOTRIN) 200 MG Tab  Active   latanoprost (XALATAN) 0.005 % Solution  Active   levothyroxine (SYNTHROID) 137 MCG Tab  Active   lisinopril (PRINIVIL) 10 MG Tab  Active   Mirabegron ER (MYRBETRIQ) 25 MG TABLET SR 24 HR  Active   ondansetron (ZOFRAN ODT) 4 MG TABLET DISPERSIBLE  Active   pantoprazole (PROTONIX) 40 MG Tablet Delayed Response  Active   phenazopyridine (PYRIDIUM) 200 MG Tab  Active   potassium chloride ER (KLOR-CON) 10 MEQ tablet  Active   promethazine (PHENERGAN) 25 MG Tab  Active   triamterene/hctz (MAXZIDE-25/DYAZIDE) 37.5-25 MG Cap  Active                ALLERGIES  Allergies   Allergen Reactions  "  • Food Hives and Nausea     oranges   • Neomycin-Polymyxin B Rash   • Pcn [Penicillins]    • Penicillin G Rash   • Sulfa Drugs        PHYSICAL EXAM  VITAL SIGNS: /54   Pulse 69   Temp 36.6 °C (97.9 °F) (Temporal)   Resp 18   Ht 1.575 m (5' 2\")   Wt 84.9 kg (187 lb 2.7 oz)   SpO2 99%   BMI 34.23 kg/m²   Vitals reviewed.  Constitutional: Patient is oriented to person, place, and time. Appears well-developed and well-nourished. Mild distress.    Head: Normocephalic and atraumatic.   Ears: Normal external ears bilaterally.   Mouth/Throat: Oropharynx is clear and moist  Eyes: Conjunctivae are normal.   Neck: Normal range of motion.   Cardiovascular: Normal rate, regular rhythm and normal heart sounds.   Pulmonary/Chest: Effort normal and breath sounds normal. No respiratory distress, no wheezes, rhonchi, or rales.  Abdominal: Soft. Bowel sounds are normal. There is no tenderness. No rebound or guarding, or peritoneal signs. Right CVA tenderness.  Musculoskeletal: No edema and no tenderness except chronic pain across her lumbar area.  No overlying skin changes.   Lymphadenopathy: No cervical adenopathy.   Neurological: No focal deficits.   Skin: Skin is warm and dry. No erythema. No pallor.   Psychiatric: Patient has a normal mood and affect.     LABS  Results for orders placed or performed during the hospital encounter of 03/09/21   CBC WITH DIFFERENTIAL   Result Value Ref Range    WBC 4.2 (L) 4.8 - 10.8 K/uL    RBC 4.57 4.20 - 5.40 M/uL    Hemoglobin 14.7 12.0 - 16.0 g/dL    Hematocrit 44.5 37.0 - 47.0 %    MCV 97.4 81.4 - 97.8 fL    MCH 32.2 27.0 - 33.0 pg    MCHC 33.0 (L) 33.6 - 35.0 g/dL    RDW 46.6 35.9 - 50.0 fL    Platelet Count 247 164 - 446 K/uL    MPV 9.3 9.0 - 12.9 fL    Neutrophils-Polys 56.40 44.00 - 72.00 %    Lymphocytes 26.30 22.00 - 41.00 %    Monocytes 12.30 0.00 - 13.40 %    Eosinophils 3.10 0.00 - 6.90 %    Basophils 0.70 0.00 - 1.80 %    Immature Granulocytes 1.20 (H) 0.00 - 0.90 %    " Nucleated RBC 0.00 /100 WBC    Neutrophils (Absolute) 2.38 2.00 - 7.15 K/uL    Lymphs (Absolute) 1.11 1.00 - 4.80 K/uL    Monos (Absolute) 0.52 0.00 - 0.85 K/uL    Eos (Absolute) 0.13 0.00 - 0.51 K/uL    Baso (Absolute) 0.03 0.00 - 0.12 K/uL    Immature Granulocytes (abs) 0.05 0.00 - 0.11 K/uL    NRBC (Absolute) 0.00 K/uL   COMP METABOLIC PANEL   Result Value Ref Range    Sodium 133 (L) 135 - 145 mmol/L    Potassium 4.3 3.6 - 5.5 mmol/L    Chloride 101 96 - 112 mmol/L    Co2 20 20 - 33 mmol/L    Anion Gap 12.0 7.0 - 16.0    Glucose 87 65 - 99 mg/dL    Bun 26 (H) 8 - 22 mg/dL    Creatinine 1.13 0.50 - 1.40 mg/dL    Calcium 9.4 8.5 - 10.5 mg/dL    AST(SGOT) 19 12 - 45 U/L    ALT(SGPT) 13 2 - 50 U/L    Alkaline Phosphatase 86 30 - 99 U/L    Total Bilirubin 0.3 0.1 - 1.5 mg/dL    Albumin 4.0 3.2 - 4.9 g/dL    Total Protein 7.0 6.0 - 8.2 g/dL    Globulin 3.0 1.9 - 3.5 g/dL    A-G Ratio 1.3 g/dL   LIPASE   Result Value Ref Range    Lipase 30 11 - 82 U/L   URINALYSIS    Specimen: Urine, Clean Catch   Result Value Ref Range    Color Yellow     Character Clear     Specific Gravity 1.015 <1.035    Ph 5.0 5.0 - 8.0    Glucose Negative Negative mg/dL    Ketones Negative Negative mg/dL    Protein Negative Negative mg/dL    Bilirubin Negative Negative    Urobilinogen, Urine 0.2 Negative    Nitrite Negative Negative    Leukocyte Esterase Negative Negative    Occult Blood Negative Negative    Micro Urine Req see below    ESTIMATED GFR   Result Value Ref Range    GFR If  56 (A) >60 mL/min/1.73 m 2    GFR If Non  46 (A) >60 mL/min/1.73 m 2       All labs reviewed by me.    COURSE & MEDICAL DECISION MAKING  Pertinent Labs & Imaging studies reviewed. (See chart for details)    Obtained and reviewed past medical records.  Patient's last encounter was an outpatient visit for a UTI March 5.  Notable outpatient encounters, last ED visit was in 2012 for altered mental status.    12:48 PM - Patient seen and  examined at bedside.  This is a pleasant 80-year-old female.  She presents with her granddaughter who gives most of the history.  She has had ongoing right-sided flank pain since the beginning of December of last year.  She has had extensive work-up including endoscopy, colonoscopy, renal ultrasound, CT abdomen and pelvis as well as multiple courses of antibiotics.  No detection of kidney stone in prior evaluation.  She does have renal insufficiency.  She is also had intermittent diarrhea.  No report of fever.  IV has been established, labs drawn including CBC, chemistry and urinalysis has been collected.  Will be kept n.p.o. at this time treated with pain medication.    2:28PM patient is reevaluated at the bedside.  She is feeling better.  We discussed lab results which were overall unrevealing.  White blood cell count slightly low, there is no neutrophilic shift.  H&H 14 and 44.  Chemistry shows a slightly low sodium but was otherwise unrevealing.  Urine analysis is entirely normal.  I do not see indication for further imaging at this time.  Patient is eager to go home and I think this is a reasonable disposition.  We discussed the possibility of thoracic arthritis and degenerative disc disease possibly causing radicular pain.  She does not want to add any other prescription therapies at this time.  Discussed the possibility of treatment with a pulse dose of steroids.  She will follow up with her PCP.  She has normal vital signs.  She will be discharged to home in stable condition.    FINAL IMPRESSION  1. Flank pain     Chronic

## 2021-03-09 NOTE — ED TRIAGE NOTES
[facility]  80 y.o.  Chief Complaint   Patient presents with   • Flank Pain     right side since  3/1, seen at  and started on Cipro BID 3/3 then again on 3/5 and given Rocephin IM, now presents here with unrelieved pain.       Patient to triage from [facility] with above complaint.  Pt reports ongoing symptoms since 3/1. Pt denies burning or painful urination or fevers.  Vitals:    03/09/21 1046   BP: 130/60   Pulse: 75   Resp: 18   Temp: 36.6 °C (97.9 °F)   SpO2: 98%     Triage process explained to patient, apologized for wait time, and returned to [facility].  Pt informed to notify staff of any change in condition. NAD at this time.

## 2021-03-09 NOTE — DISCHARGE INSTRUCTIONS
Your labs and urine analysis, are reassuring today.  I would discuss with your primary care doctor, further evaluation for possible arthritis in your back, possibly causing radicular pain and other therapies for this.

## 2021-04-20 ENCOUNTER — OFFICE VISIT (OUTPATIENT)
Dept: MEDICAL GROUP | Facility: MEDICAL CENTER | Age: 81
End: 2021-04-20
Payer: MEDICARE

## 2021-04-20 VITALS
HEIGHT: 62 IN | RESPIRATION RATE: 18 BRPM | HEART RATE: 80 BPM | OXYGEN SATURATION: 99 % | BODY MASS INDEX: 34.23 KG/M2 | DIASTOLIC BLOOD PRESSURE: 66 MMHG | WEIGHT: 186 LBS | SYSTOLIC BLOOD PRESSURE: 108 MMHG | TEMPERATURE: 98.2 F

## 2021-04-20 DIAGNOSIS — J44.89 COPD (CHRONIC OBSTRUCTIVE PULMONARY DISEASE) WITH CHRONIC BRONCHITIS (HCC): ICD-10-CM

## 2021-04-20 DIAGNOSIS — F03.90 DEMENTIA WITHOUT BEHAVIORAL DISTURBANCE, UNSPECIFIED DEMENTIA TYPE: ICD-10-CM

## 2021-04-20 DIAGNOSIS — N18.32 CHRONIC RENAL IMPAIRMENT, STAGE 3B: ICD-10-CM

## 2021-04-20 DIAGNOSIS — E66.9 OBESITY (BMI 30-39.9): ICD-10-CM

## 2021-04-20 DIAGNOSIS — G89.29 CHRONIC MIDLINE LOW BACK PAIN WITHOUT SCIATICA: ICD-10-CM

## 2021-04-20 DIAGNOSIS — I35.0 NONRHEUMATIC AORTIC VALVE STENOSIS: ICD-10-CM

## 2021-04-20 DIAGNOSIS — M54.50 CHRONIC MIDLINE LOW BACK PAIN WITHOUT SCIATICA: ICD-10-CM

## 2021-04-20 DIAGNOSIS — Z72.0 TOBACCO USE: ICD-10-CM

## 2021-04-20 DIAGNOSIS — I10 ESSENTIAL HYPERTENSION: ICD-10-CM

## 2021-04-20 DIAGNOSIS — Z78.0 POST-MENOPAUSE: ICD-10-CM

## 2021-04-20 DIAGNOSIS — E78.5 HYPERLIPIDEMIA, UNSPECIFIED HYPERLIPIDEMIA TYPE: ICD-10-CM

## 2021-04-20 DIAGNOSIS — R53.1 WEAK: ICD-10-CM

## 2021-04-20 PROCEDURE — 99214 OFFICE O/P EST MOD 30 MIN: CPT | Performed by: INTERNAL MEDICINE

## 2021-04-20 RX ORDER — HYDROCODONE BITARTRATE AND ACETAMINOPHEN 5; 325 MG/1; MG/1
TABLET ORAL
COMMUNITY
End: 2021-05-08

## 2021-04-20 RX ORDER — POTASSIUM CHLORIDE 750 MG/1
TABLET, EXTENDED RELEASE ORAL
COMMUNITY
End: 2021-08-11

## 2021-04-20 RX ORDER — MIRABEGRON 50 MG/1
50 TABLET, FILM COATED, EXTENDED RELEASE ORAL
COMMUNITY

## 2021-04-20 RX ORDER — HYDROCODONE BITARTRATE AND ACETAMINOPHEN 7.5; 325 MG/1; MG/1
TABLET ORAL
COMMUNITY
Start: 2021-04-07 | End: 2021-08-11

## 2021-04-20 RX ORDER — POLYETHYLENE GLYCOL-3350 AND ELECTROLYTES 236; 6.74; 5.86; 2.97; 22.74 G/274.31G; G/274.31G; G/274.31G; G/274.31G; G/274.31G
POWDER, FOR SOLUTION ORAL
COMMUNITY
End: 2021-05-08

## 2021-04-20 ASSESSMENT — PAIN SCALES - GENERAL: PAINLEVEL: NO PAIN

## 2021-04-20 ASSESSMENT — PATIENT HEALTH QUESTIONNAIRE - PHQ9: CLINICAL INTERPRETATION OF PHQ2 SCORE: 0

## 2021-04-20 ASSESSMENT — FIBROSIS 4 INDEX: FIB4 SCORE: 1.75

## 2021-04-20 NOTE — ASSESSMENT & PLAN NOTE
She has chronic back pain for which she goes to pain management.  This significantly limits her mobility.  She wants to have a motorized scooter so she could get around more.

## 2021-04-20 NOTE — ASSESSMENT & PLAN NOTE
She has hyperlipidemia for which she is on fenofibrate and atorvastatin.  She is significantly overweight.  She is quite sedentary.

## 2021-04-20 NOTE — ASSESSMENT & PLAN NOTE
She remains significantly overweight with BMI 34.02.  Actually her weight is down 12 pounds from March.  She is quite sedentary.

## 2021-04-20 NOTE — ASSESSMENT & PLAN NOTE
She has chronic lung disease presumed secondary to tobacco use which she continues.  Oxygen saturation at rest is 99% with ambulation it drops fairly quickly to 76% and pulse rises to 132.

## 2021-04-20 NOTE — ASSESSMENT & PLAN NOTE
She has hypertension for which she is on lisinopril and Dyazide.  Blood pressure is under good control.  She denies lightheadedness.

## 2021-04-20 NOTE — ASSESSMENT & PLAN NOTE
She unfortunately still smokes.  She does have chronic lung disease.  She is not interested in quitting smoking.

## 2021-04-20 NOTE — PROGRESS NOTES
Subjective:     Chief Complaint   Patient presents with   • Evaluation Of Medication Change     DME O2 and power scooter      Margaret Garcia is a 80 y.o. female here today for Follow-up of her obesity and tobacco use and deconditioning and oxygen desaturation with minimal exertion and weakness.    Obesity (BMI 30-39.9)  She remains significantly overweight with BMI 34.02.  Actually her weight is down 12 pounds from March.  She is quite sedentary.    Hyperlipidemia  She has hyperlipidemia for which she is on fenofibrate and atorvastatin.  She is significantly overweight.  She is quite sedentary.    Essential hypertension  She has hypertension for which she is on lisinopril and Dyazide.  Blood pressure is under good control.  She denies lightheadedness.    Tobacco use  She unfortunately still smokes.  She does have chronic lung disease.  She is not interested in quitting smoking.    Chronic renal impairment, stage 3 (moderate) (HCC)  She has chronic renal insufficiency, most recent GFR is 46.    Chronic midline low back pain without sciatica  She has chronic back pain for which she goes to pain management.  This significantly limits her mobility.  She wants to have a motorized scooter so she could get around more.      Nonrheumatic aortic valve stenosis  She has mild aortic stenosis with good ejection fraction.    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  Oxygen saturation at rest is 99% with ambulation it drops fairly quickly to 76% and pulse rises to 132.  She would certainly benefit from supplemental oxygen especially with any significant activity.  Duration of need would be about 99 months.    Dementia  Dementia is relatively mild and remains fairly stable.     Limited Mobility   She has limited mobility because of obesity and COPD and deconditioning.  A cane and a wheelchair will not  adequately improve her mobility.    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    Allergies: Food, Neomycin-polymyxin b, Pcn [penicillins], Penicillin g, and Sulfa drugs  Current medicines (including changes today)  Current Outpatient Medications   Medication Sig Dispense Refill   • fenofibrate (TRIGLIDE) 160 MG tablet TAKE 1 TABLET BY MOUTH EVERY DAY 90 Tab 4   • promethazine (PHENERGAN) 25 MG Tab TAKE 1 TABLET BY MOUTH EVERY 6 HOURS AS NEEDED FOR NAUSEA/VOMITING 30 Tab 0   • levothyroxine (SYNTHROID) 137 MCG Tab TAKE 1 TABLET BY MOUTH EVERY MORNING ON A EMPTY STOMACH 90 Tab 3   • ondansetron (ZOFRAN ODT) 4 MG TABLET DISPERSIBLE Take 4 mg by mouth as needed. Prn nausea and or vomiting     • potassium chloride ER (KLOR-CON) 10 MEQ tablet Take 10 mEq by mouth.     • Mirabegron ER (MYRBETRIQ) 25 MG TABLET SR 24 HR Take 25 mg by mouth every day. 30 Tab 11   • lisinopril (PRINIVIL) 10 MG Tab TAKE 1 TABLET BY MOUTH EVERY  Tab 3   • atorvastatin (LIPITOR) 10 MG Tab TAKE 1 TABLET BY MOUTH EVERY  Tab 3   • triamterene/hctz (MAXZIDE-25/DYAZIDE) 37.5-25 MG Cap TAKE 1 CAPSULE BY MOUTH EVERY DAY 90 Cap 3   • aspirin (ASA) 81 MG Chew Tab chewable tablet CHEW ONE TABLET BY MOUTH DAILY  0   • latanoprost (XALATAN) 0.005 % Solution INSTILL 1 DROP INTO BOTH EYES EVERY EVENING AT BEDTIME  4   • acetaminophen (TYLENOL) 500 MG TABS Take 500 mg by mouth 3 times a day as needed. Indications: Pain     • HYDROcodone-acetaminophen (NORCO) 5-325 MG Tab per tablet hydrocodone 5 mg-acetaminophen 325 mg tablet   TAKE 1 TABLET BY MOUTH TWICE  A DAY AS NEEDED     • HYDROcodone-acetaminophen (NORCO) 7.5-325 MG per tablet TAKE 1 TABLET BY MOUTH THREE TIMES A DAY AS NEEDED FOR 30 DAYS SUPPLY. M47.8147     • influenza Vac High-Dose Quad (FLUZONE HIGH-DOSE QUADRIVALENT) 0.7 ML Suspension Prefilled Syringe injection Fluzone High-Dose Quad 2020-21 (PF) 240 mcg/0.7 mL IM syringe   PHARMACY ADMINISTERED     • polyethylene glycol-electrolytes (GAVILYTE-G) 236 GM Recon Soln GaviLyte-G 236 gram-22.74 gram-6.74 gram-5.86 gram oral solution   TAKE BY MOUTH AS DIRECTED. FOLLOW GI CONSULTANTS INSTRUCTION HANDOUT     • potassium chloride SA (K-DUR) 10 MEQ Tab CR potassium chloride ER 10 mEq tablet,extended release(part/cryst)     • Mirabegron ER (MYRBETRIQ) 50 MG TABLET SR 24 HR Myrbetriq 50 mg tablet,extended release   TAKE 1 TABLET BY MOUTH EVERY DAY     • pantoprazole (PROTONIX) 40 MG Tablet Delayed Response Take 40 mg by mouth.     • phenazopyridine (PYRIDIUM) 200 MG Tab Take 1 Tab by mouth 3 times a day as needed. (Patient not taking: Reported on 9/16/2020) 10 Tab 0   • ibuprofen (MOTRIN) 200 MG Tab Take 200 mg by mouth every 6 hours as needed.       No current facility-administered medications for this visit.       She  has a past medical history of Arthritis, ASTHMA, Body mass index 40.0-44.9, adult ([facility]) (4/27/2018), Chronic renal impairment, stage 3 (moderate) (5/13/2019), COPD (chronic obstructive pulmonary disease) with chronic bronchitis ([facility]) (4/20/2021), Dental disorder, Dysuria (10/18/2016), Fall, Generalized edema (4/27/2018), Glaucoma, Hay fever (4/19/2016), Heart burn, High cholesterol, UTI (urinary tract infection), Hyperglycemia (11/24/2015), Hypertension, Hypothyroidism, Indigestion, Ischemic bowel disease ([facility]) (9/9/2019), Migraines, Nausea (10/14/2019), Neuropathy ([facility]), Obesity (BMI 30-39.9) (7/20/2015), Pneumonia, Routine general medical examination at a health care facility (7/20/2015), Sleep apnea (12/2019), Snoring, Syncope (6/5/2020), Tobacco  "use (4/29/2019), URI (upper respiratory infection) (11/4/2015), and Urinary bladder disorder.    ROS    Patient denies significant change in strength, weight or appetite.  No significant lightheadedness or headaches.  No change in vision, hearing, or swallowing.  No new dyspnea, coughing, chest pain, or palpitations.  No indigestion, abdominal pain, or change in bowel habits.  No change in urinating.  No new ankle swelling.       Objective:     PE:  /66 (BP Location: Left arm, Patient Position: Sitting, BP Cuff Size: Adult)   Pulse 80   Temp 36.8 °C (98.2 °F) (Temporal)   Resp 18   Ht 1.575 m (5' 2\")   Wt 84.4 kg (186 lb)   SpO2 99%   BMI 34.02 kg/m²   Neck is supple without significant lymphadenopathy or masses.  Lungs are clear with normal breath sounds without wheezes or rales .  Cardiovascular: peripheral circulation is satisfactory, heart sounds are unchanged and unremarkable.  Abdomen is soft, without masses or tenderness, with normal bowel sounds.  Extremities are without significant edema, cyanosis or deformity.      Assessment and Plan:   The following treatment plan was discussed  1. Post-menopause  DS-BONE DENSITY STUDY (DEXA)   2. Chronic renal impairment, stage 3b      She was encouraged to remain well-hydrated.   3. Essential hypertension  Basic Metabolic Panel    No medication change.  Follow low-salt diet.   4. Hyperlipidemia, unspecified hyperlipidemia type  Lipid Profile    No medication change.  We reviewed appropriate diet.   5. Obesity (BMI 30-39.9)      She was encouraged in further weight loss through appropriate diet and exercise.   6. Nonrheumatic aortic valve stenosis      Follow-up with periodic echocardiograms.   7. Chronic midline low back pain without sciatica      Continue follow-up with pain management.  Pain significantly limits her ability to get around.  She is too weak to use a wheelchair or walker.  I think she is a candidate for motorized scooter.   8. Dementia " without behavioral disturbance, unspecified dementia type (HCC)      Daughter will report any significant exacerbation.   9. Tobacco use      She was strongly encouraged to stop use of tobacco.  She is not interested in quitting unfortunately.   10. 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)      She needs oxygen supplementation with any exertion.  I put in a request for supplemental oxygen at 2 L/min for the next 99 months to be used with any activity.   11.    Limited mobility   A motorized scooter would very significantly improve her mobility.  I have  placed a request for 1.  This will most likely be required for 99 months.      Followup: Return in about 3 months (around 7/20/2021), or welllness, for Long.

## 2021-04-26 ENCOUNTER — HOSPITAL ENCOUNTER (OUTPATIENT)
Dept: LAB | Facility: MEDICAL CENTER | Age: 81
End: 2021-04-26
Attending: INTERNAL MEDICINE
Payer: MEDICARE

## 2021-04-26 PROCEDURE — 85025 COMPLETE CBC W/AUTO DIFF WBC: CPT

## 2021-04-26 PROCEDURE — 80048 BASIC METABOLIC PNL TOTAL CA: CPT

## 2021-04-26 PROCEDURE — 36415 COLL VENOUS BLD VENIPUNCTURE: CPT

## 2021-04-27 LAB
ANION GAP SERPL CALC-SCNC: 10 MMOL/L (ref 7–16)
BASOPHILS # BLD AUTO: 0.6 % (ref 0–1.8)
BASOPHILS # BLD: 0.03 K/UL (ref 0–0.12)
BUN SERPL-MCNC: 28 MG/DL (ref 8–22)
CALCIUM SERPL-MCNC: 9.5 MG/DL (ref 8.5–10.5)
CHLORIDE SERPL-SCNC: 107 MMOL/L (ref 96–112)
CO2 SERPL-SCNC: 22 MMOL/L (ref 20–33)
CREAT SERPL-MCNC: 1.03 MG/DL (ref 0.5–1.4)
EOSINOPHIL # BLD AUTO: 0.14 K/UL (ref 0–0.51)
EOSINOPHIL NFR BLD: 2.7 % (ref 0–6.9)
ERYTHROCYTE [DISTWIDTH] IN BLOOD BY AUTOMATED COUNT: 45.5 FL (ref 35.9–50)
FASTING STATUS PATIENT QL REPORTED: NORMAL
GLUCOSE SERPL-MCNC: 82 MG/DL (ref 65–99)
HCT VFR BLD AUTO: 43.2 % (ref 37–47)
HGB BLD-MCNC: 14.4 G/DL (ref 12–16)
IMM GRANULOCYTES # BLD AUTO: 0.03 K/UL (ref 0–0.11)
IMM GRANULOCYTES NFR BLD AUTO: 0.6 % (ref 0–0.9)
LYMPHOCYTES # BLD AUTO: 1.12 K/UL (ref 1–4.8)
LYMPHOCYTES NFR BLD: 21.5 % (ref 22–41)
MCH RBC QN AUTO: 31.6 PG (ref 27–33)
MCHC RBC AUTO-ENTMCNC: 33.3 G/DL (ref 33.6–35)
MCV RBC AUTO: 94.7 FL (ref 81.4–97.8)
MONOCYTES # BLD AUTO: 0.64 K/UL (ref 0–0.85)
MONOCYTES NFR BLD AUTO: 12.3 % (ref 0–13.4)
NEUTROPHILS # BLD AUTO: 3.24 K/UL (ref 2–7.15)
NEUTROPHILS NFR BLD: 62.3 % (ref 44–72)
NRBC # BLD AUTO: 0 K/UL
NRBC BLD-RTO: 0 /100 WBC
PLATELET # BLD AUTO: 253 K/UL (ref 164–446)
PMV BLD AUTO: 9.7 FL (ref 9–12.9)
POTASSIUM SERPL-SCNC: 4.2 MMOL/L (ref 3.6–5.5)
RBC # BLD AUTO: 4.56 M/UL (ref 4.2–5.4)
SODIUM SERPL-SCNC: 139 MMOL/L (ref 135–145)
WBC # BLD AUTO: 5.2 K/UL (ref 4.8–10.8)

## 2021-04-28 ENCOUNTER — TELEMEDICINE (OUTPATIENT)
Dept: NEPHROLOGY | Facility: MEDICAL CENTER | Age: 81
End: 2021-04-28
Payer: MEDICARE

## 2021-04-28 VITALS — BODY MASS INDEX: 33.13 KG/M2 | HEIGHT: 62 IN | WEIGHT: 180 LBS

## 2021-04-28 DIAGNOSIS — N17.9 AKI (ACUTE KIDNEY INJURY) (HCC): ICD-10-CM

## 2021-04-28 DIAGNOSIS — I10 ESSENTIAL HYPERTENSION: ICD-10-CM

## 2021-04-28 DIAGNOSIS — N18.30 STAGE 3 CHRONIC KIDNEY DISEASE, UNSPECIFIED WHETHER STAGE 3A OR 3B CKD: ICD-10-CM

## 2021-04-28 PROCEDURE — 99214 OFFICE O/P EST MOD 30 MIN: CPT | Mod: 95,CR | Performed by: INTERNAL MEDICINE

## 2021-04-28 ASSESSMENT — ENCOUNTER SYMPTOMS
VOMITING: 0
CHILLS: 0
COUGH: 0
FEVER: 0
NAUSEA: 0
BACK PAIN: 1
DIARRHEA: 1
SHORTNESS OF BREATH: 0

## 2021-04-28 ASSESSMENT — FIBROSIS 4 INDEX: FIB4 SCORE: 1.71

## 2021-04-28 NOTE — PROGRESS NOTES
Telemedicine: Established Patient   This evaluation was conducted via Zoom using secure and encrypted videoconferencing technology. The patient was in a private location in the state of [place].    The patient's identity was confirmed and verbal consent was obtained for this virtual visit.    Subjective:   CC:   Chief Complaint   Patient presents with   • Hypertension   • Chronic Kidney Disease       Margaret Garcia is a 80 y.o. female presenting for evaluation and management of: Acute kidney injury, which was attributed to volume depletion and hemodynamic effect of NSAIDs  Kidney function has improved  Patient has no hematuria no dysuria  She continues to have occasional diarrhea  She has chronic back pain but no recent use of NSAIDs    Review of Systems   Constitutional: Negative for chills, fever and malaise/fatigue.   Respiratory: Negative for cough and shortness of breath.    Cardiovascular: Negative for chest pain and leg swelling.   Gastrointestinal: Positive for diarrhea. Negative for nausea and vomiting.   Genitourinary: Negative for dysuria, frequency and urgency.   Musculoskeletal: Positive for back pain.         Allergies   Allergen Reactions   • Food Hives and Nausea     oranges   • Neomycin-Polymyxin B Rash   • Pcn [Penicillins]    • Penicillin G Rash   • Sulfa Drugs        Current medicines (including changes today)  Current Outpatient Medications   Medication Sig Dispense Refill   • HYDROcodone-acetaminophen (NORCO) 5-325 MG Tab per tablet hydrocodone 5 mg-acetaminophen 325 mg tablet   TAKE 1 TABLET BY MOUTH TWICE A DAY AS NEEDED     • fenofibrate (TRIGLIDE) 160 MG tablet TAKE 1 TABLET BY MOUTH EVERY DAY 90 Tab 4   • promethazine (PHENERGAN) 25 MG Tab TAKE 1 TABLET BY MOUTH EVERY 6 HOURS AS NEEDED FOR NAUSEA/VOMITING 30 Tab 0   • levothyroxine (SYNTHROID) 137 MCG Tab TAKE 1 TABLET BY MOUTH EVERY MORNING ON A EMPTY STOMACH 90 Tab 3   • ondansetron (ZOFRAN ODT) 4 MG TABLET DISPERSIBLE Take 4 mg by  mouth as needed. Prn nausea and or vomiting     • pantoprazole (PROTONIX) 40 MG Tablet Delayed Response Take 40 mg by mouth.     • potassium chloride ER (KLOR-CON) 10 MEQ tablet Take 10 mEq by mouth.     • Mirabegron ER (MYRBETRIQ) 25 MG TABLET SR 24 HR Take 25 mg by mouth every day. 30 Tab 11   • lisinopril (PRINIVIL) 10 MG Tab TAKE 1 TABLET BY MOUTH EVERY  Tab 3   • atorvastatin (LIPITOR) 10 MG Tab TAKE 1 TABLET BY MOUTH EVERY  Tab 3   • triamterene/hctz (MAXZIDE-25/DYAZIDE) 37.5-25 MG Cap TAKE 1 CAPSULE BY MOUTH EVERY DAY 90 Cap 3   • aspirin (ASA) 81 MG Chew Tab chewable tablet CHEW ONE TABLET BY MOUTH DAILY  0   • latanoprost (XALATAN) 0.005 % Solution INSTILL 1 DROP INTO BOTH EYES EVERY EVENING AT BEDTIME  4   • acetaminophen (TYLENOL) 500 MG TABS Take 500 mg by mouth 3 times a day as needed. Indications: Pain     • HYDROcodone-acetaminophen (NORCO) 7.5-325 MG per tablet TAKE 1 TABLET BY MOUTH THREE TIMES A DAY AS NEEDED FOR 30 DAYS SUPPLY. M47.8147     • influenza Vac High-Dose Quad (FLUZONE HIGH-DOSE QUADRIVALENT) 0.7 ML Suspension Prefilled Syringe injection Fluzone High-Dose Quad 2020-21 (PF) 240 mcg/0.7 mL IM syringe   PHARMACY ADMINISTERED     • polyethylene glycol-electrolytes (GAVILYTE-G) 236 GM Recon Soln GaviLyte-G 236 gram-22.74 gram-6.74 gram-5.86 gram oral solution   TAKE BY MOUTH AS DIRECTED. FOLLOW GI CONSULTANTS INSTRUCTION HANDOUT     • potassium chloride SA (K-DUR) 10 MEQ Tab CR potassium chloride ER 10 mEq tablet,extended release(part/cryst)     • Mirabegron ER (MYRBETRIQ) 50 MG TABLET SR 24 HR Myrbetriq 50 mg tablet,extended release   TAKE 1 TABLET BY MOUTH EVERY DAY     • phenazopyridine (PYRIDIUM) 200 MG Tab Take 1 Tab by mouth 3 times a day as needed. (Patient not taking: Reported on 9/16/2020) 10 Tab 0   • ibuprofen (MOTRIN) 200 MG Tab Take 200 mg by mouth every 6 hours as needed.       No current facility-administered medications for this visit.       Patient Active  Problem List    Diagnosis Date Noted   • Dementia ([facility]) 05/29/2011     Priority: Low   • Hypothyroidism 05/29/2011     Priority: Low   • Peripheral neuropathy 05/29/2011     Priority: Low   • COPD (chronic obstructive pulmonary disease) with chronic bronchitis ([facility]) 04/20/2021   • Renal insufficiency syndrome 02/19/2021   • Urge incontinence of urine 12/14/2020   • Urinary tract infectious disease 12/14/2020   • Nonrheumatic aortic valve stenosis 09/16/2020   • Chronic midline low back pain without sciatica 09/02/2020   • Syncope 06/05/2020   • Nausea 10/14/2019   • Ischemic bowel disease ([facility]) 09/09/2019   • Other fatigue 05/13/2019   • Chronic renal impairment, stage 3 (moderate) 05/13/2019   • Tobacco use 04/29/2019   • Generalized edema 04/27/2018   • Morbid obesity ([facility]) 04/27/2018   • Flank pain 11/14/2017   • At moderate risk for fall 11/14/2017   • Need for varicella vaccine 10/18/2016   • Dysuria 10/18/2016   • Hay fever 04/19/2016   • RUQ pain 02/29/2016   • Flu vaccine need 11/24/2015   • Hyperlipidemia 11/24/2015   • Essential hypertension 11/24/2015   • Hyperglycemia 11/24/2015   • Routine general medical examination at a health care facility 07/20/2015   • Obesity (BMI 30-39.9) 07/20/2015   • Sleep apnea 07/20/2015       Family History   Problem Relation Age of Onset   • Stroke Mother    • Hyperlipidemia Mother    • Hypertension Mother    • Arthritis Mother    • Diabetes Father    • Lung Disease Father         pneumonia   • Arthritis Sister    • Rheumatologic Disease Sister    • Hypertension Sister    • Hyperlipidemia Sister    • Other Sister         Anusha/Fibermyalgia   • Hyperlipidemia Brother    • Hypertension Brother    • Arthritis Brother    • Lung Disease Maternal Grandmother         pneumonia   • Stroke Maternal Grandfather    • Cancer Maternal Grandfather         skin    • No Known Problems Paternal Grandmother    • No Known Problems Paternal Grandfather    • Hyperlipidemia Daughter    •  "Diabetes Daughter    • No Known Problems Son        She  has a past medical history of Arthritis, ASTHMA, Body mass index 40.0-44.9, adult ([facility]) (4/27/2018), Chronic renal impairment, stage 3 (moderate) (5/13/2019), COPD (chronic obstructive pulmonary disease) with chronic bronchitis ([facility]) (4/20/2021), Dental disorder, Dysuria (10/18/2016), Fall, Generalized edema (4/27/2018), Glaucoma, Hay fever (4/19/2016), Heart burn, High cholesterol, UTI (urinary tract infection), Hyperglycemia (11/24/2015), Hypertension, Hypothyroidism, Indigestion, Ischemic bowel disease ([facility]) (9/9/2019), Migraines, Nausea (10/14/2019), Neuropathy ([facility]), Obesity (BMI 30-39.9) (7/20/2015), Pneumonia, Routine general medical examination at a health care facility (7/20/2015), Sleep apnea (12/2019), Snoring, Syncope (6/5/2020), Tobacco use (4/29/2019), URI (upper respiratory infection) (11/4/2015), and Urinary bladder disorder.  She  has a past surgical history that includes gyn surgery; lumbar decompression (6/29/2009); hemorrhoidectomy; abdominal hysterectomy total; jodi by laparoscopy (9/2/2011); lumbar laminectomy diskectomy (6/29/2009); gastroscopy (12/13/2019); and colonoscopy (12/13/2019).       Objective:   Ht 1.575 m (5' 2\")   Wt 81.6 kg (180 lb)   BMI 32.92 kg/m²     Physical Exam  Vitals obtained by patient:    Physical Exam:  Constitutional: Alert, no distress, well-groomed.  Skin: No rashes in visible areas.  Eye: Round. Conjunctiva clear, lids normal. No icterus.   ENMT: Lips pink without lesions, good dentition, moist mucous membranes. Phonation normal.  Neck: No masses, no thyromegaly. Moves freely without pain.  CV: Pulse as reported by patient  Respiratory: Unlabored respiratory effort, no cough or audible wheeze  Psych: Alert and oriented x3, normal affect and mood.   Past Medical History:   Diagnosis Date   • Arthritis     knees, and hips-osteo   • ASTHMA    • Body mass index 40.0-44.9, adult ([facility]) 4/27/2018   • Chronic " renal impairment, stage 3 (moderate) 5/13/2019   • COPD (chronic obstructive pulmonary disease) with chronic bronchitis ([facility]) 4/20/2021   • Dental disorder     upper   • Dysuria 10/18/2016   • Fall    • Generalized edema 4/27/2018   • Glaucoma     bilateral   • Hay fever 4/19/2016   • Heart burn    • High cholesterol    • Hx: UTI (urinary tract infection)    • Hyperglycemia 11/24/2015   • Hypertension    • Hypothyroidism    • Indigestion    • Ischemic bowel disease (HCC) 9/9/2019   • Migraines     pt has similar symptoms with migraines not for a long time though   • Nausea 10/14/2019   • Neuropathy ([facility])    • Obesity (BMI 30-39.9) 7/20/2015   • Pneumonia    • Routine general medical examination at a health care facility 7/20/2015    7/20/15   • Sleep apnea 12/2019    Had sleep study, didn't tolerate cpap   • Snoring    • Syncope 6/5/2020   • Tobacco use 4/29/2019   • URI (upper respiratory infection) 11/4/2015   • Urinary bladder disorder     hx of uti's     Social History     Socioeconomic History   • Marital status: Single     Spouse name: Not on file   • Number of children: Not on file   • Years of education: Not on file   • Highest education level: Not on file   Occupational History   • Not on file   Tobacco Use   • Smoking status: Current Every Day Smoker     Packs/day: 1.00     Years: 56.00     Pack years: 56.00     Types: Cigarettes   • Smokeless tobacco: Never Used   • Tobacco comment: started smoking at age 12, little mor than half a pack   Substance and Sexual Activity   • Alcohol use: No   • Drug use: No   • Sexual activity: Never     Partners: Male     Birth control/protection: Post-Menopausal   Other Topics Concern   • Not on file   Social History Narrative   • Not on file     Social Determinants of Health     Financial Resource Strain:    • Difficulty of Paying Living Expenses:    Food Insecurity:    • Worried About Running Out of Food in the Last Year:    • Ran Out of Food in the Last Year:     Transportation Needs:    • Lack of Transportation (Medical):    • Lack of Transportation (Non-Medical):    Physical Activity:    • Days of Exercise per Week:    • Minutes of Exercise per Session:    Stress:    • Feeling of Stress :    Social Connections:    • Frequency of Communication with Friends and Family:    • Frequency of Social Gatherings with Friends and Family:    • Attends Zoroastrianism Services:    • Active Member of Clubs or Organizations:    • Attends Club or Organization Meetings:    • Marital Status:    Intimate Partner Violence:    • Fear of Current or Ex-Partner:    • Emotionally Abused:    • Physically Abused:    • Sexually Abused:      Family History   Problem Relation Age of Onset   • Stroke Mother    • Hyperlipidemia Mother    • Hypertension Mother    • Arthritis Mother    • Diabetes Father    • Lung Disease Father         pneumonia   • Arthritis Sister    • Rheumatologic Disease Sister    • Hypertension Sister    • Hyperlipidemia Sister    • Other Sister         Anusha/Fibermyalgia   • Hyperlipidemia Brother    • Hypertension Brother    • Arthritis Brother    • Lung Disease Maternal Grandmother         pneumonia   • Stroke Maternal Grandfather    • Cancer Maternal Grandfather         skin    • No Known Problems Paternal Grandmother    • No Known Problems Paternal Grandfather    • Hyperlipidemia Daughter    • Diabetes Daughter    • No Known Problems Son      Recent Labs     07/23/20  0843 07/23/20  0843 12/18/20  1236 03/09/21  1248 04/26/21  1114   ALBUMIN  --   --  3.9 4.0  --    HDL 32*  --   --   --   --    TRIGLYCERIDE 176*  --   --   --   --    SODIUM 132*   < > 135 133* 139   POTASSIUM 3.9   < > 4.5 4.3 4.2   CHLORIDE 99   < > 106 101 107   CO2 19*   < > 19* 20 22   BUN 22   < > 27* 26* 28*   CREATININE 1.34   < > 1.57* 1.13 1.03    < > = values in this interval not displayed.       Assessment and Plan:   The following treatment plan was discussed:     1. SHARIFA (acute kidney injury)  (HCC)  Secondary to prerenal component  Improved    2. Stage 3 chronic kidney disease, unspecified whether stage 3a or 3b CKD  Secondary to hypertensive nephrosclerosis  No uremic symptoms  Renal dose of medication  Avoid nephrotoxins  Continue same medication regimen  Check labs annually    3. Essential hypertension  Controlled as per patient and family  Continue same medication regimen  Continue low-sodium diet      Follow-up: Return in about 1 year (around 4/28/2022).

## 2021-05-06 ENCOUNTER — HOSPITAL ENCOUNTER (OUTPATIENT)
Dept: RADIOLOGY | Facility: MEDICAL CENTER | Age: 81
End: 2021-05-06
Attending: INTERNAL MEDICINE
Payer: MEDICARE

## 2021-05-06 DIAGNOSIS — Z78.0 POST-MENOPAUSE: ICD-10-CM

## 2021-05-06 PROCEDURE — 77080 DXA BONE DENSITY AXIAL: CPT

## 2021-05-08 ENCOUNTER — OFFICE VISIT (OUTPATIENT)
Dept: URGENT CARE | Facility: PHYSICIAN GROUP | Age: 81
End: 2021-05-08
Payer: MEDICARE

## 2021-05-08 VITALS
HEART RATE: 93 BPM | TEMPERATURE: 97.7 F | HEIGHT: 62 IN | DIASTOLIC BLOOD PRESSURE: 62 MMHG | BODY MASS INDEX: 34.19 KG/M2 | OXYGEN SATURATION: 98 % | WEIGHT: 185.8 LBS | SYSTOLIC BLOOD PRESSURE: 134 MMHG | RESPIRATION RATE: 18 BRPM
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
## 2021-05-08 PROCEDURE — 12001 RPR S/N/AX/GEN/TRNK 2.5CM/<: CPT | Performed by: NURSE PRACTITIONER

## 2021-05-08 RX ORDER — DOXYCYCLINE HYCLATE 100 MG
100 TABLET ORAL 2 TIMES DAILY
Qty: 10 TABLET | Refills: 0 | Status: SHIPPED | OUTPATIENT
Start: 2021-05-08 | End: 2021-05-13

## 2021-05-08 RX ORDER — METRONIDAZOLE 500 MG/1
500 TABLET ORAL 2 TIMES DAILY
Qty: 10 TABLET | Refills: 0 | Status: SHIPPED | OUTPATIENT
Start: 2021-05-08 | End: 2021-05-13

## 2021-05-08 ASSESSMENT — FIBROSIS 4 INDEX: FIB4 SCORE: 1.71

## 2021-05-08 ASSESSMENT — ENCOUNTER SYMPTOMS
FEVER: 0
CONSTITUTIONAL NEGATIVE: 1
NEUROLOGICAL NEGATIVE: 1
SENSORY CHANGE: 0
CHILLS: 0
WEAKNESS: 0

## 2021-05-08 ASSESSMENT — VISUAL ACUITY: OU: 1

## 2021-05-08 NOTE — PATIENT INSTRUCTIONS
Animal Bite, Adult  Animal bites range from mild to serious. An animal bite can result in any of these injuries:  · A scratch.  · A deep, open cut.  · A puncture of the skin.  · A crush injury.  · Tearing away of the skin or a body part.  · A bone injury.  A small bite from a house pet is usually less serious than a bite from a stray or wild animal, such as a raccoon, dotson, skunk, or bat. That is because stray and wild animals have a higher risk of carrying a serious infection called rabies, which can be passed to humans through a bite.  What increases the risk?  You are more likely to be bitten by an animal if:  · You are around unfamiliar pets.  · You disturb an animal when it is eating, sleeping, or caring for its babies.  · You are outdoors in a place where small, wild animals roam freely.  What are the signs or symptoms?  Common symptoms of an animal bite include:  · Pain.  · Bleeding.  · Swelling.  · Bruising.  How is this diagnosed?  This condition may be diagnosed based on a physical exam and medical history. Your health care provider will examine your wound and ask for details about the animal and how the bite happened. You may also have tests, such as:  · Blood tests to check for infection.  · X-rays to check for damage to bones or joints.  · Taking a fluid sample from your wound and checking it for infection (culture test).  How is this treated?  Treatment varies depending on the type of animal, where the bite is on your body, and your medical history. Treatment may include:  · Caring for the wound. This often includes cleaning the wound, rinsing out (flushing) the wound with saline solution, and applying a bandage (dressing). In some cases, the wound may be closed with stitches (sutures), staples, skin glue, or adhesive strips.  · Antibiotic medicine to prevent or treat infection. This medicine may be prescribed in pill or ointment form. If the bite area becomes infected, the medicine may be given through  an IV.  · A tetanus shot to prevent tetanus infection.  · Rabies treatment to prevent rabies infection. This will be done if the animal could have rabies.  · Surgery. This may be done if a bite gets infected or if there is damage that needs to be repaired.  Follow these instructions at home:  Wound care    · Follow instructions from your health care provider about how to take care of your wound. Make sure you:  ? Wash your hands with soap and water before you change your dressing. If soap and water are not available, use hand .  ? Change your dressing as told by your health care provider.  ? Leave sutures, skin glue, or adhesive strips in place. These skin closures may need to stay in place for 2 weeks or longer. If adhesive strip edges start to loosen and curl up, you may trim the loose edges. Do not remove adhesive strips completely unless your health care provider tells you to do that.  · Check your wound every day for signs of infection. Check for:  ? More redness, swelling, or pain.  ? More fluid or blood.  ? Warmth.  ? Pus or a bad smell.  Medicines  · Take or apply over-the-counter and prescription medicines only as told by your health care provider.  · If you were prescribed an antibiotic, take or apply it as told by your health care provider. Do not stop using the antibiotic even if your condition improves.  General instructions    · Keep the injured area raised (elevated) above the level of your heart while you are sitting or lying down, if this is possible.  · If directed, put ice on the injured area.  ? Put ice in a plastic bag.  ? Place a towel between your skin and the bag.  ? Leave the ice on for 20 minutes, 2-3 times per day.  · Keep all follow-up visits as told by your health care provider. This is important.  Contact a health care provider if:  · You have more redness, swelling, or pain around your wound.  · Your wound feels warm to the touch.  · You have a fever or chills.  · You have a  general feeling of sickness (malaise).  · You feel nauseous or you vomit.  · You have pain that does not get better.  Get help right away if:  · You have a red streak that leads away from your wound.  · You have non-clear fluid or more blood coming from your wound.  · There is pus or a bad smell coming from your wound.  · You have trouble moving your injured area.  · You have numbness or tingling that extends beyond the wound.  Summary  · Animal bites can range from mild to serious. An animal bite can cause a scratch on the skin, a deep open cut, a puncture of the skin, a crush injury, tearing away of the skin or a body part, or a bone injury.  · Your health care provider will examine your wound and ask for details about the animal and how the bite happened.  · You may also have tests such as a blood test, X-ray, or testing of a fluid sample from your wound (culture test).  · Treatment may include wound care, antibiotic medicine, a tetanus shot, and rabies treatment if the animal could have rabies.  This information is not intended to replace advice given to you by your health care provider. Make sure you discuss any questions you have with your health care provider.  Document Released: 09/04/2012 Document Revised: 12/13/2018 Document Reviewed: 06/28/2018  Elsevier Patient Education © 2020 [employer]

## 2021-05-08 NOTE — PROGRESS NOTES
Subjective:     Margaret Garcia is a 80 y.o. female who presents for Cat Bite (right forearm, hurt, swelling, x3-4 hours. )       Animal Bite  This is a new problem. Associated symptoms include a rash. Pertinent negatives include no chills, fever or weakness.      Granddaughter present.    Patient reports that earlier today about 4 hours ago she was bitten by her cat that she adopted from the [facility].  Reports that the cat is up-to-date on its immunizations.    Tdap received 5/27/2020 per chart review.    [facility]  Has a few skin tears and puncture wounds at the right forearm.  Has some tenderness.  Mild bleeding.  No decreased range of motion, sensory changes, or weakness.    Patient was screened prior to rooming and denied COVID-19 diagnosis or contact with a person who has been diagnosed or is suspected to have COVID-19. During this visit, appropriate PPE was worn, hand hygiene was performed, and the patient and any visitors were masked.     PMH:  has a past medical history of Arthritis, ASTHMA, Body mass index 40.0-44.9, adult ([facility]) (4/27/2018), Chronic renal impairment, stage 3 (moderate) (5/13/2019), COPD (chronic obstructive pulmonary disease) with chronic bronchitis ([facility]) (4/20/2021), Dental disorder, Dysuria (10/18/2016), Fall, Generalized edema (4/27/2018), Glaucoma, Hay fever (4/19/2016), Heart burn, High cholesterol, UTI (urinary tract infection), Hyperglycemia (11/24/2015), Hypertension, Hypothyroidism, Indigestion, Ischemic bowel disease (HCC) (9/9/2019), Migraines, Nausea (10/14/2019), Neuropathy ([facility]), Obesity (BMI 30-39.9) (7/20/2015), Pneumonia, Routine general medical examination at a health care facility (7/20/2015), Sleep apnea (12/2019), Snoring, Syncope (6/5/2020), Tobacco use (4/29/2019), URI (upper respiratory infection) (11/4/2015), and Urinary bladder disorder.    MEDS:   Current Outpatient Medications:   •  doxycycline (VIBRAMYCIN) 100 MG Tab, Take 1 tablet by  mouth 2 times a day for 5 days., Disp: 10 tablet, Rfl: 0  •  metroNIDAZOLE (FLAGYL) 500 MG Tab, Take 1 tablet by mouth 2 times a day for 5 days., Disp: 10 tablet, Rfl: 0  •  HYDROcodone-acetaminophen (NORCO) 7.5-325 MG per tablet, TAKE 1 TABLET BY MOUTH THREE TIMES A DAY AS NEEDED FOR 30 DAYS SUPPLY. M47.8147, Disp: , Rfl:   •  influenza Vac High-Dose Quad (FLUZONE HIGH-DOSE QUADRIVALENT) 0.7 ML Suspension Prefilled Syringe injection, Fluzone High-Dose Quad 2020-21 (PF) 240 mcg/0.7 mL IM syringe  PHARMACY ADMINISTERED, Disp: , Rfl:   •  potassium chloride SA (K-DUR) 10 MEQ Tab CR, potassium chloride ER 10 mEq tablet,extended release(part/cryst), Disp: , Rfl:   •  Mirabegron ER (MYRBETRIQ) 50 MG TABLET SR 24 HR, Myrbetriq 50 mg tablet,extended release  TAKE 1 TABLET BY MOUTH EVERY DAY, Disp: , Rfl:   •  fenofibrate (TRIGLIDE) 160 MG tablet, TAKE 1 TABLET BY MOUTH EVERY DAY, Disp: 90 Tab, Rfl: 4  •  promethazine (PHENERGAN) 25 MG Tab, TAKE 1 TABLET BY MOUTH EVERY 6 HOURS AS NEEDED FOR NAUSEA/VOMITING, Disp: 30 Tab, Rfl: 0  •  levothyroxine (SYNTHROID) 137 MCG Tab, TAKE 1 TABLET BY MOUTH EVERY MORNING ON A EMPTY STOMACH, Disp: 90 Tab, Rfl: 3  •  ondansetron (ZOFRAN ODT) 4 MG TABLET DISPERSIBLE, Take 4 mg by mouth as needed. Prn nausea and or vomiting, Disp: , Rfl:   •  pantoprazole (PROTONIX) 40 MG Tablet Delayed Response, Take 40 mg by mouth., Disp: , Rfl:   •  potassium chloride ER (KLOR-CON) 10 MEQ tablet, Take 10 mEq by mouth., Disp: , Rfl:   •  Mirabegron ER (MYRBETRIQ) 25 MG TABLET SR 24 HR, Take 25 mg by mouth every day., Disp: 30 Tab, Rfl: 11  •  lisinopril (PRINIVIL) 10 MG Tab, TAKE 1 TABLET BY MOUTH EVERY DAY, Disp: 100 Tab, Rfl: 3  •  atorvastatin (LIPITOR) 10 MG Tab, TAKE 1 TABLET BY MOUTH EVERY DAY, Disp: 100 Tab, Rfl: 3  •  triamterene/hctz (MAXZIDE-25/DYAZIDE) 37.5-25 MG Cap, TAKE 1 CAPSULE BY MOUTH EVERY DAY, Disp: 90 Cap, Rfl: 3  •  ibuprofen (MOTRIN) 200 MG Tab, Take 200 mg by mouth every 6 hours as  needed., Disp: , Rfl:   •  aspirin (ASA) 81 MG Chew Tab chewable tablet, CHEW ONE TABLET BY MOUTH DAILY, Disp: , Rfl: 0  •  latanoprost (XALATAN) 0.005 % Solution, INSTILL 1 DROP INTO BOTH EYES EVERY EVENING AT BEDTIME, Disp: , Rfl: 4  •  acetaminophen (TYLENOL) 500 MG TABS, Take 500 mg by mouth 3 times a day as needed. Indications: Pain, Disp: , Rfl:   •  HYDROcodone-acetaminophen (NORCO) 5-325 MG Tab per tablet, hydrocodone 5 mg-acetaminophen 325 mg tablet  TAKE 1 TABLET BY MOUTH TWICE A DAY AS NEEDED, Disp: , Rfl:   •  polyethylene glycol-electrolytes (GAVILYTE-G) 236 GM Recon Soln, GaviLyte-G 236 gram-22.74 gram-6.74 gram-5.86 gram oral solution  TAKE BY MOUTH AS DIRECTED. FOLLOW GI CONSULTANTS INSTRUCTION HANDOUT, Disp: , Rfl:   •  phenazopyridine (PYRIDIUM) 200 MG Tab, Take 1 Tab by mouth 3 times a day as needed. (Patient not taking: Reported on 9/16/2020), Disp: 10 Tab, Rfl: 0    ALLERGIES:   Allergies   Allergen Reactions   • Food Hives and Nausea     oranges   • Neomycin-Polymyxin B Rash   • Pcn [Penicillins]    • Penicillin G Rash   • Sulfa Drugs      SURGHX:   Past Surgical History:   Procedure Laterality Date   • GASTROSCOPY  12/13/2019    Procedure: GASTROSCOPY;  Surgeon: Ang Angel M.D.;  Location: [facility];  Service: Gastroenterology   • COLONOSCOPY  12/13/2019    Procedure: COLONOSCOPY;  Surgeon: Ang Angel M.D.;  Location: [facility];  Service: Gastroenterology   • NIRU BY LAPAROSCOPY  9/2/2011    Performed by KAYLEIGH PORRAS at SURGERY SAME DAY [facility] ORS   • LUMBAR DECOMPRESSION  6/29/2009    Performed by ANG YAN at SURGERY [facility] ORS   • LUMBAR LAMINECTOMY DISKECTOMY  6/29/2009    Performed by ANG YAN at SURGERY [facility] ORS   • ABDOMINAL HYSTERECTOMY TOTAL     • GYN SURGERY      hysterectomy   • HEMORRHOIDECTOMY       SOCHX:  reports that she has been smoking cigarettes. She has a 56.00 pack-year smoking history. She has never used  "smokeless tobacco. She reports that she does not drink alcohol and does not use drugs.     FH: Reviewed with patient, not pertinent to this visit.    Review of Systems   Constitutional: Negative.  Negative for chills, fever and malaise/fatigue.   Musculoskeletal:        Right forearm cat bite   Skin: Positive for rash.   Neurological: Negative.  Negative for sensory change and weakness.   All other systems reviewed and are negative.    Additional details per HPI.      Objective:     /62 (BP Location: Left arm, Patient Position: Sitting, BP Cuff Size: Adult)   Pulse 93   Temp 36.5 °C (97.7 °F) (Temporal)   Resp 18   Ht 1.575 m (5' 2\")   Wt 84.3 kg (185 lb 12.8 oz)   SpO2 98%   BMI 33.98 kg/m²     Physical Exam  Vitals reviewed.   Constitutional:       General: She is not in acute distress.     Appearance: She is well-developed. She is not ill-appearing or toxic-appearing.   HENT:      Head: Normocephalic.      Right Ear: External ear normal.      Left Ear: External ear normal.   Eyes:      General: Vision grossly intact.      Extraocular Movements: Extraocular movements intact.      Conjunctiva/sclera: Conjunctivae normal.   Cardiovascular:      Rate and Rhythm: Normal rate.      Pulses: Normal pulses.   Pulmonary:      Effort: Pulmonary effort is normal. No respiratory distress.   Musculoskeletal:         General: No deformity. Normal range of motion.      Right elbow: No swelling, deformity or lacerations. Normal range of motion. No tenderness.      Right forearm: Laceration (Two small skin tears with one 1 cm tear from cat bite proximally, mild bleeding) present. No swelling, deformity or bony tenderness.      Cervical back: Normal range of motion.   Skin:     General: Skin is warm and dry.      Capillary Refill: Capillary refill takes less than 2 seconds.      Coloration: Skin is not pale.   Neurological:      Mental Status: She is alert and oriented to person, place, and time.      Sensory: No " sensory deficit.      Motor: No weakness.      Coordination: Coordination normal.   Psychiatric:         Behavior: Behavior normal. Behavior is cooperative.       Assessment/Plan:     1. Cat bite, initial encounter  - doxycycline (VIBRAMYCIN) 100 MG Tab; Take 1 tablet by mouth 2 times a day for 5 days.  Dispense: 10 tablet; Refill: 0  - metroNIDAZOLE (FLAGYL) 500 MG Tab; Take 1 tablet by mouth 2 times a day for 5 days.  Dispense: 10 tablet; Refill: 0    Wound care performed.  Irrigated copiously with NS.  Skin flaps loosely approximated with Steri-Strips.    Prophylactic antibiotics electronically to pharmacy.  Allergy to penicillins and sulfa drugs noted.  Rx sent for doxycycline in conjunction with Flagyl per Up-to-Date recommendations. (source: https://www.[employer]/contents/animal-bites-dogs-cats-and-other-animals-evaluation-and-management)    Wound care as discussed.  Advised to monitor for signs and symptoms of infection.    Tetanus up-to-date.    May take OTC ibuprofen/acetaminophen as needed for pain.  May rest, ice, and elevate as needed.    Differential diagnosis, natural history, supportive care, over-the-counter symptom management per 's instructions, close monitoring, and indications for immediate follow-up discussed.     Patient advised to: Return for 1) Symptoms that worsen/don't improve, or go to ER, 2) Follow up with primary care in 7-10 days.    All questions answered. Patient agrees with the plan of care.    Discharge summary provided.     Billing note: 30 minutes was allotted and spent for patient care and coordination of care (not reported separately) including preparing for the visit, obtaining/reviewing history, checking vaccination records, performing an exam/evaluation, performing wound care (see above) developing a plan of care, reviewing the literature for best practices (abx prophylaxis, see above), counseling/educating the patient and family, developing/printing/going over  the discharge summary with the patient and family, updating the medical record, reconciling outside information, and documentation.

## 2021-06-17 ENCOUNTER — PHYSICAL THERAPY (OUTPATIENT)
Dept: PHYSICAL THERAPY | Facility: REHABILITATION | Age: 81
End: 2021-06-17
Attending: INTERNAL MEDICINE
Payer: MEDICARE

## 2021-06-17 DIAGNOSIS — E66.9 OBESITY (BMI 30-39.9): ICD-10-CM

## 2021-06-17 DIAGNOSIS — R53.1 WEAK: ICD-10-CM

## 2021-06-17 DIAGNOSIS — J44.89 COPD (CHRONIC OBSTRUCTIVE PULMONARY DISEASE) WITH CHRONIC BRONCHITIS (HCC): ICD-10-CM

## 2021-06-17 DIAGNOSIS — R60.9 EDEMA, UNSPECIFIED TYPE: ICD-10-CM

## 2021-06-17 PROCEDURE — 97542 WHEELCHAIR MNGMENT TRAINING: CPT

## 2021-06-17 ASSESSMENT — ENCOUNTER SYMPTOMS
PAIN SCALE AT LOWEST: 3
PAIN SCALE: 7
PAIN SCALE AT HIGHEST: 9
LOWER EXTREMITY EDEMA: 1

## 2021-06-17 NOTE — OP THERAPY EVALUATION
"  Outpatient Physical Therapy  WHEELCHAIR EVALUATION    [facility] [address]  [address]  [place] [address]  Phone:  [phone]  Fax:  [phone]    Date of Evaluation: 06/17/2021    Patient Name: Margaret Garcia  YOB: 1940  MRN: [id number]   Height: 1.575 m (5' 2\")   Weight: 84.3 kg (185 lb 12.8 oz)       Referring Provider: Rodrigo Ozuna M.D.  [address] [facility] [address]  [place] [address]   Referring Diagnosis Obesity (BMI 30-39.9) [E66.9];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) [J44.9];Weak [R53.1]     Time Calculation    Start time: 1315  Stop time: 1410 Time Calculation (min): 55 minutes           Pertinent medical history and general limitations:   Ms. Garcia presents to PT with her granddaughter, Janis, for mobility evaluation.  PMH notable for COPD secondary to tobacco use, chronic LBP, peripheral neuropathy, incontinence and mild dementia. She and her family have been noting decreased functional mobility over the last year.  Last MD note, reports desaturations with exertion and has recommended O2 supplementation. Is using 2L O2 at night, but still awaiting the portable tank.     Current level of functional mobility / ADLs:   Janis reports noticing the biggest decline in function when Ms. Garcia stopped driving about 1.5 yrs ago due to resultant decrease in her overall activity level.  Ms. Garcia lives alone in a mobile home with a ramp to enter.  She reports a relatively sedentary lifestyle, but independent with ADLs of grooming, dressing, cooking and light housework.  She gets help for cleaning her home every 2 weeks. LBP in standing limits her endurance for these tasks, so she completes them with sitting breaks. Otherwise she spends her day on the computer and playing games.  Her granddaughter takes her to the grocery store, where she ambulates by leaning on the cart to reduce LBP. Admits to 2 GLFs in the 6 " "months, without injury- denies tripping or dizziness. \"I just lost track of my feet.\" She does have 1-2 incontinence episodes a day, which is managed by medication and not related to functional inability to get to her bathroom.  She has multiple family members all located on the same property and she'd like to be better able to get to and from their houses.     Lower extremity edema and pressure ulcers     Positive for lower extremity edema (manages this with medication)    Pain     Current pain ratin    At best pain rating: 3    At worst pain ratin    Location: central lower back R>L    Progression: worsening    Pain comments:  Most triggered by vacuuming, mopping, prolonged walking.     Assistive device history:    Current assistive device(s) used: four-wheeled walker and single point cane (uses this for the community)    Active Range of Motion:   Active range of motion comments: UE AROM WFL bilat: elevation= 130, HBH= C7, HBB= L1    LE AROM WFL except lacks 10 deg hip extension bilat.  Resultant fwd trunk position in standing    Strength:   /Prehension (left):      strength: Within functional limits  /Prehension (right):      strength: Within functional limits  Strength comments: UE strength: grossly 4/5 throughout. LE strength 4/5 excep R hip abd= 3-/5, R hip ext= 3+/5    Vitals at rest: O2= 96%, HR= 82 bpm,     STS= 17 sec, O2= 95% and HR 92 bpm to follow    6 min walk (performed without AD): pt opted to stop @ 4:38 due to LBP and SOB, Vitals @ 3 min: O2= 96%, DB=600 bpm. @ completion O2= 93%, HR= 123 bpm. Total distance= 588 feet    Gait quality: R uncompensated trendelenburg. Resultant decreased R stance time and L foot clearance. Fwd trunk lean.    Tone:     Left upper extremity muscle tone: Normal    Right upper extremity muscle tone: Normal    Sensation   Upper extremity (left):     Light touch: Intact  Upper extremity (right):     Light touch: Intact  Lower extremity (left):     " Light touch: Intact  Lower extremity (right):     Light touch: Intact    Coordination   Upper extremity (left):     Fine motor: Within functional limits    Gross motor: Within functional limits  Upper extremity (right):     Fine motor: Within functional limits    Gross motor: Within functional limits  Lower extremity (left):     Rapid alternating movements: Within functional limits  Lower extremity (right):     Rapid alternating movements: Within functional limits    Equipment recommendations   Type/Model     Justification:   Ms. Garcia is an 80 y.o female who presents to PT with complaint of decreased mobility related to chronic LBP and COPD.  PT evaluation reveals decreased lumbopelvic control, decreased R hip strength with resultant deviations in gait pattern. 5x STS is slightly below norms at 17 seconds and 6 min walk notably below norms at 580 feet.  Standing and walking endurance is limited by inefficient trunk posture, poor posterior chain strength and shortness of breath.  A 4WW would be most appropriate for use in her home to maximize trunk control and allow hands free carrying of her portable O2.  She does already own a 4WW, which she is encouraged to increase use of. She would benefit from use of a power scooter, but for use in the community to improve access to her family support and independence with grocery shopping.     The patient has been determined to be independent and safe with the above equipment. It is my recommendation that they receive a power wheelchair with the above specifications for use in their home.  The use of this equipment will improve their independence and safety for mobility and ADLs in the home. The use of this equipment is considered a required lifetime medical need and will contribute to cost containment in the future.      The therapist performing the evaluation has no financial relationship with the vendor involved in the evaluation. Thank you in advance for the consideration  for the medical needs of this patient. If you have and questions regarding this equipment please do not hesitate to call me at [phone].            Functional Assessment Used    LEFS= 23/80    Electronically signed by Mitra Banerjee, PT, DPT on 6/17/2021    Referring provider co-signature:  I have reviewed this evaluation and recommendation(s) and my co-signature certifies my agreement with the contents.      Physician Signature: ________________________________ Date: ______________

## 2021-06-18 RX ORDER — TRIAMTERENE AND HYDROCHLOROTHIAZIDE 37.5; 25 MG/1; MG/1
CAPSULE ORAL
Qty: 90 CAPSULE | Refills: 0 | Status: SHIPPED | OUTPATIENT
Start: 2021-06-18 | End: 2021-09-10

## 2021-07-19 ENCOUNTER — OFFICE VISIT (OUTPATIENT)
Dept: URGENT CARE | Facility: PHYSICIAN GROUP | Age: 81
End: 2021-07-19
Payer: MEDICARE

## 2021-07-19 VITALS
BODY MASS INDEX: 34.36 KG/M2 | OXYGEN SATURATION: 99 % | RESPIRATION RATE: 12 BRPM | DIASTOLIC BLOOD PRESSURE: 62 MMHG | TEMPERATURE: 97.3 F | HEART RATE: 104 BPM | HEIGHT: 61 IN | WEIGHT: 182 LBS | SYSTOLIC BLOOD PRESSURE: 106 MMHG

## 2021-07-19 DIAGNOSIS — R53.1 WEAKNESS: ICD-10-CM

## 2021-07-19 DIAGNOSIS — I48.92 ATRIAL FLUTTER, UNSPECIFIED TYPE (HCC): ICD-10-CM

## 2021-07-19 DIAGNOSIS — R41.82 ALTERED MENTAL STATUS, UNSPECIFIED ALTERED MENTAL STATUS TYPE: ICD-10-CM

## 2021-07-19 DIAGNOSIS — R30.0 DYSURIA: ICD-10-CM

## 2021-07-19 PROCEDURE — 99215 OFFICE O/P EST HI 40 MIN: CPT | Performed by: PHYSICIAN ASSISTANT

## 2021-07-19 ASSESSMENT — FIBROSIS 4 INDEX: FIB4 SCORE: 1.71

## 2021-07-19 NOTE — PROGRESS NOTES
Subjective:   Margaret Garcia is a 80 y.o. female who presents for Dysuria (urinary [id number], altered mental state, difficulty walking )        HPI   The patient is brought into urgent care today by her granddaughter.  She has noticed altered mental status and weakness x1 day.  The patient has been complaining of urinary frequency, dysuria x1 day.  She has also noticed slurred speech.  She does note baseline dementia, described as mild.  She denies shortness of breath or chest pain.  No previous history of atrial fibrillation, arrhythmia.      PMH:  has a past medical history of Arthritis, ASTHMA, Body mass index 40.0-44.9, adult ([facility]) (4/27/2018), Chronic renal impairment, stage 3 (moderate) ([facility]) (5/13/2019), COPD (chronic obstructive pulmonary disease) with chronic bronchitis ([facility]) (4/20/2021), Dental disorder, Dysuria (10/18/2016), Fall, Generalized edema (4/27/2018), Glaucoma, Hay fever (4/19/2016), Heart burn, High cholesterol, UTI (urinary tract infection), Hyperglycemia (11/24/2015), Hypertension, Hypothyroidism, Indigestion, Ischemic bowel disease ([facility]) (9/9/2019), Migraines, Nausea (10/14/2019), Neuropathy ([facility]), Obesity (BMI 30-39.9) (7/20/2015), Pneumonia, Routine general medical examination at a health care facility (7/20/2015), Sleep apnea (12/2019), Snoring, Syncope (6/5/2020), Tobacco use (4/29/2019), URI (upper respiratory infection) (11/4/2015), and Urinary bladder disorder.  MEDS:   Current Outpatient Medications:   •  triamterene/hctz (MAXZIDE-25/DYAZIDE) 37.5-25 MG Cap, TAKE 1 CAPSULE BY MOUTH EVERY DAY, Disp: 90 capsule, Rfl: 0  •  HYDROcodone-acetaminophen (NORCO) 7.5-325 MG per tablet, TAKE 1 TABLET BY MOUTH THREE TIMES A DAY AS NEEDED FOR 30 DAYS SUPPLY. M47.8147, Disp: , Rfl:   •  fenofibrate (TRIGLIDE) 160 MG tablet, TAKE 1 TABLET BY MOUTH EVERY DAY, Disp: 90 Tab, Rfl: 4  •  promethazine (PHENERGAN) 25 MG Tab, TAKE 1 TABLET BY MOUTH EVERY 6 HOURS AS NEEDED FOR NAUSEA/VOMITING, Disp: 30  Tab, Rfl: 0  •  levothyroxine (SYNTHROID) 137 MCG Tab, TAKE 1 TABLET BY MOUTH EVERY MORNING ON A EMPTY STOMACH, Disp: 90 Tab, Rfl: 3  •  ondansetron (ZOFRAN ODT) 4 MG TABLET DISPERSIBLE, Take 4 mg by mouth as needed. Prn nausea and or vomiting, Disp: , Rfl:   •  pantoprazole (PROTONIX) 40 MG Tablet Delayed Response, Take 40 mg by mouth., Disp: , Rfl:   •  potassium chloride ER (KLOR-CON) 10 MEQ tablet, Take 10 mEq by mouth., Disp: , Rfl:   •  Mirabegron ER (MYRBETRIQ) 25 MG TABLET SR 24 HR, Take 25 mg by mouth every day., Disp: 30 Tab, Rfl: 11  •  lisinopril (PRINIVIL) 10 MG Tab, TAKE 1 TABLET BY MOUTH EVERY DAY, Disp: 100 Tab, Rfl: 3  •  atorvastatin (LIPITOR) 10 MG Tab, TAKE 1 TABLET BY MOUTH EVERY DAY, Disp: 100 Tab, Rfl: 3  •  aspirin (ASA) 81 MG Chew Tab chewable tablet, CHEW ONE TABLET BY MOUTH DAILY, Disp: , Rfl: 0  •  acetaminophen (TYLENOL) 500 MG TABS, Take 500 mg by mouth 3 times a day as needed. Indications: Pain, Disp: , Rfl:   •  potassium chloride SA (K-DUR) 10 MEQ Tab CR, potassium chloride ER 10 mEq tablet,extended release(part/cryst), Disp: , Rfl:   •  Mirabegron ER (MYRBETRIQ) 50 MG TABLET SR 24 HR, Myrbetriq 50 mg tablet,extended release  TAKE 1 TABLET BY MOUTH EVERY DAY, Disp: , Rfl:   ALLERGIES:   Allergies   Allergen Reactions   • Food Hives and Nausea     oranges   • Neomycin-Polymyxin B Rash   • Pcn [Penicillins]    • Penicillin G Rash   • Sulfa Drugs      SURGHX:   Past Surgical History:   Procedure Laterality Date   • GASTROSCOPY  12/13/2019    Procedure: GASTROSCOPY;  Surgeon: Ang Angel M.D.;  Location: [facility];  Service: Gastroenterology   • COLONOSCOPY  12/13/2019    Procedure: COLONOSCOPY;  Surgeon: Ang Angel M.D.;  Location: SURGERY [facility];  Service: Gastroenterology   • NIRU BY LAPAROSCOPY  9/2/2011    Performed by KAYLEIGH PORRAS at [facility]ERY [facility]   • LUMBAR DECOMPRESSION  6/29/2009    Performed by ANG YAN at SURGERY  "[facility] ORS   • LUMBAR LAMINECTOMY DISKECTOMY  6/29/2009    Performed by ANG YAN at SURGERY [facility] ORS   • ABDOMINAL HYSTERECTOMY TOTAL     • GYN SURGERY      hysterectomy   • HEMORRHOIDECTOMY       SOCHX:  reports that she has been smoking cigarettes. She has a 56.00 pack-year smoking history. She has never used smokeless tobacco. She reports that she does not drink alcohol and does not use drugs.  Family History   Problem Relation Age of Onset   • Stroke Mother    • Hyperlipidemia Mother    • Hypertension Mother    • Arthritis Mother    • Diabetes Father    • Lung Disease Father         pneumonia   • Arthritis Sister    • Rheumatologic Disease Sister    • Hypertension Sister    • Hyperlipidemia Sister    • Other Sister         Anusha/Fibermyalgia   • Hyperlipidemia Brother    • Hypertension Brother    • Arthritis Brother    • Lung Disease Maternal Grandmother         pneumonia   • Stroke Maternal Grandfather    • Cancer Maternal Grandfather         skin    • No Known Problems Paternal Grandmother    • No Known Problems Paternal Grandfather    • Hyperlipidemia Daughter    • Diabetes Daughter    • No Known Problems Son         Objective:   /62   Pulse (!) 104   Temp 36.3 °C (97.3 °F) (Temporal)   Resp 12   Ht 1.549 m (5' 1\")   Wt 82.6 kg (182 lb) Comment: pt states  SpO2 99%   BMI 34.39 kg/m²     Physical Exam  Constitutional:       General: She is not in acute distress.  Cardiovascular:      Rate and Rhythm: Tachycardia present. Rhythm irregular.   Abdominal:      General: There is no distension.      Palpations: Abdomen is soft.      Tenderness: There is no abdominal tenderness.   Musculoskeletal:      Cervical back: Normal range of motion and neck supple.   Neurological:      General: No focal deficit present.      Mental Status: She is alert.   Psychiatric:         Mood and Affect: Mood normal.         Behavior: Behavior normal.           Assessment/Plan:     1. Atrial flutter, " unspecified type (HCC)     2. Dysuria     3. Altered mental status, unspecified altered mental status type     4. Weakness       Upon patient's arrival in urgent care her heart rate was noted to be erratic by MA.  An EKG was performed showing atrial flutter with a rate of 136.    The patient is referred to a higher level of care at [facility] now by EMS transportation for evaluation and treatment; patient aware of location of [facility]. We discussed risks of non-compliance or delayed medical care. All questions answered to patient’s apparent satisfaction. Patient verbalizes understanding and agreement with plan of care. EMS was contacted and patient report given.      Please note that this dictation was created using voice recognition software. I have made every reasonable attempt to correct obvious errors, but I expect that there are errors of grammar and possibly content that I did not discover before finalizing the note.

## 2021-07-22 DIAGNOSIS — I10 ESSENTIAL HYPERTENSION: ICD-10-CM

## 2021-07-22 RX ORDER — LISINOPRIL 10 MG/1
10 TABLET ORAL
Qty: 90 TABLET | Refills: 4 | Status: SHIPPED | OUTPATIENT
Start: 2021-07-22 | End: 2022-07-20 | Stop reason: SDUPTHER

## 2021-07-22 RX ORDER — ATORVASTATIN CALCIUM 10 MG/1
10 TABLET, FILM COATED ORAL
Qty: 90 TABLET | Refills: 4 | Status: SHIPPED | OUTPATIENT
Start: 2021-07-22 | End: 2022-07-20 | Stop reason: SDUPTHER

## 2021-07-23 PROBLEM — N17.9 ACUTE KIDNEY INJURY (HCC): Status: ACTIVE | Noted: 2021-07-23

## 2021-07-30 ENCOUNTER — OFFICE VISIT (OUTPATIENT)
Dept: CARDIOLOGY | Facility: MEDICAL CENTER | Age: 81
End: 2021-07-30
Payer: MEDICARE

## 2021-07-30 VITALS
DIASTOLIC BLOOD PRESSURE: 52 MMHG | BODY MASS INDEX: 34.93 KG/M2 | SYSTOLIC BLOOD PRESSURE: 106 MMHG | WEIGHT: 185 LBS | HEIGHT: 61 IN | OXYGEN SATURATION: 95 % | HEART RATE: 89 BPM

## 2021-07-30 DIAGNOSIS — I35.0 NONRHEUMATIC AORTIC VALVE STENOSIS: ICD-10-CM

## 2021-07-30 DIAGNOSIS — I10 ESSENTIAL HYPERTENSION: ICD-10-CM

## 2021-07-30 DIAGNOSIS — F03.90 DEMENTIA WITHOUT BEHAVIORAL DISTURBANCE, UNSPECIFIED DEMENTIA TYPE: ICD-10-CM

## 2021-07-30 DIAGNOSIS — Z91.81 AT MODERATE RISK FOR FALL: ICD-10-CM

## 2021-07-30 DIAGNOSIS — J44.89 COPD (CHRONIC OBSTRUCTIVE PULMONARY DISEASE) WITH CHRONIC BRONCHITIS (HCC): ICD-10-CM

## 2021-07-30 DIAGNOSIS — R10.9 FLANK PAIN: ICD-10-CM

## 2021-07-30 DIAGNOSIS — G47.30 SLEEP APNEA, UNSPECIFIED TYPE: ICD-10-CM

## 2021-07-30 DIAGNOSIS — N28.9 RENAL INSUFFICIENCY SYNDROME: ICD-10-CM

## 2021-07-30 DIAGNOSIS — N18.31 CHRONIC RENAL IMPAIRMENT, STAGE 3A: ICD-10-CM

## 2021-07-30 DIAGNOSIS — E66.01 MORBID OBESITY (HCC): ICD-10-CM

## 2021-07-30 DIAGNOSIS — K55.9 ISCHEMIC BOWEL DISEASE (HCC): ICD-10-CM

## 2021-07-30 DIAGNOSIS — I48.0 PAROXYSMAL ATRIAL FIBRILLATION (HCC): ICD-10-CM

## 2021-07-30 DIAGNOSIS — N39.41 URGE INCONTINENCE OF URINE: ICD-10-CM

## 2021-07-30 PROCEDURE — 99214 OFFICE O/P EST MOD 30 MIN: CPT | Performed by: INTERNAL MEDICINE

## 2021-07-30 ASSESSMENT — ENCOUNTER SYMPTOMS
CLAUDICATION: 0
GASTROINTESTINAL NEGATIVE: 1
WEAKNESS: 0
SPUTUM PRODUCTION: 0
EYES NEGATIVE: 1
MUSCULOSKELETAL NEGATIVE: 1
PALPITATIONS: 0
DIZZINESS: 0
BRUISES/BLEEDS EASILY: 0
RESPIRATORY NEGATIVE: 1
WHEEZING: 0
FEVER: 0
COUGH: 0
PND: 0
SHORTNESS OF BREATH: 0
CHILLS: 0
ORTHOPNEA: 0
CONSTITUTIONAL NEGATIVE: 1
STRIDOR: 0
CARDIOVASCULAR NEGATIVE: 1
HEMOPTYSIS: 0
LOSS OF CONSCIOUSNESS: 0
SORE THROAT: 0
NEUROLOGICAL NEGATIVE: 1

## 2021-07-30 ASSESSMENT — FIBROSIS 4 INDEX: FIB4 SCORE: 1.71

## 2021-07-30 NOTE — PROGRESS NOTES
Chief Complaint   Patient presents with   • HTN (Controlled)   • Hyperlipidemia   • COPD       Subjective:   Ella Garcia is a 80 y.o. female who presents today as a follow-up for her aortic stenosis and history of syncope.  Since we last saw her which has been sometime we did an echocardiogram that showed mild aortic stenosis.  Echocardiogram was normal.  She was recently admitted to the [facility] for a UTI.  Per the patient she went into atrial fibrillation but self converted.  She was not started on any oral anticoagulation and had no follow-up.  She is never had a stroke.  She does have high blood pressure.  She has no chest pain.    Past Medical History:   Diagnosis Date   • Arthritis     knees, and hips-osteo   • ASTHMA    • Body mass index 40.0-44.9, adult ([facility]) 4/27/2018   • Chronic renal impairment, stage 3 (moderate) ([facility]) 5/13/2019   • COPD (chronic obstructive pulmonary disease) with chronic bronchitis ([facility]) 4/20/2021   • Dental disorder     upper   • Dysuria 10/18/2016   • Fall    • Generalized edema 4/27/2018   • Glaucoma     bilateral   • Hay fever 4/19/2016   • Heart burn    • High cholesterol    • Hx: UTI (urinary tract infection)    • Hyperglycemia 11/24/2015   • Hypertension    • Hypothyroidism    • Indigestion    • Ischemic bowel disease ([facility]) 9/9/2019   • Migraines     pt has similar symptoms with migraines not for a long time though   • Nausea 10/14/2019   • Neuropathy ([facility])    • Obesity (BMI 30-39.9) 7/20/2015   • Pneumonia    • Routine general medical examination at a health care facility 7/20/2015    7/20/15   • Sleep apnea 12/2019    Had sleep study, didn't tolerate cpap   • Snoring    • Syncope 6/5/2020   • Tobacco use 4/29/2019   • URI (upper respiratory infection) 11/4/2015   • Urinary bladder disorder     hx of uti's     Past Surgical History:   Procedure Laterality Date   • GASTROSCOPY  12/13/2019    Procedure: GASTROSCOPY;  Surgeon: Scottie Angel M.D.;  Location: SURGERY  [facility];  Service: Gastroenterology   • COLONOSCOPY  12/13/2019    Procedure: COLONOSCOPY;  Surgeon: Ang Angel M.D.;  Location: SURGERY [facility];  Service: Gastroenterology   • NIRU BY LAPAROSCOPY  9/2/2011    Performed by KAYLEIGH PORRAS at SURGERY [facility]S   • LUMBAR DECOMPRESSION  6/29/2009    Performed by ANG YAN at SURGERY [facility] ORS   • LUMBAR LAMINECTOMY DISKECTOMY  6/29/2009    Performed by ANG YAN at SURGERY [facility] ORS   • ABDOMINAL HYSTERECTOMY TOTAL     • GYN SURGERY      hysterectomy   • HEMORRHOIDECTOMY       Family History   Problem Relation Age of Onset   • Stroke Mother    • Hyperlipidemia Mother    • Hypertension Mother    • Arthritis Mother    • Diabetes Father    • Lung Disease Father         pneumonia   • Arthritis Sister    • Rheumatologic Disease Sister    • Hypertension Sister    • Hyperlipidemia Sister    • Other Sister         Anusha/Fibermyalgia   • Hyperlipidemia Brother    • Hypertension Brother    • Arthritis Brother    • Lung Disease Maternal Grandmother         pneumonia   • Stroke Maternal Grandfather    • Cancer Maternal Grandfather         skin    • No Known Problems Paternal Grandmother    • No Known Problems Paternal Grandfather    • Hyperlipidemia Daughter    • Diabetes Daughter    • No Known Problems Son      Social History     Socioeconomic History   • Marital status: Single     Spouse name: Not on file   • Number of children: Not on file   • Years of education: Not on file   • Highest education level: Not on file   Occupational History   • Not on file   Tobacco Use   • Smoking status: Current Every Day Smoker     Packs/day: 1.00     Years: 56.00     Pack years: 56.00     Types: Cigarettes   • Smokeless tobacco: Never Used   • Tobacco comment: started smoking at age 12, little mor than half a pack   Vaping Use   • Vaping Use: Never used   Substance and Sexual Activity   • Alcohol use: No   • Drug use: No   • Sexual  activity: Never     Partners: Male     Birth control/protection: Post-Menopausal   Other Topics Concern   • Not on file   Social History Narrative   • Not on file     Social Determinants of Health     Financial Resource Strain:    • Difficulty of Paying Living Expenses:    Food Insecurity:    • Worried About Running Out of Food in the Last Year:    • Ran Out of Food in the Last Year:    Transportation Needs:    • Lack of Transportation (Medical):    • Lack of Transportation (Non-Medical):    Physical Activity:    • Days of Exercise per Week:    • Minutes of Exercise per Session:    Stress:    • Feeling of Stress :    Social Connections:    • Frequency of Communication with Friends and Family:    • Frequency of Social Gatherings with Friends and Family:    • Attends Mu-ism Services:    • Active Member of Clubs or Organizations:    • Attends Club or Organization Meetings:    • Marital Status:    Intimate Partner Violence:    • Fear of Current or Ex-Partner:    • Emotionally Abused:    • Physically Abused:    • Sexually Abused:      Allergies   Allergen Reactions   • Food Hives and Nausea     oranges   • Neomycin-Polymyxin B Rash   • Pcn [Penicillins]    • Penicillin G Rash   • Sulfa Drugs      Outpatient Encounter Medications as of 7/30/2021   Medication Sig Dispense Refill   • apixaban (ELIQUIS) 5mg Tab Take 1 tablet by mouth 2 times a day. 60 tablet 11   • atorvastatin (LIPITOR) 10 MG Tab Take 1 tablet by mouth every day. 90 tablet 4   • lisinopril (PRINIVIL) 10 MG Tab Take 1 tablet by mouth every day. 90 tablet 4   • triamterene/hctz (MAXZIDE-25/DYAZIDE) 37.5-25 MG Cap TAKE 1 CAPSULE BY MOUTH EVERY DAY 90 capsule 0   • HYDROcodone-acetaminophen (NORCO) 7.5-325 MG per tablet TAKE 1 TABLET BY MOUTH THREE TIMES A DAY AS NEEDED FOR 30 DAYS SUPPLY. M47.8147     • Mirabegron ER (MYRBETRIQ) 50 MG TABLET SR 24 HR Myrbetriq 50 mg tablet,extended release   TAKE 1 TABLET BY MOUTH EVERY DAY     • fenofibrate (TRIGLIDE) 160  "MG tablet TAKE 1 TABLET BY MOUTH EVERY DAY 90 Tab 4   • promethazine (PHENERGAN) 25 MG Tab TAKE 1 TABLET BY MOUTH EVERY 6 HOURS AS NEEDED FOR NAUSEA/VOMITING 30 Tab 0   • levothyroxine (SYNTHROID) 137 MCG Tab TAKE 1 TABLET BY MOUTH EVERY MORNING ON A EMPTY STOMACH 90 Tab 3   • ondansetron (ZOFRAN ODT) 4 MG TABLET DISPERSIBLE Take 4 mg by mouth as needed. Prn nausea and or vomiting     • pantoprazole (PROTONIX) 40 MG Tablet Delayed Response Take 40 mg by mouth.     • potassium chloride ER (KLOR-CON) 10 MEQ tablet Take 10 mEq by mouth.     • acetaminophen (TYLENOL) 500 MG TABS Take 500 mg by mouth 3 times a day as needed. Indications: Pain     • potassium chloride SA (K-DUR) 10 MEQ Tab CR potassium chloride ER 10 mEq tablet,extended release(part/cryst)     • [DISCONTINUED] Mirabegron ER (MYRBETRIQ) 25 MG TABLET SR 24 HR Take 25 mg by mouth every day. (Patient not taking: Reported on 7/30/2021) 30 Tab 11   • [DISCONTINUED] aspirin (ASA) 81 MG Chew Tab chewable tablet CHEW ONE TABLET BY MOUTH DAILY  0     No facility-administered encounter medications on file as of 7/30/2021.     Review of Systems   Constitutional: Negative.  Negative for chills, fever and malaise/fatigue.   HENT: Negative.  Negative for sore throat.    Eyes: Negative.    Respiratory: Negative.  Negative for cough, hemoptysis, sputum production, shortness of breath, wheezing and stridor.    Cardiovascular: Negative.  Negative for chest pain, palpitations, orthopnea, claudication, leg swelling and PND.   Gastrointestinal: Negative.    Genitourinary: Negative.    Musculoskeletal: Negative.    Skin: Negative.    Neurological: Negative.  Negative for dizziness, loss of consciousness and weakness.   Endo/Heme/Allergies: Negative.  Does not bruise/bleed easily.   All other systems reviewed and are negative.       Objective:   /52 (BP Location: Right arm, Patient Position: Sitting, BP Cuff Size: Adult)   Pulse 89   Ht 1.549 m (5' 1\")   Wt 83.9 kg (185 " lb)   SpO2 95%   BMI 34.96 kg/m²     Physical Exam   Constitutional: She appears well-developed. No distress.   HENT:   Head: Normocephalic and atraumatic.   Right Ear: External ear normal.   Left Ear: External ear normal.   Nose: Nose normal.   Mouth/Throat: No oropharyngeal exudate.   Eyes: Pupils are equal, round, and reactive to light. Conjunctivae are normal. Right eye exhibits no discharge. Left eye exhibits no discharge. No scleral icterus.   Neck: No JVD present.   Cardiovascular: Normal rate and regular rhythm. Exam reveals no gallop and no friction rub.   No murmur heard.  Pulmonary/Chest: Effort normal. No stridor. No respiratory distress. She has no wheezes. She has no rales. She exhibits no tenderness.   Abdominal: Soft. She exhibits no distension. There is no guarding.   Musculoskeletal:         General: No tenderness or deformity. Normal range of motion.      Cervical back: Neck supple.   Neurological: She is alert. She has normal reflexes. She displays normal reflexes. No cranial nerve deficit. She exhibits normal muscle tone. Coordination normal.   Skin: Skin is warm and dry. No rash noted. She is not diaphoretic. No erythema. No pallor.   Psychiatric: Her behavior is normal. Judgment and thought content normal.   Nursing note and vitals reviewed.    Outside medical records: : Scanned and reviewed and per the HPI.  Assessment:     1. Essential hypertension     2. Dementia without behavioral disturbance, unspecified dementia type ([facility])     3. COPD (chronic obstructive pulmonary disease) with chronic bronchitis ([facility])     4. Chronic renal impairment, stage 3a ([facility])     5. At moderate risk for fall     6. Flank pain     7. Ischemic bowel disease (HCC)  Cardiac Event Monitor    apixaban (ELIQUIS) 5mg Tab   8. Morbid obesity (HCC)     9. Renal insufficiency syndrome  apixaban (ELIQUIS) 5mg Tab   10. Nonrheumatic aortic valve stenosis     11. Urge incontinence of urine     12. Sleep apnea, unspecified  type  Cardiac Event Monitor   13. Paroxysmal atrial fibrillation (HCC)  Cardiac Event Monitor    apixaban (ELIQUIS) 5mg Tab       Medical Decision Making:  Today's Assessment / Status / Plan:   80-year-old female with mild aortic stenosis and COPD with lower extremity edema which is controlled.  For her new onset atrial fibrillation is unclear whether not this is brought about by her septic-like condition or whether not this is paroxysmal.  I reviewed her labs in the outside hospital which showed her creatinine of 1.5.  We will start her on Eliquis and get an event recorder.  We will see her back after the recorder.

## 2021-08-02 DIAGNOSIS — J96.11 CHRONIC RESPIRATORY FAILURE WITH HYPOXIA (HCC): ICD-10-CM

## 2021-08-02 DIAGNOSIS — J44.89 COPD (CHRONIC OBSTRUCTIVE PULMONARY DISEASE) WITH CHRONIC BRONCHITIS (HCC): ICD-10-CM

## 2021-08-02 DIAGNOSIS — I48.0 PAROXYSMAL ATRIAL FIBRILLATION (HCC): ICD-10-CM

## 2021-08-02 PROBLEM — N28.9 RENAL INSUFFICIENCY SYNDROME: Status: RESOLVED | Noted: 2021-02-19 | Resolved: 2021-08-02

## 2021-08-04 ENCOUNTER — TELEPHONE (OUTPATIENT)
Dept: CARDIOLOGY | Facility: MEDICAL CENTER | Age: 81
End: 2021-08-04

## 2021-08-04 ENCOUNTER — NON-PROVIDER VISIT (OUTPATIENT)
Dept: CARDIOLOGY | Facility: MEDICAL CENTER | Age: 81
End: 2021-08-04
Payer: MEDICARE

## 2021-08-04 DIAGNOSIS — I48.91 ATRIAL FIBRILLATION, UNSPECIFIED TYPE (HCC): ICD-10-CM

## 2021-08-04 DIAGNOSIS — R00.2 PALPITATIONS: ICD-10-CM

## 2021-08-04 NOTE — TELEPHONE ENCOUNTER
Home enrollment completed for the 14 day [employer] XT Holter monitoring program per Marvin Kapoor MD.   Monitor to be mailed to patient by [employer].   >Currently pending EOS.

## 2021-08-05 ENCOUNTER — HOSPITAL ENCOUNTER (OUTPATIENT)
Facility: MEDICAL CENTER | Age: 81
End: 2021-08-05
Attending: STUDENT IN AN ORGANIZED HEALTH CARE EDUCATION/TRAINING PROGRAM
Payer: MEDICARE

## 2021-08-05 ENCOUNTER — OFFICE VISIT (OUTPATIENT)
Dept: MEDICAL GROUP | Facility: MEDICAL CENTER | Age: 81
End: 2021-08-05
Payer: MEDICARE

## 2021-08-05 VITALS
SYSTOLIC BLOOD PRESSURE: 94 MMHG | WEIGHT: 184 LBS | DIASTOLIC BLOOD PRESSURE: 44 MMHG | OXYGEN SATURATION: 97 % | TEMPERATURE: 97.2 F | RESPIRATION RATE: 16 BRPM | BODY MASS INDEX: 34.77 KG/M2 | HEART RATE: 78 BPM

## 2021-08-05 DIAGNOSIS — N30.00 ACUTE CYSTITIS WITHOUT HEMATURIA: ICD-10-CM

## 2021-08-05 DIAGNOSIS — I48.0 PAROXYSMAL ATRIAL FIBRILLATION (HCC): ICD-10-CM

## 2021-08-05 DIAGNOSIS — Z99.89 WALKER AS AMBULATION AID: ICD-10-CM

## 2021-08-05 DIAGNOSIS — N10 PYELONEPHRITIS, ACUTE: ICD-10-CM

## 2021-08-05 DIAGNOSIS — N18.31 CHRONIC RENAL IMPAIRMENT, STAGE 3A: ICD-10-CM

## 2021-08-05 LAB
APPEARANCE UR: NORMAL
BILIRUB UR STRIP-MCNC: NORMAL MG/DL
COLOR UR AUTO: NORMAL
GLUCOSE UR STRIP.AUTO-MCNC: NORMAL MG/DL
KETONES UR STRIP.AUTO-MCNC: NORMAL MG/DL
LEUKOCYTE ESTERASE UR QL STRIP.AUTO: NORMAL
NITRITE UR QL STRIP.AUTO: NORMAL
PH UR STRIP.AUTO: 5.5 [PH] (ref 5–8)
PROT UR QL STRIP: NORMAL MG/DL
RBC UR QL AUTO: NORMAL
SP GR UR STRIP.AUTO: 1.02
UROBILINOGEN UR STRIP-MCNC: 0.2 MG/DL

## 2021-08-05 PROCEDURE — 87086 URINE CULTURE/COLONY COUNT: CPT

## 2021-08-05 PROCEDURE — 99213 OFFICE O/P EST LOW 20 MIN: CPT | Performed by: STUDENT IN AN ORGANIZED HEALTH CARE EDUCATION/TRAINING PROGRAM

## 2021-08-05 PROCEDURE — 81002 URINALYSIS NONAUTO W/O SCOPE: CPT | Performed by: STUDENT IN AN ORGANIZED HEALTH CARE EDUCATION/TRAINING PROGRAM

## 2021-08-05 RX ORDER — CEFDINIR 300 MG/1
300 CAPSULE ORAL 2 TIMES DAILY
Qty: 14 CAPSULE | Refills: 0 | Status: SHIPPED | OUTPATIENT
Start: 2021-08-05 | End: 2021-08-11

## 2021-08-05 ASSESSMENT — FIBROSIS 4 INDEX: FIB4 SCORE: 1.71

## 2021-08-05 NOTE — PROGRESS NOTES
Subjective:     CC: ***    HPI:   Margaret presents today with ***    No problem-specific Assessment & Plan notes found for this encounter.      Current Outpatient Medications Ordered in Epic   Medication Sig Dispense Refill   • Misc. Devices Misc One oxygen conserving device.  Use 2L of oxygen via nasal cannula with exertion 1 Each 0   • apixaban (ELIQUIS) 5mg Tab Take 1 tablet by mouth 2 times a day. 60 tablet 11   • atorvastatin (LIPITOR) 10 MG Tab Take 1 tablet by mouth every day. 90 tablet 4   • lisinopril (PRINIVIL) 10 MG Tab Take 1 tablet by mouth every day. 90 tablet 4   • triamterene/hctz (MAXZIDE-25/DYAZIDE) 37.5-25 MG Cap TAKE 1 CAPSULE BY MOUTH EVERY DAY 90 capsule 0   • HYDROcodone-acetaminophen (NORCO) 7.5-325 MG per tablet TAKE 1 TABLET BY MOUTH THREE TIMES A DAY AS NEEDED FOR 30 DAYS SUPPLY. M47.8147     • potassium chloride SA (K-DUR) 10 MEQ Tab CR potassium chloride ER 10 mEq tablet,extended release(part/cryst)     • Mirabegron ER (MYRBETRIQ) 50 MG TABLET SR 24 HR Myrbetriq 50 mg tablet,extended release   TAKE 1 TABLET BY MOUTH EVERY DAY     • fenofibrate (TRIGLIDE) 160 MG tablet TAKE 1 TABLET BY MOUTH EVERY DAY 90 Tab 4   • promethazine (PHENERGAN) 25 MG Tab TAKE 1 TABLET BY MOUTH EVERY 6 HOURS AS NEEDED FOR NAUSEA/VOMITING 30 Tab 0   • levothyroxine (SYNTHROID) 137 MCG Tab TAKE 1 TABLET BY MOUTH EVERY MORNING ON A EMPTY STOMACH 90 Tab 3   • ondansetron (ZOFRAN ODT) 4 MG TABLET DISPERSIBLE Take 4 mg by mouth as needed. Prn nausea and or vomiting     • pantoprazole (PROTONIX) 40 MG Tablet Delayed Response Take 40 mg by mouth.     • potassium chloride ER (KLOR-CON) 10 MEQ tablet Take 10 mEq by mouth.     • acetaminophen (TYLENOL) 500 MG TABS Take 500 mg by mouth 3 times a day as needed. Indications: Pain       No current Epic-ordered facility-administered medications on file.       Health Maintenance: {COMPLETED:298588}    ROS:  Gen: no fevers/chills, no changes in weight  Eyes: no changes in  vision  ENT: no sore throat, no hearing loss, no bloody nose  Pulm: no sob, no cough  CV: no chest pain, no palpitations  GI: no nausea/vomiting, no diarrhea  : no dysuria  MSk: no myalgias  Skin: no rash  Neuro: no headaches, no numbness/tingling  Heme/Lymph: no easy bruising      Objective:     Exam:  BP (!) 94/44   Pulse 78   Temp 36.2 °C (97.2 °F)   Resp 16   Wt 83.5 kg (184 lb)   SpO2 97%   BMI 34.77 kg/m²  Body mass index is 34.77 kg/m².    Gen: Alert and oriented, No apparent distress.  Neck: Neck is supple without lymphadenopathy.  Lungs: Normal effort, CTA bilaterally, no wheezes, rhonchi, or rales  CV: Regular rate and rhythm. No murmurs, rubs, or gallops.  Ext: No clubbing, cyanosis, edema.    A chaperone was offered to the patient during today's exam. {CHAPERONE:50574}    Labs: ***    Assessment & Plan:     80 y.o. female with the following -     1. Acute cystitis without hematuria  ***  - POCT Urinalysis    2. Walker as ambulation aid  ***  - DME Walker      I spent a total of *** minutes with record review, exam, communication with the patient, communication with other providers, and documentation of this encounter.      No follow-ups on file.    Please note that this dictation was created using voice recognition software. I have made every reasonable attempt to correct obvious errors, but I expect that there are errors of grammar and possibly content that I did not discover before finalizing the note.      Face to Face Note  -  Durable Medical Equipment    Payton Galo P.A.-C. - NPI: [id number]  I certify that this patient is under my care and that they had a durable medical equipment(DME)face to face encounter by myself that meets the physician DME face-to-face encounter requirements with this patient on:    Date of encounter:   Patient:                    MRN:                       YOB: 2023  Margaret Garcia  [id number]  1940     The encounter with the patient  was in whole, or in part, for the following medical condition, which is the primary reason for durable medical equipment:  Other - total joint replacement    I certify that, based on my findings, the following durable medical equipment is medically necessary:  Walkers.

## 2021-08-05 NOTE — LETTER
[facility]  Rodrigo Ozuna M.D.  [address]  [place] [address]  Fax: [phone]   Authorization for Release/Disclosure of   Protected Health Information   Name: MARGARET REY : 1940 SSN: [id number]   Address: [address] [place] [address] [address]  [place] [address] Phone:    [phone] (home)    I authorize the entity listed below to release/disclose the PHI below to:   [facility]/Rodrigo Ozuna M.D. and Payton Galo P.A.-C.   Provider or Entity Name:  [facility]    Address   City, [facility], [facility]   Phone:      Fax:     Reason for request: continuity of care   Information to be released:    [  ] LAST COLONOSCOPY,  including any PATH REPORT and follow-up  [  ] LAST FIT/COLOGUARD RESULT [  ] LAST DEXA  [  ] LAST MAMMOGRAM  [  ] LAST PAP  [  ] LAST LABS [  ] RETINA EXAM REPORT  [  ] IMMUNIZATION RECORDS  [  ] Release all info      [  ] Check here and initial the line next to each item to release ALL health information INCLUDING  _____ Care and treatment for drug and / or alcohol abuse  _____ HIV testing, infection status, or AIDS  _____ Genetic Testing    DATES OF SERVICE OR TIME PERIOD TO BE DISCLOSED: _____________  I understand and acknowledge that:  * This Authorization may be revoked at any time by you in writing, except if your health information has already been used or disclosed.  * Your health information that will be used or disclosed as a result of you signing this authorization could be re-disclosed by the recipient. If this occurs, your re-disclosed health information may no longer be protected by State or Federal laws.  * You may refuse to sign this Authorization. Your refusal will not affect your ability to obtain treatment.  * This Authorization becomes effective upon signing and will  on (date) __________.      If no date is indicated, this Authorization will  one (1) year from the signature date.    Name: Margaret Rey    Signature:   Date:      8/5/2021       PLEASE FAX REQUESTED RECORDS BACK TO: [phone]

## 2021-08-05 NOTE — PROGRESS NOTES
Subjective:     Margaret Garcia is a 80 y.o. female who presents for Hospital Follow-up.    HPI:   Patient recently seen in [facility] ED due to UTI.  Patient states that she went to urgent care for a urinary tract infection on 7/19 but due to slurred speech and altered mental status urgent care recommended further evaluation in the ED.  Patient spent 4 days in the hospital and the next week recovering at skilled nursing.  In the hospital patient was found to have a UTI with initial creatinine of 2.4.  MRI was negative for stroke.  Patient had been taking NSAIDs and found to have acute kidney injury.  Patient is then admitted to [facility] for skilled services.  Since discharge patient has had physical therapy, occupational therapy and home health nurse twice per week.  Will request records for more specific information.    Due to A. fib while in the hospital patient has been encouraged to follow-up with cardiology.  Patient had cardiology follow-up 7/30 and is being placed on Holter monitor.  Patient was started on Eliquis.    Patient has appointment scheduled with nephrologist for next week.  Patient understands she should not NSAIDs and other nephrotoxins.    Patient notes that over the last few days she has felt improved but yesterday started noticing a tingling feeling after urination.  Patient denies urgency or frequency.  Patient does state lower back pain.  Urinalysis was performed in office today.    Current medicines (including reconciliation performed today)  Current Outpatient Medications   Medication Sig Dispense Refill   • cefdinir (OMNICEF) 300 MG Cap Take 1 capsule by mouth 2 times a day. 14 capsule 0   • Misc. Devices Misc One oxygen conserving device.  Use 2L of oxygen via nasal cannula with exertion 1 Each 0   • apixaban (ELIQUIS) 5mg Tab Take 1 tablet by mouth 2 times a day. 60 tablet 11   • atorvastatin (LIPITOR) 10 MG Tab Take 1 tablet by mouth every day. 90 tablet 4   •  lisinopril (PRINIVIL) 10 MG Tab Take 1 tablet by mouth every day. 90 tablet 4   • triamterene/hctz (MAXZIDE-25/DYAZIDE) 37.5-25 MG Cap TAKE 1 CAPSULE BY MOUTH EVERY DAY 90 capsule 0   • HYDROcodone-acetaminophen (NORCO) 7.5-325 MG per tablet TAKE 1 TABLET BY MOUTH THREE TIMES A DAY AS NEEDED FOR 30 DAYS SUPPLY. M47.8147     • potassium chloride SA (K-DUR) 10 MEQ Tab CR potassium chloride ER 10 mEq tablet,extended release(part/cryst)     • Mirabegron ER (MYRBETRIQ) 50 MG TABLET SR 24 HR Myrbetriq 50 mg tablet,extended release   TAKE 1 TABLET BY MOUTH EVERY DAY     • fenofibrate (TRIGLIDE) 160 MG tablet TAKE 1 TABLET BY MOUTH EVERY DAY 90 Tab 4   • promethazine (PHENERGAN) 25 MG Tab TAKE 1 TABLET BY MOUTH EVERY 6 HOURS AS NEEDED FOR NAUSEA/VOMITING 30 Tab 0   • levothyroxine (SYNTHROID) 137 MCG Tab TAKE 1 TABLET BY MOUTH EVERY MORNING ON A EMPTY STOMACH 90 Tab 3   • ondansetron (ZOFRAN ODT) 4 MG TABLET DISPERSIBLE Take 4 mg by mouth as needed. Prn nausea and or vomiting     • pantoprazole (PROTONIX) 40 MG Tablet Delayed Response Take 40 mg by mouth.     • potassium chloride ER (KLOR-CON) 10 MEQ tablet Take 10 mEq by mouth.     • acetaminophen (TYLENOL) 500 MG TABS Take 500 mg by mouth 3 times a day as needed. Indications: Pain       No current facility-administered medications for this visit.       Allergies:   Food, Neomycin-polymyxin b, Pcn [penicillins], Penicillin g, and Sulfa drugs    Social History     Tobacco Use   • Smoking status: Current Every Day Smoker     Packs/day: 1.00     Years: 56.00     Pack years: 56.00     Types: Cigarettes   • Smokeless tobacco: Never Used   • Tobacco comment: started smoking at age 12, little mor than half a pack   Vaping Use   • Vaping Use: Never used   Substance Use Topics   • Alcohol use: No   • Drug use: No       ROS:  Gen: no fevers/chills, no changes in weight  Eyes: no changes in vision  ENT: no sore throat, no hearing loss, no bloody nose  Pulm: no sob, no cough  CV: no  chest pain, no palpitations  GI: no nausea/vomiting, no diarrhea  : no dysuria  MSk: no myalgias  Skin: no rash  Neuro: no headaches, no numbness/tingling  Heme/Lymph: no easy bruising      Objective:     Exam:  BP (!) 94/44   Pulse 78   Temp 36.2 °C (97.2 °F)   Resp 16   Wt 83.5 kg (184 lb)   SpO2 97%   BMI 34.77 kg/m²  Body mass index is 34.77 kg/m².    Gen: Alert and oriented, No apparent distress.  Neck: Neck is supple without lymphadenopathy.  Lungs: Normal effort, CTA bilaterally, no wheezes, rhonchi, or rales  CV: Regular rate and rhythm. No murmurs, rubs, or gallops.  Ext: No clubbing, cyanosis, edema.      Assessment and Plan:   1. Acute cystitis without hematuria  2. Pyelonephritis, acute  Acute, stable.  Urinalysis in office today continues to have leukocytes.  Patient will be treated with cefdinir x7 days. Culture sent.  Patient strongly encouraged to follow-up if symptoms persist.  - POCT Urinalysis  - URINE CULTURE(NEW); Future  - cefdinir (OMNICEF) 300 MG Cap; Take 1 capsule by mouth 2 times a day.  Dispense: 14 capsule; Refill: 0    2. Walker as ambulation aid  Chronic, stable.  Patient requesting walker today.  Patient states that she has been using a walker from her grandma and that it is several years old.  Patient has never received a walker through insurance.  Patient requesting a Rollator.  Patient using a walker daily for ambulation.  Patient will likely continue to need walker for further duration.  - DME Walker    4. Paroxysmal atrial fibrillation (HCC)  Chronic, stable.  Patient following with cardiology.  Patient placed on Eliquis.    5. Chronic renal impairment, stage 3a (HCC)  Chronic, stable.  Discussed with patient avoidance of NSAIDs and other nephrotoxins.    - Chart and discharge summary were reviewed.   - Hospitalization and results reviewed with patient.   - Medications reviewed including instructions regarding high risk medications, dosing and side effects.  - Recommended  Services: No services needed at this time    Follow-up:Return if symptoms worsen or fail to improve.

## 2021-08-08 LAB
BACTERIA UR CULT: NORMAL
SIGNIFICANT IND 70042: NORMAL
SITE SITE: NORMAL
SOURCE SOURCE: NORMAL

## 2021-08-09 ENCOUNTER — HOSPITAL ENCOUNTER (OUTPATIENT)
Facility: MEDICAL CENTER | Age: 81
End: 2021-08-09
Attending: PHYSICIAN ASSISTANT
Payer: MEDICARE

## 2021-08-09 PROCEDURE — 87086 URINE CULTURE/COLONY COUNT: CPT

## 2021-08-10 ENCOUNTER — HOSPITAL ENCOUNTER (OUTPATIENT)
Dept: LAB | Facility: MEDICAL CENTER | Age: 81
End: 2021-08-10
Attending: INTERNAL MEDICINE
Payer: MEDICARE

## 2021-08-10 DIAGNOSIS — N17.9 AKI (ACUTE KIDNEY INJURY) (HCC): ICD-10-CM

## 2021-08-10 DIAGNOSIS — N18.30 STAGE 3 CHRONIC KIDNEY DISEASE, UNSPECIFIED WHETHER STAGE 3A OR 3B CKD: ICD-10-CM

## 2021-08-10 LAB
ANION GAP SERPL CALC-SCNC: 13 MMOL/L (ref 7–16)
BUN SERPL-MCNC: 12 MG/DL (ref 8–22)
CALCIUM SERPL-MCNC: 9.2 MG/DL (ref 8.5–10.5)
CHLORIDE SERPL-SCNC: 111 MMOL/L (ref 96–112)
CO2 SERPL-SCNC: 19 MMOL/L (ref 20–33)
CREAT SERPL-MCNC: 1.1 MG/DL (ref 0.5–1.4)
CREAT UR-MCNC: 87.06 MG/DL
ERYTHROCYTE [DISTWIDTH] IN BLOOD BY AUTOMATED COUNT: 56.9 FL (ref 35.9–50)
GLUCOSE SERPL-MCNC: 93 MG/DL (ref 65–99)
HCT VFR BLD AUTO: 37.8 % (ref 37–47)
HGB BLD-MCNC: 12.1 G/DL (ref 12–16)
MCH RBC QN AUTO: 31.9 PG (ref 27–33)
MCHC RBC AUTO-ENTMCNC: 32 G/DL (ref 33.6–35)
MCV RBC AUTO: 99.7 FL (ref 81.4–97.8)
MICROALBUMIN UR-MCNC: 2.8 MG/DL
MICROALBUMIN/CREAT UR: 32 MG/G (ref 0–30)
PLATELET # BLD AUTO: 325 K/UL (ref 164–446)
PMV BLD AUTO: 9.4 FL (ref 9–12.9)
POTASSIUM SERPL-SCNC: 4 MMOL/L (ref 3.6–5.5)
RBC # BLD AUTO: 3.79 M/UL (ref 4.2–5.4)
SODIUM SERPL-SCNC: 143 MMOL/L (ref 135–145)
WBC # BLD AUTO: 4.5 K/UL (ref 4.8–10.8)

## 2021-08-10 PROCEDURE — 85027 COMPLETE CBC AUTOMATED: CPT

## 2021-08-10 PROCEDURE — 80048 BASIC METABOLIC PNL TOTAL CA: CPT

## 2021-08-10 PROCEDURE — 82570 ASSAY OF URINE CREATININE: CPT

## 2021-08-10 PROCEDURE — 82043 UR ALBUMIN QUANTITATIVE: CPT

## 2021-08-10 PROCEDURE — 36415 COLL VENOUS BLD VENIPUNCTURE: CPT

## 2021-08-11 ENCOUNTER — OFFICE VISIT (OUTPATIENT)
Dept: NEPHROLOGY | Facility: MEDICAL CENTER | Age: 81
End: 2021-08-11
Payer: MEDICARE

## 2021-08-11 VITALS
HEIGHT: 62 IN | DIASTOLIC BLOOD PRESSURE: 70 MMHG | WEIGHT: 186.25 LBS | HEART RATE: 74 BPM | BODY MASS INDEX: 34.27 KG/M2 | TEMPERATURE: 98.2 F | OXYGEN SATURATION: 99 % | SYSTOLIC BLOOD PRESSURE: 118 MMHG

## 2021-08-11 DIAGNOSIS — N18.30 STAGE 3 CHRONIC KIDNEY DISEASE, UNSPECIFIED WHETHER STAGE 3A OR 3B CKD: ICD-10-CM

## 2021-08-11 DIAGNOSIS — I10 ESSENTIAL HYPERTENSION: ICD-10-CM

## 2021-08-11 PROCEDURE — 99214 OFFICE O/P EST MOD 30 MIN: CPT | Performed by: INTERNAL MEDICINE

## 2021-08-11 RX ORDER — DOXYCYCLINE HYCLATE 100 MG
100 TABLET ORAL
COMMUNITY
End: 2021-09-20

## 2021-08-11 ASSESSMENT — ENCOUNTER SYMPTOMS
CHILLS: 0
HYPERTENSION: 1
COUGH: 0
SHORTNESS OF BREATH: 0
NAUSEA: 0
FEVER: 0
VOMITING: 0

## 2021-08-11 ASSESSMENT — FIBROSIS 4 INDEX: FIB4 SCORE: 1.33

## 2021-08-11 NOTE — PROGRESS NOTES
"Subjective:      Ella Garcia is a 80 y.o. female who presents with Hypertension and Chronic Kidney Disease            Patient was recently hospitalized at [facility] with cardiac arrhythmias, found to have acute kidney injury.  Patient is doing better, she is under the care of Dr. Kapoor from cardiology.    Hypertension  This is a chronic problem. The current episode started more than 1 year ago. The problem is unchanged. The problem is controlled. Pertinent negatives include no chest pain, malaise/fatigue, peripheral edema or shortness of breath. Risk factors for coronary artery disease include post-menopausal state. Past treatments include ACE inhibitors. The current treatment provides significant improvement. There are no compliance problems.  Hypertensive end-organ damage includes kidney disease. Identifiable causes of hypertension include chronic renal disease.   Chronic Kidney Disease  This is a chronic problem. The current episode started more than 1 year ago. The problem occurs constantly. The problem has been waxing and waning. Pertinent negatives include no chest pain, chills, coughing, fever, nausea, urinary symptoms or vomiting.       Review of Systems   Constitutional: Negative for chills, fever and malaise/fatigue.   Respiratory: Negative for cough and shortness of breath.    Cardiovascular: Negative for chest pain and leg swelling.   Gastrointestinal: Negative for nausea and vomiting.   Genitourinary: Negative for dysuria, frequency and urgency.          Objective:     /70 (BP Location: Right arm, Patient Position: Sitting, BP Cuff Size: Large adult)   Pulse 74   Temp 36.8 °C (98.2 °F) (Temporal)   Ht 1.575 m (5' 2\")   Wt 84.5 kg (186 lb 4 oz)   SpO2 99%   BMI 34.07 kg/m²      Physical Exam  Vitals and nursing note reviewed.   Constitutional:       General: She is not in acute distress.     Appearance: Normal appearance. She is well-developed. She is not diaphoretic.   HENT:      " Head: Normocephalic and atraumatic.      Right Ear: External ear normal.      Left Ear: External ear normal.      Nose: Nose normal.   Eyes:      General: No scleral icterus.        Right eye: No discharge.         Left eye: No discharge.      Conjunctiva/sclera: Conjunctivae normal.   Cardiovascular:      Rate and Rhythm: Normal rate and regular rhythm.      Heart sounds: No murmur heard.     Pulmonary:      Effort: Pulmonary effort is normal. No respiratory distress.      Breath sounds: Normal breath sounds.   Musculoskeletal:         General: No tenderness.      Right lower leg: No edema.      Left lower leg: No edema.   Skin:     General: Skin is warm and dry.      Findings: No erythema.   Neurological:      General: No focal deficit present.      Mental Status: She is alert and oriented to person, place, and time.      Cranial Nerves: No cranial nerve deficit.   Psychiatric:         Mood and Affect: Mood normal.         Behavior: Behavior normal.       Past Medical History:   Diagnosis Date   • Arthritis     knees, and hips-osteo   • ASTHMA    • Body mass index 40.0-44.9, adult ([facility]) 4/27/2018   • Chronic renal impairment, stage 3 (moderate) ([facility]) 5/13/2019   • COPD (chronic obstructive pulmonary disease) with chronic bronchitis ([facility]) 4/20/2021   • Dental disorder     upper   • Dysuria 10/18/2016   • Fall    • Generalized edema 4/27/2018   • Glaucoma     bilateral   • Hay fever 4/19/2016   • Heart burn    • High cholesterol    • Hx: UTI (urinary tract infection)    • Hyperglycemia 11/24/2015   • Hypertension    • Hypothyroidism    • Indigestion    • Ischemic bowel disease ([facility]) 9/9/2019   • Migraines     pt has similar symptoms with migraines not for a long time though   • Nausea 10/14/2019   • Neuropathy ([facility])    • Obesity (BMI 30-39.9) 7/20/2015   • Pneumonia    • Routine general medical examination at a health care facility 7/20/2015    7/20/15   • Sleep apnea 12/2019    Had sleep study, didn't tolerate cpap    • Snoring    • Syncope 6/5/2020   • Tobacco use 4/29/2019   • URI (upper respiratory infection) 11/4/2015   • Urinary bladder disorder     hx of uti's     Social History     Socioeconomic History   • Marital status: Single     Spouse name: Not on file   • Number of children: Not on file   • Years of education: Not on file   • Highest education level: Not on file   Occupational History   • Not on file   Tobacco Use   • Smoking status: Current Every Day Smoker     Packs/day: 1.00     Years: 56.00     Pack years: 56.00     Types: Cigarettes   • Smokeless tobacco: Never Used   • Tobacco comment: started smoking at age 12, little mor than half a pack   Vaping Use   • Vaping Use: Never used   Substance and Sexual Activity   • Alcohol use: No   • Drug use: No   • Sexual activity: Never     Partners: Male     Birth control/protection: Post-Menopausal   Other Topics Concern   • Not on file   Social History Narrative   • Not on file     Social Determinants of Health     Financial Resource Strain:    • Difficulty of Paying Living Expenses:    Food Insecurity:    • Worried About Running Out of Food in the Last Year:    • Ran Out of Food in the Last Year:    Transportation Needs:    • Lack of Transportation (Medical):    • Lack of Transportation (Non-Medical):    Physical Activity:    • Days of Exercise per Week:    • Minutes of Exercise per Session:    Stress:    • Feeling of Stress :    Social Connections:    • Frequency of Communication with Friends and Family:    • Frequency of Social Gatherings with Friends and Family:    • Attends [employer] Services:    • Active Member of Clubs or Organizations:    • Attends Club or Organization Meetings:    • Marital Status:    Intimate Partner Violence:    • Fear of Current or Ex-Partner:    • Emotionally Abused:    • Physically Abused:    • Sexually Abused:      Family History   Problem Relation Age of Onset   • Stroke Mother    • Hyperlipidemia Mother    • Hypertension Mother    •  Arthritis Mother    • Diabetes Father    • Lung Disease Father         pneumonia   • Arthritis Sister    • Rheumatologic Disease Sister    • Hypertension Sister    • Hyperlipidemia Sister    • Other Sister         Anusha/Fibermyalgia   • Hyperlipidemia Brother    • Hypertension Brother    • Arthritis Brother    • Lung Disease Maternal Grandmother         pneumonia   • Stroke Maternal Grandfather    • Cancer Maternal Grandfather         skin    • No Known Problems Paternal Grandmother    • No Known Problems Paternal Grandfather    • Hyperlipidemia Daughter    • Diabetes Daughter    • No Known Problems Son      Recent Labs     12/18/20  1236 12/18/20  1236 03/09/21  1248 04/26/21  1114 08/10/21  0945   ALBUMIN 3.9  --  4.0  --   --    SODIUM 135   < > 133* 139 143   POTASSIUM 4.5   < > 4.3 4.2 4.0   CHLORIDE 106   < > 101 107 111   CO2 19*   < > 20 22 19*   BUN 27*   < > 26* 28* 12   CREATININE 1.57*   < > 1.13 1.03 1.10    < > = values in this interval not displayed.                        Assessment/Plan:        1. Essential hypertension  Controlled  Continue same medication regimen  Continue low-sodium diet      2. Stage 3 chronic kidney disease, unspecified whether stage 3a or 3b CKD (HCC)  Creatinine is back to baseline  I believe her recent worsening of creatinine is secondary to cardiorenal syndrome  No uremic symptoms  Renal dose of medication  Avoid nephrotoxins  Continue same medication regimen  Check labs annually

## 2021-08-12 LAB
BACTERIA UR CULT: NORMAL
SIGNIFICANT IND 70042: NORMAL
SITE SITE: NORMAL
SOURCE SOURCE: NORMAL

## 2021-08-27 ENCOUNTER — HOSPITAL ENCOUNTER (OUTPATIENT)
Facility: MEDICAL CENTER | Age: 81
End: 2021-08-27
Attending: PHYSICIAN ASSISTANT
Payer: MEDICARE

## 2021-08-27 PROCEDURE — 87086 URINE CULTURE/COLONY COUNT: CPT

## 2021-08-30 LAB
BACTERIA UR CULT: NORMAL
SIGNIFICANT IND 70042: NORMAL
SITE SITE: NORMAL
SOURCE SOURCE: NORMAL

## 2021-09-10 ENCOUNTER — TELEMEDICINE (OUTPATIENT)
Dept: CARDIOLOGY | Facility: MEDICAL CENTER | Age: 81
End: 2021-09-10
Payer: MEDICARE

## 2021-09-10 VITALS
WEIGHT: 175 LBS | HEIGHT: 62 IN | HEART RATE: 85 BPM | SYSTOLIC BLOOD PRESSURE: 149 MMHG | BODY MASS INDEX: 32.2 KG/M2 | DIASTOLIC BLOOD PRESSURE: 68 MMHG

## 2021-09-10 DIAGNOSIS — J44.89 COPD (CHRONIC OBSTRUCTIVE PULMONARY DISEASE) WITH CHRONIC BRONCHITIS (HCC): ICD-10-CM

## 2021-09-10 DIAGNOSIS — E78.5 HYPERLIPIDEMIA, UNSPECIFIED HYPERLIPIDEMIA TYPE: ICD-10-CM

## 2021-09-10 DIAGNOSIS — I48.0 PAROXYSMAL ATRIAL FIBRILLATION (HCC): ICD-10-CM

## 2021-09-10 DIAGNOSIS — N18.31 CHRONIC RENAL IMPAIRMENT, STAGE 3A: ICD-10-CM

## 2021-09-10 DIAGNOSIS — I35.0 NONRHEUMATIC AORTIC VALVE STENOSIS: ICD-10-CM

## 2021-09-10 DIAGNOSIS — Z79.899 HIGH RISK MEDICATION USE: ICD-10-CM

## 2021-09-10 DIAGNOSIS — I10 ESSENTIAL HYPERTENSION: ICD-10-CM

## 2021-09-10 DIAGNOSIS — G47.30 SLEEP APNEA, UNSPECIFIED TYPE: ICD-10-CM

## 2021-09-10 PROCEDURE — 99214 OFFICE O/P EST MOD 30 MIN: CPT | Mod: 95 | Performed by: NURSE PRACTITIONER

## 2021-09-10 ASSESSMENT — ENCOUNTER SYMPTOMS
NAUSEA: 0
DIZZINESS: 0
FALLS: 0
PALPITATIONS: 0
COUGH: 0
BLOOD IN STOOL: 0
FEVER: 0
CLAUDICATION: 0
ABDOMINAL PAIN: 0
VOMITING: 0
ORTHOPNEA: 0
TINGLING: 0
BLURRED VISION: 0
SHORTNESS OF BREATH: 0
LOSS OF CONSCIOUSNESS: 0
PND: 0
BRUISES/BLEEDS EASILY: 0
WEIGHT LOSS: 0
MYALGIAS: 0

## 2021-09-10 ASSESSMENT — FIBROSIS 4 INDEX: FIB4 SCORE: 1.33

## 2021-09-10 NOTE — PROGRESS NOTES
Chief Complaint   Patient presents with   • HTN (Controlled)   • Hyperlipidemia   • Atrial Fibrillation   • COPD     This evaluation was conducted via Zoom using secure and encrypted videoconferencing technology. The patient was in a private location in the state of [place].    The patient's identity was confirmed and verbal consent was obtained for this virtual visit.    Subjective     Ella Garcia is a 80 y.o. female who presents today hypertension, proximal A. fib, mild aortic stenosis follow-up.  Patient was last seen by Dr. Kapoor on 7/30/2021.  Since patient was last seen she was also seen by nephrology and geriatric specialist.    In addition to above patient has past medical history of COPD, dysuria, SHABANA.    Patient feels well, denies chest pain, shortness of breath, palpitations, dizziness/lightheadedness, orthopnea, PND or Edema.  Patient denies signs or symptoms of bleeding.  Patient denies frequent falls.  We discussed her cardiac event monitor findings and her overall burden of A. fib.  Due to findings, we discussed continuing Eliquis as prescribed to reduce overall stroke risk.  Patient agreeable we also discussed modifiable risk factors for A. fib including SHABANA were patient reports she does not use her CPAP machine.  Patient does not monitor her blood pressure at home.  Her blood pressure slightly elevated today as reported virtually however she believes the blood pressure cuff she currently has is not accurate.  Discussed checking blood pressure at pharmacy and reporting to office if her blood pressure remains elevated.    Past Medical History:   Diagnosis Date   • Arthritis     knees, and hips-osteo   • ASTHMA    • Body mass index 40.0-44.9, adult ([facility]) 4/27/2018   • Chronic renal impairment, stage 3 (moderate) ([facility]) 5/13/2019   • COPD (chronic obstructive pulmonary disease) with chronic bronchitis ([facility]) 4/20/2021   • Dental disorder     upper   • Dysuria 10/18/2016   • Fall    • Generalized edema  4/27/2018   • Glaucoma     bilateral   • Hay fever 4/19/2016   • Heart burn    • High cholesterol    • Hx: UTI (urinary tract infection)    • Hyperglycemia 11/24/2015   • Hypertension    • Hypothyroidism    • Indigestion    • Ischemic bowel disease (HCC) 9/9/2019   • Migraines     pt has similar symptoms with migraines not for a long time though   • Nausea 10/14/2019   • Neuropathy (HCC)    • Obesity (BMI 30-39.9) 7/20/2015   • Pneumonia    • Routine general medical examination at a health care facility 7/20/2015    7/20/15   • Sleep apnea 12/2019    Had sleep study, didn't tolerate cpap   • Snoring    • Syncope 6/5/2020   • Tobacco use 4/29/2019   • URI (upper respiratory infection) 11/4/2015   • Urinary bladder disorder     hx of uti's     Past Surgical History:   Procedure Laterality Date   • GASTROSCOPY  12/13/2019    Procedure: GASTROSCOPY;  Surgeon: Ang Angel M.D.;  Location: SURGERY [facility];  Service: Gastroenterology   • COLONOSCOPY  12/13/2019    Procedure: COLONOSCOPY;  Surgeon: Ang Angel M.D.;  Location: SURGERY [facility];  Service: Gastroenterology   • NIRU BY LAPAROSCOPY  9/2/2011    Performed by KAYLEIGH PORRAS at SURGERY SAME DAY [facility] ORS   • LUMBAR DECOMPRESSION  6/29/2009    Performed by ANG YAN at SURGERY [facility] ORS   • LUMBAR LAMINECTOMY DISKECTOMY  6/29/2009    Performed by ANG YAN at SURGERY [facility] ORS   • ABDOMINAL HYSTERECTOMY TOTAL     • GYN SURGERY      hysterectomy   • HEMORRHOIDECTOMY       Family History   Problem Relation Age of Onset   • Stroke Mother    • Hyperlipidemia Mother    • Hypertension Mother    • Arthritis Mother    • Diabetes Father    • Lung Disease Father         pneumonia   • Arthritis Sister    • Rheumatologic Disease Sister    • Hypertension Sister    • Hyperlipidemia Sister    • Other Sister         Anusha/Fibermyalgia   • Hyperlipidemia Brother    • Hypertension Brother    • Arthritis Brother    • Lung Disease  Maternal Grandmother         pneumonia   • Stroke Maternal Grandfather    • Cancer Maternal Grandfather         skin    • No Known Problems Paternal Grandmother    • No Known Problems Paternal Grandfather    • Hyperlipidemia Daughter    • Diabetes Daughter    • No Known Problems Son      Social History     Socioeconomic History   • Marital status: Single     Spouse name: Not on file   • Number of children: Not on file   • Years of education: Not on file   • Highest education level: Not on file   Occupational History   • Not on file   Tobacco Use   • Smoking status: Current Every Day Smoker     Packs/day: 1.00     Years: 56.00     Pack years: 56.00     Types: Cigarettes   • Smokeless tobacco: Never Used   • Tobacco comment: started smoking at age 12, little mor than half a pack   Vaping Use   • Vaping Use: Never used   Substance and Sexual Activity   • Alcohol use: No   • Drug use: No   • Sexual activity: Never     Partners: Male     Birth control/protection: Post-Menopausal   Other Topics Concern   • Not on file   Social History Narrative   • Not on file     Social Determinants of Health     Financial Resource Strain:    • Difficulty of Paying Living Expenses:    Food Insecurity:    • Worried About Running Out of Food in the Last Year:    • Ran Out of Food in the Last Year:    Transportation Needs:    • Lack of Transportation (Medical):    • Lack of Transportation (Non-Medical):    Physical Activity:    • Days of Exercise per Week:    • Minutes of Exercise per Session:    Stress:    • Feeling of Stress :    Social Connections:    • Frequency of Communication with Friends and Family:    • Frequency of Social Gatherings with Friends and Family:    • Attends [employer] Services:    • Active Member of Clubs or Organizations:    • Attends Club or Organization Meetings:    • Marital Status:    Intimate Partner Violence:    • Fear of Current or Ex-Partner:    • Emotionally Abused:    • Physically Abused:    • Sexually  Abused:      Allergies   Allergen Reactions   • Food Hives and Nausea     oranges   • Neomycin-Polymyxin B Rash   • Pcn [Penicillins]    • Penicillin G Rash   • Sulfa Drugs      Outpatient Encounter Medications as of 9/10/2021   Medication Sig Dispense Refill   • Misc. Devices Misc One oxygen conserving device.  Use 2L of oxygen via nasal cannula with exertion 1 Each 0   • apixaban (ELIQUIS) 5mg Tab Take 1 tablet by mouth 2 times a day. 60 tablet 11   • atorvastatin (LIPITOR) 10 MG Tab Take 1 tablet by mouth every day. 90 tablet 4   • lisinopril (PRINIVIL) 10 MG Tab Take 1 tablet by mouth every day. 90 tablet 4   • triamterene/hctz (MAXZIDE-25/DYAZIDE) 37.5-25 MG Cap TAKE 1 CAPSULE BY MOUTH EVERY DAY 90 capsule 0   • Mirabegron ER (MYRBETRIQ) 50 MG TABLET SR 24 HR Myrbetriq 50 mg tablet,extended release   TAKE 1 TABLET BY MOUTH EVERY DAY     • fenofibrate (TRIGLIDE) 160 MG tablet TAKE 1 TABLET BY MOUTH EVERY DAY 90 Tab 4   • levothyroxine (SYNTHROID) 137 MCG Tab TAKE 1 TABLET BY MOUTH EVERY MORNING ON A EMPTY STOMACH 90 Tab 3   • ondansetron (ZOFRAN ODT) 4 MG TABLET DISPERSIBLE Take 4 mg by mouth as needed. Prn nausea and or vomiting     • pantoprazole (PROTONIX) 40 MG Tablet Delayed Response Take 40 mg by mouth.     • potassium chloride ER (KLOR-CON) 10 MEQ tablet Take 10 mEq by mouth.     • acetaminophen (TYLENOL) 500 MG TABS Take 500 mg by mouth 3 times a day as needed. Indications: Pain     • doxycycline (VIBRAMYCIN) 100 MG Tab Take 100 mg by mouth. (Patient not taking: Reported on 9/10/2021)       No facility-administered encounter medications on file as of 9/10/2021.     Review of Systems   Constitutional: Negative for fever, malaise/fatigue and weight loss.   Eyes: Negative for blurred vision.   Respiratory: Negative for cough and shortness of breath.    Cardiovascular: Negative for chest pain, palpitations, orthopnea, claudication, leg swelling and PND.   Gastrointestinal: Negative for abdominal pain, blood  "in stool, nausea and vomiting.   Genitourinary: Negative for dysuria, frequency and hematuria.   Musculoskeletal: Negative for falls and myalgias.   Neurological: Negative for dizziness, tingling and loss of consciousness.   Endo/Heme/Allergies: Does not bruise/bleed easily.              Objective     /68 (BP Location: Left arm, Patient Position: Sitting, BP Cuff Size: Adult)   Pulse 85   Ht 1.575 m (5' 2\")   Wt 79.4 kg (175 lb)   BMI 32.01 kg/m²     Physical Exam  Physical Exam:  Constitutional: Alert, no distress, well-groomed.  Skin: No rashes in visible areas.  Eye: Round. Conjunctiva clear, lids normal. No icterus.   ENMT: Lips pink without lesions, good dentition, moist mucous membranes. Phonation normal.  Neck: No masses, no thyromegaly. Moves freely without pain.  CV: Pulse as reported by patient  Respiratory: Unlabored respiratory effort, no cough or audible wheeze  Psych: Alert and oriented x3, normal affect and mood.      Lab Results   Component Value Date/Time    CHOLSTRLTOT 106 07/23/2020 08:43 AM    LDL 39 07/23/2020 08:43 AM    HDL 32 (A) 07/23/2020 08:43 AM    TRIGLYCERIDE 176 (H) 07/23/2020 08:43 AM       Lab Results   Component Value Date/Time    SODIUM 143 08/10/2021 09:45 AM    POTASSIUM 4.0 08/10/2021 09:45 AM    CHLORIDE 111 08/10/2021 09:45 AM    CO2 19 (L) 08/10/2021 09:45 AM    GLUCOSE 93 08/10/2021 09:45 AM    BUN 12 08/10/2021 09:45 AM    CREATININE 1.10 08/10/2021 09:45 AM     Lab Results   Component Value Date/Time    ALKPHOSPHAT 86 03/09/2021 12:48 PM    ASTSGOT 19 03/09/2021 12:48 PM    ALTSGPT 13 03/09/2021 12:48 PM    TBILIRUBIN 0.3 03/09/2021 12:48 PM      Transthoracic echocardiogram (10/6/2020):Compared to the images of the prior study done 12/06/2019, no   significant changes are noted.     Left ventricular ejection fraction is visually estimated to be 65%.  Right ventricular systolic pressure is estimated to be 28 mmHg    Cardiac event monitor (8/21/2026): Predominant " sinus rhythm average heart rate 79 bpm no concerning blocks or bradycardia.  2% burden of atrial fibrillation with an average of 82 bpm longest interval 3 hours 16 minutes.    Assessment & Plan     1. Essential hypertension     2. 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)     3. Chronic renal impairment, stage 3a (HCC)     4. Nonrheumatic aortic valve stenosis     5. High risk medication use     6. Paroxysmal atrial fibrillation (HCC)     7. Sleep apnea, unspecified type     8. Hyperlipidemia, unspecified hyperlipidemia type         Medical Decision Making: Today's Assessment/Status/Plan:        Paroxysmal Atrial Fibrillation (PAF)  - Asymptomatic, denies AF breakthrough, rate controlled.   -[id number]  - On OAC with Eliquis, continue.    Mild aortic stenosis  -Denies symptoms  -Echo reviewed per above    SHABANA; COPD  -Patient reports CPAP non-compliance  -We discussed importance of treating nocturnal hypoxia due to causing increased risk for cardiac disease; patient verbalizes understanding.  -Per sleep medicine    Hyperlipidemia  -Continue atorvastatin 10 mg daily    Hypertension  -Continue lisinopril 10 mg daily  -Continue triamteren/HCTZ 25.7-25 mg daily  -Today in office blood pressure is slightly elevated .    -Encouraged to continue home BP monitoring/log.  Patient may bring in her blood pressure cuff at next appointment to verify accuracy.  -Medication recommendations per above.    CKD3a  -Crt 1.10 (8/10/2021)  -CTM    FU in clinic in 6 months. Sooner if needed.    Patient verbalizes understanding and agrees with the plan of care.     Collaborating MD: Dr. Fish MD    PLEASE NOTE: This Note was created using voice recognition Software. I have made every reasonable attempt to correct obvious errors, but I expect that there are errors of grammar and possibly content that I did not discover before finalizing the note

## 2021-09-10 NOTE — PATIENT INSTRUCTIONS
Checking Blood Pressure:  -Blood pressure cuff, spend in the $40-65, with good return policy  -It should be automatic, upper arm, measure your arm to get the correct size, probably adult Large  -Put the cuff in place, rest arm on table near height of your heart, sit quietly for 5 min, legs uncrossed, with back support, then take your blood pressure, write it down, keep a log  -Check once a day. Ideally, 2 hours after medications.  -Can bring your cuff to at least one appointment where it can be calibrated to a manual cuff if you are concerned.  -Goal blood pressure is at least under 130/80, ideally under 120/80.  If you think your BP is overall too high, let us know in the office, we can adjust medications, can use Sensitive [employer] or call the [facility] office: [phone].  -If you feel dizzy, please check your blood pressure.  If your blood pressure is less than 90/60 with dizziness call our office at [phone].    -Continue to monitor blood pressures, heart rates, and daily weights in log provided in office today. Please bring to next appointment to review with provider.     Salt=sodium=sea salt, guidelines say stay under 2,500 mg daily   Get salt smart, start looking at labels, count it up.  Salt is hidden in everything, salad dressing, sauces, cheese, most canned food, any processed meat.

## 2021-09-14 DIAGNOSIS — Z00.00 WELLNESS EXAMINATION: ICD-10-CM

## 2021-09-20 ENCOUNTER — OFFICE VISIT (OUTPATIENT)
Dept: URGENT CARE | Facility: CLINIC | Age: 81
End: 2021-09-20
Payer: MEDICARE

## 2021-09-20 ENCOUNTER — HOSPITAL ENCOUNTER (OUTPATIENT)
Facility: MEDICAL CENTER | Age: 81
End: 2021-09-20
Attending: PHYSICIAN ASSISTANT
Payer: MEDICARE

## 2021-09-20 VITALS
DIASTOLIC BLOOD PRESSURE: 60 MMHG | SYSTOLIC BLOOD PRESSURE: 110 MMHG | OXYGEN SATURATION: 99 % | HEIGHT: 62 IN | TEMPERATURE: 97.5 F | BODY MASS INDEX: 31.83 KG/M2 | WEIGHT: 173 LBS | HEART RATE: 73 BPM

## 2021-09-20 DIAGNOSIS — R30.0 DYSURIA: ICD-10-CM

## 2021-09-20 DIAGNOSIS — R31.9 URINARY TRACT INFECTION WITH HEMATURIA, SITE UNSPECIFIED: ICD-10-CM

## 2021-09-20 DIAGNOSIS — N39.0 URINARY TRACT INFECTION WITH HEMATURIA, SITE UNSPECIFIED: ICD-10-CM

## 2021-09-20 LAB
APPEARANCE UR: CLEAR
BILIRUB UR STRIP-MCNC: NEGATIVE MG/DL
COLOR UR AUTO: YELLOW
GLUCOSE UR STRIP.AUTO-MCNC: NEGATIVE MG/DL
KETONES UR STRIP.AUTO-MCNC: NEGATIVE MG/DL
LEUKOCYTE ESTERASE UR QL STRIP.AUTO: NEGATIVE
NITRITE UR QL STRIP.AUTO: NEGATIVE
PH UR STRIP.AUTO: 5 [PH] (ref 5–8)
PROT UR QL STRIP: NEGATIVE MG/DL
RBC UR QL AUTO: NORMAL
SP GR UR STRIP.AUTO: 1.01
UROBILINOGEN UR STRIP-MCNC: 0.2 MG/DL

## 2021-09-20 PROCEDURE — 87086 URINE CULTURE/COLONY COUNT: CPT

## 2021-09-20 PROCEDURE — 81002 URINALYSIS NONAUTO W/O SCOPE: CPT | Performed by: PHYSICIAN ASSISTANT

## 2021-09-20 PROCEDURE — 99213 OFFICE O/P EST LOW 20 MIN: CPT | Performed by: PHYSICIAN ASSISTANT

## 2021-09-20 RX ORDER — CEPHALEXIN 500 MG/1
500 CAPSULE ORAL EVERY 6 HOURS
Qty: 40 CAPSULE | Refills: 0 | Status: SHIPPED | OUTPATIENT
Start: 2021-09-20 | End: 2021-09-30

## 2021-09-20 ASSESSMENT — ENCOUNTER SYMPTOMS
FLANK PAIN: 1
FEVER: 0
NAUSEA: 0
EYE DISCHARGE: 0
SORE THROAT: 0
COUGH: 0
SHORTNESS OF BREATH: 0
HEADACHES: 0
VOMITING: 0
EYE REDNESS: 0

## 2021-09-20 ASSESSMENT — FIBROSIS 4 INDEX: FIB4 SCORE: 1.33

## 2021-09-20 NOTE — PROGRESS NOTES
"Ayesha Garcia is a 80 y.o. female who presents with Dysuria (frequent urination, pain when peeing, kidney \"aching\" X1 day )        This is a new problem.  The patient presents to clinic secondary to a possible urinary tract infection x1 day.  The patient is accompanied by her granddaughter helps provide the history for today's encounter.  The patient's granddaughter states the patient has a history of recurrent urinary tract infections with a recent kidney infection that required hospitalization.    Dysuria   This is a new problem. Episode onset: x 1 day ago. The problem occurs every urination. The problem has been unchanged. The quality of the pain is described as burning. The pain is mild. There has been no fever. There is a history of pyelonephritis. Associated symptoms include flank pain (The patient reports \"aching\" to her bilateral kidney region.), frequency and urgency. Pertinent negatives include no hematuria, nausea or vomiting. She has tried acetaminophen for the symptoms.     The patient's granddaughter states the patient [facility].  The patient's granddaughter states the patient responded well to this antibiotic.      PMH:  has a past medical history of Arthritis, ASTHMA, Body mass index 40.0-44.9, adult ([facility]) (4/27/2018), Chronic renal impairment, stage 3 (moderate) ([facility]) (5/13/2019), COPD (chronic obstructive pulmonary disease) with chronic bronchitis ([facility]) (4/20/2021), Dental disorder, Dysuria (10/18/2016), Fall, Generalized edema (4/27/2018), Glaucoma, Hay fever (4/19/2016), Heart burn, High cholesterol, UTI (urinary tract infection), Hyperglycemia (11/24/2015), Hypertension, Hypothyroidism, Indigestion, Ischemic bowel disease ([facility]) (9/9/2019), Migraines, Nausea (10/14/2019), Neuropathy ([facility]), Obesity (BMI 30-39.9) (7/20/2015), Pneumonia, Routine general medical examination at a health care facility (7/20/2015), Sleep " apnea (12/2019), Snoring, Syncope (6/5/2020), Tobacco use (4/29/2019), URI (upper respiratory infection) (11/4/2015), and Urinary bladder disorder.  MEDS:   Current Outpatient Medications:   •  triamterene/hctz (MAXZIDE-25/DYAZIDE) 37.5-25 MG Cap, TAKE 1 CAPSULE BY MOUTH EVERY DAY, Disp: 90 Capsule, Rfl: 3  •  Misc. Devices Misc, One oxygen conserving device.  Use 2L of oxygen via nasal cannula with exertion, Disp: 1 Each, Rfl: 0  •  apixaban (ELIQUIS) 5mg Tab, Take 1 tablet by mouth 2 times a day., Disp: 60 tablet, Rfl: 11  •  atorvastatin (LIPITOR) 10 MG Tab, Take 1 tablet by mouth every day., Disp: 90 tablet, Rfl: 4  •  lisinopril (PRINIVIL) 10 MG Tab, Take 1 tablet by mouth every day., Disp: 90 tablet, Rfl: 4  •  Mirabegron ER (MYRBETRIQ) 50 MG TABLET SR 24 HR, Myrbetriq 50 mg tablet,extended release  TAKE 1 TABLET BY MOUTH EVERY DAY, Disp: , Rfl:   •  fenofibrate (TRIGLIDE) 160 MG tablet, TAKE 1 TABLET BY MOUTH EVERY DAY, Disp: 90 Tab, Rfl: 4  •  levothyroxine (SYNTHROID) 137 MCG Tab, TAKE 1 TABLET BY MOUTH EVERY MORNING ON A EMPTY STOMACH, Disp: 90 Tab, Rfl: 3  •  ondansetron (ZOFRAN ODT) 4 MG TABLET DISPERSIBLE, Take 4 mg by mouth as needed. Prn nausea and or vomiting, Disp: , Rfl:   •  pantoprazole (PROTONIX) 40 MG Tablet Delayed Response, Take 40 mg by mouth., Disp: , Rfl:   •  potassium chloride ER (KLOR-CON) 10 MEQ tablet, Take 10 mEq by mouth., Disp: , Rfl:   •  acetaminophen (TYLENOL) 500 MG TABS, Take 500 mg by mouth 3 times a day as needed. Indications: Pain, Disp: , Rfl:   ALLERGIES:   Allergies   Allergen Reactions   • Food Hives and Nausea     oranges   • Neomycin-Polymyxin B Rash   • Pcn [Penicillins]    • Penicillin G Rash     SURGHX:   Past Surgical History:   Procedure Laterality Date   • GASTROSCOPY  12/13/2019    Procedure: GASTROSCOPY;  Surgeon: Scottie Angel M.D.;  Location: SURGERY [facility];  Service: Gastroenterology   • COLONOSCOPY  12/13/2019    Procedure: COLONOSCOPY;   "Surgeon: Ang Angel M.D.;  Location: SURGERY [facility];  Service: Gastroenterology   • NIRU BY LAPAROSCOPY  9/2/2011    Performed by KAYLEIGH PORRAS at SURGERY SAME DAY [facility] ORS   • LUMBAR DECOMPRESSION  6/29/2009    Performed by ANG YAN at SURGERY [facility] ORS   • LUMBAR LAMINECTOMY DISKECTOMY  6/29/2009    Performed by ANG YAN at SURGERY [facility] ORS   • ABDOMINAL HYSTERECTOMY TOTAL     • GYN SURGERY      hysterectomy   • HEMORRHOIDECTOMY       SOCHX:  reports that she has been smoking cigarettes. She has a 56.00 pack-year smoking history. She has never used smokeless tobacco. She reports that she does not drink alcohol and does not use drugs.  FH: Family history was reviewed, no pertinent findings to report    Review of Systems   Constitutional: Negative for fever.   HENT: Negative for congestion, ear pain and sore throat.    Eyes: Negative for discharge and redness.   Respiratory: Negative for cough and shortness of breath.    Cardiovascular: Negative for chest pain and leg swelling.   Gastrointestinal: Negative for nausea and vomiting.   Genitourinary: Positive for dysuria, flank pain (The patient reports \"aching\" to her bilateral kidney region.), frequency and urgency. Negative for hematuria.   Musculoskeletal: Negative for joint pain.   Skin: Negative for rash.   Neurological: Negative for headaches.              Objective     /60   Pulse 73   Temp 36.4 °C (97.5 °F) (Temporal)   Ht 1.575 m (5' 2\")   Wt 78.5 kg (173 lb)   SpO2 99%   BMI 31.64 kg/m²      Physical Exam  Constitutional:       General: She is not in acute distress.     Appearance: Normal appearance. She is well-developed. She is not ill-appearing.   HENT:      Head: Normocephalic and atraumatic.      Right Ear: External ear normal.      Left Ear: External ear normal.   Eyes:      Extraocular Movements: Extraocular movements intact.      Conjunctiva/sclera: Conjunctivae normal.   Cardiovascular:      " Rate and Rhythm: Normal rate and regular rhythm.      Heart sounds: Normal heart sounds.   Pulmonary:      Effort: Pulmonary effort is normal. No respiratory distress.      Breath sounds: Normal breath sounds. No wheezing.   Abdominal:      Palpations: Abdomen is soft.      Tenderness: There is no abdominal tenderness. There is no right CVA tenderness.   Musculoskeletal:         General: Normal range of motion.      Cervical back: Normal range of motion and neck supple.   Skin:     General: Skin is warm and dry.   Neurological:      Mental Status: She is alert and oriented to person, place, and time.               Progress:  Results for orders placed or performed in visit on 09/20/21   POCT Urinalysis   Result Value Ref Range    POC Color yellow Negative    POC Appearance clear Negative    POC Leukocyte Esterase Negative Negative    POC Nitrites Negative Negative    POC Urobiligen 0.2 Negative (0.2) mg/dL    POC Protein Negative Negative mg/dL    POC Urine PH 5.0 5.0 - 8.0    POC Blood Trace-intact Negative    POC Specific Gravity 1.010 <1.005 - >1.030    POC Ketones Negative Negative mg/dL    POC Bilirubin Negative Negative mg/dL    POC Glucose Negative Negative mg/dL         Urine Culture - pending               Assessment & Plan          1. Dysuria  - POCT Urinalysis  - Urine Culture; Future    2. Urinary tract infection with hematuria, site unspecified  - cephALEXin (KEFLEX) 500 MG Cap; Take 1 Capsule by mouth every 6 hours for 10 days.  Dispense: 40 Capsule; Refill: 0    The patient's presenting symptoms and physical exam findings are consistent with dysuria likely secondary to an acute urinary tract infection.  The patient's physical exam today in clinic was normal.  No CVA tenderness was appreciated.  The patient appears in no acute distress.  The patient's vital signs are stable and within normal limits.  She is afebrile today in clinic.  The patient's POCT urinalysis today in clinic showed trace blood with  negative leukocyte esterase and negative nitrates.  We will culture the patient's urine to identify a possible bacterial source.  Based on the patient's presenting symptoms, will prescribe the patient cephalexin for a presumed urinary tract infection.  This antibiotic trace was mutually decided between myself, the patient, and the patient's granddaughter based on the patient's history.  Advised the patient and her granddaughter to monitor for worsening signs and/or symptoms.  I have low clinical suspicion for acute pyelonephritis at this time.  Recommend OTC medications and supportive care for symptomatic management.  Recommend the patient follow-up with PCP as needed.  Discussed return precautions with the patient and her granddaughter, and they verbalized understanding.      Differential diagnoses, supportive care, and indications for immediate follow-up discussed with patient.   Instructed to return to clinic or nearest emergency department for any change in condition, further concerns, or worsening of symptoms.    OTC Tylenol or Motrin for fever/discomfort.  Drink plenty of fluids  Follow-up with PCP  Return to clinic or go to the ED if symptoms worsen or fail to improve, or if the patient should develop worsening/increasing urinary symptoms, hematuria, flank pain, abdominal pain, nausea/vomiting, fever/chills, and/or any concerning symptoms.    Discussed plan with the patient and her granddaughter, and they agrees with the above.    I personally reviewed prior external notes and test results pertinent to today's visit.  I have independently reviewed and interpreted all diagnostics ordered during this urgent care visit.     Time spent evaluating this patient was at least 30 minutes and includes preparing for visit, obtaining history, exam and evaluation, ordering labs/tests/procedures/medications, independent interpretation, and counseling/education.    Please note that this dictation was created using voice  recognition software. I have made every reasonable attempt to correct obvious errors, but I expect that there may be errors of grammar and possibly content that I did not discover before finalizing the note.     This note was electronically signed by Jessica Abdi PA-C

## 2021-09-23 LAB
BACTERIA UR CULT: NORMAL
SIGNIFICANT IND 70042: NORMAL
SITE SITE: NORMAL
SOURCE SOURCE: NORMAL

## 2021-09-28 ENCOUNTER — HOSPITAL ENCOUNTER (OUTPATIENT)
Facility: MEDICAL CENTER | Age: 81
End: 2021-09-28
Attending: PHYSICIAN ASSISTANT
Payer: MEDICARE

## 2021-09-28 PROCEDURE — 87086 URINE CULTURE/COLONY COUNT: CPT

## 2021-10-01 LAB
BACTERIA UR CULT: NORMAL
SIGNIFICANT IND 70042: NORMAL
SITE SITE: NORMAL
SOURCE SOURCE: NORMAL

## 2021-10-28 ENCOUNTER — APPOINTMENT (OUTPATIENT)
Dept: RADIOLOGY | Facility: MEDICAL CENTER | Age: 81
End: 2021-10-28
Attending: NURSE PRACTITIONER
Payer: MEDICARE

## 2021-10-28 DIAGNOSIS — M54.16 LUMBAR RADICULOPATHY: ICD-10-CM

## 2021-10-28 PROCEDURE — 72148 MRI LUMBAR SPINE W/O DYE: CPT | Mod: MH

## 2021-11-02 ENCOUNTER — HOSPITAL ENCOUNTER (OUTPATIENT)
Dept: LAB | Facility: MEDICAL CENTER | Age: 81
End: 2021-11-02
Attending: STUDENT IN AN ORGANIZED HEALTH CARE EDUCATION/TRAINING PROGRAM
Payer: MEDICARE

## 2021-11-02 ENCOUNTER — TELEPHONE (OUTPATIENT)
Dept: MEDICAL GROUP | Facility: MEDICAL CENTER | Age: 81
End: 2021-11-02

## 2021-11-02 DIAGNOSIS — N18.31 CHRONIC RENAL IMPAIRMENT, STAGE 3A: ICD-10-CM

## 2021-11-02 DIAGNOSIS — E03.9 HYPOTHYROIDISM, UNSPECIFIED TYPE: ICD-10-CM

## 2021-11-02 DIAGNOSIS — I10 ESSENTIAL HYPERTENSION: ICD-10-CM

## 2021-11-02 DIAGNOSIS — Z00.00 WELLNESS EXAMINATION: ICD-10-CM

## 2021-11-02 DIAGNOSIS — E78.5 HYPERLIPIDEMIA, UNSPECIFIED HYPERLIPIDEMIA TYPE: ICD-10-CM

## 2021-11-02 NOTE — TELEPHONE ENCOUNTER
No, there were some lab orders from [facility], seems like the lab descontinue them for some reason... the labs were CBC Without differential, CMP, TSH, Free Thyroxine, and Lipid Profile

## 2021-11-02 NOTE — TELEPHONE ENCOUNTER
1. Caller Name: [facility]                   Called stating labwork was ordered for patient but the coding needs to be changed. Current lab codes are not coved by Medicare              How would the patient prefer to be contacted with a response: Phone call do NOT leave a detailed message

## 2021-11-09 ENCOUNTER — HOSPITAL ENCOUNTER (OUTPATIENT)
Dept: LAB | Facility: MEDICAL CENTER | Age: 81
End: 2021-11-09
Attending: FAMILY MEDICINE
Payer: MEDICARE

## 2021-11-09 DIAGNOSIS — N18.31 CHRONIC RENAL IMPAIRMENT, STAGE 3A: ICD-10-CM

## 2021-11-09 DIAGNOSIS — I10 ESSENTIAL HYPERTENSION: ICD-10-CM

## 2021-11-09 DIAGNOSIS — E78.5 HYPERLIPIDEMIA, UNSPECIFIED HYPERLIPIDEMIA TYPE: ICD-10-CM

## 2021-11-09 DIAGNOSIS — E03.9 HYPOTHYROIDISM, UNSPECIFIED TYPE: ICD-10-CM

## 2021-11-09 LAB
ALBUMIN SERPL BCP-MCNC: 4 G/DL (ref 3.2–4.9)
ALBUMIN/GLOB SERPL: 1.4 G/DL
ALP SERPL-CCNC: 61 U/L (ref 30–99)
ALT SERPL-CCNC: 7 U/L (ref 2–50)
ANION GAP SERPL CALC-SCNC: 11 MMOL/L (ref 7–16)
AST SERPL-CCNC: 10 U/L (ref 12–45)
BASOPHILS # BLD AUTO: 0.9 % (ref 0–1.8)
BASOPHILS # BLD: 0.05 K/UL (ref 0–0.12)
BILIRUB SERPL-MCNC: 0.2 MG/DL (ref 0.1–1.5)
BUN SERPL-MCNC: 20 MG/DL (ref 8–22)
CALCIUM SERPL-MCNC: 9.5 MG/DL (ref 8.5–10.5)
CHLORIDE SERPL-SCNC: 106 MMOL/L (ref 96–112)
CHOLEST SERPL-MCNC: 107 MG/DL (ref 100–199)
CO2 SERPL-SCNC: 21 MMOL/L (ref 20–33)
CREAT SERPL-MCNC: 1.32 MG/DL (ref 0.5–1.4)
EOSINOPHIL # BLD AUTO: 0.16 K/UL (ref 0–0.51)
EOSINOPHIL NFR BLD: 2.9 % (ref 0–6.9)
ERYTHROCYTE [DISTWIDTH] IN BLOOD BY AUTOMATED COUNT: 44.2 FL (ref 35.9–50)
FASTING STATUS PATIENT QL REPORTED: NORMAL
GLOBULIN SER CALC-MCNC: 2.8 G/DL (ref 1.9–3.5)
GLUCOSE SERPL-MCNC: 99 MG/DL (ref 65–99)
HCT VFR BLD AUTO: 41.7 % (ref 37–47)
HDLC SERPL-MCNC: 31 MG/DL
HGB BLD-MCNC: 13.3 G/DL (ref 12–16)
IMM GRANULOCYTES # BLD AUTO: 0.03 K/UL (ref 0–0.11)
IMM GRANULOCYTES NFR BLD AUTO: 0.5 % (ref 0–0.9)
LDLC SERPL CALC-MCNC: 45 MG/DL
LYMPHOCYTES # BLD AUTO: 1.37 K/UL (ref 1–4.8)
LYMPHOCYTES NFR BLD: 24.7 % (ref 22–41)
MCH RBC QN AUTO: 30.1 PG (ref 27–33)
MCHC RBC AUTO-ENTMCNC: 31.9 G/DL (ref 33.6–35)
MCV RBC AUTO: 94.3 FL (ref 81.4–97.8)
MONOCYTES # BLD AUTO: 0.58 K/UL (ref 0–0.85)
MONOCYTES NFR BLD AUTO: 10.5 % (ref 0–13.4)
NEUTROPHILS # BLD AUTO: 3.36 K/UL (ref 2–7.15)
NEUTROPHILS NFR BLD: 60.5 % (ref 44–72)
NRBC # BLD AUTO: 0 K/UL
NRBC BLD-RTO: 0 /100 WBC
PLATELET # BLD AUTO: 313 K/UL (ref 164–446)
PMV BLD AUTO: 9.3 FL (ref 9–12.9)
POTASSIUM SERPL-SCNC: 4.5 MMOL/L (ref 3.6–5.5)
PROT SERPL-MCNC: 6.8 G/DL (ref 6–8.2)
RBC # BLD AUTO: 4.42 M/UL (ref 4.2–5.4)
SODIUM SERPL-SCNC: 138 MMOL/L (ref 135–145)
T4 FREE SERPL-MCNC: 2.13 NG/DL (ref 0.93–1.7)
TRIGL SERPL-MCNC: 153 MG/DL (ref 0–149)
TSH SERPL DL<=0.005 MIU/L-ACNC: 0.11 UIU/ML (ref 0.38–5.33)
WBC # BLD AUTO: 5.6 K/UL (ref 4.8–10.8)

## 2021-11-09 PROCEDURE — 85025 COMPLETE CBC W/AUTO DIFF WBC: CPT

## 2021-11-09 PROCEDURE — 80053 COMPREHEN METABOLIC PANEL: CPT

## 2021-11-09 PROCEDURE — 80061 LIPID PANEL: CPT

## 2021-11-09 PROCEDURE — 36415 COLL VENOUS BLD VENIPUNCTURE: CPT

## 2021-11-09 PROCEDURE — 84439 ASSAY OF FREE THYROXINE: CPT

## 2021-11-09 PROCEDURE — 84443 ASSAY THYROID STIM HORMONE: CPT

## 2021-11-17 ENCOUNTER — OFFICE VISIT (OUTPATIENT)
Dept: MEDICAL GROUP | Facility: MEDICAL CENTER | Age: 81
End: 2021-11-17
Payer: MEDICARE

## 2021-11-17 VITALS
OXYGEN SATURATION: 99 % | HEART RATE: 80 BPM | WEIGHT: 180 LBS | DIASTOLIC BLOOD PRESSURE: 70 MMHG | BODY MASS INDEX: 33.13 KG/M2 | RESPIRATION RATE: 16 BRPM | HEIGHT: 62 IN | SYSTOLIC BLOOD PRESSURE: 122 MMHG | TEMPERATURE: 97.6 F

## 2021-11-17 DIAGNOSIS — N18.32 CHRONIC RENAL IMPAIRMENT, STAGE 3B: Chronic | ICD-10-CM

## 2021-11-17 DIAGNOSIS — E03.9 HYPOTHYROIDISM, UNSPECIFIED TYPE: ICD-10-CM

## 2021-11-17 DIAGNOSIS — I48.0 PAROXYSMAL ATRIAL FIBRILLATION (HCC): Chronic | ICD-10-CM

## 2021-11-17 DIAGNOSIS — R30.0 DYSURIA: ICD-10-CM

## 2021-11-17 DIAGNOSIS — F03.90 DEMENTIA WITHOUT BEHAVIORAL DISTURBANCE, UNSPECIFIED DEMENTIA TYPE: Chronic | ICD-10-CM

## 2021-11-17 PROBLEM — N17.9 ACUTE KIDNEY INJURY (HCC): Chronic | Status: RESOLVED | Noted: 2021-07-23 | Resolved: 2021-11-17

## 2021-11-17 PROBLEM — K55.9 ISCHEMIC BOWEL DISEASE (HCC): Chronic | Status: ACTIVE | Noted: 2019-09-09

## 2021-11-17 PROBLEM — N18.30 CHRONIC RENAL IMPAIRMENT, STAGE 3 (MODERATE): Chronic | Status: ACTIVE | Noted: 2019-05-13

## 2021-11-17 PROBLEM — J44.89 COPD (CHRONIC OBSTRUCTIVE PULMONARY DISEASE) WITH CHRONIC BRONCHITIS (HCC): Chronic | Status: ACTIVE | Noted: 2021-04-20

## 2021-11-17 PROBLEM — N17.9 ACUTE KIDNEY INJURY (HCC): Chronic | Status: ACTIVE | Noted: 2021-07-23

## 2021-11-17 PROBLEM — J96.11 CHRONIC RESPIRATORY FAILURE WITH HYPOXIA (HCC): Chronic | Status: ACTIVE | Noted: 2021-08-02

## 2021-11-17 PROBLEM — E66.01 MORBID OBESITY (HCC): Chronic | Status: ACTIVE | Noted: 2018-04-27

## 2021-11-17 LAB
APPEARANCE UR: NORMAL
BILIRUB UR STRIP-MCNC: NORMAL MG/DL
COLOR UR AUTO: YELLOW
GLUCOSE UR STRIP.AUTO-MCNC: NORMAL MG/DL
KETONES UR STRIP.AUTO-MCNC: NORMAL MG/DL
LEUKOCYTE ESTERASE UR QL STRIP.AUTO: NORMAL
NITRITE UR QL STRIP.AUTO: NORMAL
PH UR STRIP.AUTO: 5 [PH] (ref 5–8)
PROT UR QL STRIP: NORMAL MG/DL
RBC UR QL AUTO: NORMAL
SP GR UR STRIP.AUTO: 1.01
UROBILINOGEN UR STRIP-MCNC: 0.2 MG/DL

## 2021-11-17 PROCEDURE — 99213 OFFICE O/P EST LOW 20 MIN: CPT | Performed by: STUDENT IN AN ORGANIZED HEALTH CARE EDUCATION/TRAINING PROGRAM

## 2021-11-17 PROCEDURE — 81002 URINALYSIS NONAUTO W/O SCOPE: CPT | Performed by: STUDENT IN AN ORGANIZED HEALTH CARE EDUCATION/TRAINING PROGRAM

## 2021-11-17 RX ORDER — LEVOTHYROXINE SODIUM 0.12 MG/1
125 TABLET ORAL
Qty: 30 TABLET | Refills: 11 | Status: SHIPPED | OUTPATIENT
Start: 2021-11-17 | End: 2021-12-15

## 2021-11-17 RX ORDER — ESTRADIOL 0.1 MG/G
CREAM VAGINAL
Status: ON HOLD | COMMUNITY
End: 2022-09-23

## 2021-11-17 RX ORDER — PHENAZOPYRIDINE HYDROCHLORIDE 200 MG/1
200 TABLET, FILM COATED ORAL 3 TIMES DAILY
Qty: 6 TABLET | Refills: 0 | Status: SHIPPED | OUTPATIENT
Start: 2021-11-17 | End: 2021-11-19

## 2021-11-17 RX ORDER — CONJUGATED ESTROGENS 0.62 MG/G
CREAM VAGINAL
COMMUNITY
End: 2022-07-20

## 2021-11-17 RX ORDER — HYDROXYZINE HYDROCHLORIDE 25 MG/1
25 TABLET, FILM COATED ORAL
COMMUNITY

## 2021-11-17 RX ORDER — HYDROCODONE BITARTRATE AND ACETAMINOPHEN 7.5; 325 MG/1; MG/1
1 TABLET ORAL EVERY 8 HOURS PRN
COMMUNITY
Start: 2021-11-03

## 2021-11-17 RX ORDER — HYDROXYZINE HYDROCHLORIDE 25 MG/1
25 TABLET, FILM COATED ORAL 3 TIMES DAILY PRN
COMMUNITY
End: 2021-11-17

## 2021-11-17 RX ORDER — DIAZEPAM 5 MG/1
TABLET ORAL
COMMUNITY
Start: 2021-08-27 | End: 2022-07-20

## 2021-11-17 RX ORDER — HYDROCHLOROTHIAZIDE 12.5 MG/1
12.5 TABLET ORAL DAILY
COMMUNITY
End: 2022-07-20

## 2021-11-17 RX ORDER — LATANOPROST 50 UG/ML
1 SOLUTION/ DROPS OPHTHALMIC NIGHTLY
COMMUNITY

## 2021-11-17 ASSESSMENT — FIBROSIS 4 INDEX: FIB4 SCORE: 1

## 2021-11-17 NOTE — PROGRESS NOTES
"Subjective:     CC: Dysuria, hypothyroid    HPI:   Margaret presents today with   Dysuria  Patient presents today for concerns about dysuria.  Patient states that she has burning when she urinates.  Patient denies pain in the flanks.  Patient denies increased frequency.  Patient states that she always has urgency.  Patient's last 2 urine cultures have been negative despite positive leukocytes.  Patient is following with urology for this problem.    Memory changes  Patient started expresses concern about memory changes today.  Daughter is concerned that memory is worse than what they noticed due to patient being stuck in routine.  Discussed doing [place] at next visit and considering referral to neurology.  Patient continues to live alone.  Patient has stopped driving but recently got a motorized scooter to travel 1-2 blocks to her friends houses.    Hypothyroid  Patient's recent labs show low TSH and elevated T4.  Will decrease levothyroxine from 137 mcg to 125 mcg daily.  Will recheck in 3 months.    CKD  Patient's kidney function on recent labs has decreased to 38.  Patient continues to avoid nephrotoxins and increase hydration.  Patient is following with nephrology.    A. Fib  Patient does not note any other episodes of A. fib.  Patient is following with cardiology.  Patient taking Eliquis twice daily.        ROS:  Gen: no fevers/chills  Pulm: no sob, no cough  CV: no chest pain, no palpitations  GI: no nausea/vomiting, no diarrhea  Neuro: no headaches    Objective:     Exam:  /70 (BP Location: Right arm, Patient Position: Sitting, BP Cuff Size: Adult)   Pulse 80   Temp 36.4 °C (97.6 °F) (Temporal)   Resp 16   Ht 1.575 m (5' 2\")   Wt 81.6 kg (180 lb)   SpO2 99%   BMI 32.92 kg/m²  Body mass index is 32.92 kg/m².    Gen: Alert and oriented, No apparent distress.  Neck: Neck is supple without lymphadenopathy.  Lungs: Normal effort, CTA bilaterally, no wheezes, rhonchi, or rales  CV: Regular rate and rhythm. " No murmurs, rubs, or gallops.  Ext: No clubbing, cyanosis, edema.      Assessment & Plan:     81 y.o. female with the following -   1. Hypothyroidism, unspecified type  Recent labs show elevated T4 and decreased TSH.  Will reduce levothyroxine from 137 to 125 mcg daily.  - levothyroxine (SYNTHROID) 125 MCG Tab; Take 1 Tablet by mouth every morning on an empty stomach.  Dispense: 30 Tablet; Refill: 11    2. Dysuria  Patient presents today with concerns about dysuria.  We will treat with Pyridium for pain.  Discussed with patient that based on urinalysis in office today showing only small leukocytes and previous urine cultures not growing bacteria, will not treat with antibiotics today.  Patient encouraged to follow-up with urologist and continue to maintain good hydration and hygiene.  - POCT Urinalysis    3. Dementia without behavioral disturbance, unspecified dementia type (HCC)  Chronic, stable.  Patient's daughter expresses concerns about her dementia and concerns that it is worsening without being noticed due to patient constantly doing the same routine.  Discussed doing a [place] exam at next visit to further evaluate memory changes and determine need for referral to neurology.    4. Chronic renal impairment, stage 3b (HCC)  Chronic, stable.  Patient's recent GFR at 38.  Patient continues to avoid nephrotoxins.  Patient follows with nephrology.    5. Paroxysmal atrial fibrillation (HCC)  Chronic, stable.  Patient continues to follow with cardiology.  Patient continues on Eliquis.        Return in about 6 months (around 5/17/2022).    Please note that this dictation was created using voice recognition software. I have made every reasonable attempt to correct obvious errors, but I expect that there are errors of grammar and possibly content that I did not discover before finalizing the note.

## 2022-01-24 ENCOUNTER — PATIENT MESSAGE (OUTPATIENT)
Dept: MEDICAL GROUP | Facility: MEDICAL CENTER | Age: 82
End: 2022-01-24

## 2022-01-25 DIAGNOSIS — K21.9 GASTROESOPHAGEAL REFLUX DISEASE WITHOUT ESOPHAGITIS: ICD-10-CM

## 2022-01-25 RX ORDER — PANTOPRAZOLE SODIUM 40 MG/1
40 TABLET, DELAYED RELEASE ORAL 2 TIMES DAILY
Qty: 180 TABLET | Refills: 2 | Status: SHIPPED | OUTPATIENT
Start: 2022-01-25 | End: 2022-10-17

## 2022-01-25 NOTE — TELEPHONE ENCOUNTER
From: Margaret Garcia  To: Physician Assistant Payton Galo  Sent: 1/24/2022 7:55 PM PST  Subject: Pantoprazole refill    This message is being sent by Janis Lopez on behalf of Margaret Garcia.    Hi, my grama has been on Pantoprazole 40mg twice a day (before meals) for about 2 years now. It was originally prescribed by her GI Dr. I was wondering if you can send in refills for that? She's not having any gi problems, and last February they did scope, and colonoscopy, and CT scan. Everything was normal. She's just on it as maintenance drug. Thanks!

## 2022-03-04 DIAGNOSIS — E03.9 HYPOTHYROIDISM, UNSPECIFIED TYPE: ICD-10-CM

## 2022-04-22 PROCEDURE — 93248 EXT ECG>7D<15D REV&INTERPJ: CPT | Performed by: INTERNAL MEDICINE

## 2022-05-04 ENCOUNTER — HOSPITAL ENCOUNTER (OUTPATIENT)
Facility: MEDICAL CENTER | Age: 82
End: 2022-05-04
Attending: PHYSICIAN ASSISTANT
Payer: MEDICARE

## 2022-05-04 PROCEDURE — 87186 SC STD MICRODIL/AGAR DIL: CPT

## 2022-05-04 PROCEDURE — 87086 URINE CULTURE/COLONY COUNT: CPT

## 2022-05-04 PROCEDURE — 87077 CULTURE AEROBIC IDENTIFY: CPT

## 2022-05-13 ENCOUNTER — HOSPITAL ENCOUNTER (OUTPATIENT)
Dept: LAB | Facility: MEDICAL CENTER | Age: 82
End: 2022-05-13
Attending: STUDENT IN AN ORGANIZED HEALTH CARE EDUCATION/TRAINING PROGRAM
Payer: MEDICARE

## 2022-05-13 DIAGNOSIS — E03.9 HYPOTHYROIDISM, UNSPECIFIED TYPE: ICD-10-CM

## 2022-05-13 PROCEDURE — 84443 ASSAY THYROID STIM HORMONE: CPT

## 2022-05-13 PROCEDURE — 36415 COLL VENOUS BLD VENIPUNCTURE: CPT

## 2022-05-13 PROCEDURE — 84439 ASSAY OF FREE THYROXINE: CPT

## 2022-05-14 LAB
T4 FREE SERPL-MCNC: 2.1 NG/DL (ref 0.93–1.7)
TSH SERPL DL<=0.005 MIU/L-ACNC: 1.31 UIU/ML (ref 0.38–5.33)

## 2022-05-18 ENCOUNTER — OFFICE VISIT (OUTPATIENT)
Dept: MEDICAL GROUP | Facility: MEDICAL CENTER | Age: 82
End: 2022-05-18
Payer: MEDICARE

## 2022-05-18 VITALS
DIASTOLIC BLOOD PRESSURE: 74 MMHG | TEMPERATURE: 98.2 F | BODY MASS INDEX: 34.6 KG/M2 | RESPIRATION RATE: 16 BRPM | SYSTOLIC BLOOD PRESSURE: 110 MMHG | HEIGHT: 62 IN | OXYGEN SATURATION: 100 % | WEIGHT: 188 LBS | HEART RATE: 89 BPM

## 2022-05-18 DIAGNOSIS — E03.9 HYPOTHYROIDISM, UNSPECIFIED TYPE: ICD-10-CM

## 2022-05-18 DIAGNOSIS — N18.32 CHRONIC RENAL IMPAIRMENT, STAGE 3B: ICD-10-CM

## 2022-05-18 DIAGNOSIS — R41.3 MEMORY CHANGE: ICD-10-CM

## 2022-05-18 PROCEDURE — 99214 OFFICE O/P EST MOD 30 MIN: CPT | Performed by: STUDENT IN AN ORGANIZED HEALTH CARE EDUCATION/TRAINING PROGRAM

## 2022-05-18 RX ORDER — PROMETHAZINE HYDROCHLORIDE 25 MG/1
TABLET ORAL
COMMUNITY
Start: 2022-04-18 | End: 2022-05-20

## 2022-05-18 RX ORDER — NALOXONE HYDROCHLORIDE 4 MG/.1ML
SPRAY NASAL
Status: ON HOLD | COMMUNITY
End: 2022-09-23

## 2022-05-18 RX ORDER — LEVOTHYROXINE SODIUM 112 UG/1
112 TABLET ORAL
Qty: 30 TABLET | Refills: 11 | Status: SHIPPED | OUTPATIENT
Start: 2022-05-18 | End: 2022-06-13

## 2022-05-18 ASSESSMENT — FIBROSIS 4 INDEX: FIB4 SCORE: 1

## 2022-05-18 ASSESSMENT — PATIENT HEALTH QUESTIONNAIRE - PHQ9: CLINICAL INTERPRETATION OF PHQ2 SCORE: 0

## 2022-05-18 NOTE — PROGRESS NOTES
"Subjective:     CC: Memory, hypothyroid    HPI:   Margaret presents today with    Memory changes  Patient and her daughters continue to be concerned about memory changes.  Daughters note that memory continues to worsen.  They are nonspecific and describing memory changes but no difficulty with remembering, zoned out, and confusion.  They deny any behavior changes.  MoCA completed in office today.  Patient unable to draw a cube and only remembered 3 out of 5 recall.    Hypothyroid  Patient with low TSH on last labs, and elevated T4.  Patient's levothyroxine was reduced to 125 mcg.  Patient's T4 remains slightly elevated but TSH within normal limits.  Patient denies symptoms of hyper/hypothyroid.    Dysuria  Patient continues to follow with urology.        ROS:  Gen: no fevers/chills  Pulm: no sob, no cough  CV: no chest pain, no palpitations  GI: no nausea/vomiting, no diarrhea  Neuro: no headaches      Objective:     Exam:  /74 (BP Location: Left arm, Patient Position: Sitting, BP Cuff Size: Adult)   Pulse 89   Temp 36.8 °C (98.2 °F) (Temporal)   Resp 16   Ht 1.575 m (5' 2\")   Wt 85.3 kg (188 lb)   SpO2 100%   BMI 34.39 kg/m²  Body mass index is 34.39 kg/m².    Gen: Alert and oriented, No apparent distress.  Neck: Neck is supple without lymphadenopathy.  Lungs: Normal effort, CTA bilaterally, no wheezes, rhonchi, or rales  CV: Regular rate and rhythm. No murmurs, rubs, or gallops.  Ext: No clubbing, cyanosis, edema.      Assessment & Plan:     81 y.o. female with the following -     1. Hypothyroidism, unspecified type  Chronic, stable.  T4 continues to be elevated or overtreated.  Will reduce levothyroxine from 125 mcg 112 mcg.  Advised to follow-up in 3 months for repeat labs.  - levothyroxine (SYNTHROID) 112 MCG Tab; Take 1 Tablet by mouth every morning on an empty stomach.  Dispense: 30 Tablet; Refill: 11  - TSH; Future  - FREE THYROXINE; Future    2. Chronic renal impairment, stage 3b (HCC  - Basic " Metabolic Panel; Future    3. Memory change  MoCA completed in office today.  Patient did very well scoring 27 out of 30.  Patient only missing points for recall of 2 words and difficulty with drawing cube.  Daughters note that although score reflects good cognition, patient is worse in the afternoons.  They do not believe that patient would have performed as well if she had a 3 or 4:00 appointment.  They would like further evaluation with neurology.  - Referral to Neurology      Return in about 3 months (around 8/18/2022).    Please note that this dictation was created using voice recognition software. I have made every reasonable attempt to correct obvious errors, but I expect that there are errors of grammar and possibly content that I did not discover before finalizing the note.

## 2022-07-19 ENCOUNTER — HOSPITAL ENCOUNTER (OUTPATIENT)
Dept: RADIOLOGY | Facility: MEDICAL CENTER | Age: 82
End: 2022-07-19
Payer: MEDICARE

## 2022-07-19 DIAGNOSIS — R11.2 NAUSEA AND VOMITING, INTRACTABILITY OF VOMITING NOT SPECIFIED, UNSPECIFIED VOMITING TYPE: ICD-10-CM

## 2022-07-19 DIAGNOSIS — K21.9 GASTROESOPHAGEAL REFLUX DISEASE, UNSPECIFIED WHETHER ESOPHAGITIS PRESENT: ICD-10-CM

## 2022-07-19 DIAGNOSIS — K31.89 GASTROPTOSIS: ICD-10-CM

## 2022-07-19 DIAGNOSIS — I10 ESSENTIAL HYPERTENSION, MALIGNANT: ICD-10-CM

## 2022-07-19 DIAGNOSIS — R19.7 DIARRHEA OF PRESUMED INFECTIOUS ORIGIN: ICD-10-CM

## 2022-07-19 DIAGNOSIS — E03.9 PRIMARY HYPOTHYROIDISM: ICD-10-CM

## 2022-07-19 DIAGNOSIS — J45.20 MILD INTERMITTENT ASTHMA, UNSPECIFIED WHETHER COMPLICATED: ICD-10-CM

## 2022-07-19 DIAGNOSIS — E66.01 CLASS 2 SEVERE OBESITY WITH SERIOUS COMORBIDITY IN ADULT, UNSPECIFIED BMI, UNSPECIFIED OBESITY TYPE (HCC): ICD-10-CM

## 2022-07-19 PROCEDURE — A9541 TC99M SULFUR COLLOID: HCPCS

## 2022-07-20 ENCOUNTER — OFFICE VISIT (OUTPATIENT)
Dept: CARDIOLOGY | Facility: MEDICAL CENTER | Age: 82
End: 2022-07-20
Payer: MEDICARE

## 2022-07-20 ENCOUNTER — TELEPHONE (OUTPATIENT)
Dept: CARDIOLOGY | Facility: MEDICAL CENTER | Age: 82
End: 2022-07-20

## 2022-07-20 VITALS
RESPIRATION RATE: 16 BRPM | SYSTOLIC BLOOD PRESSURE: 110 MMHG | HEIGHT: 62 IN | WEIGHT: 184 LBS | BODY MASS INDEX: 33.86 KG/M2 | OXYGEN SATURATION: 99 % | HEART RATE: 84 BPM | DIASTOLIC BLOOD PRESSURE: 60 MMHG

## 2022-07-20 DIAGNOSIS — G47.30 SLEEP APNEA, UNSPECIFIED TYPE: ICD-10-CM

## 2022-07-20 DIAGNOSIS — I10 ESSENTIAL HYPERTENSION: ICD-10-CM

## 2022-07-20 DIAGNOSIS — E78.5 HYPERLIPIDEMIA, UNSPECIFIED HYPERLIPIDEMIA TYPE: ICD-10-CM

## 2022-07-20 DIAGNOSIS — J44.89 COPD (CHRONIC OBSTRUCTIVE PULMONARY DISEASE) WITH CHRONIC BRONCHITIS (HCC): ICD-10-CM

## 2022-07-20 DIAGNOSIS — I10 ESSENTIAL HYPERTENSION, BENIGN: ICD-10-CM

## 2022-07-20 DIAGNOSIS — K55.9 ISCHEMIC BOWEL DISEASE (HCC): ICD-10-CM

## 2022-07-20 DIAGNOSIS — I48.0 PAROXYSMAL ATRIAL FIBRILLATION (HCC): ICD-10-CM

## 2022-07-20 DIAGNOSIS — N28.9 RENAL INSUFFICIENCY SYNDROME: ICD-10-CM

## 2022-07-20 DIAGNOSIS — Z72.0 TOBACCO USE: ICD-10-CM

## 2022-07-20 DIAGNOSIS — I35.0 NONRHEUMATIC AORTIC VALVE STENOSIS: ICD-10-CM

## 2022-07-20 DIAGNOSIS — I35.0 MILD AORTIC STENOSIS: ICD-10-CM

## 2022-07-20 DIAGNOSIS — N18.31 CHRONIC RENAL IMPAIRMENT, STAGE 3A: ICD-10-CM

## 2022-07-20 DIAGNOSIS — R06.09 DOE (DYSPNEA ON EXERTION): ICD-10-CM

## 2022-07-20 PROCEDURE — 99214 OFFICE O/P EST MOD 30 MIN: CPT | Performed by: NURSE PRACTITIONER

## 2022-07-20 RX ORDER — LISINOPRIL 10 MG/1
10 TABLET ORAL
Qty: 90 TABLET | Refills: 3 | Status: SHIPPED | OUTPATIENT
Start: 2022-07-20 | End: 2022-09-28

## 2022-07-20 RX ORDER — FENOFIBRATE 160 MG/1
160 TABLET ORAL
Qty: 90 TABLET | Refills: 3 | Status: SHIPPED | OUTPATIENT
Start: 2022-07-20 | End: 2023-09-22

## 2022-07-20 RX ORDER — ATORVASTATIN CALCIUM 10 MG/1
10 TABLET, FILM COATED ORAL
Qty: 90 TABLET | Refills: 3 | Status: SHIPPED | OUTPATIENT
Start: 2022-07-20 | End: 2023-08-25 | Stop reason: SDUPTHER

## 2022-07-20 ASSESSMENT — ENCOUNTER SYMPTOMS
DIZZINESS: 0
FEVER: 0
VOMITING: 0
SHORTNESS OF BREATH: 1
ABDOMINAL PAIN: 0
TINGLING: 0
PND: 0
ORTHOPNEA: 0
CLAUDICATION: 0
WEIGHT LOSS: 0
MYALGIAS: 0
BLURRED VISION: 0
LOSS OF CONSCIOUSNESS: 0
BLOOD IN STOOL: 0
BRUISES/BLEEDS EASILY: 0
NAUSEA: 0
BACK PAIN: 1
COUGH: 0
FALLS: 0
PALPITATIONS: 0

## 2022-07-20 ASSESSMENT — FIBROSIS 4 INDEX: FIB4 SCORE: 1

## 2022-07-20 NOTE — TELEPHONE ENCOUNTER
Pulmonary Functions Test has been faxed over to our Pulmonary Department: Fax: [phone], Phone: [phone].

## 2022-07-20 NOTE — PROGRESS NOTES
Chief Complaint   Patient presents with   • Atrial Fibrillation     F/V Dx: Paroxysmal atrial fibrillation (HCC)   • Hyperlipidemia   • Hypertension       Subjective     Ella Garcia is a 80 y.o. female who presents today hypertension, proximal A. fib, mild aortic stenosis follow-up.  Patient was last seen by myself on 9/10/2021and Dr. Kapoor on 7/30/2021.  Since patient was last seen she was also seen by nephrology and geriatric specialist.    In addition to above patient has past medical history of COPD, dysuria, SHABANA.    Patient reports she has been doing well, but continues to experience SCHERER at short distances, patient notes her walking distance is limited due to back pain.  Otherwise, patient denies chest pain, palpitations, dizziness/lightheadedness, orthopnea, PND, or edema.  Patient denies signs or symptoms of bleeding.  Patient appears euvolemic on exam.  Patient continues to smoke a pack per day.  We discussed the importance of cessation.  Patient is also not using her CPAP machine.  Patient did not monitor her blood pressure at home but notes its always good when she comes in for doctor's visits.  Patient reports she has not addressed her lung function with her primary care provider, we will obtain pulmonary function tests for initial evaluation.  We will also obtain follow-up echocardiogram for SCHERER and mild AS noted on previous echo with systolic murmur heard on auscultation.  Otherwise, we will continue her home medications as prescribed.      Past Medical History:   Diagnosis Date   • Arthritis     knees, and hips-osteo   • ASTHMA    • Body mass index 40.0-44.9, adult ([facility]) 4/27/2018   • Chronic renal impairment, stage 3 (moderate) ([facility]) 5/13/2019   • COPD (chronic obstructive pulmonary disease) with chronic bronchitis ([facility]) 4/20/2021   • Dental disorder     upper   • Dysuria 10/18/2016   • Fall    • Generalized edema 4/27/2018   • Glaucoma     bilateral   • Hay fever 4/19/2016   • Heart burn    •  High cholesterol    • Hx: UTI (urinary tract infection)    • Hyperglycemia 11/24/2015   • Hypertension    • Hypothyroidism    • Indigestion    • Ischemic bowel disease (HCC) 9/9/2019   • Migraines     pt has similar symptoms with migraines not for a long time though   • Nausea 10/14/2019   • Neuropathy (HCC)    • Obesity (BMI 30-39.9) 7/20/2015   • Pneumonia    • Routine general medical examination at a health care facility 7/20/2015    7/20/15   • Sleep apnea 12/2019    Had sleep study, didn't tolerate cpap   • Snoring    • Syncope 6/5/2020   • Tobacco use 4/29/2019   • URI (upper respiratory infection) 11/4/2015   • Urinary bladder disorder     hx of uti's     Past Surgical History:   Procedure Laterality Date   • GASTROSCOPY  12/13/2019    Procedure: GASTROSCOPY;  Surgeon: Ang Angel M.D.;  Location: SURGERY [facility];  Service: Gastroenterology   • COLONOSCOPY  12/13/2019    Procedure: COLONOSCOPY;  Surgeon: Ang Angel M.D.;  Location: SURGERY [facility];  Service: Gastroenterology   • NIRU BY LAPAROSCOPY  9/2/2011    Performed by KAYLEIGH PORRAS at SURGERY SAME DAY [facility]S   • LUMBAR DECOMPRESSION  6/29/2009    Performed by ANG YAN at SURGERY [facility]   • LUMBAR LAMINECTOMY DISKECTOMY  6/29/2009    Performed by ANG YAN at SURGERY [facility]   • ABDOMINAL HYSTERECTOMY TOTAL     • GYN SURGERY      hysterectomy   • HEMORRHOIDECTOMY       Family History   Problem Relation Age of Onset   • Stroke Mother    • Hyperlipidemia Mother    • Hypertension Mother    • Arthritis Mother    • Diabetes Father    • Lung Disease Father         pneumonia   • Arthritis Sister    • Rheumatologic Disease Sister    • Hypertension Sister    • Hyperlipidemia Sister    • Other Sister         Anusha/Fibermyalgia   • Hyperlipidemia Brother    • Hypertension Brother    • Arthritis Brother    • Lung Disease Maternal Grandmother         pneumonia   • Stroke Maternal Grandfather    • Cancer  Maternal Grandfather         skin    • No Known Problems Paternal Grandmother    • No Known Problems Paternal Grandfather    • Hyperlipidemia Daughter    • Diabetes Daughter    • No Known Problems Son      Social History     Socioeconomic History   • Marital status: Single     Spouse name: Not on file   • Number of children: Not on file   • Years of education: Not on file   • Highest education level: Not on file   Occupational History   • Not on file   Tobacco Use   • Smoking status: Current Every Day Smoker     Packs/day: 1.00     Years: 56.00     Pack years: 56.00     Types: Cigarettes   • Smokeless tobacco: Never Used   • Tobacco comment: started smoking at age 12, little mor than half a pack   Vaping Use   • Vaping Use: Never used   Substance and Sexual Activity   • Alcohol use: No   • Drug use: No   • Sexual activity: Never     Partners: Male     Birth control/protection: Post-Menopausal   Other Topics Concern   • Not on file   Social History Narrative   • Not on file     Social Determinants of Health     Financial Resource Strain: Not on file   Food Insecurity: Not on file   Transportation Needs: Not on file   Physical Activity: Not on file   Stress: Not on file   Social Connections: Not on file   Intimate Partner Violence: Not on file   Housing Stability: Not on file     Allergies   Allergen Reactions   • Food Hives and Nausea     oranges   • Neomycin-Polymyxin B Rash   • Pcn [Penicillins]    • Penicillin G Rash     Outpatient Encounter Medications as of 7/20/2022   Medication Sig Dispense Refill   • apixaban (ELIQUIS) 5mg Tab Take 1 Tablet by mouth 2 times a day. NEEDS TO BE SEEN FOR FURTHER REFILLS. 180 Tablet 3   • atorvastatin (LIPITOR) 10 MG Tab Take 1 Tablet by mouth every day. 90 Tablet 3   • lisinopril (PRINIVIL) 10 MG Tab Take 1 Tablet by mouth every day. 90 Tablet 3   • fenofibrate (TRIGLIDE) 160 MG tablet Take 1 Tablet by mouth every day. 90 Tablet 3   • levothyroxine (SYNTHROID) 112 MCG Tab TAKE 1  TABLET BY MOUTH EVERY DAY IN THE MORNING ON AN EMPTY STOMACH 90 Tablet 3   • promethazine (PHENERGAN) 25 MG Tab TAKE 1 TABLET BY MOUTH 3 TIMES A DAY AS NEEDED FOR NAUSEA AND VOMITING 60 Tablet 1   • Naloxone (NARCAN) 4 MG/0.1ML Liquid Narcan 4 mg/actuation nasal spray   USE AS DIRECTED     • potassium chloride SA (K-DUR) 10 MEQ Tab CR TAKE 1 TABLET BY MOUTH EVERY DAY 90 Tablet 3   • pantoprazole (PROTONIX) 40 MG Tablet Delayed Response Take 1 Tablet by mouth 2 times a day. 180 Tablet 2   • estradiol (ESTRACE) 0.1 MG/GM vaginal cream estradiol 0.01% (0.1 mg/gram) vaginal cream     • HYDROcodone-acetaminophen (NORCO) 7.5-325 MG per tablet      • hydrOXYzine HCl (ATARAX) 25 MG Tab hydroxyzine HCl 25 mg tablet   Take 1 tablet every day by oral route at bedtime for 90 days.     • latanoprost (XALATAN) 0.005 % Solution latanoprost 0.005 % eye drops     • triamterene/hctz (MAXZIDE-25/DYAZIDE) 37.5-25 MG Cap TAKE 1 CAPSULE BY MOUTH EVERY DAY 90 Capsule 3   • Mirabegron ER (MYRBETRIQ) 50 MG TABLET SR 24 HR Myrbetriq 50 mg tablet,extended release   TAKE 1 TABLET BY MOUTH EVERY DAY     • acetaminophen (TYLENOL) 500 MG TABS Take 500 mg by mouth 3 times a day as needed. Indications: Pain     • [DISCONTINUED] apixaban (ELIQUIS) 5mg Tab Take 1 Tablet by mouth 2 times a day. NEEDS TO BE SEEN FOR FURTHER REFILLS. 90 Tablet 0   • [DISCONTINUED] fenofibrate (TRIGLIDE) 160 MG tablet TAKE 1 TABLET BY MOUTH EVERY DAY 90 Tablet 2   • [DISCONTINUED] hydroCHLOROthiazide (HYDRODIURIL) 12.5 MG tablet Take 12.5 mg by mouth every day.     • [DISCONTINUED] diazePAM (VALIUM) 5 MG Tab TAKE 1 TABLET BY MOUTH 1 HR PRIOR TO PROCEDURE THEN 1 TAB 30 MIN PRIOR TO PROCEDURE FOR 1 DAY. F41.1     • [DISCONTINUED] estrogens, conjugated (PREMARIN) 0.625 MG/GM Cream Premarin 0.625 mg/gram vaginal cream   Insert 2g PV daily for 2 weeks, then 1g PV 2-3 a week thereafter     • [DISCONTINUED] Misc. Devices Misc One oxygen conserving device.  Use 2L of oxygen via  "nasal cannula with exertion 1 Each 0   • [DISCONTINUED] atorvastatin (LIPITOR) 10 MG Tab Take 1 tablet by mouth every day. 90 tablet 4   • [DISCONTINUED] lisinopril (PRINIVIL) 10 MG Tab Take 1 tablet by mouth every day. 90 tablet 4   • [DISCONTINUED] ondansetron (ZOFRAN ODT) 4 MG TABLET DISPERSIBLE Take 4 mg by mouth as needed. Prn nausea and or vomiting       No facility-administered encounter medications on file as of 7/20/2022.     Review of Systems   Constitutional: Negative for fever, malaise/fatigue and weight loss.   Eyes: Negative for blurred vision.   Respiratory: Positive for shortness of breath. Negative for cough.    Cardiovascular: Negative for chest pain, palpitations, orthopnea, claudication, leg swelling and PND.   Gastrointestinal: Negative for abdominal pain, blood in stool, nausea and vomiting.   Genitourinary: Negative for dysuria, frequency and hematuria.   Musculoskeletal: Positive for back pain. Negative for falls and myalgias.   Neurological: Negative for dizziness, tingling and loss of consciousness.   Endo/Heme/Allergies: Does not bruise/bleed easily.              Objective     /60 (BP Location: Left arm, Patient Position: Sitting, BP Cuff Size: Adult)   Pulse 84   Resp 16   Ht 1.575 m (5' 2\")   Wt 83.5 kg (184 lb)   SpO2 99%   BMI 33.65 kg/m²     Physical Exam  Vitals reviewed.   Constitutional:       Appearance: She is well-developed.      Comments: BMI 33   HENT:      Head: Normocephalic and atraumatic.   Eyes:      Pupils: Pupils are equal, round, and reactive to light.   Neck:      Vascular: No JVD.   Cardiovascular:      Rate and Rhythm: Normal rate and regular rhythm.      Heart sounds: Murmur heard.    Systolic murmur is present.    No friction rub. No gallop.   Pulmonary:      Effort: Pulmonary effort is normal. No respiratory distress.      Breath sounds: Examination of the right-lower field reveals decreased breath sounds. Examination of the left-lower field reveals " decreased breath sounds. Decreased breath sounds present.   Abdominal:      General: Bowel sounds are normal. There is no distension.      Palpations: Abdomen is soft.   Musculoskeletal:      Right lower leg: No edema.      Left lower leg: No edema.   Skin:     General: Skin is warm and dry.      Findings: No erythema.   Neurological:      Mental Status: She is alert and oriented to person, place, and time.   Psychiatric:         Behavior: Behavior normal.            Lab Results   Component Value Date/Time    CHOLSTRLTOT 107 11/09/2021 07:52 AM    LDL 45 11/09/2021 07:52 AM    HDL 31 (A) 11/09/2021 07:52 AM    TRIGLYCERIDE 153 (H) 11/09/2021 07:52 AM       Lab Results   Component Value Date/Time    SODIUM 138 11/09/2021 07:52 AM    POTASSIUM 4.5 11/09/2021 07:52 AM    CHLORIDE 106 11/09/2021 07:52 AM    CO2 21 11/09/2021 07:52 AM    GLUCOSE 99 11/09/2021 07:52 AM    BUN 20 11/09/2021 07:52 AM    CREATININE 1.32 11/09/2021 07:52 AM     Lab Results   Component Value Date/Time    ALKPHOSPHAT 61 11/09/2021 07:52 AM    ASTSGOT 10 (L) 11/09/2021 07:52 AM    ALTSGPT 7 11/09/2021 07:52 AM    TBILIRUBIN 0.2 11/09/2021 07:52 AM      Transthoracic echocardiogram (10/6/2020):Compared to the images of the prior study done 12/06/2019, no   significant changes are noted.     Left ventricular ejection fraction is visually estimated to be 65%.  Right ventricular systolic pressure is estimated to be 28 mmHg    Cardiac event monitor (8/21/2021): Predominant sinus rhythm average heart rate 79 bpm no concerning blocks or bradycardia.  2% burden of atrial fibrillation with an average of 82 bpm longest interval 3 hours 16 minutes.    Assessment & Plan     1. Ischemic bowel disease (HCC)  apixaban (ELIQUIS) 5mg Tab    Comp Metabolic Panel    Lipid Profile    PULMONARY FUNCTION TESTS -Test requested: Complete Pulmonary Function Test    EC-ECHOCARDIOGRAM COMPLETE W/O CONT   2. Renal insufficiency syndrome  apixaban (ELIQUIS) 5mg Tab    Comp  Metabolic Panel    Lipid Profile    PULMONARY FUNCTION TESTS -Test requested: Complete Pulmonary Function Test    EC-ECHOCARDIOGRAM COMPLETE W/O CONT   3. Paroxysmal atrial fibrillation (HCC)  apixaban (ELIQUIS) 5mg Tab    Comp Metabolic Panel    Lipid Profile    PULMONARY FUNCTION TESTS -Test requested: Complete Pulmonary Function Test    EC-ECHOCARDIOGRAM COMPLETE W/O CONT   4. Essential hypertension  lisinopril (PRINIVIL) 10 MG Tab    Comp Metabolic Panel    Lipid Profile    PULMONARY FUNCTION TESTS -Test requested: Complete Pulmonary Function Test    EC-ECHOCARDIOGRAM COMPLETE W/O CONT   5. SCHERER (dyspnea on exertion)  PULMONARY FUNCTION TESTS -Test requested: Complete Pulmonary Function Test    EC-ECHOCARDIOGRAM COMPLETE W/O CONT   6. Mild aortic stenosis  EC-ECHOCARDIOGRAM COMPLETE W/O CONT   7. 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 Chronic renal impairment, stage 3a (HCC)     9. Nonrheumatic aortic valve stenosis     10. Sleep apnea, unspecified type     11. Essential hypertension, benign     12. Tobacco use     13. Hyperlipidemia, unspecified hyperlipidemia type         Medical Decision Making: Today's Assessment/Status/Plan:        Paroxysmal Atrial Fibrillation (PAF)  - Asymptomatic, denies AF breakthrough, rate controlled.   -[id number] 4  - On OAC with Eliquis, continue.    Mild aortic stenosis  -Persistent SCHERER, low activity tolerance  -Echo ordered    SHABANA; COPD; Tobacco Abuse 1 PPD for 50 years  -Patient reports CPAP non-compliance  -Persistent SCHERER.  Check PFT  -We discussed importance of treating nocturnal hypoxia due to causing increased risk for cardiac disease; patient verbalizes understanding.  -Per sleep medicine    Hyperlipidemia  -Continue atorvastatin 10 mg daily  -Check lipid panel and CMP    Hypertension  -Continue lisinopril 10 mg daily  -Continue triamteren/HCTZ 25.7-25 mg daily  -Today in office blood pressure is controlled.    -Encouraged to  continue home BP monitoring/log.  Patient may bring in her blood pressure cuff at next appointment to verify accuracy.  -Medication recommendations per above.    CKD3a  -Crt 1.32 (11/9/2021  -CTM if CMP    FU in clinic in 6 months with Dr. Kapoor. Sooner if needed.    Patient verbalizes understanding and agrees with the plan of care.     CALI Calderón.   [facility]  [phone]    PLEASE NOTE: This Note was created using voice recognition Software. I have made every reasonable attempt to correct obvious errors, but I expect that there are errors of grammar and possibly content that I did not discover before finalizing the note

## 2022-08-11 ENCOUNTER — HOSPITAL ENCOUNTER (OUTPATIENT)
Dept: LAB | Facility: MEDICAL CENTER | Age: 82
End: 2022-08-11
Attending: NURSE PRACTITIONER
Payer: MEDICARE

## 2022-08-11 DIAGNOSIS — E03.9 HYPOTHYROIDISM, UNSPECIFIED TYPE: ICD-10-CM

## 2022-08-11 DIAGNOSIS — N28.9 RENAL INSUFFICIENCY SYNDROME: ICD-10-CM

## 2022-08-11 DIAGNOSIS — I48.0 PAROXYSMAL ATRIAL FIBRILLATION (HCC): ICD-10-CM

## 2022-08-11 DIAGNOSIS — N18.30 STAGE 3 CHRONIC KIDNEY DISEASE, UNSPECIFIED WHETHER STAGE 3A OR 3B CKD: ICD-10-CM

## 2022-08-11 DIAGNOSIS — K55.9 ISCHEMIC BOWEL DISEASE (HCC): ICD-10-CM

## 2022-08-11 DIAGNOSIS — N18.32 CHRONIC RENAL IMPAIRMENT, STAGE 3B: ICD-10-CM

## 2022-08-11 DIAGNOSIS — I10 ESSENTIAL HYPERTENSION: ICD-10-CM

## 2022-08-11 LAB
ALBUMIN SERPL BCP-MCNC: 4.4 G/DL (ref 3.2–4.9)
ALBUMIN/GLOB SERPL: 1.5 G/DL
ALP SERPL-CCNC: 52 U/L (ref 30–99)
ALT SERPL-CCNC: 9 U/L (ref 2–50)
ANION GAP SERPL CALC-SCNC: 12 MMOL/L (ref 7–16)
ANION GAP SERPL CALC-SCNC: 13 MMOL/L (ref 7–16)
ANION GAP SERPL CALC-SCNC: 14 MMOL/L (ref 7–16)
AST SERPL-CCNC: 13 U/L (ref 12–45)
BILIRUB SERPL-MCNC: 0.4 MG/DL (ref 0.1–1.5)
BUN SERPL-MCNC: 35 MG/DL (ref 8–22)
CALCIUM SERPL-MCNC: 9.7 MG/DL (ref 8.5–10.5)
CALCIUM SERPL-MCNC: 9.7 MG/DL (ref 8.5–10.5)
CALCIUM SERPL-MCNC: 9.8 MG/DL (ref 8.5–10.5)
CHLORIDE SERPL-SCNC: 102 MMOL/L (ref 96–112)
CHOLEST SERPL-MCNC: 121 MG/DL (ref 100–199)
CO2 SERPL-SCNC: 17 MMOL/L (ref 20–33)
CO2 SERPL-SCNC: 18 MMOL/L (ref 20–33)
CO2 SERPL-SCNC: 18 MMOL/L (ref 20–33)
CREAT SERPL-MCNC: 1.68 MG/DL (ref 0.5–1.4)
CREAT SERPL-MCNC: 1.68 MG/DL (ref 0.5–1.4)
CREAT SERPL-MCNC: 1.72 MG/DL (ref 0.5–1.4)
CREAT UR-MCNC: 51.05 MG/DL
FASTING STATUS PATIENT QL REPORTED: NORMAL
GFR SERPLBLD CREATININE-BSD FMLA CKD-EPI: 29 ML/MIN/1.73 M 2
GFR SERPLBLD CREATININE-BSD FMLA CKD-EPI: 30 ML/MIN/1.73 M 2
GFR SERPLBLD CREATININE-BSD FMLA CKD-EPI: 30 ML/MIN/1.73 M 2
GLOBULIN SER CALC-MCNC: 3 G/DL (ref 1.9–3.5)
GLUCOSE SERPL-MCNC: 109 MG/DL (ref 65–99)
GLUCOSE SERPL-MCNC: 109 MG/DL (ref 65–99)
GLUCOSE SERPL-MCNC: 110 MG/DL (ref 65–99)
HDLC SERPL-MCNC: 39 MG/DL
LDLC SERPL CALC-MCNC: 53 MG/DL
MICROALBUMIN UR-MCNC: <1.2 MG/DL
MICROALBUMIN/CREAT UR: NORMAL MG/G (ref 0–30)
POTASSIUM SERPL-SCNC: 4.5 MMOL/L (ref 3.6–5.5)
POTASSIUM SERPL-SCNC: 4.6 MMOL/L (ref 3.6–5.5)
POTASSIUM SERPL-SCNC: 4.6 MMOL/L (ref 3.6–5.5)
PROT SERPL-MCNC: 7.4 G/DL (ref 6–8.2)
SODIUM SERPL-SCNC: 132 MMOL/L (ref 135–145)
SODIUM SERPL-SCNC: 133 MMOL/L (ref 135–145)
SODIUM SERPL-SCNC: 133 MMOL/L (ref 135–145)
T4 FREE SERPL-MCNC: 1.78 NG/DL (ref 0.93–1.7)
TRIGL SERPL-MCNC: 145 MG/DL (ref 0–149)
TSH SERPL DL<=0.005 MIU/L-ACNC: 2.74 UIU/ML (ref 0.38–5.33)

## 2022-08-11 PROCEDURE — 84443 ASSAY THYROID STIM HORMONE: CPT

## 2022-08-11 PROCEDURE — 36415 COLL VENOUS BLD VENIPUNCTURE: CPT

## 2022-08-11 PROCEDURE — 80061 LIPID PANEL: CPT

## 2022-08-11 PROCEDURE — 84439 ASSAY OF FREE THYROXINE: CPT

## 2022-08-11 PROCEDURE — 80048 BASIC METABOLIC PNL TOTAL CA: CPT | Mod: 91

## 2022-08-11 PROCEDURE — 80053 COMPREHEN METABOLIC PANEL: CPT

## 2022-08-11 PROCEDURE — 82570 ASSAY OF URINE CREATININE: CPT

## 2022-08-11 PROCEDURE — 80048 BASIC METABOLIC PNL TOTAL CA: CPT

## 2022-08-11 PROCEDURE — 82043 UR ALBUMIN QUANTITATIVE: CPT

## 2022-08-17 ENCOUNTER — TELEMEDICINE (OUTPATIENT)
Dept: NEPHROLOGY | Facility: MEDICAL CENTER | Age: 82
End: 2022-08-17
Payer: MEDICARE

## 2022-08-17 VITALS
TEMPERATURE: 98.2 F | WEIGHT: 180 LBS | BODY MASS INDEX: 33.13 KG/M2 | HEIGHT: 62 IN | SYSTOLIC BLOOD PRESSURE: 100 MMHG | DIASTOLIC BLOOD PRESSURE: 55 MMHG

## 2022-08-17 DIAGNOSIS — N18.30 STAGE 3 CHRONIC KIDNEY DISEASE, UNSPECIFIED WHETHER STAGE 3A OR 3B CKD: ICD-10-CM

## 2022-08-17 DIAGNOSIS — E87.1 HYPONATREMIA: ICD-10-CM

## 2022-08-17 DIAGNOSIS — I10 ESSENTIAL HYPERTENSION: ICD-10-CM

## 2022-08-17 PROCEDURE — 99214 OFFICE O/P EST MOD 30 MIN: CPT | Mod: 95 | Performed by: INTERNAL MEDICINE

## 2022-08-17 ASSESSMENT — ENCOUNTER SYMPTOMS
VOMITING: 0
CHILLS: 0
NAUSEA: 0
COUGH: 0
FEVER: 0
SHORTNESS OF BREATH: 0

## 2022-08-17 ASSESSMENT — FIBROSIS 4 INDEX: FIB4 SCORE: 1.15

## 2022-08-17 NOTE — PROGRESS NOTES
Telemedicine: Established Patient   This evaluation was conducted via Zoom using secure and encrypted videoconferencing technology. The patient was in their home in the [facility].    The patient's identity was confirmed and verbal consent was obtained for this virtual visit.    Subjective:   CC:   Chief Complaint   Patient presents with    Hypertension    Chronic Kidney Disease       Margaret Garcia is a 81 y.o. female presenting for evaluation and management of:CKD  Patient has a long history of hypertension, blood pressure has been on the lower side, patient has no chest pain, no shortness of breath.  She also has a chronic kidney disease stage III, her creatinine was elevated, patient has no hematuria, no dysuria.  No recent use of NSAIDs or IV contrast exposure.    Review of Systems   Constitutional:  Negative for chills, fever and malaise/fatigue.   Respiratory:  Negative for cough and shortness of breath.    Cardiovascular:  Negative for chest pain and leg swelling.   Gastrointestinal:  Negative for nausea and vomiting.   Genitourinary:  Negative for dysuria, frequency and urgency.       Allergies   Allergen Reactions    Food Hives and Nausea     oranges    Neomycin-Polymyxin B Rash    Pcn [Penicillins]     Penicillin G Rash       Current medicines (including changes today)  Current Outpatient Medications   Medication Sig Dispense Refill    promethazine (PHENERGAN) 25 MG Tab TAKE 1 TABLET BY MOUTH 3 TIMES A DAY AS NEEDED FOR NAUSEA AND VOMITING 60 Tablet 0    apixaban (ELIQUIS) 5mg Tab Take 1 Tablet by mouth 2 times a day. NEEDS TO BE SEEN FOR FURTHER REFILLS. 180 Tablet 3    atorvastatin (LIPITOR) 10 MG Tab Take 1 Tablet by mouth every day. 90 Tablet 3    lisinopril (PRINIVIL) 10 MG Tab Take 1 Tablet by mouth every day. 90 Tablet 3    fenofibrate (TRIGLIDE) 160 MG tablet Take 1 Tablet by mouth every day. 90 Tablet 3    levothyroxine (SYNTHROID) 112 MCG Tab TAKE 1 TABLET BY MOUTH EVERY DAY IN THE  MORNING ON AN EMPTY STOMACH 90 Tablet 3    Naloxone (NARCAN) 4 MG/0.1ML Liquid Narcan 4 mg/actuation nasal spray   USE AS DIRECTED      potassium chloride SA (K-DUR) 10 MEQ Tab CR TAKE 1 TABLET BY MOUTH EVERY DAY 90 Tablet 3    pantoprazole (PROTONIX) 40 MG Tablet Delayed Response Take 1 Tablet by mouth 2 times a day. 180 Tablet 2    estradiol (ESTRACE) 0.1 MG/GM vaginal cream estradiol 0.01% (0.1 mg/gram) vaginal cream      HYDROcodone-acetaminophen (NORCO) 7.5-325 MG per tablet       hydrOXYzine HCl (ATARAX) 25 MG Tab hydroxyzine HCl 25 mg tablet   Take 1 tablet every day by oral route at bedtime for 90 days.      latanoprost (XALATAN) 0.005 % Solution latanoprost 0.005 % eye drops      Mirabegron ER (MYRBETRIQ) 50 MG TABLET SR 24 HR Myrbetriq 50 mg tablet,extended release   TAKE 1 TABLET BY MOUTH EVERY DAY      acetaminophen (TYLENOL) 500 MG TABS Take 500 mg by mouth 3 times a day as needed. Indications: Pain      triamterene/hctz (MAXZIDE-25/DYAZIDE) 37.5-25 MG Cap TAKE 1 CAPSULE BY MOUTH EVERY DAY 90 Capsule 3     No current facility-administered medications for this visit.       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[facility]) 04/20/2021    Urge incontinence of urine 12/14/2020    Urinary tract infectious disease 12/14/2020    Nonrheumatic aortic valve stenosis 09/16/2020    Chronic midline low back pain without sciatica 09/02/2020    Syncope 06/05/2020    Nausea 10/14/2019    Ischemic bowel disease ([facility]) 09/09/2019    Other fatigue 05/13/2019    Chronic renal impairment, stage 3 (moderate) ([facility]) 05/13/2019    Tobacco use 04/29/2019    Generalized edema 04/27/2018    Morbid obesity ([facility]) 04/27/2018    Flank pain 11/14/2017    At moderate risk for fall 11/14/2017    Dysuria 10/18/2016    Hay fever 04/19/2016    RUQ pain  02/29/2016    Hyperlipidemia 11/24/2015    Essential hypertension 11/24/2015    Hyperglycemia 11/24/2015    Routine general medical examination at a health care facility 07/20/2015    Obesity (BMI 30-39.9) 07/20/2015    Sleep apnea 07/20/2015    Dementia (HCC) 05/29/2011    Hypothyroidism 05/29/2011    Peripheral neuropathy 05/29/2011       Family History   Problem Relation Age of Onset    Stroke Mother     Hyperlipidemia Mother     Hypertension Mother     Arthritis Mother     Diabetes Father     Lung Disease Father         pneumonia    Arthritis Sister     Rheumatologic Disease Sister     Hypertension Sister     Hyperlipidemia Sister     Other Sister         Anusha/Fibermyalgia    Hyperlipidemia Brother     Hypertension Brother     Arthritis Brother     Lung Disease Maternal Grandmother         pneumonia    Stroke Maternal Grandfather     Cancer Maternal Grandfather         skin     No Known Problems Paternal Grandmother     No Known Problems Paternal Grandfather     Hyperlipidemia Daughter     Diabetes Daughter     No Known Problems Son        She  has a past medical history of Arthritis, ASTHMA, Body mass index 40.0-44.9, adult ([facility]) (4/27/2018), Chronic renal impairment, stage 3 (moderate) ([facility]) (5/13/2019), COPD (chronic obstructive pulmonary disease) with chronic bronchitis ([facility]) (4/20/2021), Dental disorder, Dysuria (10/18/2016), Fall, Generalized edema (4/27/2018), Glaucoma, Hay fever (4/19/2016), Heart burn, High cholesterol, UTI (urinary tract infection), Hyperglycemia (11/24/2015), Hypertension, Hypothyroidism, Indigestion, Ischemic bowel disease ([facility]) (9/9/2019), Migraines, Nausea (10/14/2019), Neuropathy ([facility]), Obesity (BMI 30-39.9) (7/20/2015), Pneumonia, Routine general medical examination at a health care facility (7/20/2015), Sleep apnea (12/2019), Snoring, Syncope (6/5/2020), Tobacco use (4/29/2019), URI (upper respiratory infection) (11/4/2015), and Urinary bladder disorder.  She  has a past  surgical history that includes gyn surgery; lumbar decompression (6/29/2009); hemorrhoidectomy; abdominal hysterectomy total; jodi by laparoscopy (9/2/2011); lumbar laminectomy diskectomy (6/29/2009); gastroscopy (12/13/2019); and colonoscopy (12/13/2019).    Past Medical History:   Diagnosis Date    Arthritis     knees, and hips-osteo    ASTHMA     Body mass index 40.0-44.9, adult ([facility]) 4/27/2018    Chronic renal impairment, stage 3 (moderate) ([facility]) 5/13/2019    COPD (chronic obstructive pulmonary disease) with chronic bronchitis ([facility]) 4/20/2021    Dental disorder     upper    Dysuria 10/18/2016    Fall     Generalized edema 4/27/2018    Glaucoma     bilateral    Hay fever 4/19/2016    Heart burn     High cholesterol     Hx: UTI (urinary tract infection)     Hyperglycemia 11/24/2015    Hypertension     Hypothyroidism     Indigestion     Ischemic bowel disease ([facility]) 9/9/2019    Migraines     pt has similar symptoms with migraines not for a long time though    Nausea 10/14/2019    Neuropathy ([facility])     Obesity (BMI 30-39.9) 7/20/2015    Pneumonia     Routine general medical examination at a health care facility 7/20/2015    7/20/15    Sleep apnea 12/2019    Had sleep study, didn't tolerate cpap    Snoring     Syncope 6/5/2020    Tobacco use 4/29/2019    URI (upper respiratory infection) 11/4/2015    Urinary bladder disorder     hx of uti's     Social History     Socioeconomic History    Marital status: Single     Spouse name: Not on file    Number of children: Not on file    Years of education: Not on file    Highest education level: Not on file   Occupational History    Not on file   Tobacco Use    Smoking status: Every Day     Packs/day: 1.00     Years: 56.00     Pack years: 56.00     Types: Cigarettes    Smokeless tobacco: Never    Tobacco comments:     started smoking at age 12, little mor than half a pack   Vaping Use    Vaping Use: Never used   Substance and Sexual Activity    Alcohol use: No    Drug use: No     "Sexual activity: Never     Partners: Male     Birth control/protection: Post-Menopausal   Other Topics Concern    Not on file   Social History Narrative    Not on file     Social Determinants of Health     Financial Resource Strain: Not on file   Food Insecurity: Not on file   Transportation Needs: Not on file   Physical Activity: Not on file   Stress: Not on file   Social Connections: Not on file   Intimate Partner Violence: Not on file   Housing Stability: Not on file     Family History   Problem Relation Age of Onset    Stroke Mother     Hyperlipidemia Mother     Hypertension Mother     Arthritis Mother     Diabetes Father     Lung Disease Father         pneumonia    Arthritis Sister     Rheumatologic Disease Sister     Hypertension Sister     Hyperlipidemia Sister     Other Sister         Anusha/Fibermyalgia    Hyperlipidemia Brother     Hypertension Brother     Arthritis Brother     Lung Disease Maternal Grandmother         pneumonia    Stroke Maternal Grandfather     Cancer Maternal Grandfather         skin     No Known Problems Paternal Grandmother     No Known Problems Paternal Grandfather     Hyperlipidemia Daughter     Diabetes Daughter     No Known Problems Son      Recent Labs     11/09/21  0752 08/11/22  0857   ALBUMIN 4.0 4.4   HDL 31* 39*   TRIGLYCERIDE 153* 145   SODIUM 138 133*  132*  133*   POTASSIUM 4.5 4.5  4.6  4.6   CHLORIDE 106 102  102  102   CO2 21 17*  18*  18*   BUN 20 35*  35*  35*   CREATININE 1.32 1.72*  1.68*  1.68*        Objective:   /55   Temp 36.8 °C (98.2 °F)   Ht 1.575 m (5' 2\")   Wt 81.6 kg (180 lb)   BMI 32.92 kg/m²     Physical Exam  Constitutional:       General: She is not in acute distress.     Appearance: Normal appearance. She is not ill-appearing.   HENT:      Head: Normocephalic and atraumatic.      Nose: Nose normal.   Eyes:      General: No scleral icterus.     Conjunctiva/sclera: Conjunctivae normal.   Cardiovascular:      Rate and Rhythm: Normal " rate.   Pulmonary:      Effort: Pulmonary effort is normal.      Breath sounds: Normal breath sounds.   Musculoskeletal:      Right lower leg: No edema.      Left lower leg: No edema.   Skin:     General: Skin is warm.      Coloration: Skin is not jaundiced or pale.   Neurological:      General: No focal deficit present.      Mental Status: She is alert and oriented to person, place, and time. Mental status is at baseline.   Psychiatric:         Mood and Affect: Mood normal.         Behavior: Behavior normal.         Thought Content: Thought content normal.       Assessment and Plan:   The following treatment plan was discussed:     1. Stage 3 chronic kidney disease, unspecified whether stage 3a or 3b CKD (HCC)  Creatinine is worse, I believe its prerenal component secondary to volume depletion  Patient was advised to discontinue hydrochlorothiazide  Recheck labs in 2 months  Renal diet  Renal dose on medication    2. Essential hypertension  Blood pressure on the low side  Discontinue hydrochlorothiazide  Continue low-sodium diet    3. Hyponatremia  Discontinue hydrochlorothiazide  Recheck labs      Follow-up: Return in about 1 year (around 8/17/2023).

## 2022-08-24 ENCOUNTER — OFFICE VISIT (OUTPATIENT)
Dept: MEDICAL GROUP | Facility: PHYSICIAN GROUP | Age: 82
End: 2022-08-24
Payer: MEDICARE

## 2022-08-24 ENCOUNTER — HOSPITAL ENCOUNTER (OUTPATIENT)
Facility: MEDICAL CENTER | Age: 82
End: 2022-08-24
Attending: NURSE PRACTITIONER
Payer: MEDICARE

## 2022-08-24 VITALS
OXYGEN SATURATION: 97 % | WEIGHT: 180 LBS | HEIGHT: 62 IN | BODY MASS INDEX: 33.13 KG/M2 | SYSTOLIC BLOOD PRESSURE: 112 MMHG | TEMPERATURE: 98.9 F | DIASTOLIC BLOOD PRESSURE: 74 MMHG | HEART RATE: 83 BPM

## 2022-08-24 DIAGNOSIS — N30.00 ACUTE CYSTITIS WITHOUT HEMATURIA: ICD-10-CM

## 2022-08-24 LAB
APPEARANCE UR: NORMAL
BILIRUB UR STRIP-MCNC: NORMAL MG/DL
COLOR UR AUTO: YELLOW
GLUCOSE UR STRIP.AUTO-MCNC: NORMAL MG/DL
KETONES UR STRIP.AUTO-MCNC: NORMAL MG/DL
LEUKOCYTE ESTERASE UR QL STRIP.AUTO: NORMAL
NITRITE UR QL STRIP.AUTO: NORMAL
PH UR STRIP.AUTO: 5.5 [PH] (ref 5–8)
PROT UR QL STRIP: NORMAL MG/DL
RBC UR QL AUTO: NORMAL
SP GR UR STRIP.AUTO: 1.01
UROBILINOGEN UR STRIP-MCNC: NORMAL MG/DL

## 2022-08-24 PROCEDURE — 87077 CULTURE AEROBIC IDENTIFY: CPT

## 2022-08-24 PROCEDURE — 87086 URINE CULTURE/COLONY COUNT: CPT

## 2022-08-24 PROCEDURE — 81002 URINALYSIS NONAUTO W/O SCOPE: CPT | Performed by: NURSE PRACTITIONER

## 2022-08-24 PROCEDURE — 99213 OFFICE O/P EST LOW 20 MIN: CPT | Performed by: NURSE PRACTITIONER

## 2022-08-24 PROCEDURE — 87186 SC STD MICRODIL/AGAR DIL: CPT

## 2022-08-24 RX ORDER — CEFDINIR 300 MG/1
300 CAPSULE ORAL DAILY
Qty: 5 CAPSULE | Refills: 0 | Status: SHIPPED | OUTPATIENT
Start: 2022-08-24 | End: 2022-08-29

## 2022-08-24 ASSESSMENT — FIBROSIS 4 INDEX: FIB4 SCORE: 1.15

## 2022-08-24 NOTE — PROGRESS NOTES
Chief Complaint   Patient presents with    Painful Urination     X 2 days frequency        HISTORY OF PRESENT ILLNESS: Margaret Garcia is a 81 y.o. female established patient of [facility] who presents today to discuss:  - increased burning with urination the past 2 days; no hematuria, flank pain, pelvic pain, or vaginal discharge. Appetite is good.   - hx stress incontinence on Myrbetriq, followed by urology; also followed by nephrology for CKD;   - granddaughter is also here today; mentions patient gets 2-3 UTIs each year; last one was around January    Current Outpatient Medications on File Prior to Visit   Medication Sig Dispense Refill    promethazine (PHENERGAN) 25 MG Tab TAKE 1 TABLET BY MOUTH 3 TIMES A DAY AS NEEDED FOR NAUSEA AND VOMITING 60 Tablet 0    apixaban (ELIQUIS) 5mg Tab Take 1 Tablet by mouth 2 times a day. NEEDS TO BE SEEN FOR FURTHER REFILLS. 180 Tablet 3    atorvastatin (LIPITOR) 10 MG Tab Take 1 Tablet by mouth every day. 90 Tablet 3    lisinopril (PRINIVIL) 10 MG Tab Take 1 Tablet by mouth every day. 90 Tablet 3    fenofibrate (TRIGLIDE) 160 MG tablet Take 1 Tablet by mouth every day. 90 Tablet 3    levothyroxine (SYNTHROID) 112 MCG Tab TAKE 1 TABLET BY MOUTH EVERY DAY IN THE MORNING ON AN EMPTY STOMACH 90 Tablet 3    Naloxone (NARCAN) 4 MG/0.1ML Liquid Narcan 4 mg/actuation nasal spray   USE AS DIRECTED      potassium chloride SA (K-DUR) 10 MEQ Tab CR TAKE 1 TABLET BY MOUTH EVERY DAY 90 Tablet 3    pantoprazole (PROTONIX) 40 MG Tablet Delayed Response Take 1 Tablet by mouth 2 times a day. 180 Tablet 2    estradiol (ESTRACE) 0.1 MG/GM vaginal cream estradiol 0.01% (0.1 mg/gram) vaginal cream      HYDROcodone-acetaminophen (NORCO) 7.5-325 MG per tablet       hydrOXYzine HCl (ATARAX) 25 MG Tab hydroxyzine HCl 25 mg tablet   Take 1 tablet every day by oral route at bedtime for 90 days.      latanoprost (XALATAN) 0.005 % Solution latanoprost 0.005 % eye drops      triamterene/hctz  "(MAXZIDE-25/DYAZIDE) 37.5-25 MG Cap TAKE 1 CAPSULE BY MOUTH EVERY DAY 90 Capsule 3    Mirabegron ER (MYRBETRIQ) 50 MG TABLET SR 24 HR Myrbetriq 50 mg tablet,extended release   TAKE 1 TABLET BY MOUTH EVERY DAY      acetaminophen (TYLENOL) 500 MG TABS Take 500 mg by mouth 3 times a day as needed. Indications: Pain       No current facility-administered medications on file prior to visit.       has a past medical history of Arthritis, ASTHMA, Body mass index 40.0-44.9, adult ([facility]) (4/27/2018), Chronic renal impairment, stage 3 (moderate) ([facility]) (5/13/2019), COPD (chronic obstructive pulmonary disease) with chronic bronchitis ([facility]) (4/20/2021), Dental disorder, Dysuria (10/18/2016), Fall, Generalized edema (4/27/2018), Glaucoma, Hay fever (4/19/2016), Heart burn, High cholesterol, UTI (urinary tract infection), Hyperglycemia (11/24/2015), Hypertension, Hypothyroidism, Indigestion, Ischemic bowel disease ([facility]) (9/9/2019), Migraines, Nausea (10/14/2019), Neuropathy ([facility]), Obesity (BMI 30-39.9) (7/20/2015), Pneumonia, Routine general medical examination at a health care facility (7/20/2015), Sleep apnea (12/2019), Snoring, Syncope (6/5/2020), Tobacco use (4/29/2019), URI (upper respiratory infection) (11/4/2015), and Urinary bladder disorder.     Allergies:Food, Neomycin-polymyxin b, Pcn [penicillins], and Penicillin g    Health Maintenance: deferred  Review of Systems -included above  Exam:   /74   Pulse 83   Temp 37.2 °C (98.9 °F) (Temporal)   Ht 1.575 m (5' 2\")   Wt 81.6 kg (180 lb)   SpO2 97%   Body mass index is 32.92 kg/m².   General:  Well nourished, well developed female in NAD, appropriate and cooperative with exam.  Lungs: Clear and equal with good air movement.  Normal effort. No rales, ronchi, or wheezing.  Cardiovascular: Regular rate and rhythm, S1, S2; murmur present. Pedal pulses 2+ bilaterally. No edema  Abdomen: Soft, round, normal BS. No TTP, no CVAT  Neuro: a&o x4     Latest Reference Range & " Units 8/24/22 12:02   POC Color Negative  yellow   POC Appearance Negative  cloudy   POC Specific Gravity <1.005 - >1.030  1.010   POC Urine PH 5.0 - 8.0  5.5   POC Glucose Negative mg/dL neg   POC Ketones Negative mg/dL neg   POC Protein Negative mg/dL neg   POC Nitrites Negative  neg   POC Leukocyte Esterase Negative  mod   POC Blood Negative  trace   POC Bilirubin Negative mg/dL neg   POC Urobiligen Negative (0.2) mg/dL neg     Assessment/Plan:  1. Acute cystitis without hematuria  Dysuria x2 days; UA today with mod leuk; will send out urine culture; start treatment with cefdinir (GFR 30; pt tolerated in the past per granddaughter); counseled on adequate hydration, wipe front to back  - POCT Urinalysis  - URINE CULTURE(NEW); Future  - cefdinir (OMNICEF) 300 MG Cap; Take 1 Capsule by mouth every day for 5 days.  Dispense: 5 Capsule; Refill: 0    Follow up:  Return if symptoms worsen or fail to improve.    Educated in proper administration of medication(s) ordered today including safety, possible SE, risks, benefits, rationale and alternatives to therapy.       Please note that this dictation was created using voice recognition software. I have made every reasonable attempt to correct obvious errors, but I expect that there are errors of grammar and possibly content that I did not discover before finalizing the note.

## 2022-08-30 DIAGNOSIS — N30.00 ACUTE CYSTITIS WITHOUT HEMATURIA: ICD-10-CM

## 2022-08-30 RX ORDER — CEFDINIR 300 MG/1
300 CAPSULE ORAL EVERY 12 HOURS
Qty: 10 CAPSULE | Refills: 0 | Status: SHIPPED | OUTPATIENT
Start: 2022-08-30 | End: 2022-08-31 | Stop reason: SDUPTHER

## 2022-08-31 DIAGNOSIS — N30.00 ACUTE CYSTITIS WITHOUT HEMATURIA: ICD-10-CM

## 2022-08-31 RX ORDER — CEFDINIR 300 MG/1
300 CAPSULE ORAL EVERY 12 HOURS
Qty: 10 CAPSULE | Refills: 0 | Status: SHIPPED | OUTPATIENT
Start: 2022-08-31 | End: 2022-09-05

## 2022-09-06 ENCOUNTER — OFFICE VISIT (OUTPATIENT)
Dept: NEUROLOGY | Facility: MEDICAL CENTER | Age: 82
End: 2022-09-06
Attending: PSYCHIATRY & NEUROLOGY
Payer: MEDICARE

## 2022-09-06 VITALS
TEMPERATURE: 98.3 F | RESPIRATION RATE: 18 BRPM | OXYGEN SATURATION: 97 % | HEIGHT: 61 IN | BODY MASS INDEX: 35.01 KG/M2 | HEART RATE: 82 BPM | SYSTOLIC BLOOD PRESSURE: 112 MMHG | DIASTOLIC BLOOD PRESSURE: 64 MMHG | WEIGHT: 185.41 LBS

## 2022-09-06 DIAGNOSIS — G30.9 ALZHEIMER'S DISEASE, UNSPECIFIED (CODE) (HCC): ICD-10-CM

## 2022-09-06 DIAGNOSIS — G30.1 ALZHEIMER'S DISEASE WITH LATE ONSET (CODE) (HCC): ICD-10-CM

## 2022-09-06 DIAGNOSIS — E87.1 CHRONIC HYPONATREMIA: ICD-10-CM

## 2022-09-06 DIAGNOSIS — E66.9 OBESITY (BMI 35.0-39.9 WITHOUT COMORBIDITY): ICD-10-CM

## 2022-09-06 PROCEDURE — 99204 OFFICE O/P NEW MOD 45 MIN: CPT | Performed by: PSYCHIATRY & NEUROLOGY

## 2022-09-06 PROCEDURE — 99212 OFFICE O/P EST SF 10 MIN: CPT

## 2022-09-06 ASSESSMENT — MONTREAL COGNITIVE ASSESSMENT (MOCA)
ORIENTATION SUBSCORE: 5/6
3. DRAW A CLOCK: CONTOUR, NUMBERS, HANDS: 1/3
11. FOR EACH PAIR OF WORDS, WHAT CATEGORY DO THEY BELONG TO (OUT OF 2): 2/2
4. NAME EACH OF THE THREE ANIMALS SHOWN: 3/3
10. [FLUENCY] NAME WORDS STARTING WITH DESIGNATED LETTER: 1/1
7. [VIGILENCE] TAP WHEN HEARING DESIGNATED LETTER: 0/1
DELAYED RECALL SUBSCORE: 2/5
1. ALTERNATING TRAIL MAKING: 0/1
6. READ LIST OF DIGITS [FORWARD/BACKWARD]: 2/2
CATEGORY CUE (IF APPLICABLE): 2
5. MEMORY TRIALS: SECOND TRIAL
9. REPEAT EACH SENTENCE: 1/2
8. SERIAL SUBTRACTION OF 7S: 4 OR 5/5
2. COPY DRAWING: 0/1
WHAT IS THE VERSION OF MOCA ADMINISTERED: 8.3
WHAT IS THE TOTAL SCORE (OUT OF 30): 20

## 2022-09-06 ASSESSMENT — PATIENT HEALTH QUESTIONNAIRE - PHQ9: CLINICAL INTERPRETATION OF PHQ2 SCORE: 0

## 2022-09-06 ASSESSMENT — FIBROSIS 4 INDEX: FIB4 SCORE: 1.15

## 2022-09-06 NOTE — PROGRESS NOTES
Reason for Neurology Consult:  Concern for Dementia    History of present illness:    Margaret Garcia 81 y.o. right handed woman female who is originally from [facility] and  for a [employer] x 4-5 years. She lives in [place] and lives with a Room Mate (Kwame).     She is clearly aware of problems with her short term memory dating back for over 1 year.  She often will get up to get something and have retrace her steps but usually recalling what intended to get.    Janis (grand daughter) adds that about 2 years she will repeats same stories over and over within minutes (5-10 minutes). This will tell the same story about 2 times in a 2 hour visit with her daughter which has been going for 1 year.    Janis does her doctor's appointment as she may write notes down and forget the notes or forget she gets an appointments.    She can forget information within 5-10 minutes atleast in the last 6 months.    Communication ability has gradually changed with very infrequent.    She walks with a cane- lower back pain (degenerative disc dz)- had surgery 7-10 years ago.  She uses pain kills (narcotics- 7.5/325) PO TID- which really reduces the pain down to a tolerable level and on average 2/10 level.    No history to suggest seizure(s), ischemic stroke(s) or concussions.    She denies depression or being anxious,nervous,hallucinations,paranoia or sundowning events in the last 6 months.    She stopped driving over  3 months as she got lost for a moment 4-5  years ago.    No involuntary movements of the limbs or head-neck-body in the last 3 months.      No evolving gait-balance decline in last 12 months.    Smoked 1 pack per day x 60 years > still smoking (1 pack per day).  No significant alcohol use in adult life.    Mother's side- 5-6 siblings (dementia)- late onset  Father: institutionalized when young  2 Siblings: without cognitive/Dementia issues.      Patient Active Problem List    Diagnosis Date  Noted    Chronic respiratory failure with hypoxia ([facility]ly [facility]) 08/02/2021    Paroxysmal atrial fibrillation ([facility]ly [facility]) 07/30/2021    COPD (chronic obstructive pulmonary disease) with chronic bronchitis (Former[facility]) 04/20/2021    Urge incontinence of urine 12/14/2020    Urinary tract infectious disease 12/14/2020    Nonrheumatic aortic valve stenosis 09/16/2020    Chronic midline low back pain without sciatica 09/02/2020    Syncope 06/05/2020    Nausea 10/14/2019    Ischemic bowel disease ([facility]) 09/09/2019    Other fatigue 05/13/2019    Chronic renal impairment, stage 3 (moderate) (Formerly [facility]) 05/13/2019    Tobacco use 04/29/2019    Generalized edema 04/27/2018    Morbid obesity (Formerly [facility]) 04/27/2018    Flank pain 11/14/2017    At moderate risk for fall 11/14/2017    Dysuria 10/18/2016    Hay fever 04/19/2016    RUQ pain 02/29/2016    Hyperlipidemia 11/24/2015    Essential hypertension 11/24/2015    Hyperglycemia 11/24/2015    Routine general medical examination at a health care facility 07/20/2015    Obesity (BMI 30-39.9) 07/20/2015    Sleep apnea 07/20/2015    Dementia (Formerly [facility]) 05/29/2011    Hypothyroidism 05/29/2011    Peripheral neuropathy 05/29/2011       Past medical history:   Past Medical History:   Diagnosis Date    Arthritis     knees, and hips-osteo    ASTHMA     Body mass index 40.0-44.9, adult (Formerly [facility]) 4/27/2018    Chronic renal impairment, stage 3 (moderate) (Formerly [facility]) 5/13/2019    COPD (chronic obstructive pulmonary disease) with chronic bronchitis (Former[facility]) 4/20/2021    Dental disorder     upper    Dysuria 10/18/2016    Fall     Generalized edema 4/27/2018    Glaucoma     bilateral    Hay fever 4/19/2016    Heart burn     High cholesterol     Hx: UTI (urinary tract infection)     Hyperglycemia 11/24/2015    Hypertension     Hypothyroidism     Indigestion     Ischemic bowel disease ([facility]) 9/9/2019    Migraines     pt has similar symptoms with migraines not for a long time though    Nausea 10/14/2019    Neuropathy ([facility])     Obesity (BMI 30-39.9)  7/20/2015    Pneumonia     Routine general medical examination at a health care facility 7/20/2015    7/20/15    Sleep apnea 12/2019    Had sleep study, didn't tolerate cpap    Snoring     Syncope 6/5/2020    Tobacco use 4/29/2019    URI (upper respiratory infection) 11/4/2015    Urinary bladder disorder     hx of uti's       Past surgical history:   Past Surgical History:   Procedure Laterality Date    GASTROSCOPY  12/13/2019    Procedure: GASTROSCOPY;  Surgeon: Ang Angel M.D.;  Location: SURGERY [facility];  Service: Gastroenterology    COLONOSCOPY  12/13/2019    Procedure: COLONOSCOPY;  Surgeon: Ang Angel M.D.;  Location: SURGERY [facility];  Service: Gastroenterology    NIRU BY LAPAROSCOPY  9/2/2011    Performed [facility]AS [facility]    LUMBAR DECOMPRESSION  6/29/2009    Performed by ANG YAN at SURGERY [facility] ORS    LUMBAR LAMINECTOMY DISKECTOMY  6/29/2009    Performed by ANG YAN at SURGERY [facility] ORS    ABDOMINAL HYSTERECTOMY TOTAL      GYN SURGERY      hysterectomy    HEMORRHOIDECTOMY           Social history:   Social History     Socioeconomic History    Marital status: Single     Spouse name: Not on file    Number of children: Not on file    Years of education: Not on file    Highest education level: Not on file   Occupational History    Not on file   Tobacco Use    Smoking status: Every Day     Packs/day: 1.00     Years: 56.00     Pack years: 56.00     Types: Cigarettes    Smokeless tobacco: Never    Tobacco comments:     started smoking at age 12, little mor than half a pack   Vaping Use    Vaping Use: Never used   Substance and Sexual Activity    Alcohol use: No    Drug use: No    Sexual activity: Never     Partners: Male     Birth control/protection: Post-Menopausal   Other Topics Concern    Not on file   Social History Narrative    Not on file     Social Determinants of Health     Financial Resource Strain: Not on file   Food  Insecurity: Not on file   Transportation Needs: Not on file   Physical Activity: Not on file   Stress: Not on file   Social Connections: Not on file   Intimate Partner Violence: Not on file   Housing Stability: Not on file       Family history:   Family History   Problem Relation Age of Onset    Stroke Mother     Hyperlipidemia Mother     Hypertension Mother     Arthritis Mother     Diabetes Father     Lung Disease Father         pneumonia    Arthritis Sister     Rheumatologic Disease Sister     Hypertension Sister     Hyperlipidemia Sister     Other Sister         Anusha/Fibermyalgia    Hyperlipidemia Brother     Hypertension Brother     Arthritis Brother     Lung Disease Maternal Grandmother         pneumonia    Stroke Maternal Grandfather     Cancer Maternal Grandfather         skin     No Known Problems Paternal Grandmother     No Known Problems Paternal Grandfather     Hyperlipidemia Daughter     Diabetes Daughter     No Known Problems Son          Current medications:   Current Outpatient Medications   Medication    promethazine (PHENERGAN) 25 MG Tab    apixaban (ELIQUIS) 5mg Tab    atorvastatin (LIPITOR) 10 MG Tab    lisinopril (PRINIVIL) 10 MG Tab    levothyroxine (SYNTHROID) 112 MCG Tab    Naloxone (NARCAN) 4 MG/0.1ML Liquid    potassium chloride SA (K-DUR) 10 MEQ Tab CR    pantoprazole (PROTONIX) 40 MG Tablet Delayed Response    estradiol (ESTRACE) 0.1 MG/GM vaginal cream    HYDROcodone-acetaminophen (NORCO) 7.5-325 MG per tablet    hydrOXYzine HCl (ATARAX) 25 MG Tab    latanoprost (XALATAN) 0.005 % Solution    triamterene/hctz (MAXZIDE-25/DYAZIDE) 37.5-25 MG Cap    acetaminophen (TYLENOL) 500 MG TABS    fenofibrate (TRIGLIDE) 160 MG tablet    Mirabegron ER (MYRBETRIQ) 50 MG TABLET SR 24 HR     No current facility-administered medications for this visit.       Medication Allergy:  Allergies   Allergen Reactions    Food Hives and Nausea     oranges    Neomycin-Polymyxin B Rash    Pcn [Penicillins]      "Penicillin G Rash           Physical examination:   Vitals:    09/06/22 1357   BP: 112/64   BP Location: Right arm   Patient Position: Sitting   BP Cuff Size: Adult   Pulse: 82   Resp: 18   Temp: 36.8 °C (98.3 °F)   TempSrc: Temporal   SpO2: 97%   Weight: 84.1 kg (185 lb 6.5 oz)   Height: 1.549 m (5' 1\")       Normal cephalic atraumatic.  There is full range of movement around the neck in all directions without restrictions or discrete pain evoked triggers.  No lower extremity edema.      Neurological  Exam:      [place] Cognitive Assessment (MOCA) Version 7.1    Years of Education: 11th grade    TOTAL SCORE: 21/30  (to be scanned into the MEDIA section in the E.M.R.)          Mental status: Awake, alert and fully oriented to person, place, time, and situation. Normal attention and concentration.  Did not appear/act combative,irritable,anxious,paranoid/delusional or aggressive to or with me.    Speech and language: Speech is fluent without errors, clear, intact to repetition, and intact to naming.     Follows 3 step motor commands in sequence without significant delay and correctly.    Cranial nerve exam:  II: Pupils are equally round and reactive to light. Visual fields are intact by confrontation.  III, IV, VI: EOMI, no diplopia, no ptosis.  V: Sensation to light touch is normal over V1-3 distributions bilaterally.  .  VII: Facial movements are symmetrical. There is no facial droop. .  VIII: Hearing intact to soft speech and finger rub bilaterally  IX: Palate elevates symmetrically, uvula is midline. Dysarthria is not present.  XI: Shoulder shrug are symmetrical and strong.   XII: Tongue protrudes midline.      Motor exam:  Muscle tone is normal in all 4 limbs. and No abnormal movements appreciated.    Muscle strength:    Neck Flexors/Extensors: 5/5       Right  Left  Deltoid   5/5  5/5      Biceps   5/5  5/5  Triceps              5/5  5/5   Wrist extensors 5/5  5/5  Wrist " flexors  5/5  5/5     5/5  5/5  Interossei  5/5  5/5  Thenar (APB)  5/5  5/5   Hip flexors  5/5  5/5  Quadriceps  5/5  5/5    Hamstrings  5/5  5/5  Dorsiflexors  5/5  5/5  Plantarflexors  5/5  5/5  Toe extension  5/5  5/5        Reflexes:       Right  Left  Biceps   2/4  2/4  Triceps             2/4  2/4  Brachioradialis             2/4  2/4  Knee jerk  2/4  2/4  Ankle jerk  2/4  2/4     Frontal release signs are absent    bilaterally toes are downgoing to plantar stimulation..    Coordination (finger-to-nose, heel/knee/shin, rapid alternating movements) was normal.     There was no ataxia, no tremors, and no dysmetria.       Station and gait .    Easily stands up from exam chair without retropulsion,veering,leaning,swaying (to either side).     No freezing or shuffling.    No Rombergism.      Labs and Tests:      TSH- wnl (8/2011)    Impression    1. Mild cerebral atrophy and minimal nonspecific microvascular ischemic disease.           Exam Ended: 12/30/11  1:58 PM              NEUROIMAGING: Head CT     FINDINGS:     Brain: No evidence of hemorrhage, mass, shift, or extra axial fluid   collection. The gray-white matter differentiation is maintained. Scattered   patchy white matter hypodensities, consistent with chronic small vessel   ischemic disease, involving the left centrum semiovale.     Mild-to-moderate volume loss.     Bones and orbits: Hyperostosis frontalis. No calvarial fractures.     Soft tissues: Left frontal scalp hematoma.     Paranasal sinuses and mastoid air cells: Clear.     IMPRESSION:     1. No acute intracranial hemorrhage or mass effect.   2. Left frontal scalp hematoma. Intact calvarium.           Preliminary Report Electronically Signed By: ELIO Johnson on 5/27/2020 6:57   PM     I have personally reviewed the images of this study and agree with the   above report.     Final Report Electronically Signed By: Raymond Delgado M.D. on 5/27/2020   7:26 PM  Exam End: 05/27/20  6:37 PM  "        Impression/Plans/Recommendations:      Dementia - mild  in severity- likely Neurodegenerative given time line of over 2 years. Probalby Alz type Dementia based on duration of symptoms and short term memory predominant decline.  No parkinsonism at this time.  No notable psychotic features in the recent months.    Janis (grand daughter) is assisting to make decisions at this time.  Janis has been assisting with finances for over 1 year now.    MOCA score of 20/30 today.    FAQ score in the 17-18 range per daughter and grand daughter.    Global Deterioration Score of solid 4 (to 5) range per daughter and grand daughter.      2. Obesity- reviewed importance of weight loss through diet changes; goal BMI under 28.    3. Chronic Anticoagulation (on Eliquis)- for stroke prevention.    4. Unsteady Gait- uses simple cane for distance walking- no falls reported to me in last  6months.      Today, I reviewed the clinical criteria and reviewed several  scenarios of the differences being using the medical terms of normal brain aging (age associated memory impairment),  Mild Cognitive Impairment (MCI) and Dementia.    Dementia  is a syndrome but statistically and for the majority of patients  occurs due  to a more rapid aging of the brain tissue or potentially from injury to certain parts of one's brain ( often from such contributing factors as  the cumulative effects of alcohol, from one or more ischemic or hemmorhagic stroke(s), from neurodegeneration or long standing with/without suboptimally controlled Hypertension). It is for some of these potential factors as to why I recommend a brain imaging test (Head CT or Brain MRI) be done for the 1st time or in certain circumstances repeated for comparison purposes  as such imaging can suggest one or more factors as to the reason(s) for the person's cognitive/memory changes. In fact, a normal or \"age related\" finding on a brain imaging test in and of itself is useful " clinical and objective information to have in the evaluation of a person who has either an acute, evolving or even chronic (months to years) long cognitive/memory complaint.    Additional factors or contributors to Brain Health issues can be summarized in several books/references which I discussed as well today.     Goals going forwards include:    A. Paying attention to one's risk factors and reducing their prevalence or potential impact on one's changing memory/thinking> an excellent example would be to stop smoking, reduce or eliminate alcohol use (depending on the amount and frequency of usage), maintain good blood pressure control by buying a digital arm blood pressure cuff such as an OMRON Series 3 or 5 and checking one's blood pressure atleast 3 days per week (in the am and early afternoon) that the numbers are under 140/90 and working as needed with the primary care doctor  to optimize blood pressure control).    B. Encouraging proper sleep hygiene which for most adults is 7 to 8 hours of uninterrupted sleep per night.      C. Encouraging some form of exercise preferable aerobic forms to be done (4 to 5 days per week- 30 minutes minimum per day)> 150 minutes per week as a goal. Example activities could include fast walking (up a slight incline), jogging, cycling (road or stationary), swimming,tennis. Essentially, even basic walking on a flat surface or a treadmill would be better than doing nothing.    D. Maintaining or forming new social contacts with family and friends  and a positive attitude despite the concerns and/or ongoing issues with thinking and/or memory.    E. Eating well which means a diet low in salt  (under 2 grams per day), sugar and saturated fat.    F. Maintaining one's BMI (Body Mass Index) under 30.    G. Consideration of the use of cognitive enhancers (acetylcholinerase inhibitors such as Aricept vs an NMDA Receptor agent like Namenda). Pros and cons of such compounds in terms of  predicted efficacy and side effects profiles reviewed.At this time will hold on such medications after review today.    H. B vitamin levels to be checked.    I .Continue Eliquis for long term stroke prevention measures - risks of bleeding risks reviewed today.    J. Hold off on Brain PET after reviewed this test with Ella and daughter today.    K. Alz information relayed to daughter; she was aware of this issue.    L. Brain MRI to be done         I have performed  a history and physical exam and a directed /focused  ROS today.    Total time spent today or this patient's care was  52 minutes and included reviewing  the diagnostic workup to date (such as labs and imaging as well as interpreting such tests relevant to this patient's neurological condition),  reviewing/obtaining separately obtained history (from patient and/or accompanying ffamily member)  for today's neurological problem(s) ,counseling and educating the patient and family member on issues related to cognition/memory and cognitive health factors and documenting  the clinical information in the EMR.    Follow up: in 6 months.        Taiwo aHll MD  [facility].   [facility]

## 2022-09-12 ENCOUNTER — HOSPITAL ENCOUNTER (OUTPATIENT)
Dept: LAB | Facility: MEDICAL CENTER | Age: 82
End: 2022-09-12
Attending: PSYCHIATRY & NEUROLOGY
Payer: MEDICARE

## 2022-09-12 DIAGNOSIS — G30.9 ALZHEIMER'S DISEASE, UNSPECIFIED (CODE) (HCC): ICD-10-CM

## 2022-09-12 PROCEDURE — 36415 COLL VENOUS BLD VENIPUNCTURE: CPT

## 2022-09-12 PROCEDURE — 84425 ASSAY OF VITAMIN B-1: CPT

## 2022-09-12 PROCEDURE — 82746 ASSAY OF FOLIC ACID SERUM: CPT

## 2022-09-12 PROCEDURE — 82607 VITAMIN B-12: CPT

## 2022-09-13 LAB
FOLATE SERPL-MCNC: 4.8 NG/ML
VIT B12 SERPL-MCNC: 275 PG/ML (ref 211–911)

## 2022-09-16 LAB — VIT B1 BLD-MCNC: 74 NMOL/L (ref 70–180)

## 2022-09-20 ENCOUNTER — PATIENT MESSAGE (OUTPATIENT)
Dept: MEDICAL GROUP | Facility: MEDICAL CENTER | Age: 82
End: 2022-09-20

## 2022-09-20 ENCOUNTER — OFFICE VISIT (OUTPATIENT)
Dept: MEDICAL GROUP | Facility: MEDICAL CENTER | Age: 82
End: 2022-09-20
Payer: MEDICARE

## 2022-09-20 VITALS
OXYGEN SATURATION: 100 % | BODY MASS INDEX: 32.76 KG/M2 | RESPIRATION RATE: 16 BRPM | HEART RATE: 89 BPM | WEIGHT: 178 LBS | HEIGHT: 62 IN | DIASTOLIC BLOOD PRESSURE: 72 MMHG | SYSTOLIC BLOOD PRESSURE: 126 MMHG

## 2022-09-20 DIAGNOSIS — J96.11 CHRONIC RESPIRATORY FAILURE WITH HYPOXIA (HCC): Chronic | ICD-10-CM

## 2022-09-20 DIAGNOSIS — E66.9 OBESITY (BMI 30-39.9): ICD-10-CM
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
## 2022-09-20 DIAGNOSIS — D68.69 SECONDARY HYPERCOAGULABLE STATE (HCC): ICD-10-CM

## 2022-09-20 DIAGNOSIS — E03.9 HYPOTHYROIDISM, UNSPECIFIED TYPE: ICD-10-CM

## 2022-09-20 DIAGNOSIS — N18.32 CHRONIC RENAL IMPAIRMENT, STAGE 3B: Chronic | ICD-10-CM

## 2022-09-20 DIAGNOSIS — L24.A9 WOUND DRAINAGE: ICD-10-CM

## 2022-09-20 DIAGNOSIS — F03.90 DEMENTIA WITHOUT BEHAVIORAL DISTURBANCE, UNSPECIFIED DEMENTIA TYPE: Chronic | ICD-10-CM

## 2022-09-20 PROCEDURE — 99214 OFFICE O/P EST MOD 30 MIN: CPT | Performed by: STUDENT IN AN ORGANIZED HEALTH CARE EDUCATION/TRAINING PROGRAM

## 2022-09-20 ASSESSMENT — FIBROSIS 4 INDEX: FIB4 SCORE: 1.15

## 2022-09-20 NOTE — PROGRESS NOTES
"Subjective:     Chief Complaint   Patient presents with    Referral Needed     Wound care         HPI:   Margaret presents today with    Hospital follow-up  Patient went to [facility] ED 9/18 due to fall.  Patient had no significant injuries but did an open wound to her arm.  Patient is on blood thinners and wound continues to bleed.  Discussed reducing Eliquis to 2.5 mg twice daily for 5 days.  Daughter requesting referral to home health for wound care services.    Memory changes  Patient has been seen by neurology for further evaluation of mild dementia.  Neurology recommended patient start on vitamin B1 and vitamin B12.  Patient has not yet started on these.    Hypothyroid  Levothyroxine reduced from 125 mcg to 112 mcg at last visit.  Patient's T4 remains slightly elevated but TSH within normal limits.       ROS:  Gen: no fevers/chills  Pulm: no sob, no cough  CV: no chest pain, no palpitations  GI: no nausea/vomiting, no diarrhea      Objective:     Exam:  /72 (BP Location: Right arm, Patient Position: Sitting, BP Cuff Size: Adult)   Pulse 89   Resp 16   Ht 1.575 m (5' 2\")   Wt 80.7 kg (178 lb)   SpO2 100%   BMI 32.56 kg/m²  Body mass index is 32.56 kg/m².    Gen: Alert and oriented, No apparent distress.  Neck: Neck is supple without lymphadenopathy.  Lungs: Normal effort, CTA bilaterally, no wheezes, rhonchi, or rales  CV: Regular rate and rhythm. No murmurs, rubs, or gallops.  Ext: No clubbing, cyanosis, edema.      Assessment & Plan:     81 y.o. female with the following -     1. Wound drainage  2. Secondary hypercoagulable state (HCC)  3. Fall from ground level  Acute, stable.  Patient with fall 3 days ago.  Patient with open wound to left forearm.  Patient requesting wound care from [facility] to further evaluate.  We will reduce Eliquis 5 mg to 2.5 mg twice daily for 5 days to help improve bleeding/wound healing.  Discussed signs and symptoms that would warrant emergent " evaluation.  Patient advised to follow-up if no improvement.  - Referral to Home Health  4. Dementia without behavioral disturbance, unspecified dementia type (HCC)  Chronic, stable.  Patient following with neurology.    5. Chronic renal impairment, stage 3b (HCC)  Chronic, stable.  Patient following with nephrology.    6. Obesity (BMI 30-39.9)  Chronic, stable.  BMI at 32.    7. Hypothyroidism, unspecified type  Chronic, stable.  Patient continues on levothyroxine 112 mcg.    8. Chronic respiratory failure with hypoxia (HCC)  Chronic, stable.  Patient continues to smoke half pack per day.  Patient is not currently on oxygen.  Patient would benefit from walk test.  We will follow-up at next visit on further evaluation of COPD/respiratory failure.    Return in about 3 months (around 12/20/2022).    Please note that this dictation was created using voice recognition software. I have made every reasonable attempt to correct obvious errors, but I expect that there are errors of grammar and possibly content that I did not discover before finalizing the note.
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
## 2022-09-22 ENCOUNTER — APPOINTMENT (OUTPATIENT)
Dept: RADIOLOGY | Facility: MEDICAL CENTER | Age: 82
End: 2022-09-22
Attending: EMERGENCY MEDICINE
Payer: MEDICARE

## 2022-09-22 ENCOUNTER — HOSPITAL ENCOUNTER (EMERGENCY)
Facility: MEDICAL CENTER | Age: 82
End: 2022-09-23
Attending: EMERGENCY MEDICINE
Payer: MEDICARE
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
## 2022-09-22 DIAGNOSIS — R53.1 WEAKNESS GENERALIZED: ICD-10-CM

## 2022-09-22 DIAGNOSIS — N39.0 URINARY TRACT INFECTION WITHOUT HEMATURIA, SITE UNSPECIFIED: ICD-10-CM

## 2022-09-22 PROBLEM — Z71.89 ADVANCE CARE PLANNING: Status: ACTIVE | Noted: 2022-09-22

## 2022-09-22 PROBLEM — N18.30 ACUTE RENAL FAILURE SUPERIMPOSED ON STAGE 3 CHRONIC KIDNEY DISEASE (HCC): Status: ACTIVE | Noted: 2021-07-23

## 2022-09-22 LAB
ALBUMIN SERPL BCP-MCNC: 3.4 G/DL (ref 3.2–4.9)
ALBUMIN/GLOB SERPL: 1 G/DL
ALP SERPL-CCNC: 41 U/L (ref 30–99)
ALT SERPL-CCNC: 14 U/L (ref 2–50)
ANION GAP SERPL CALC-SCNC: 16 MMOL/L (ref 7–16)
APPEARANCE UR: ABNORMAL
AST SERPL-CCNC: 35 U/L (ref 12–45)
BACTERIA #/AREA URNS HPF: ABNORMAL /HPF
BASOPHILS # BLD AUTO: 0.3 % (ref 0–1.8)
BASOPHILS # BLD: 0.03 K/UL (ref 0–0.12)
BILIRUB SERPL-MCNC: 0.6 MG/DL (ref 0.1–1.5)
BILIRUB UR QL STRIP.AUTO: NEGATIVE
BUN SERPL-MCNC: 28 MG/DL (ref 8–22)
CALCIUM SERPL-MCNC: 9.6 MG/DL (ref 8.5–10.5)
CHLORIDE SERPL-SCNC: 104 MMOL/L (ref 96–112)
CK SERPL-CCNC: 928 U/L (ref 0–154)
CO2 SERPL-SCNC: 16 MMOL/L (ref 20–33)
COLOR UR: ABNORMAL
CREAT SERPL-MCNC: 1.8 MG/DL (ref 0.5–1.4)
EKG IMPRESSION: NORMAL
EOSINOPHIL # BLD AUTO: 0 K/UL (ref 0–0.51)
EOSINOPHIL NFR BLD: 0 % (ref 0–6.9)
EPI CELLS #/AREA URNS HPF: ABNORMAL /HPF
ERYTHROCYTE [DISTWIDTH] IN BLOOD BY AUTOMATED COUNT: 49.9 FL (ref 35.9–50)
GFR SERPLBLD CREATININE-BSD FMLA CKD-EPI: 27 ML/MIN/1.73 M 2
GLOBULIN SER CALC-MCNC: 3.5 G/DL (ref 1.9–3.5)
GLUCOSE SERPL-MCNC: 119 MG/DL (ref 65–99)
GLUCOSE UR STRIP.AUTO-MCNC: NEGATIVE MG/DL
HCT VFR BLD AUTO: 37.5 % (ref 37–47)
HGB BLD-MCNC: 12.3 G/DL (ref 12–16)
HYALINE CASTS #/AREA URNS LPF: ABNORMAL /LPF
IMM GRANULOCYTES # BLD AUTO: 0.11 K/UL (ref 0–0.11)
IMM GRANULOCYTES NFR BLD AUTO: 1.2 % (ref 0–0.9)
KETONES UR STRIP.AUTO-MCNC: ABNORMAL MG/DL
LEUKOCYTE ESTERASE UR QL STRIP.AUTO: ABNORMAL
LYMPHOCYTES # BLD AUTO: 0.81 K/UL (ref 1–4.8)
LYMPHOCYTES NFR BLD: 8.9 % (ref 22–41)
MAGNESIUM SERPL-MCNC: 1.4 MG/DL (ref 1.5–2.5)
MCH RBC QN AUTO: 31.3 PG (ref 27–33)
MCHC RBC AUTO-ENTMCNC: 32.8 G/DL (ref 33.6–35)
MCV RBC AUTO: 95.4 FL (ref 81.4–97.8)
MICRO URNS: ABNORMAL
MONOCYTES # BLD AUTO: 0.86 K/UL (ref 0–0.85)
MONOCYTES NFR BLD AUTO: 9.5 % (ref 0–13.4)
NEUTROPHILS # BLD AUTO: 7.28 K/UL (ref 2–7.15)
NEUTROPHILS NFR BLD: 80.1 % (ref 44–72)
NITRITE UR QL STRIP.AUTO: POSITIVE
NRBC # BLD AUTO: 0 K/UL
NRBC BLD-RTO: 0 /100 WBC
PH UR STRIP.AUTO: 5 [PH] (ref 5–8)
PHOSPHATE SERPL-MCNC: 2.5 MG/DL (ref 2.5–4.5)
PLATELET # BLD AUTO: 308 K/UL (ref 164–446)
PMV BLD AUTO: 8.8 FL (ref 9–12.9)
POTASSIUM SERPL-SCNC: 4.2 MMOL/L (ref 3.6–5.5)
PROCALCITONIN SERPL-MCNC: 0.64 NG/ML
PROT SERPL-MCNC: 6.9 G/DL (ref 6–8.2)
PROT UR QL STRIP: 30 MG/DL
RBC # BLD AUTO: 3.93 M/UL (ref 4.2–5.4)
RBC # URNS HPF: ABNORMAL /HPF
RBC UR QL AUTO: ABNORMAL
SODIUM SERPL-SCNC: 136 MMOL/L (ref 135–145)
SP GR UR STRIP.AUTO: 1.02
TROPONIN T SERPL-MCNC: 34 NG/L (ref 6–19)
UROBILINOGEN UR STRIP.AUTO-MCNC: 1 MG/DL
WBC # BLD AUTO: 9.1 K/UL (ref 4.8–10.8)
WBC #/AREA URNS HPF: ABNORMAL /HPF

## 2022-09-22 PROCEDURE — 81001 URINALYSIS AUTO W/SCOPE: CPT

## 2022-09-22 PROCEDURE — 93005 ELECTROCARDIOGRAM TRACING: CPT | Performed by: EMERGENCY MEDICINE

## 2022-09-22 PROCEDURE — 700102 HCHG RX REV CODE 250 W/ 637 OVERRIDE(OP): Performed by: STUDENT IN AN ORGANIZED HEALTH CARE EDUCATION/TRAINING PROGRAM

## 2022-09-22 PROCEDURE — 87186 SC STD MICRODIL/AGAR DIL: CPT

## 2022-09-22 PROCEDURE — 85025 COMPLETE CBC W/AUTO DIFF WBC: CPT

## 2022-09-22 PROCEDURE — 84145 PROCALCITONIN (PCT): CPT

## 2022-09-22 PROCEDURE — 87077 CULTURE AEROBIC IDENTIFY: CPT

## 2022-09-22 PROCEDURE — 71045 X-RAY EXAM CHEST 1 VIEW: CPT

## 2022-09-22 PROCEDURE — 93005 ELECTROCARDIOGRAM TRACING: CPT

## 2022-09-22 PROCEDURE — 99497 ADVNCD CARE PLAN 30 MIN: CPT | Performed by: STUDENT IN AN ORGANIZED HEALTH CARE EDUCATION/TRAINING PROGRAM

## 2022-09-22 PROCEDURE — 83735 ASSAY OF MAGNESIUM: CPT

## 2022-09-22 PROCEDURE — 700105 HCHG RX REV CODE 258: Performed by: STUDENT IN AN ORGANIZED HEALTH CARE EDUCATION/TRAINING PROGRAM

## 2022-09-22 PROCEDURE — 36415 COLL VENOUS BLD VENIPUNCTURE: CPT

## 2022-09-22 PROCEDURE — 82550 ASSAY OF CK (CPK): CPT

## 2022-09-22 PROCEDURE — 99285 EMERGENCY DEPT VISIT HI MDM: CPT | Mod: 25 | Performed by: STUDENT IN AN ORGANIZED HEALTH CARE EDUCATION/TRAINING PROGRAM

## 2022-09-22 PROCEDURE — A9270 NON-COVERED ITEM OR SERVICE: HCPCS | Performed by: STUDENT IN AN ORGANIZED HEALTH CARE EDUCATION/TRAINING PROGRAM

## 2022-09-22 PROCEDURE — 87086 URINE CULTURE/COLONY COUNT: CPT

## 2022-09-22 PROCEDURE — 84484 ASSAY OF TROPONIN QUANT: CPT

## 2022-09-22 PROCEDURE — 70450 CT HEAD/BRAIN W/O DYE: CPT

## 2022-09-22 PROCEDURE — 700111 HCHG RX REV CODE 636 W/ 250 OVERRIDE (IP): Performed by: EMERGENCY MEDICINE

## 2022-09-22 PROCEDURE — 80053 COMPREHEN METABOLIC PANEL: CPT

## 2022-09-22 PROCEDURE — 96374 THER/PROPH/DIAG INJ IV PUSH: CPT

## 2022-09-22 PROCEDURE — 99285 EMERGENCY DEPT VISIT HI MDM: CPT

## 2022-09-22 PROCEDURE — 84100 ASSAY OF PHOSPHORUS: CPT

## 2022-09-22 RX ORDER — POLYETHYLENE GLYCOL 3350 17 G/17G
1 POWDER, FOR SOLUTION ORAL
Status: DISCONTINUED | OUTPATIENT
Start: 2022-09-22 | End: 2022-09-23 | Stop reason: HOSPADM

## 2022-09-22 RX ORDER — AMOXICILLIN 250 MG
2 CAPSULE ORAL 2 TIMES DAILY
Status: DISCONTINUED | OUTPATIENT
Start: 2022-09-22 | End: 2022-09-23 | Stop reason: HOSPADM

## 2022-09-22 RX ORDER — PANTOPRAZOLE SODIUM 40 MG/1
40 TABLET, DELAYED RELEASE ORAL 2 TIMES DAILY
Status: DISCONTINUED | OUTPATIENT
Start: 2022-09-22 | End: 2022-09-22

## 2022-09-22 RX ORDER — OMEPRAZOLE 20 MG/1
40 CAPSULE, DELAYED RELEASE ORAL 2 TIMES DAILY
Status: CANCELLED | OUTPATIENT
Start: 2022-09-23

## 2022-09-22 RX ORDER — IPRATROPIUM BROMIDE AND ALBUTEROL SULFATE 2.5; .5 MG/3ML; MG/3ML
3 SOLUTION RESPIRATORY (INHALATION)
Status: CANCELLED | OUTPATIENT
Start: 2022-09-22

## 2022-09-22 RX ORDER — ACETAMINOPHEN 325 MG/1
650 TABLET ORAL EVERY 6 HOURS PRN
Status: CANCELLED | OUTPATIENT
Start: 2022-09-22

## 2022-09-22 RX ORDER — BISACODYL 10 MG
10 SUPPOSITORY, RECTAL RECTAL
Status: DISCONTINUED | OUTPATIENT
Start: 2022-09-22 | End: 2022-09-23 | Stop reason: HOSPADM

## 2022-09-22 RX ORDER — POLYETHYLENE GLYCOL 3350 17 G/17G
1 POWDER, FOR SOLUTION ORAL
Status: CANCELLED | OUTPATIENT
Start: 2022-09-22

## 2022-09-22 RX ORDER — SODIUM CHLORIDE, SODIUM LACTATE, POTASSIUM CHLORIDE, CALCIUM CHLORIDE 600; 310; 30; 20 MG/100ML; MG/100ML; MG/100ML; MG/100ML
INJECTION, SOLUTION INTRAVENOUS CONTINUOUS
Status: CANCELLED | OUTPATIENT
Start: 2022-09-22 | End: 2022-09-23

## 2022-09-22 RX ORDER — AMOXICILLIN 250 MG
2 CAPSULE ORAL 2 TIMES DAILY
Status: CANCELLED | OUTPATIENT
Start: 2022-09-23

## 2022-09-22 RX ORDER — ATORVASTATIN CALCIUM 10 MG/1
10 TABLET, FILM COATED ORAL
Status: CANCELLED | OUTPATIENT
Start: 2022-09-23

## 2022-09-22 RX ORDER — ONDANSETRON 4 MG/1
4 TABLET, ORALLY DISINTEGRATING ORAL EVERY 4 HOURS PRN
Status: DISCONTINUED | OUTPATIENT
Start: 2022-09-22 | End: 2022-09-23 | Stop reason: HOSPADM

## 2022-09-22 RX ORDER — ACETAMINOPHEN 325 MG/1
650 TABLET ORAL EVERY 6 HOURS PRN
Status: DISCONTINUED | OUTPATIENT
Start: 2022-09-22 | End: 2022-09-23 | Stop reason: HOSPADM

## 2022-09-22 RX ORDER — SODIUM CHLORIDE, SODIUM LACTATE, POTASSIUM CHLORIDE, CALCIUM CHLORIDE 600; 310; 30; 20 MG/100ML; MG/100ML; MG/100ML; MG/100ML
INJECTION, SOLUTION INTRAVENOUS CONTINUOUS
Status: DISCONTINUED | OUTPATIENT
Start: 2022-09-22 | End: 2022-09-23

## 2022-09-22 RX ORDER — LABETALOL HYDROCHLORIDE 5 MG/ML
10 INJECTION, SOLUTION INTRAVENOUS EVERY 4 HOURS PRN
Status: DISCONTINUED | OUTPATIENT
Start: 2022-09-22 | End: 2022-09-23 | Stop reason: HOSPADM

## 2022-09-22 RX ORDER — LEVOTHYROXINE SODIUM 112 UG/1
112 TABLET ORAL
Status: DISCONTINUED | OUTPATIENT
Start: 2022-09-23 | End: 2022-09-23 | Stop reason: HOSPADM

## 2022-09-22 RX ORDER — LABETALOL HYDROCHLORIDE 5 MG/ML
10 INJECTION, SOLUTION INTRAVENOUS EVERY 4 HOURS PRN
Status: CANCELLED | OUTPATIENT
Start: 2022-09-22

## 2022-09-22 RX ORDER — ONDANSETRON 2 MG/ML
4 INJECTION INTRAMUSCULAR; INTRAVENOUS EVERY 4 HOURS PRN
Status: CANCELLED | OUTPATIENT
Start: 2022-09-22

## 2022-09-22 RX ORDER — FENOFIBRATE 160 MG/1
160 TABLET ORAL
Status: DISCONTINUED | OUTPATIENT
Start: 2022-09-22 | End: 2022-09-22

## 2022-09-22 RX ORDER — IPRATROPIUM BROMIDE AND ALBUTEROL SULFATE 2.5; .5 MG/3ML; MG/3ML
3 SOLUTION RESPIRATORY (INHALATION)
Status: DISCONTINUED | OUTPATIENT
Start: 2022-09-22 | End: 2022-09-23 | Stop reason: HOSPADM

## 2022-09-22 RX ORDER — LEVOTHYROXINE SODIUM 112 UG/1
112 TABLET ORAL
Status: CANCELLED | OUTPATIENT
Start: 2022-09-23

## 2022-09-22 RX ORDER — CEFDINIR 300 MG/1
300 CAPSULE ORAL 2 TIMES DAILY
Status: ON HOLD | COMMUNITY
Start: 2022-09-03 | End: 2022-09-25

## 2022-09-22 RX ORDER — CEFTRIAXONE 2 G/1
2 INJECTION, POWDER, FOR SOLUTION INTRAMUSCULAR; INTRAVENOUS ONCE
Status: COMPLETED | OUTPATIENT
Start: 2022-09-22 | End: 2022-09-22

## 2022-09-22 RX ORDER — OMEPRAZOLE 20 MG/1
40 CAPSULE, DELAYED RELEASE ORAL 2 TIMES DAILY
Status: DISCONTINUED | OUTPATIENT
Start: 2022-09-22 | End: 2022-09-23 | Stop reason: HOSPADM

## 2022-09-22 RX ORDER — ATORVASTATIN CALCIUM 10 MG/1
10 TABLET, FILM COATED ORAL
Status: DISCONTINUED | OUTPATIENT
Start: 2022-09-23 | End: 2022-09-23 | Stop reason: HOSPADM

## 2022-09-22 RX ORDER — BISACODYL 10 MG
10 SUPPOSITORY, RECTAL RECTAL
Status: CANCELLED | OUTPATIENT
Start: 2022-09-22

## 2022-09-22 RX ORDER — ONDANSETRON 4 MG/1
4 TABLET, ORALLY DISINTEGRATING ORAL EVERY 4 HOURS PRN
Status: CANCELLED | OUTPATIENT
Start: 2022-09-22

## 2022-09-22 RX ORDER — ONDANSETRON 2 MG/ML
4 INJECTION INTRAMUSCULAR; INTRAVENOUS EVERY 4 HOURS PRN
Status: DISCONTINUED | OUTPATIENT
Start: 2022-09-22 | End: 2022-09-23 | Stop reason: HOSPADM

## 2022-09-22 RX ADMIN — OMEPRAZOLE 40 MG: 20 CAPSULE, DELAYED RELEASE ORAL at 20:19

## 2022-09-22 RX ADMIN — CEFTRIAXONE SODIUM 2 G: 2 INJECTION, POWDER, FOR SOLUTION INTRAMUSCULAR; INTRAVENOUS at 16:44

## 2022-09-22 RX ADMIN — SODIUM CHLORIDE, POTASSIUM CHLORIDE, SODIUM LACTATE AND CALCIUM CHLORIDE: 600; 310; 30; 20 INJECTION, SOLUTION INTRAVENOUS at 20:19

## 2022-09-22 RX ADMIN — APIXABAN 2.5 MG: 5 TABLET, FILM COATED ORAL at 20:19

## 2022-09-22 RX ADMIN — SENNOSIDES AND DOCUSATE SODIUM 2 TABLET: 50; 8.6 TABLET ORAL at 20:18

## 2022-09-22 ASSESSMENT — ENCOUNTER SYMPTOMS
HEADACHES: 1
DIARRHEA: 0
ABDOMINAL PAIN: 0
SHORTNESS OF BREATH: 1
FEVER: 0
WEAKNESS: 1
VOMITING: 0
SINUS PAIN: 0
PHOTOPHOBIA: 0
NERVOUS/ANXIOUS: 0
DOUBLE VISION: 0
FALLS: 1
PALPITATIONS: 0
FOCAL WEAKNESS: 0
SPEECH CHANGE: 0
BACK PAIN: 1
NAUSEA: 0
COUGH: 0
SENSORY CHANGE: 0

## 2022-09-22 ASSESSMENT — CHA2DS2 SCORE
SEX: FEMALE
HYPERTENSION: YES
PRIOR STROKE OR TIA OR THROMBOEMBOLISM: NO
DIABETES: NO
AGE 65 TO 74: NO
CHF OR LEFT VENTRICULAR DYSFUNCTION: NO
AGE 75 OR GREATER: YES
CHA2DS2 VASC SCORE: 4
VASCULAR DISEASE: NO

## 2022-09-22 ASSESSMENT — FIBROSIS 4 INDEX: FIB4 SCORE: 1.15

## 2022-09-22 ASSESSMENT — LIFESTYLE VARIABLES: SUBSTANCE_ABUSE: 0

## 2022-09-22 NOTE — ED TRIAGE NOTES
"Chief Complaint   Patient presents with   • T-5000 FALL     Patient presents after had a fall around 3am this morning patient was found by her daughter around 9am. Patient denies hitting head. No obvious signs of head trauma. She does take eliquis for afib. Initially refused EMS transport. Patient has been able to ambulate with assistance since fall. Family states that she is now more lethargic, but patient is answering questions appropriately and GCS 15 at this time   • Dizziness     Patient also had a fall on Saturday with left arm pain    Patient to triage via wheelchair with a family member, [id number], Appropriate precautions in place.     Explained wait time and triage process. Told to notify ED tech or RN of any changes, verbalized understanding.    /55   Pulse 90   Temp 36.8 °C (98.3 °F) (Temporal)   Resp 14   Ht 1.575 m (5' 2\")   Wt 81.6 kg (180 lb)   SpO2 97%   BMI 32.92 kg/m²     " i RETURNED THE PT CALL REGARDING LA PAPERWORK. I INFORMED THE PT THAT I WILL SPEAK WITH  BEFORE SHE HS TO MAKE ANY TRIPS HERE.

## 2022-09-22 NOTE — ED PROVIDER NOTES
"ED Provider Note    CHIEF COMPLAINT  Chief Complaint   Patient presents with    T-5000 FALL     Patient presents after had a fall around 3am this morning patient was found by her daughter around 9am. Patient denies hitting head. No obvious signs of head trauma. She does take eliquis for afib. Initially refused EMS transport. Patient has been able to ambulate with assistance since fall. Family states that she is now more lethargic, but patient is answering questions appropriately and GCS 15 at this time    Dizziness       HPI  Margaret Garcia is a 81 y.o. female who presents accompanied by her daughter, having had a fall this morning at 3 AM.  Patient's daughter found her on the floor at 9 AM.  Patient had recently been treated for UTI, finishing her antibiotics days ago.  Patient states \"I might have hit my head\".  She has fallen twice both today as well as last Saturday.  She saw her primary doctor on Tuesday and has been feeling better, generalized weakness slightly worse today according to the daughter.  No dysuria.  No fever.  No cough or sore throat.  She denies chest pain.  Patient denies syncope.  No abdominal pain, no vomiting or diarrhea.  Patient has history of atrial fibrillation, currently on blood thinners.  She was able to get up with assistance today, she uses a walker and a wheelchair at home.  No acute vision change.  Patient is at a baseline short of breath, there is no new shortness of breath per the daughter.  Patient had a frontal headache this morning which she states is now resolved.    REVIEW OF SYSTEMS    Constitutional: Fatigue and generalized weakness.  Denies fever  Respiratory: Chronic shortness of breath, no acute change.  No new cough  Cardiac: No chest pain or syncope  Gastrointestinal: No abdominal pain.  No vomiting or diarrhea  Musculoskeletal: Denies neck or back pain.  No extremity injuries from the fall.  Neurologic: Possible head injury.  Headache this morning is now " resolved.       All other systems are negative.       PAST MEDICAL HISTORY  Past Medical History:   Diagnosis Date    Arthritis     knees, and hips-osteo    ASTHMA     Body mass index 40.0-44.9, adult ([facility]) 4/27/2018    Chronic renal impairment, stage 3 (moderate) ([facility]) 5/13/2019    COPD (chronic obstructive pulmonary disease) with chronic bronchitis ([facility]) 4/20/2021    Dental disorder     upper    Dysuria 10/18/2016    Fall     Generalized edema 4/27/2018    Glaucoma     bilateral    Hay fever 4/19/2016    Heart burn     High cholesterol     Hx: UTI (urinary tract infection)     Hyperglycemia 11/24/2015    Hypertension     Hypothyroidism     Indigestion     Ischemic bowel disease ([facility]) 9/9/2019    Migraines     pt has similar symptoms with migraines not for a long time though    Nausea 10/14/2019    Neuropathy ([facility])     Obesity (BMI 30-39.9) 7/20/2015    Pneumonia     Routine general medical examination at a health care facility 7/20/2015    7/20/15    Sleep apnea 12/2019    Had sleep study, didn't tolerate cpap    Snoring     Syncope 6/5/2020    Tobacco use 4/29/2019    URI (upper respiratory infection) 11/4/2015    Urinary bladder disorder     hx of uti's       FAMILY HISTORY  Family History   Problem Relation Age of Onset    Stroke Mother     Hyperlipidemia Mother     Hypertension Mother     Arthritis Mother     Diabetes Father     Lung Disease Father         pneumonia    Arthritis Sister     Rheumatologic Disease Sister     Hypertension Sister     Hyperlipidemia Sister     Other Sister         Anusha/Fibermyalgia    Hyperlipidemia Brother     Hypertension Brother     Arthritis Brother     Lung Disease Maternal Grandmother         pneumonia    Stroke Maternal Grandfather     Cancer Maternal Grandfather         skin     No Known Problems Paternal Grandmother     No Known Problems Paternal Grandfather     Hyperlipidemia Daughter     Diabetes Daughter     No Known Problems Son        SOCIAL HISTORY  Social History      Socioeconomic History    Marital status: Single   Tobacco Use    Smoking status: Every Day     Packs/day: 1.00     Years: 56.00     Pack years: 56.00     Types: Cigarettes    Smokeless tobacco: Never    Tobacco comments:     started smoking at age 12, little mor than half a pack   Vaping Use    Vaping Use: Never used   Substance and Sexual Activity    Alcohol use: No    Drug use: No    Sexual activity: Never     Partners: Male     Birth control/protection: Post-Menopausal       SURGICAL HISTORY  Past Surgical History:   Procedure Laterality Date    GASTROSCOPY  12/13/2019    Procedure: GASTROSCOPY;  Surgeon: Ang Angel M.D.;  Location: [facility];  Service: Gastroenterology    COLONOSCOPY  12/13/2019    Procedure: COLONOSCOPY;  Surgeon: Ang Angel M.D.;  Location: [facility];  Service: Gastroenterology    NIRU BY LAPAROSCOPY  9/2/2011    [facility]    LUMBAR DECOMPRESSION  6/29/2009    Performed by ANG YAN at SURGERY [facility] ORS    LUMBAR LAMINECTOMY DISKECTOMY  6/29/2009    Performed by ANG YAN at SURGERY [facility] ORS    ABDOMINAL HYSTERECTOMY TOTAL      GYN SURGERY      hysterectomy    HEMORRHOIDECTOMY         CURRENT MEDICATIONS  Home Medications       Reviewed by Brie Augustin R.N. (Registered Nurse) on 09/22/22 at 1319  Med List Status: Partial     Medication Last Dose Status   acetaminophen (TYLENOL) 500 MG TABS  Active   apixaban (ELIQUIS) 5mg Tab  Active   atorvastatin (LIPITOR) 10 MG Tab  Active   estradiol (ESTRACE) 0.1 MG/GM vaginal cream  Active   fenofibrate (TRIGLIDE) 160 MG tablet  Active   HYDROcodone-acetaminophen (NORCO) 7.5-325 MG per tablet  Active   hydrOXYzine HCl (ATARAX) 25 MG Tab  Active   latanoprost (XALATAN) 0.005 % Solution  Active   levothyroxine (SYNTHROID) 112 MCG Tab  Active   Lidocaine 1.8 % Patch  Active   lisinopril (PRINIVIL) 10 MG Tab  Active   Mirabegron ER  "(MYRBETRIQ) 50 MG TABLET SR 24 HR  Active   Naloxone (NARCAN) 4 MG/0.1ML Liquid  Active   pantoprazole (PROTONIX) 40 MG Tablet Delayed Response  Active   potassium chloride SA (K-DUR) 10 MEQ Tab CR  Active   promethazine (PHENERGAN) 25 MG Tab  Active   triamterene/hctz (MAXZIDE-25/DYAZIDE) 37.5-25 MG Cap  Active                    ALLERGIES  Allergies   Allergen Reactions    Food Hives and Nausea     oranges    Neomycin-Polymyxin B Rash    Pcn [Penicillins]     Penicillin G Rash       PHYSICAL EXAM  VITAL SIGNS: /55   Pulse 90   Temp 36.8 °C (98.3 °F) (Temporal)   Resp 14   Ht 1.575 m (5' 2\")   Wt 81.6 kg (180 lb)   SpO2 97%   BMI 32.92 kg/m²   Constitutional:  Non-toxic appearance.   HENT: No facial swelling.  Small abrasion left forehead  Eyes: Anicteric, no conjunctivitis.     Cardiovascular: Normal heart rate, Normal rhythm  Pulmonary:  No wheezing, No rales.   Gastrointestinal: Soft, No tenderness, No distention  Skin: Warm, Dry, No cyanosis.  Punctate abrasion with eschar left forehead, no surrounding swelling or bruising.  Face is otherwise atraumatic.  Neurologic: Speech is clear, follows commands, facial expression is symmetrical.  Moves all extremities to command  Psychiatric: Affect normal,  Mood normal.  Patient is calm and cooperative  Musculoskeletal: Neck and spine nontender.  Extremities nontender.    EKG/Labs  Results for orders placed or performed during the hospital encounter of 09/22/22   CBC with Differential   Result Value Ref Range    WBC 9.1 4.8 - 10.8 K/uL    RBC 3.93 (L) 4.20 - 5.40 M/uL    Hemoglobin 12.3 12.0 - 16.0 g/dL    Hematocrit 37.5 37.0 - 47.0 %    MCV 95.4 81.4 - 97.8 fL    MCH 31.3 27.0 - 33.0 pg    MCHC 32.8 (L) 33.6 - 35.0 g/dL    RDW 49.9 35.9 - 50.0 fL    Platelet Count 308 164 - 446 K/uL    MPV 8.8 (L) 9.0 - 12.9 fL    Neutrophils-Polys 80.10 (H) 44.00 - 72.00 %    Lymphocytes 8.90 (L) 22.00 - 41.00 %    Monocytes 9.50 0.00 - 13.40 %    Eosinophils 0.00 0.00 - " 6.90 %    Basophils 0.30 0.00 - 1.80 %    Immature Granulocytes 1.20 (H) 0.00 - 0.90 %    Nucleated RBC 0.00 /100 WBC    Neutrophils (Absolute) 7.28 (H) 2.00 - 7.15 K/uL    Lymphs (Absolute) 0.81 (L) 1.00 - 4.80 K/uL    Monos (Absolute) 0.86 (H) 0.00 - 0.85 K/uL    Eos (Absolute) 0.00 0.00 - 0.51 K/uL    Baso (Absolute) 0.03 0.00 - 0.12 K/uL    Immature Granulocytes (abs) 0.11 0.00 - 0.11 K/uL    NRBC (Absolute) 0.00 K/uL   Complete Metabolic Panel (CMP)   Result Value Ref Range    Sodium 136 135 - 145 mmol/L    Potassium 4.2 3.6 - 5.5 mmol/L    Chloride 104 96 - 112 mmol/L    Co2 16 (L) 20 - 33 mmol/L    Anion Gap 16.0 7.0 - 16.0    Glucose 119 (H) 65 - 99 mg/dL    Bun 28 (H) 8 - 22 mg/dL    Creatinine 1.80 (H) 0.50 - 1.40 mg/dL    Calcium 9.6 8.5 - 10.5 mg/dL    AST(SGOT) 35 12 - 45 U/L    ALT(SGPT) 14 2 - 50 U/L    Alkaline Phosphatase 41 30 - 99 U/L    Total Bilirubin 0.6 0.1 - 1.5 mg/dL    Albumin 3.4 3.2 - 4.9 g/dL    Total Protein 6.9 6.0 - 8.2 g/dL    Globulin 3.5 1.9 - 3.5 g/dL    A-G Ratio 1.0 g/dL   Troponin   Result Value Ref Range    Troponin T 34 (H) 6 - 19 ng/L   URINALYSIS (UA)    Specimen: Urine   Result Value Ref Range    Color DK Yellow     Character Turbid (A)     Specific Gravity 1.018 <1.035    Ph 5.0 5.0 - 8.0    Glucose Negative Negative mg/dL    Ketones Trace (A) Negative mg/dL    Protein 30 (A) Negative mg/dL    Bilirubin Negative Negative    Urobilinogen, Urine 1.0 Negative    Nitrite Positive (A) Negative    Leukocyte Esterase Large (A) Negative    Occult Blood Moderate (A) Negative    Micro Urine Req Microscopic    ESTIMATED GFR   Result Value Ref Range    GFR (CKD-EPI) 27 (A) >60 mL/min/1.73 m 2   URINE MICROSCOPIC (W/UA)   Result Value Ref Range    WBC Packed (A) /hpf    RBC 5-10 (A) /hpf    Bacteria Many (A) None /hpf    Epithelial Cells Few /hpf    Hyaline Cast 6-10 (A) /lpf   EKG   Result Value Ref Range    Report       [facility] Emergency Dept.    Test Date:   2022  Pt Name:    ZEINA REY                 Department: ER  MRN:        [id number]                      Room:  Gender:     Female                       Technician: [id number]  :        [id number]                   Requested By:ER TRIAGE PROTOCOL  Order #:    [id number]                    Reading MD: RANGEL TRAN MD    Measurements  Intervals                                Axis  Rate:       89                           P:          55  IA:         167                          QRS:        23  QRSD:       73                           T:          43  QT:         346  QTc:        421    Interpretive Statements  Sinus rhythm  Compared to ECG 2019 14:52:32  No significant changes  Electronically Signed On 2022 14:28:04 PDT by RANGEL TRAN MD           RADIOLOGY/PROCEDURES  CT-HEAD W/O   Final Result      1.  Mild cerebral atrophy and chronic microvascular ischemic type changes.   2.  No acute intracranial abnormality.         DX-CHEST-PORTABLE (1 VIEW)   Final Result      Bibasilar underinflation atelectasis which could obscure an additional process.            COURSE & MEDICAL DECISION MAKING  Pertinent Labs & Imaging studies reviewed. (See chart for details)  Patient with generalized weakness, suspect secondary to ongoing UTI, results showed packed white blood cells, nitrite positive.  Urine will be sent for culture.  She does not appear septic, normal pulse, blood pressure, afebrile, normal white blood cell count.  Daughter is extremely concerned, patient takes blood thinners, has fallen twice in the last 4 days secondary to her generalized weakness.  Daughter feels it is unsafe for the patient to go home, I am in agreement given the multiple falls, generalized weakness, strong evidence of urinary infection at this time.  She is started on Rocephin, patient be hospitalized for ongoing evaluation and treatment.    FINAL IMPRESSION     1. Weakness generalized        2. Urinary tract infection  without hematuria, site unspecified        3. Fall, initial encounter                        Electronically signed by: Job Lopez M.D., 9/22/2022 2:25 PM

## 2022-09-23 ENCOUNTER — PATIENT MESSAGE (OUTPATIENT)
Dept: NEUROLOGY | Facility: MEDICAL CENTER | Age: 82
End: 2022-09-23

## 2022-09-23 ENCOUNTER — APPOINTMENT (OUTPATIENT)
Dept: RADIOLOGY | Facility: MEDICAL CENTER | Age: 82
End: 2022-09-23
Attending: GENERAL PRACTICE
Payer: MEDICARE

## 2022-09-23 ENCOUNTER — HOSPITAL ENCOUNTER (INPATIENT)
Facility: MEDICAL CENTER | Age: 82
LOS: 2 days | DRG: 683 | End: 2022-09-25
Attending: PATHOLOGY | Admitting: STUDENT IN AN ORGANIZED HEALTH CARE EDUCATION/TRAINING PROGRAM
Payer: MEDICARE

## 2022-09-23 ENCOUNTER — APPOINTMENT (OUTPATIENT)
Dept: RADIOLOGY | Facility: MEDICAL CENTER | Age: 82
DRG: 683 | End: 2022-09-23
Attending: GENERAL PRACTICE
Payer: MEDICARE

## 2022-09-23 ENCOUNTER — APPOINTMENT (OUTPATIENT)
Dept: RADIOLOGY | Facility: MEDICAL CENTER | Age: 82
End: 2022-09-23
Attending: PSYCHIATRY & NEUROLOGY
Payer: MEDICARE

## 2022-09-23 VITALS
SYSTOLIC BLOOD PRESSURE: 149 MMHG | OXYGEN SATURATION: 96 % | WEIGHT: 180 LBS | HEIGHT: 62 IN | DIASTOLIC BLOOD PRESSURE: 60 MMHG | RESPIRATION RATE: 15 BRPM | HEART RATE: 103 BPM | BODY MASS INDEX: 33.13 KG/M2 | TEMPERATURE: 98.7 F

## 2022-09-23 DIAGNOSIS — T14.8XXA ABRASION: ICD-10-CM

## 2022-09-23 DIAGNOSIS — I48.0 PAROXYSMAL ATRIAL FIBRILLATION (HCC): Chronic | ICD-10-CM

## 2022-09-23 DIAGNOSIS — N30.00 ACUTE CYSTITIS WITHOUT HEMATURIA: ICD-10-CM
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
## 2022-09-23 PROBLEM — N17.9 ACUTE KIDNEY INJURY SUPERIMPOSED ON CHRONIC KIDNEY DISEASE (HCC): Status: ACTIVE | Noted: 2019-05-13

## 2022-09-23 PROBLEM — N18.9 ACUTE KIDNEY INJURY SUPERIMPOSED ON CHRONIC KIDNEY DISEASE (HCC): Status: ACTIVE | Noted: 2019-05-13

## 2022-09-23 PROBLEM — T79.6XXA TRAUMATIC RHABDOMYOLYSIS (HCC): Status: ACTIVE | Noted: 2022-09-23

## 2022-09-23 LAB
ANION GAP SERPL CALC-SCNC: 15 MMOL/L (ref 7–16)
BUN SERPL-MCNC: 24 MG/DL (ref 8–22)
CALCIUM SERPL-MCNC: 9.3 MG/DL (ref 8.4–10.2)
CHLORIDE SERPL-SCNC: 103 MMOL/L (ref 96–112)
CK SERPL-CCNC: 821 U/L (ref 0–154)
CO2 SERPL-SCNC: 16 MMOL/L (ref 20–33)
CREAT SERPL-MCNC: 1.42 MG/DL (ref 0.5–1.4)
GFR SERPLBLD CREATININE-BSD FMLA CKD-EPI: 36 ML/MIN/1.73 M 2
GLUCOSE SERPL-MCNC: 90 MG/DL (ref 65–99)
MAGNESIUM SERPL-MCNC: 1.5 MG/DL (ref 1.5–2.5)
PHOSPHATE SERPL-MCNC: 2.9 MG/DL (ref 2.5–4.5)
POTASSIUM SERPL-SCNC: 3.9 MMOL/L (ref 3.6–5.5)
SODIUM SERPL-SCNC: 134 MMOL/L (ref 135–145)

## 2022-09-23 PROCEDURE — A9270 NON-COVERED ITEM OR SERVICE: HCPCS | Performed by: STUDENT IN AN ORGANIZED HEALTH CARE EDUCATION/TRAINING PROGRAM

## 2022-09-23 PROCEDURE — 770006 HCHG ROOM/CARE - MED/SURG/GYN SEMI*

## 2022-09-23 PROCEDURE — 83735 ASSAY OF MAGNESIUM: CPT

## 2022-09-23 PROCEDURE — 84100 ASSAY OF PHOSPHORUS: CPT

## 2022-09-23 PROCEDURE — 97162 PT EVAL MOD COMPLEX 30 MIN: CPT

## 2022-09-23 PROCEDURE — 82550 ASSAY OF CK (CPK): CPT

## 2022-09-23 PROCEDURE — 700105 HCHG RX REV CODE 258: Performed by: GENERAL PRACTICE

## 2022-09-23 PROCEDURE — 36415 COLL VENOUS BLD VENIPUNCTURE: CPT

## 2022-09-23 PROCEDURE — 80048 BASIC METABOLIC PNL TOTAL CA: CPT

## 2022-09-23 PROCEDURE — 73521 X-RAY EXAM HIPS BI 2 VIEWS: CPT

## 2022-09-23 PROCEDURE — 97166 OT EVAL MOD COMPLEX 45 MIN: CPT

## 2022-09-23 PROCEDURE — 700102 HCHG RX REV CODE 250 W/ 637 OVERRIDE(OP): Performed by: STUDENT IN AN ORGANIZED HEALTH CARE EDUCATION/TRAINING PROGRAM

## 2022-09-23 PROCEDURE — 87040 BLOOD CULTURE FOR BACTERIA: CPT | Mod: 91

## 2022-09-23 PROCEDURE — 700105 HCHG RX REV CODE 258: Performed by: STUDENT IN AN ORGANIZED HEALTH CARE EDUCATION/TRAINING PROGRAM

## 2022-09-23 PROCEDURE — 99233 SBSQ HOSP IP/OBS HIGH 50: CPT | Performed by: GENERAL PRACTICE

## 2022-09-23 PROCEDURE — 700102 HCHG RX REV CODE 250 W/ 637 OVERRIDE(OP): Performed by: GENERAL PRACTICE

## 2022-09-23 PROCEDURE — A9270 NON-COVERED ITEM OR SERVICE: HCPCS | Performed by: GENERAL PRACTICE

## 2022-09-23 PROCEDURE — 99407 BEHAV CHNG SMOKING > 10 MIN: CPT

## 2022-09-23 PROCEDURE — 700111 HCHG RX REV CODE 636 W/ 250 OVERRIDE (IP): Performed by: GENERAL PRACTICE

## 2022-09-23 RX ORDER — ONDANSETRON 4 MG/1
4 TABLET, ORALLY DISINTEGRATING ORAL EVERY 4 HOURS PRN
Status: DISCONTINUED | OUTPATIENT
Start: 2022-09-23 | End: 2022-09-25 | Stop reason: HOSPADM

## 2022-09-23 RX ORDER — AMOXICILLIN 250 MG
2 CAPSULE ORAL 2 TIMES DAILY
Status: DISCONTINUED | OUTPATIENT
Start: 2022-09-23 | End: 2022-09-25 | Stop reason: HOSPADM

## 2022-09-23 RX ORDER — POLYETHYLENE GLYCOL 3350 17 G/17G
1 POWDER, FOR SOLUTION ORAL
Status: DISCONTINUED | OUTPATIENT
Start: 2022-09-23 | End: 2022-09-25 | Stop reason: HOSPADM

## 2022-09-23 RX ORDER — ATORVASTATIN CALCIUM 10 MG/1
10 TABLET, FILM COATED ORAL DAILY
Status: DISCONTINUED | OUTPATIENT
Start: 2022-09-23 | End: 2022-09-25 | Stop reason: HOSPADM

## 2022-09-23 RX ORDER — LEVOTHYROXINE SODIUM 112 UG/1
112 TABLET ORAL
Status: DISCONTINUED | OUTPATIENT
Start: 2022-09-23 | End: 2022-09-25 | Stop reason: HOSPADM

## 2022-09-23 RX ORDER — ACETAMINOPHEN 325 MG/1
650 TABLET ORAL EVERY 6 HOURS PRN
Status: DISCONTINUED | OUTPATIENT
Start: 2022-09-23 | End: 2022-09-25 | Stop reason: HOSPADM

## 2022-09-23 RX ORDER — HYDROXYZINE HYDROCHLORIDE 25 MG/1
25 TABLET, FILM COATED ORAL 3 TIMES DAILY PRN
Status: DISCONTINUED | OUTPATIENT
Start: 2022-09-23 | End: 2022-09-23

## 2022-09-23 RX ORDER — SODIUM CHLORIDE 9 MG/ML
INJECTION, SOLUTION INTRAVENOUS CONTINUOUS
Status: ACTIVE | OUTPATIENT
Start: 2022-09-23 | End: 2022-09-24

## 2022-09-23 RX ORDER — LATANOPROST 50 UG/ML
1 SOLUTION/ DROPS OPHTHALMIC NIGHTLY
Status: DISCONTINUED | OUTPATIENT
Start: 2022-09-23 | End: 2022-09-25 | Stop reason: HOSPADM

## 2022-09-23 RX ORDER — OMEPRAZOLE 20 MG/1
40 CAPSULE, DELAYED RELEASE ORAL 2 TIMES DAILY
Status: DISCONTINUED | OUTPATIENT
Start: 2022-09-23 | End: 2022-09-25 | Stop reason: HOSPADM

## 2022-09-23 RX ORDER — HYDROCODONE BITARTRATE AND ACETAMINOPHEN 5; 325 MG/1; MG/1
1 TABLET ORAL EVERY 6 HOURS PRN
Status: DISCONTINUED | OUTPATIENT
Start: 2022-09-23 | End: 2022-09-25 | Stop reason: HOSPADM

## 2022-09-23 RX ORDER — BISACODYL 10 MG
10 SUPPOSITORY, RECTAL RECTAL
Status: DISCONTINUED | OUTPATIENT
Start: 2022-09-23 | End: 2022-09-25 | Stop reason: HOSPADM

## 2022-09-23 RX ORDER — LABETALOL HYDROCHLORIDE 5 MG/ML
10 INJECTION, SOLUTION INTRAVENOUS EVERY 4 HOURS PRN
Status: DISCONTINUED | OUTPATIENT
Start: 2022-09-23 | End: 2022-09-25 | Stop reason: HOSPADM

## 2022-09-23 RX ORDER — SODIUM CHLORIDE, SODIUM LACTATE, POTASSIUM CHLORIDE, CALCIUM CHLORIDE 600; 310; 30; 20 MG/100ML; MG/100ML; MG/100ML; MG/100ML
INJECTION, SOLUTION INTRAVENOUS CONTINUOUS
Status: DISCONTINUED | OUTPATIENT
Start: 2022-09-23 | End: 2022-09-23

## 2022-09-23 RX ORDER — IPRATROPIUM BROMIDE AND ALBUTEROL SULFATE 2.5; .5 MG/3ML; MG/3ML
3 SOLUTION RESPIRATORY (INHALATION)
Status: DISCONTINUED | OUTPATIENT
Start: 2022-09-23 | End: 2022-09-25 | Stop reason: HOSPADM

## 2022-09-23 RX ORDER — ONDANSETRON 2 MG/ML
4 INJECTION INTRAMUSCULAR; INTRAVENOUS EVERY 4 HOURS PRN
Status: DISCONTINUED | OUTPATIENT
Start: 2022-09-23 | End: 2022-09-25 | Stop reason: HOSPADM

## 2022-09-23 RX ADMIN — LATANOPROST 1 DROP: 50 SOLUTION OPHTHALMIC at 20:56

## 2022-09-23 RX ADMIN — HYDROCODONE BITARTRATE AND ACETAMINOPHEN 1 TABLET: 5; 325 TABLET ORAL at 11:04

## 2022-09-23 RX ADMIN — SENNOSIDES AND DOCUSATE SODIUM 2 TABLET: 50; 8.6 TABLET ORAL at 17:25

## 2022-09-23 RX ADMIN — ACETAMINOPHEN 650 MG: 325 TABLET, FILM COATED ORAL at 02:11

## 2022-09-23 RX ADMIN — APIXABAN 2.5 MG: 2.5 TABLET, FILM COATED ORAL at 07:08

## 2022-09-23 RX ADMIN — OMEPRAZOLE 40 MG: 20 CAPSULE, DELAYED RELEASE ORAL at 17:25

## 2022-09-23 RX ADMIN — LEVOTHYROXINE SODIUM 112 MCG: 0.11 TABLET ORAL at 07:08

## 2022-09-23 RX ADMIN — ATORVASTATIN CALCIUM 10 MG: 10 TABLET, FILM COATED ORAL at 07:08

## 2022-09-23 RX ADMIN — OMEPRAZOLE 40 MG: 20 CAPSULE, DELAYED RELEASE ORAL at 07:08

## 2022-09-23 RX ADMIN — SODIUM CHLORIDE, POTASSIUM CHLORIDE, SODIUM LACTATE AND CALCIUM CHLORIDE: 600; 310; 30; 20 INJECTION, SOLUTION INTRAVENOUS at 07:12

## 2022-09-23 RX ADMIN — SODIUM CHLORIDE: 9 INJECTION, SOLUTION INTRAVENOUS at 15:48

## 2022-09-23 RX ADMIN — ACETAMINOPHEN 650 MG: 325 TABLET, FILM COATED ORAL at 21:39

## 2022-09-23 RX ADMIN — APIXABAN 2.5 MG: 2.5 TABLET, FILM COATED ORAL at 17:25

## 2022-09-23 RX ADMIN — CEFTRIAXONE SODIUM 2 G: 2 INJECTION, POWDER, FOR SOLUTION INTRAMUSCULAR; INTRAVENOUS at 17:25

## 2022-09-23 ASSESSMENT — CHA2DS2 SCORE
SEX: FEMALE
HYPERTENSION: YES
CHF OR LEFT VENTRICULAR DYSFUNCTION: NO
CHA2DS2 VASC SCORE: 4
AGE 75 OR GREATER: YES
DIABETES: NO
VASCULAR DISEASE: NO
PRIOR STROKE OR TIA OR THROMBOEMBOLISM: NO
AGE 65 TO 74: NO

## 2022-09-23 ASSESSMENT — PATIENT HEALTH QUESTIONNAIRE - PHQ9
SUM OF ALL RESPONSES TO PHQ9 QUESTIONS 1 AND 2: 0
2. FEELING DOWN, DEPRESSED, IRRITABLE, OR HOPELESS: NOT AT ALL
1. LITTLE INTEREST OR PLEASURE IN DOING THINGS: NOT AT ALL

## 2022-09-23 ASSESSMENT — GAIT ASSESSMENTS
DEVIATION: DECREASED BASE OF SUPPORT
DISTANCE (FEET): 30
GAIT LEVEL OF ASSIST: MINIMAL ASSIST
ASSISTIVE DEVICE: FRONT WHEEL WALKER
DISTANCE (FEET): 15

## 2022-09-23 ASSESSMENT — PAIN DESCRIPTION - PAIN TYPE
TYPE: ACUTE PAIN
TYPE: CHRONIC PAIN

## 2022-09-23 ASSESSMENT — COGNITIVE AND FUNCTIONAL STATUS - GENERAL
SUGGESTED CMS G CODE MODIFIER MOBILITY: CK
PERSONAL GROOMING: A LITTLE
MOVING FROM LYING ON BACK TO SITTING ON SIDE OF FLAT BED: UNABLE
DRESSING REGULAR UPPER BODY CLOTHING: A LITTLE
HELP NEEDED FOR BATHING: A LITTLE
MOBILITY SCORE: 14
STANDING UP FROM CHAIR USING ARMS: A LITTLE
DRESSING REGULAR LOWER BODY CLOTHING: A LITTLE
EATING MEALS: A LITTLE
SUGGESTED CMS G CODE MODIFIER DAILY ACTIVITY: CK
TOILETING: A LITTLE
WALKING IN HOSPITAL ROOM: A LITTLE
DAILY ACTIVITIY SCORE: 17
DRESSING REGULAR LOWER BODY CLOTHING: A LOT
MOBILITY SCORE: 18
TURNING FROM BACK TO SIDE WHILE IN FLAT BAD: A LITTLE
CLIMB 3 TO 5 STEPS WITH RAILING: A LITTLE
MOVING TO AND FROM BED TO CHAIR: UNABLE
SUGGESTED CMS G CODE MODIFIER DAILY ACTIVITY: CK
SUGGESTED CMS G CODE MODIFIER MOBILITY: CL
MOVING TO AND FROM BED TO CHAIR: A LITTLE
MOVING FROM LYING ON BACK TO SITTING ON SIDE OF FLAT BED: A LITTLE
STANDING UP FROM CHAIR USING ARMS: A LITTLE
TOILETING: A LITTLE
WALKING IN HOSPITAL ROOM: A LITTLE
PERSONAL GROOMING: A LITTLE
EATING MEALS: A LITTLE
CLIMB 3 TO 5 STEPS WITH RAILING: A LITTLE
TURNING FROM BACK TO SIDE WHILE IN FLAT BAD: A LITTLE
DAILY ACTIVITIY SCORE: 18
DRESSING REGULAR UPPER BODY CLOTHING: A LITTLE
HELP NEEDED FOR BATHING: A LITTLE

## 2022-09-23 ASSESSMENT — LIFESTYLE VARIABLES
ALCOHOL_USE: NO
HAVE YOU EVER FELT YOU SHOULD CUT DOWN ON YOUR DRINKING: NO
TOTAL SCORE: 0
EVER HAD A DRINK FIRST THING IN THE MORNING TO STEADY YOUR NERVES TO GET RID OF A HANGOVER: NO
HAVE PEOPLE ANNOYED YOU BY CRITICIZING YOUR DRINKING: NO
CONSUMPTION TOTAL: NEGATIVE
ON A TYPICAL DAY WHEN YOU DRINK ALCOHOL HOW MANY DRINKS DO YOU HAVE: 0
AVERAGE NUMBER OF DAYS PER WEEK YOU HAVE A DRINK CONTAINING ALCOHOL: 0
PACK_YEARS: 56
HOW MANY TIMES IN THE PAST YEAR HAVE YOU HAD 5 OR MORE DRINKS IN A DAY: 0
EVER FELT BAD OR GUILTY ABOUT YOUR DRINKING: NO

## 2022-09-23 ASSESSMENT — FIBROSIS 4 INDEX: FIB4 SCORE: 2.52

## 2022-09-23 ASSESSMENT — ENCOUNTER SYMPTOMS: WEAKNESS: 1

## 2022-09-23 ASSESSMENT — ACTIVITIES OF DAILY LIVING (ADL): TOILETING: INDEPENDENT

## 2022-09-23 NOTE — H&P
Hospital Medicine History & Physical Note    Date of Service  9/22/2022    Primary Care Physician  Payton Galo P.A.-C.    Consultants  None    Code Status  DNAR/DNI    Chief Complaint  Chief Complaint   Patient presents with    T-5000 FALL     Patient presents after had a fall around 3am this morning patient was found by her daughter around 9am. Patient denies hitting head. No obvious signs of head trauma. She does take eliquis for afib. Initially refused EMS transport. Patient has been able to ambulate with assistance since fall. Family states that she is now more lethargic, but patient is answering questions appropriately and GCS 15 at this time    Dizziness       History of Presenting Illness  Margaret Garcia is a 81 y.o. female with extensive past medical history to include hypertension, hyperlipidemia, CKD3, paroxysmal A. fib on Eliquis, COPD on 2 L supplemental O2 with exertion, hypothyroidism, ischemic bowel disease diagnosed in 2019, sleep apnea not on CPAP, dementia, chronic low back pain who presented 9/22/2022 with recurrent falls, generalized weakness, found to have persistent urinary tract infection and SHARIFA on CKD.    Family at bedside helps with history, patient had a fall on Saturday and again a fall on the day of admission she thinks around 3 in the morning, she was found on the floor around 9 in the morning.  She has been able to ambulate since the fall. She has been experiencing generalized fatigue and weakness, dysuria, mild headaches, decreased p.o. intake, no measured fevers but has been a little diaphoretic times, dyspnea on exertion.  She was recently treated with a course of cefdinir for UTI, urine culture obtained at that time showed Klebsiella variicola with pan sensitivities intermediate sensitive to nitrofurantoin.  Due to ongoing symptoms they presented for evaluation.  She denies any vision changes, chest pain, cough, dyspnea at rest, nausea vomiting abdominal pain or  diarrhea.    In the ED she was afebrile with normal pulse respiratory rate normal room air oxygen saturation systolic blood pressure ranged from 110-116.  Initial CBC with normal WBC count and hemoglobin, normal platelets, CMP with normal electrolytes, BUN 28 serum creatinine 1.8 normal liver function, troponin T 34, UA appears infectious with moderate occult blood positive nitrate leukocyte esterase packed WBCs many bacteria.  EKG sinus rhythm without significant ectopy or ST segment elevation or depression, chest x-ray shows bibasilar underinflation atelectasis, CT head shows mild cerebral atrophy and chronic microvascular ischemic type changes no acute intracranial abnormalities. She given ceftriaxone subsequent referred to hospitalist for admission.    I discussed the plan of care with patient, family, bedside RN, and pharmacy.    Review of Systems  Review of Systems   Constitutional:  Positive for malaise/fatigue. Negative for fever.   HENT:  Negative for congestion and sinus pain.    Eyes:  Negative for double vision and photophobia.   Respiratory:  Positive for shortness of breath. Negative for cough.    Cardiovascular:  Negative for chest pain and palpitations.   Gastrointestinal:  Negative for abdominal pain, diarrhea, nausea and vomiting.   Genitourinary:  Positive for dysuria and frequency.   Musculoskeletal:  Positive for back pain (chronic) and falls.   Neurological:  Positive for weakness and headaches. Negative for sensory change, speech change and focal weakness.   Psychiatric/Behavioral:  Negative for substance abuse. The patient is not nervous/anxious.      Past Medical History   has a past medical history of Arthritis, ASTHMA, Body mass index 40.0-44.9, adult ([facility]) (4/27/2018), Chronic renal impairment, stage 3 (moderate) ([facility]) (5/13/2019), COPD (chronic obstructive pulmonary disease) with chronic bronchitis ([facility]) (4/20/2021), Dental disorder, Dysuria (10/18/2016), Fall, Generalized edema  (4/27/2018), Glaucoma, Hay fever (4/19/2016), Heart burn, High cholesterol, UTI (urinary tract infection), Hyperglycemia (11/24/2015), Hypertension, Hypothyroidism, Indigestion, Ischemic bowel disease (HCC) (9/9/2019), Migraines, Nausea (10/14/2019), Neuropathy (HCC), Obesity (BMI 30-39.9) (7/20/2015), Pneumonia, Routine general medical examination at a health care facility (7/20/2015), Sleep apnea (12/2019), Snoring, Syncope (6/5/2020), Tobacco use (4/29/2019), URI (upper respiratory infection) (11/4/2015), and Urinary bladder disorder.    Surgical History   has a past surgical history that includes gyn surgery; lumbar decompression (6/29/2009); hemorrhoidectomy; abdominal hysterectomy total; jodi by laparoscopy (9/2/2011); lumbar laminectomy diskectomy (6/29/2009); gastroscopy (12/13/2019); and colonoscopy (12/13/2019).     Family History  family history includes Arthritis in her brother, mother, and sister; Cancer in her maternal grandfather; Diabetes in her daughter and father; Hyperlipidemia in her brother, daughter, mother, and sister; Hypertension in her brother, mother, and sister; Lung Disease in her father and maternal grandmother; No Known Problems in her paternal grandfather, paternal grandmother, and son; Other in her sister; Rheumatologic Disease in her sister; Stroke in her maternal grandfather and mother.       Social History   reports that she has been smoking cigarettes. She has a 56.00 pack-year smoking history. She has never used smokeless tobacco. She reports that she does not drink alcohol and does not use drugs.    Allergies  Allergies   Allergen Reactions    Food Hives and Nausea     Oranges, Hives    Neomycin-Polymyxin B Rash     Rash       Medications  Prior to Admission Medications   Prescriptions Last Dose Informant Patient Reported? Taking?   HYDROcodone-acetaminophen (NORCO) 7.5-325 MG per tablet   Yes No   Lidocaine 1.8 % Patch   No No   Sig: Apply 1 Each topically 1 time a day as  needed (pain). Apply patch to painful area. Patch may remain in place for up to 12 hours in any 24-hour period. No more than 1 patch should be used in a 24-hour period.   Mirabegron ER (MYRBETRIQ) 50 MG TABLET SR 24 HR   Yes No   Sig: Myrbetriq 50 mg tablet,extended release   TAKE 1 TABLET BY MOUTH EVERY DAY   Naloxone (NARCAN) 4 MG/0.1ML Liquid   Yes No   Sig: Narcan 4 mg/actuation nasal spray   USE AS DIRECTED   acetaminophen (TYLENOL) 500 MG TABS   Yes No   Sig: Take 500 mg by mouth 3 times a day as needed. Indications: Pain   apixaban (ELIQUIS) 5mg Tab   No No   Sig: Take 1 Tablet by mouth 2 times a day. NEEDS TO BE SEEN FOR FURTHER REFILLS.   atorvastatin (LIPITOR) 10 MG Tab   No No   Sig: Take 1 Tablet by mouth every day.   estradiol (ESTRACE) 0.1 MG/GM vaginal cream   Yes No   Sig: estradiol 0.01% (0.1 mg/gram) vaginal cream   fenofibrate (TRIGLIDE) 160 MG tablet   No No   Sig: Take 1 Tablet by mouth every day.   hydrOXYzine HCl (ATARAX) 25 MG Tab   Yes No   Sig: hydroxyzine HCl 25 mg tablet   Take 1 tablet every day by oral route at bedtime for 90 days.   latanoprost (XALATAN) 0.005 % Solution   Yes No   Sig: latanoprost 0.005 % eye drops   levothyroxine (SYNTHROID) 112 MCG Tab   No No   Sig: TAKE 1 TABLET BY MOUTH EVERY DAY IN THE MORNING ON AN EMPTY STOMACH   lisinopril (PRINIVIL) 10 MG Tab   No No   Sig: Take 1 Tablet by mouth every day.   pantoprazole (PROTONIX) 40 MG Tablet Delayed Response   No No   Sig: Take 1 Tablet by mouth 2 times a day.   potassium chloride SA (K-DUR) 10 MEQ Tab CR   No No   Sig: TAKE 1 TABLET BY MOUTH EVERY DAY   promethazine (PHENERGAN) 25 MG Tab   No No   Sig: TAKE 1 TABLET BY MOUTH 3 TIMES A DAY AS NEEDED FOR NAUSEA AND VOMITING   triamterene/hctz (MAXZIDE-25/DYAZIDE) 37.5-25 MG Cap   No No   Sig: TAKE 1 CAPSULE BY MOUTH EVERY DAY      Facility-Administered Medications: None       Physical Exam  Temp:  [36.8 °C (98.3 °F)-36.9 °C (98.4 °F)] 36.9 °C (98.4 °F)  Pulse:  [84-90]  84  Resp:  [14-16] 16  BP: (110-116)/(55-85) 110/85  SpO2:  [97 %-98 %] 98 %  Blood Pressure : 110/85   Temperature: 36.9 °C (98.4 °F)   Pulse: 84   Respiration: 16   Pulse Oximetry: 98 %       Physical Exam  Vitals and nursing note reviewed.   Constitutional:       General: She is not in acute distress.     Appearance: She is obese. She is ill-appearing.      Comments: 81-year-old female appears stated age alert and conversant able to speak full sentences ill-appearing, pleasant mood, daughter at bedside   HENT:      Head: Normocephalic and atraumatic.      Nose: Nose normal. No rhinorrhea.      Mouth/Throat:      Mouth: Mucous membranes are dry.      Pharynx: Oropharynx is clear.   Eyes:      General: No scleral icterus.     Extraocular Movements: Extraocular movements intact.      Conjunctiva/sclera: Conjunctivae normal.      Pupils: Pupils are equal, round, and reactive to light.   Cardiovascular:      Rate and Rhythm: Normal rate and regular rhythm.      Pulses: Normal pulses.      Heart sounds: No murmur heard.  Pulmonary:      Effort: No respiratory distress.      Breath sounds: Rales present. No wheezing or rhonchi.      Comments: Poor inspiratory effort, bibasilar rales  Abdominal:      Palpations: Abdomen is soft.      Tenderness: There is no abdominal tenderness. There is no guarding or rebound.   Musculoskeletal:         General: No tenderness or deformity. Normal range of motion.      Cervical back: Normal range of motion and neck supple. No rigidity or tenderness.      Right lower leg: Edema present.      Left lower leg: Edema present.   Skin:     General: Skin is warm and dry.      Capillary Refill: Capillary refill takes less than 2 seconds.   Neurological:      General: No focal deficit present.      Mental Status: She is alert. Mental status is at baseline.      Cranial Nerves: No cranial nerve deficit.      Sensory: No sensory deficit.      Motor: No weakness.      Coordination: Coordination  normal.      Comments: Face symmetrical, tongue midline, able to move all 4 extremities antigravity, no hemineglect or gaze preference, sensation intact   Psychiatric:         Mood and Affect: Mood normal.         Behavior: Behavior normal.         Thought Content: Thought content normal.         Judgment: Judgment normal.       Laboratory:  Recent Labs     09/22/22  1349   WBC 9.1   RBC 3.93*   HEMOGLOBIN 12.3   HEMATOCRIT 37.5   MCV 95.4   MCH 31.3   MCHC 32.8*   RDW 49.9   PLATELETCT 308   MPV 8.8*     Recent Labs     09/22/22  1349   SODIUM 136   POTASSIUM 4.2   CHLORIDE 104   CO2 16*   GLUCOSE 119*   BUN 28*   CREATININE 1.80*   CALCIUM 9.6     Recent Labs     09/22/22  1349   ALTSGPT 14   ASTSGOT 35   ALKPHOSPHAT 41   TBILIRUBIN 0.6   GLUCOSE 119*         No results for input(s): NTPROBNP in the last 72 hours.      Recent Labs     09/22/22  1349   TROPONINT 34*       Imaging:  CT-HEAD W/O   Final Result      1.  Mild cerebral atrophy and chronic microvascular ischemic type changes.   2.  No acute intracranial abnormality.         DX-CHEST-PORTABLE (1 VIEW)   Final Result      Bibasilar underinflation atelectasis which could obscure an additional process.          X-Ray:  I have personally reviewed the images and compared with prior images. and My impression is: chest x-ray shows bibasilar underinflation atelectasis  EKG:  I have personally reviewed the images and compared with prior images. and My impression is: EKG sinus rhythm without significant ectopy or ST segment elevation or depression    Assessment/Plan:  Justification for Admission Status  I anticipate this patient will require at least two midnights for appropriate medical management, necessitating inpatient admission because SHARIFA, UTI      * Acute renal failure superimposed on stage 3 chronic kidney disease (HCC)- (present on admission)  Assessment & Plan  With persistent UTI.  UA shows infection, check CPK  Rule out post obstruction  IVF  Renal dose  meds and avoid nephrotoxins  Monitor I&O's  Follow renal function      Advance care planning- (present on admission)  Assessment & Plan  I discussed advance care planning with the patient and family for at least 22 minutes, including diagnosis, prognosis, plan of care, risks and benefits of any therapies that could be offered, as well as alternatives including palliation and hospice, as appropriate.  DNR per patient and family wishes.     Paroxysmal atrial fibrillation (HCC)- (present on admission)  Assessment & Plan  Sinus rhythm on admission, heart rate in the 80s to 90s.  Continue Eliquis, not on any rate controlling medications as outpatient.  Eliquis dose reduced 2/2 SHARIFA    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)- (present on admission)  Assessment & Plan  Not in acute exacerbation.  Oxygen per guidelines, continuous pulse ox.  DuoNebs as needed    Essential hypertension- (present on admission)  Assessment & Plan  Coming in with UTI systolic blood pressure 110s.  Home regimen lisinopril 10 mg daily triamterene hydrochlorothiazide combination pill 37.5-25 mg  Hold lisinopril and hydrochlorothiazide on admission due to SHARIFA.  Will hold triamterene monitor blood pressure trend.    Hyperlipidemia- (present on admission)  Assessment & Plan  Continue Lipitor, fenofibrate    Hypothyroidism- (present on admission)  Assessment & Plan  Continue Synthroid      VTE prophylaxis: SCDs/TEDs and therapeutic anticoagulation with eliquis

## 2022-09-23 NOTE — THERAPY
Occupational Therapy   Initial Evaluation     Patient Name: Margaret Garcia  Age:  81 y.o., Sex:  female  Medical Record #: [id number]  Today's Date: 9/23/2022     Precautions  Precautions: Fall Risk    Assessment  Patient is 81 y.o. female with a diagnosis of SHARIFA, UTI.  Recent GLF 9/17/22.  Additional factors influencing patient status / progress: strength, endurance, balance.  Therapist reviewed/educated patient/family on environmental/safety awareness, fall precautions, AE/DME, ADL's/transfers.    Plan    Recommend Occupational Therapy 3 times per week until therapy goals are met for the following treatments:  Adaptive Equipment, Neuro Re-Education / Balance, Self Care/Activities of Daily Living, and Therapeutic Activities.       Discharge Recommendations: Recommend home health for continued occupational therapy services     Subjective    Patient was alert, oriented and cooperative w/ tx.     Objective       09/23/22 1512   Prior Living Situation   Prior Services None   Housing / Facility Mobile Home   Steps Into Home   (Ramp w/ bilateral rails)   Rail Both Rail (Steps into Home)  (ramp)   Bathroom Set up Walk In Shower;Shower Glass Doors;Grab Bars;Shower Chair   Equipment Owned Front-Wheel Walker;4-Wheel Walker;Single Point Cane;Tub / Shower Seat;Grab Bar(s) In Tub / Shower;Hand Held Shower;Reacher;Ramp   Lives with - Patient's Self Care Capacity Alone and Able to Care For Self   Comments Family resides in area and are avaolable to assist.  Granddaughter will stay with patient upon discharge home   Prior Level of ADL Function   Self Feeding Independent   Grooming / Hygiene Independent   Bathing Independent   Dressing Independent   Toileting Independent   Prior Level of IADL Function   Medication Management Independent   Laundry Independent   Kitchen Mobility Independent   Finances Independent   Home Management Independent   Prior Level Of Mobility Independent With Device in Home   Driving / Transportation  Relatives / Others Provide Transportation   History of Falls   History of Falls Yes   Date of Last Fall 09/17/22   Precautions   Precautions Fall Risk   Vitals   O2 Delivery Device None - Room Air   Pain   Intervention Repositioned   Pain 0 - 10 Group   Location Back   Location Orientation Lower   Description Aching   Therapist Pain Assessment 4;Post Activity Pain Same as Prior to Activity   Cognition    Cognition / Consciousness WDL   Active ROM Upper Body   Active ROM Upper Body  WDL   Dominant Hand Left   Strength Upper Body   Upper Body Strength  WDL   Upper Body Muscle Tone   Upper Body Muscle Tone  WDL   Coordination Upper Body   Coordination WDL   Balance Assessment   Sitting Balance (Static) Fair +   Sitting Balance (Dynamic) Fair   Standing Balance (Static) Fair   Standing Balance (Dynamic) Fair -   Weight Shift Sitting Fair   Weight Shift Standing Fair   Comments FWW   Bed Mobility    Supine to Sit Minimal Assist   Sit to Supine Minimal Assist   ADL Assessment   Upper Body Dressing Minimal Assist   Lower Body Dressing Moderate Assist   Toileting Minimal Assist   How much help from another person does the patient currently need...   Putting on and taking off regular lower body clothing? 2   Bathing (including washing, rinsing, and drying)? 3   Toileting, which includes using a toilet, bedpan, or urinal? 3   Putting on and taking off regular upper body clothing? 3   Taking care of personal grooming such as brushing teeth? 3   Eating meals? 3   6 Clicks Daily Activity Score 17   Functional Mobility   Sit to Stand Minimal Assist   Bed, Chair, Wheelchair Transfer Minimal Assist   Toilet Transfers Minimal Assist   Transfer Method Stand Step   Mobility EOB<>commode via FWW   Distance (Feet) 15   # of Times Distance was Traveled 2   Activity Tolerance   Sitting in Chair 5   Sitting Edge of Bed 10   Standing 5   Comments sitting/commode 5   Short Term Goals   Short Term Goal # 1 Supervision to Mod Indep LB clothing  management via AE/DME   Short Term Goal # 2 Sup/SBA commode transfer via DME   Short Term Goal # 3 Sup-Mod Indep toileting   Short Term Goal # 4 Mod Indep UB clothing management   Education Group   Education Provided Hip Precautions;Role of Occupational Therapist;Activities of Daily Living;Adaptive Equipment   Role of Occupational Therapist Patient Response Patient;Acceptance;Explanation;Verbal Demonstration   Hip Precautions Patient Response Patient;Acceptance;Explanation;Demonstration;Verbal Demonstration;Action Demonstration   ADL Patient Response Patient;Acceptance;Explanation;Demonstration;Verbal Demonstration;Action Demonstration;Reinforcement Needed   Adaptive Equipment Patient Response Patient;Acceptance;Explanation;Demonstration;Verbal Demonstration;Action Demonstration;Reinforcement Needed   Problem List   Problem List Decreased Active Daily Living Skills;Decreased Functional Mobility;Decreased Activity Tolerance;Safety Awareness Deficits / Cognition   Anticipated Discharge Equipment and Recommendations   Discharge Recommendations Recommend home health for continued occupational therapy services

## 2022-09-23 NOTE — ED NOTES
Pt medicated per MAR for fever, reduces blankets on pt. States all other needs are met at this, VSS, call light in reach.

## 2022-09-23 NOTE — HOSPITAL COURSE
"This is a 81-year-old female with past medical history of hypertension, dyslipidemia, CKD stage IIIa, paroxysmal atrial fibrillation on Eliquis, chronic hypoxic respiratory failure secondary to COPD on 2 L on exertion, hypothyroidism, ischemic bowel disease dx 2019, SHABANA not on CPAP, dementia, and chronic lower back pain who was admitted on 09/22/2022 with generalized weakness resulting in GLF-significant for SHARIFA on CKD, rhabdomyolysis and UTI.    As per prior Hospitalist: \"She was recently treated with a course of cefdinir for UTI, urine culture obtained at that time showed Klebsiella variicola with pan sensitivities intermediate sensitive to nitrofurantoin.  Due to ongoing symptoms they presented for evaluation.\"    CT head shows mild cerebral atrophy and chronic microvascular ischemic type changes no acute intracranial abnormalities.  Hip Xray negative for fractures.    Patient started on Rocephin for UTI, and antibiotic course while in the hospital.  Blood cultures no growth to date.    Labs significant for rhabdomyolysis, patient started on gentle IV fluids.  Resolved.    Home health and walker ordered.  "

## 2022-09-23 NOTE — ASSESSMENT & PLAN NOTE
With persistent UTI.  UA shows infection, check CPK  Rule out post obstruction  IVF  Renal dose meds and avoid nephrotoxins  Monitor I&O's  Follow renal function

## 2022-09-23 NOTE — DISCHARGE PLANNING
Pt being transferred to [facility] Rm. 2221-2.  Cobra Form, MAR, Face Sheet, and Approved Service form in packet to go with pt.

## 2022-09-23 NOTE — ED NOTES
Telephone handoff report called to [facility]. Pt transferred to [facility] with CLEMENTE. Pt is A&Ox4, with stable vital signs and no apparent distress upon transfer. Transfer packet and pt's paperwork and personal belongings provided to [facility].

## 2022-09-23 NOTE — ASSESSMENT & PLAN NOTE
Sinus rhythm on admission, heart rate in the 80s to 90s.  Continue Eliquis, not on any rate controlling medications as outpatient.  Eliquis dose reduced 2/2 SHARIFA

## 2022-09-23 NOTE — ASSESSMENT & PLAN NOTE
I discussed advance care planning with the patient and family for at least 22 minutes, including diagnosis, prognosis, plan of care, risks and benefits of any therapies that could be offered, as well as alternatives including palliation and hospice, as appropriate.  DNR per patient and family wishes.

## 2022-09-23 NOTE — ASSESSMENT & PLAN NOTE
Coming in with UTI systolic blood pressure 110s.  Home regimen lisinopril 10 mg daily triamterene hydrochlorothiazide combination pill 37.5-25 mg  Hold lisinopril and hydrochlorothiazide on admission due to SHARIFA.  Will hold triamterene monitor blood pressure trend.

## 2022-09-23 NOTE — WOUND TEAM
Wound team in to see pt for skin tear L.  Wound is PTW and is clean.  Orders for RN to follow skin tear protocol.

## 2022-09-23 NOTE — THERAPY
Physical Therapy   Initial Evaluation     Patient Name: Margaret Garcia  Age:  81 y.o., Sex:  female  Medical Record #: [id number]  Today's Date: 9/23/2022     Precautions  Precautions: (P) Fall Risk    Assessment  Patient is 81 y.o. female who was recently admitted for GLF, weakness, dizziness, and acute renal failure with UTI. Pt presented to PT with impaired balance, impaired gait, weakness, and poor upright activity tolerance. These impairments are limiting the patients ability to safely perform upright mobility such as bed mobility, sit<>stands, ambulation, and transfers. Pt is currently a high fall risk and will continue to benefit from skilled PT while in house in order to progress functional mobility and reduce risk of falling. Anticipate pt to progress well while in house, with recommendation for HH therapy and family assist upon d/c to home.     Plan    Recommend Physical Therapy 4 times per week until therapy goals are met for the following treatments:  Bed Mobility, Community Re-integration, Equipment, Gait Training, Manual Therapy, Neuro Re-Education / Balance, Self Care/Home Evaluation, Therapeutic Activities, and Therapeutic Exercises    DC Equipment Recommendations: (P) Front-Wheel Walker  Discharge Recommendations: (P) Recommend home health for continued physical therapy services (with family assist)     Objective       09/23/22 0945   Initial Contact Note    Initial Contact Note Order Received and Verified, Physical Therapy Evaluation in Progress with Full Report to Follow.   Precautions   Precautions Fall Risk   Pain 0 - 10 Group   Therapist Pain Assessment Nurse Notified;0   Prior Living Situation   Prior Services None   Housing / Facility 1 Story House   Steps Into Home 0   Steps In Home 0   Equipment Owned Front-Wheel Walker;Single Point Cane   Lives with - Patient's Self Care Capacity Other (Comments)  (grand dtr)   Comments pt and family state there is lots of family nearby and live about 3  houses down. Pt will have grand dtr stay with her for a few days upon d/c to home   Prior Level of Functional Mobility   Bed Mobility Independent   Transfer Status Independent   Ambulation Independent   Distance Ambulation (Feet)   (community)   Assistive Devices Used Front-Wheel Walker   Comments pt reports of an IPLOF; reports switching between use of SPC and FWW   History of Falls   History of Falls Yes   Date of Last Fall   (reason for admit)   Cognition    Cognition / Consciousness WDL   Passive ROM Lower Body   Passive ROM Lower Body WDL   Active ROM Lower Body    Active ROM Lower Body  WDL   Strength Lower Body   Lower Body Strength  X   Gross Strength Generalized Weakness, Equal Bilaterally   Comments presents with gross strength of 4/5 in BLE   Sensation Lower Body   Lower Extremity Sensation   WDL   Lower Body Muscle Tone   Lower Body Muscle Tone  WDL   Strength Upper Body   Upper Body Strength  WDL   Upper Body Muscle Tone   Upper Body Muscle Tone  WDL   Neurological Concerns   Neurological Concerns No   Coordination Upper Body   Coordination WDL   Coordination Lower Body    Coordination Lower Body  WDL   Balance Assessment   Sitting Balance (Static) Fair +   Sitting Balance (Dynamic) Fair   Standing Balance (Static) Fair   Standing Balance (Dynamic) Fair -   Weight Shift Sitting Fair   Weight Shift Standing Fair   Comments w/fww   Gait Analysis   Gait Level Of Assist Minimal Assist   Assistive Device Front Wheel Walker   Distance (Feet) 30   # of Times Distance was Traveled 2   Deviation Decreased Base Of Support   Weight Bearing Status fwb   Bed Mobility    Supine to Sit Minimal Assist   Sit to Supine Minimal Assist   Scooting Minimal Assist   Comments HOB elevated and rails up   Functional Mobility   Sit to Stand Minimal Assist   Bed, Chair, Wheelchair Transfer Minimal Assist   Toilet Transfers Minimal Assist   Transfer Method Stand Step   Mobility EOB, sit<>stnad, ambulation, to toilet, back to bed    How much difficulty does the patient currently have...   Turning over in bed (including adjusting bedclothes, sheets and blankets)? 3   Sitting down on and standing up from a chair with arms (e.g., wheelchair, bedside commode, etc.) 1   Moving from lying on back to sitting on the side of the bed? 1   How much help from another person does the patient currently need...   Moving to and from a bed to a chair (including a wheelchair)? 3   Need to walk in a hospital room? 3   Climbing 3-5 steps with a railing? 3   6 clicks Mobility Score 14   Activity Tolerance   Sitting in Chair 5 mins toilet   Sitting Edge of Bed functional   Standing 5 mins   Comments limited due to fatigue and weakness   Edema / Skin Assessment   Edema / Skin  Not Assessed   Patient / Family Goals    Patient / Family Goal #1 to go home   Short Term Goals    Short Term Goal # 1 pt will go supine<>sit w/bed features w/spv in 6tx   Short Term Goal # 2 pt will go sit<>stand w/fww w/spv in 6tx   Short Term Goal # 3 pt will ambulate for 150ft w/fww w/spv in 6tx   Education Group   Education Provided Role of Physical Therapist   Role of Physical Therapist Patient Response Patient;Acceptance;Family;Explanation;Demonstration;Verbal Demonstration;Action Demonstration   Problem List    Problems Impaired Bed Mobility;Impaired Transfers;Impaired Ambulation;Functional Strength Deficit;Impaired Balance;Decreased Activity Tolerance   Anticipated Discharge Equipment and Recommendations   DC Equipment Recommendations Front-Wheel Walker   Discharge Recommendations Recommend home health for continued physical therapy services  (with family assist)   Interdisciplinary Plan of Care Collaboration   IDT Collaboration with  Nursing   Patient Position at End of Therapy In Bed;Call Light within Reach;Tray Table within Reach;Phone within Reach;Family / Friend in Room   Collaboration Comments aware of visit and recs   Session Information   Date / Session Number  9/23-1 (1/4,  9/29)

## 2022-09-23 NOTE — FACE TO FACE
Face to Face Note  -  Durable Medical Equipment    Marion Knight D.O. - NPI: [id number]  I certify that this patient is under my care and that they have had a durable medical equipment(DME)face to face encounter by myself that meets the physician DME face-to-face encounter requirements with this patient on:    Date of encounter:   Patient:                    MRN:                       [facility] 2024  [id number]  1940     The encounter with the patient was in whole, or in part, for the following medical condition, which is the primary reason for durable medical equipment:  Other - Unsteady Gait    I certify that, based on my findings, the following durable medical equipment is medically necessary:  Walkers.        ------------------------------------------------------------------------------------------------------------------    Face to Face Supporting Documentation - Home Health    The encounter with this patient was in whole or in part the primary reason for home health admission.    Date of encounter:   Patient:                    MRN:                       YOB: 2024  Margaret [facility]  [id number]  1940     Home health to see patient for:  Skilled Nursing care for assessment, interventions & education, Home health aide, Physical Therapy evaluation and treatment, and Occupational therapy evaluation and treatment    Skilled need for:  Exacerbation of Chronic Disease State Debility    Skilled nursing interventions to include:  Comment: PT/OT/HH    Homebound evidenced status by:  Need the aid of supportive devices such as crutches, canes, wheelchairs or walkers. Leaving home must require a considerable and taxing effort. There must exist a normal inability to leave the home.    Community Physician to provide follow up care: Payton Galo P.A.-C.     Optional Interventions    Wound information & treatment:    Home Infusion Therapy orders:     Line/Drain/Airway:    I certify the face to face encounter for this home care referral meets the CMS requirements and the encounter/clinical assessment with the patient was, in whole, or in part, for the medical condition(s) listed above, which is the primary reason for home health care. Based on my clinical findings: the service(s) are medically necessary, support the need for home health care, and the homebound criteria are met.  I certify that this patient has had a face to face encounter by myself.  Marion Knight D.O. - NPI: [id number]    *Debility, frailty and advanced age in the absence of an acute deterioration or exacerbation of a condition do not qualify a patient for home health.

## 2022-09-23 NOTE — PROGRESS NOTES
4 Eyes Skin Assessment Completed by KEYONNA Kendall and KEYONNA Novak.    Head WDL  Ears WDL  Nose WDL  Mouth WDL  Neck WDL  Breast/Chest WDL  Shoulder Blades WDL  Spine WDL  (R) Arm/Elbow/Hand Bruising  (L) Arm/Elbow/Hand Redness, Bruising, and Scab  Abdomen WDL  Groin WDL  Scrotum/Coccyx/Buttocks WDL  (R) Leg Bruising  (L) Leg Bruising  (R) Heel/Foot/Toe WDL  (L) Heel/Foot/Toe Scab          Devices In Places Pulse Ox      Interventions In Place Pillows    Possible Skin Injury Yes    Pictures Uploaded Into Epic Yes  Wound Consult Placed Yes  RN Wound Prevention Protocol Ordered Yes

## 2022-09-23 NOTE — RESPIRATORY CARE
"   COPD EDUCATION by COPD CLINICAL EDUCATOR  9/23/2022 at 1:39 PM by Rosina Rhoades, RRT     Patient reviewed by COPD education team. Patient does not have a history or diagnosis of COPD. Patient is a smoker, smoking cessation education done. A comprehensive packet with tips to quit and information on outpatient classes given to patient.      Smoking Cessation Intervention and education completed, 20 minutes spent on smoking cessation education with patient.  Provided smoking cessation packet with \"Tips to Quit\" and brochure for \"Free Smoking Cessation Classes\".      COPD Screen  COPD Risk Screening  Do you have a history of COPD?: No    COPD Assessment  COPD Clinical Specialists ONLY  COPD Education Initiated: Yes--Short Intervention (Pt has never been diagnosed COPD, NO PFT, does not take any respiratory medication, is currently smoking. Smoking Cessation Education Provided)  DME Company: None  Physician Name: Payton Galo P.A.-C.  Pulmonologist Name: None  Referrals Initiated: Yes  Pulmonary Rehab: Declined  Smoking Cessation: Yes  $ Smoking Cessation >10 Minutes: Symptomatic  Smoking Pack Years: 56  Hospice: N/A  Home Health Care: N/A  [facility]: N/A  [facility]: N/A  [facility]: Declined  Private In-Home Care Agency: N/A  Is this a COPD exacerbation patient?: No  (OP) Pulmonary Function Testing: Yes    PFT Results    No results found for: PFT    Meds to Beds  Would the patient like to opt in for Bedside Medication Delivery at Discharge?: No     MY COPD ACTION PLAN     It is recommended that patients and physicians /healthcare providers complete this action plan together. This plan should be discussed at each physician visit and updated as needed.    The green, yellow and red zones show groups of symptoms of COPD. This list of symptoms is not comprehensive, and you may experience other symptoms. In the \"Actions\" column, your healthcare provider has recommended actions " "for you to take based on your symptoms.    Patient Name: Margaret Garcia   YOB: 1940   Last Updated on:     Green Zone:  I am doing well today Actions     Usual activitiy and exercise level   Take daily medications     Usual amounts of cough and phlegm/mucus   Use oxygen as prescribed     Sleep well at night   Continue regular exercise/diet plan     Appetite is good   At all times avoid cigarette smoke, inhaled irritants     Daily Medications (these medications are taken every day):                Yellow Zone:  I am having a bad day or a COPD flare Actions     More breathless than usual   Continue daily medications     I have less energy for my daily activities   Use quick relief inhaler as ordered     Increased or thicker phlegm/mucus   Use oxygen as prescribed     Using quick relief inhaler/nebulizer more often   Get plenty of rest     Swelling of ankles more than usual   Use pursed lip breathing     More coughing than usual   At all times avoid cigarette smoke, inhaled irritants     I feel like I have a \"chest cold\"     Poor sleep and my symptoms woke me up     My appetite is not good     My medicine is not helping      Call provider immediately if symptoms don’t improve     Continue daily medications, add rescue medications:               Medications to be used during a flare up, (as Discussed with Provider):              Red Zone:  I need urgent medical care Actions     Severe shortness of breath even at rest   Call 911 or seek medical care immediately     Not able to do any activity because of breathing      Fever or shaking chills      Feeling confused or very drowsy       Chest pains      Coughing up blood                  "

## 2022-09-24 ENCOUNTER — APPOINTMENT (OUTPATIENT)
Dept: PULMONOLOGY | Facility: MEDICAL CENTER | Age: 82
End: 2022-09-24
Attending: NURSE PRACTITIONER
Payer: MEDICARE

## 2022-09-24 PROBLEM — R50.9 FEVER: Status: ACTIVE | Noted: 2022-09-24

## 2022-09-24 LAB
ANION GAP SERPL CALC-SCNC: 14 MMOL/L (ref 7–16)
BACTERIA UR CULT: ABNORMAL
BACTERIA UR CULT: ABNORMAL
BUN SERPL-MCNC: 20 MG/DL (ref 8–22)
CALCIUM SERPL-MCNC: 9 MG/DL (ref 8.4–10.2)
CHLORIDE SERPL-SCNC: 105 MMOL/L (ref 96–112)
CK SERPL-CCNC: 411 U/L (ref 0–154)
CO2 SERPL-SCNC: 16 MMOL/L (ref 20–33)
CREAT SERPL-MCNC: 1.24 MG/DL (ref 0.5–1.4)
GFR SERPLBLD CREATININE-BSD FMLA CKD-EPI: 43 ML/MIN/1.73 M 2
GLUCOSE SERPL-MCNC: 85 MG/DL (ref 65–99)
POTASSIUM SERPL-SCNC: 3.9 MMOL/L (ref 3.6–5.5)
SIGNIFICANT IND 70042: ABNORMAL
SITE SITE: ABNORMAL
SODIUM SERPL-SCNC: 135 MMOL/L (ref 135–145)
SOURCE SOURCE: ABNORMAL

## 2022-09-24 PROCEDURE — 770006 HCHG ROOM/CARE - MED/SURG/GYN SEMI*

## 2022-09-24 PROCEDURE — A9270 NON-COVERED ITEM OR SERVICE: HCPCS | Performed by: STUDENT IN AN ORGANIZED HEALTH CARE EDUCATION/TRAINING PROGRAM

## 2022-09-24 PROCEDURE — 80048 BASIC METABOLIC PNL TOTAL CA: CPT

## 2022-09-24 PROCEDURE — A9270 NON-COVERED ITEM OR SERVICE: HCPCS | Performed by: GENERAL PRACTICE

## 2022-09-24 PROCEDURE — 700102 HCHG RX REV CODE 250 W/ 637 OVERRIDE(OP): Performed by: STUDENT IN AN ORGANIZED HEALTH CARE EDUCATION/TRAINING PROGRAM

## 2022-09-24 PROCEDURE — 94760 N-INVAS EAR/PLS OXIMETRY 1: CPT

## 2022-09-24 PROCEDURE — 82550 ASSAY OF CK (CPK): CPT

## 2022-09-24 PROCEDURE — 700102 HCHG RX REV CODE 250 W/ 637 OVERRIDE(OP): Performed by: GENERAL PRACTICE

## 2022-09-24 PROCEDURE — 700105 HCHG RX REV CODE 258: Performed by: GENERAL PRACTICE

## 2022-09-24 PROCEDURE — 36415 COLL VENOUS BLD VENIPUNCTURE: CPT

## 2022-09-24 PROCEDURE — 99233 SBSQ HOSP IP/OBS HIGH 50: CPT | Performed by: GENERAL PRACTICE

## 2022-09-24 PROCEDURE — 700111 HCHG RX REV CODE 636 W/ 250 OVERRIDE (IP): Performed by: GENERAL PRACTICE

## 2022-09-24 RX ORDER — SODIUM CHLORIDE 9 MG/ML
INJECTION, SOLUTION INTRAVENOUS CONTINUOUS
Status: ACTIVE | OUTPATIENT
Start: 2022-09-24 | End: 2022-09-24

## 2022-09-24 RX ADMIN — ATORVASTATIN CALCIUM 10 MG: 10 TABLET, FILM COATED ORAL at 05:39

## 2022-09-24 RX ADMIN — APIXABAN 2.5 MG: 2.5 TABLET, FILM COATED ORAL at 17:35

## 2022-09-24 RX ADMIN — LATANOPROST 1 DROP: 50 SOLUTION OPHTHALMIC at 21:00

## 2022-09-24 RX ADMIN — APIXABAN 2.5 MG: 2.5 TABLET, FILM COATED ORAL at 05:39

## 2022-09-24 RX ADMIN — SODIUM CHLORIDE: 9 INJECTION, SOLUTION INTRAVENOUS at 08:01

## 2022-09-24 RX ADMIN — OMEPRAZOLE 40 MG: 20 CAPSULE, DELAYED RELEASE ORAL at 17:35

## 2022-09-24 RX ADMIN — OMEPRAZOLE 40 MG: 20 CAPSULE, DELAYED RELEASE ORAL at 05:39

## 2022-09-24 RX ADMIN — HYDROCODONE BITARTRATE AND ACETAMINOPHEN 1 TABLET: 5; 325 TABLET ORAL at 08:10

## 2022-09-24 RX ADMIN — CEFTRIAXONE SODIUM 2 G: 2 INJECTION, POWDER, FOR SOLUTION INTRAMUSCULAR; INTRAVENOUS at 17:35

## 2022-09-24 RX ADMIN — LEVOTHYROXINE SODIUM 112 MCG: 0.11 TABLET ORAL at 05:39

## 2022-09-24 RX ADMIN — SENNOSIDES AND DOCUSATE SODIUM 2 TABLET: 50; 8.6 TABLET ORAL at 17:35

## 2022-09-24 RX ADMIN — ACETAMINOPHEN 650 MG: 325 TABLET, FILM COATED ORAL at 19:21

## 2022-09-24 RX ADMIN — SENNOSIDES AND DOCUSATE SODIUM 2 TABLET: 50; 8.6 TABLET ORAL at 05:39

## 2022-09-24 ASSESSMENT — PAIN DESCRIPTION - PAIN TYPE
TYPE: CHRONIC PAIN
TYPE: ACUTE PAIN
TYPE: ACUTE PAIN

## 2022-09-24 ASSESSMENT — ENCOUNTER SYMPTOMS: WEAKNESS: 1

## 2022-09-24 NOTE — DIETARY
"Nutrition services: Day 1 of admit.  Margaret Garcia is a 81 y.o. female with admitting DX of SHARIFA (acute kidney injury) (HCC) [N17.9]    Consult received for unplanned wt loss >34 lb in >1 yr/poor PO intake. Pt reports poor appetite over the past 3-4 weeks, eating ~1/2 of normal intake. Breakfast this a.m. documented at 50-75%; pt reports that is how much she usually eats at home, reports 3 meals daily, no snacks.    Assessment:  Height: 157.5 cm (5' 2\")  Weight: 79.3 kg (174 lb 13.2 oz) - via bed scale  Body mass index is 31.98 kg/m²., BMI classification: Obesity class I  Diet/Intake: Cardiac; 0% to 50-75% per ADLs; intake trending up    Evaluation:   PMHx includes: CKD 3, COPD, dental disorder, HLD, hyperglycemia, HTN, hypothyroidism, dementia, ischemic bowel disease, lap cholecystectomy. Presents w/ GLF x 2 in past week. Dx list: SHARIFA on CKD, acute cystitis w/o hematuria, traumatic rhabdomyolysis.   Labs: GFR 43  MAR: levothyroxine, NS @ 75 ml/hr, prilosec, senna  Wt hx   Moderate muscle wasting to temporal regions.    Malnutrition Risk: Pt meets criteria for severe acute malnutrition in setting of recent UTI and SHARIFA on CKD as evidenced by 5.4% wt loss in ~2 weeks per chart review, pt report of PO intake 50% of usual x >3 weeks, and pt w/ moderate muscle wasting on physical exam.    Recommendations/Plan:  Will provide TID protein snacks w/ meals to bolster PO intake.   Encourage intake of meals and snacks >50%.  Document intake of all PO as % taken in ADL's to provide interdisciplinary communication across all shifts.   Monitor weight.  Nutrition rep will continue to see patient for ongoing meal and snack preferences.     RD following.    "

## 2022-09-24 NOTE — PROGRESS NOTES
"Hospital Medicine Daily Progress Note    Date of Service  9/24/2022    Chief Complaint  Margaret Garcia is a 81 y.o. female admitted 9/23/2022 with s/p GLF    Hospital Course  This is a 81-year-old female with past medical history of hypertension, dyslipidemia, CKD stage IIIa, paroxysmal atrial fibrillation on Eliquis, chronic hypoxic respiratory failure secondary to COPD on 2 L on exertion, hypothyroidism, ischemic bowel disease dx 2019, SHABANA not on CPAP, dementia, and chronic lower back pain who was admitted on 09/22/2022 with generalized weakness resulting in GLF-significant for SHARIFA on CKD, rhabdomyolysis and UTI.    As per prior Hospitalist: \"She was recently treated with a course of cefdinir for UTI, urine culture obtained at that time showed Klebsiella variicola with pan sensitivities intermediate sensitive to nitrofurantoin.  Due to ongoing symptoms they presented for evaluation.\"    CT head shows mild cerebral atrophy and chronic microvascular ischemic type changes no acute intracranial abnormalities.  Hip Xray negative for fractures.    Patient started on Rocephin for UTI, blood cultures and urine cultures pending.    Labs significant for rhabdomyolysis, patient started on gentle IV fluids.    Interval Problem Update  Patient currently being treated for UTI.  Follow urine cultures.    Patient had fever and chills last night, concern for bacteremia, BC pending.     Patient evaluated by PT/OT with recommendations for home health, orders placed.    Called patient's daughter, updated on plan of care, all questions answered.    I have discussed this patient's plan of care and discharge plan at IDT rounds today with Case Management, Nursing, Nursing leadership, and other members of the IDT team.    Consultants/Specialty  None    Code Status  DNAR/DNI    Disposition  Patient is not medically cleared for discharge.   Anticipate discharge to to home with organized home healthcare and close outpatient follow-up.  I " have placed the appropriate orders for post-discharge needs.    Review of Systems  Review of Systems   Constitutional:  Positive for malaise/fatigue.   Neurological:  Positive for weakness.   All other systems reviewed and are negative.     Physical Exam  Temp:  [36.6 °C (97.9 °F)-38.9 °C (102 °F)] 36.7 °C (98 °F)  Pulse:  [] 76  Resp:  [17-20] 17  BP: (109-142)/(40-58) 114/40  SpO2:  [93 %-98 %] 93 %    Physical Exam  Vitals and nursing note reviewed.   Constitutional:       General: She is not in acute distress.     Appearance: Normal appearance.   HENT:      Head: Normocephalic and atraumatic.      Mouth/Throat:      Mouth: Mucous membranes are moist.      Pharynx: No oropharyngeal exudate.   Eyes:      Extraocular Movements: Extraocular movements intact.      Pupils: Pupils are equal, round, and reactive to light.   Cardiovascular:      Rate and Rhythm: Normal rate and regular rhythm.      Pulses: Normal pulses.      Heart sounds: No murmur heard.    No friction rub. No gallop.   Pulmonary:      Effort: Pulmonary effort is normal. No respiratory distress.      Breath sounds: No wheezing, rhonchi or rales.   Abdominal:      General: Bowel sounds are normal. There is no distension.      Palpations: Abdomen is soft. There is no mass.      Tenderness: There is no abdominal tenderness.   Musculoskeletal:         General: No swelling or tenderness. Normal range of motion.      Cervical back: Normal range of motion. No rigidity. No muscular tenderness.      Right lower leg: No edema.      Left lower leg: No edema.   Skin:     General: Skin is warm and dry.      Capillary Refill: Capillary refill takes less than 2 seconds.      Findings: No erythema or rash.   Neurological:      General: No focal deficit present.      Mental Status: She is alert and oriented to person, place, and time.      Motor: Weakness present.      Gait: Gait normal.       Fluids    Intake/Output Summary (Last 24 hours) at 9/24/2022  1459  Last data filed at 9/24/2022 0900  Gross per 24 hour   Intake 960 ml   Output 500 ml   Net 460 ml       Laboratory  Recent Labs     09/22/22  1349   WBC 9.1   RBC 3.93*   HEMOGLOBIN 12.3   HEMATOCRIT 37.5   MCV 95.4   MCH 31.3   MCHC 32.8*   RDW 49.9   PLATELETCT 308   MPV 8.8*     Recent Labs     09/22/22  1349 09/23/22  1016 09/24/22  0523   SODIUM 136 134* 135   POTASSIUM 4.2 3.9 3.9   CHLORIDE 104 103 105   CO2 16* 16* 16*   GLUCOSE 119* 90 85   BUN 28* 24* 20   CREATININE 1.80* 1.42* 1.24   CALCIUM 9.6 9.3 9.0                   Imaging  DX-HIP-BILATERAL-WITH PELVIS-2 VIEWS   Final Result      No radiographic evidence of acute traumatic injury.           Assessment/Plan  * Acute cystitis without hematuria  Assessment & Plan  Follow urine culture, blood culture  Antibiotic: Ceftriaxone    Fever  Assessment & Plan  09/23 PM  Patient had fever and chills last night, concern for bacteremia, BC pending.     Traumatic rhabdomyolysis (HCC)  Assessment & Plan  Labs significant for rhabdomyolysis, patient started on gentle IV fluids.  CPK downtrending    Fall from ground level  Assessment & Plan  Hip xray negative for fracture    Acute kidney injury superimposed on chronic kidney disease (HCC)- (present on admission)  Assessment & Plan  SHARIFA on CKD  Continue gentle IV fluids  Serial BMPs  Resolved    Chronic respiratory failure with hypoxia (HCC)- (present on admission)  Assessment & Plan  chronic hypoxic respiratory failure secondary to COPD on 2 L on exertion    Paroxysmal atrial fibrillation (HCC)- (present on admission)  Assessment & Plan  Resume Eliquis    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)- (present on admission)  Assessment & Plan  NOT IN ACUTE EXACERBATION  Chronic hypoxic respiratory failure secondary to COPD on 2 L on exertion  Duonebs PRN    Essential hypertension- (present on admission)  Assessment & Plan  Resume home regimen    Hyperlipidemia- (present on  admission)  Assessment & Plan  Resume statin therapy    Hypothyroidism- (present on admission)  Assessment & Plan  Resume synthroid       VTE prophylaxis: SCDs/TEDs and therapeutic anticoagulation with Eliquis    I have performed a physical exam and reviewed and updated ROS and Plan today (9/24/2022). In review of yesterday's note (9/23/2022), there are no changes except as documented above.

## 2022-09-24 NOTE — CARE PLAN
The patient is Watcher - Medium risk of patient condition declining or worsening    Shift Goals  Clinical Goals: seen by MD, free of falls,  Patient Goals: pee more, ambulate, see pt/ot  Family Goals: UTI to clear up    Progress made toward(s) clinical / shift goals:  Pt was seen by MD and remained free of falls. Will continue to monitor.     Patient is not progressing towards the following goals:

## 2022-09-24 NOTE — DISCHARGE PLANNING
Case Management Discharge Planning    Admission Date: 9/23/2022  GMLOS:    ALOS: 1    6-Clicks ADL Score: 17  6-Clicks Mobility Score: 18      Anticipated Discharge Dispo:  Home with HH    DME Needed: No    Action(s) Taken: OTHER, MARILYN RN met with pt at bedside. Pt on service with [facility] and would like to resume HH. HH order placed. HH choice sent.     Escalations Completed: None    Medically Clear: No    Next Steps: MARILYN RN to follow up with any other needs.    Barriers to Discharge: Medical clearance    Is the patient up for discharge tomorrow: No

## 2022-09-24 NOTE — CARE PLAN
The patient is Stable - Low risk of patient condition declining or worsening    Shift Goals  Clinical Goals: pt will be free of falls by the end of this shift  Patient Goals: pee more, ambulate, see pt/ot  Family Goals: UTI to clear up    Progress made toward(s) clinical / shift goals:  PT remained free from falls and injury for duration of shift and utilized call light APPROPRIATELY when needing assistance getting up     Patient is not progressing towards the following goals:

## 2022-09-24 NOTE — ASSESSMENT & PLAN NOTE
NOT IN ACUTE EXACERBATION  Chronic hypoxic respiratory failure secondary to COPD on 2 L on exertion  Duonebs PRN

## 2022-09-24 NOTE — DISCHARGE PLANNING
Received Choice form at [phone]  Agency/Facility Name: [facility]  Referral sent per Choice form @ [phone]

## 2022-09-24 NOTE — PROGRESS NOTES
Ella recently was admitted to the hospital for Rx of a UTI.   She had a HEAD CT scan as part of that evaluation.    I do not think that a Brain MRI which I had recently ordered in the evaluation of a Dementia issue is necessary at this point or will .    I am going to let the daughter know my opinion above.

## 2022-09-25 VITALS
BODY MASS INDEX: 32.17 KG/M2 | HEART RATE: 79 BPM | RESPIRATION RATE: 17 BRPM | WEIGHT: 174.82 LBS | SYSTOLIC BLOOD PRESSURE: 127 MMHG | OXYGEN SATURATION: 98 % | TEMPERATURE: 97.9 F | HEIGHT: 62 IN | DIASTOLIC BLOOD PRESSURE: 84 MMHG

## 2022-09-25 PROCEDURE — 700102 HCHG RX REV CODE 250 W/ 637 OVERRIDE(OP): Performed by: GENERAL PRACTICE

## 2022-09-25 PROCEDURE — 99239 HOSP IP/OBS DSCHRG MGMT >30: CPT | Performed by: GENERAL PRACTICE

## 2022-09-25 PROCEDURE — 700102 HCHG RX REV CODE 250 W/ 637 OVERRIDE(OP): Performed by: STUDENT IN AN ORGANIZED HEALTH CARE EDUCATION/TRAINING PROGRAM

## 2022-09-25 PROCEDURE — A9270 NON-COVERED ITEM OR SERVICE: HCPCS | Performed by: STUDENT IN AN ORGANIZED HEALTH CARE EDUCATION/TRAINING PROGRAM

## 2022-09-25 PROCEDURE — A9270 NON-COVERED ITEM OR SERVICE: HCPCS | Performed by: GENERAL PRACTICE

## 2022-09-25 RX ORDER — BACITRACIN ZINC 500 [USP'U]/G
1 OINTMENT TOPICAL 2 TIMES DAILY
Status: SHIPPED
Start: 2022-09-25 | End: 2022-10-02

## 2022-09-25 RX ORDER — BACITRACIN ZINC 500 [USP'U]/G
OINTMENT TOPICAL 2 TIMES DAILY
Status: DISCONTINUED | OUTPATIENT
Start: 2022-09-25 | End: 2022-09-25 | Stop reason: HOSPADM

## 2022-09-25 RX ADMIN — ATORVASTATIN CALCIUM 10 MG: 10 TABLET, FILM COATED ORAL at 04:10

## 2022-09-25 RX ADMIN — APIXABAN 2.5 MG: 2.5 TABLET, FILM COATED ORAL at 04:10

## 2022-09-25 RX ADMIN — BACITRACIN ZINC: 500 OINTMENT TOPICAL at 14:27

## 2022-09-25 RX ADMIN — OMEPRAZOLE 40 MG: 20 CAPSULE, DELAYED RELEASE ORAL at 04:17

## 2022-09-25 RX ADMIN — LEVOTHYROXINE SODIUM 112 MCG: 0.11 TABLET ORAL at 04:11

## 2022-09-25 RX ADMIN — SENNOSIDES AND DOCUSATE SODIUM 2 TABLET: 50; 8.6 TABLET ORAL at 04:11

## 2022-09-25 ASSESSMENT — PAIN DESCRIPTION - PAIN TYPE: TYPE: ACUTE PAIN

## 2022-09-25 NOTE — CARE PLAN
The patient is Stable - Low risk of patient condition declining or worsening    Shift Goals  Clinical Goals: pt will be free of falls by the end of shift.  Patient Goals: peeing  Family Goals: uti to clear up    Progress made toward(s) clinical / shift goals:  pt has been resting comfortably all night. Pt did have a headache at the start of shift. Tylenol was given. Pt has been calling for assistance to the commode. No falls during this shift.      Patient is not progressing towards the following goals:

## 2022-09-25 NOTE — DISCHARGE SUMMARY
"Discharge Summary    CHIEF COMPLAINT ON ADMISSION  No chief complaint on file.      Reason for Admission  UTI, Acute kidney injury     Admission Date  9/23/2022    CODE STATUS  DNAR/DNI    HPI & HOSPITAL COURSE  This is a 81-year-old female with past medical history of hypertension, dyslipidemia, CKD stage IIIa, paroxysmal atrial fibrillation on Eliquis, chronic hypoxic respiratory failure secondary to COPD on 2 L on exertion, hypothyroidism, ischemic bowel disease dx 2019, SHABANA not on CPAP, dementia, and chronic lower back pain who was admitted on 09/22/2022 with generalized weakness resulting in GLF-significant for SHARIFA on CKD, rhabdomyolysis and UTI.    As per prior Hospitalist: \"She was recently treated with a course of cefdinir for UTI, urine culture obtained at that time showed Klebsiella variicola with pan sensitivities intermediate sensitive to nitrofurantoin.  Due to ongoing symptoms they presented for evaluation.\"    CT head shows mild cerebral atrophy and chronic microvascular ischemic type changes no acute intracranial abnormalities.  Hip Xray negative for fractures.    Patient started on Rocephin for UTI, and antibiotic course while in the hospital.  Blood cultures no growth to date.    Labs significant for rhabdomyolysis, patient started on gentle IV fluids.  Resolved.    Home health and walker ordered.    Therefore, she is discharged in good and stable condition to home with organized home healthcare and close outpatient follow-up.    The patient met 2-midnight criteria for an inpatient stay at the time of discharge.    Discharge Date  09/25/2022    FOLLOW UP ITEMS POST DISCHARGE  Primary care physician    DISCHARGE DIAGNOSES  Principal Problem:    Acute cystitis without hematuria POA: Unknown  Active Problems:    Acute kidney injury superimposed on chronic kidney disease (HCC) POA: Yes    Fall from ground level POA: Unknown    Traumatic rhabdomyolysis (HCC) POA: Unknown    Fever POA: Unknown    " Hypothyroidism POA: Yes    Hyperlipidemia POA: Yes    Essential hypertension POA: Yes    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) (Chronic) POA: Yes    Paroxysmal atrial fibrillation (HCC) (Chronic) POA: Yes    Chronic respiratory failure with hypoxia (HCC) (Chronic) POA: Yes      Overview: 2L with exertion  Resolved Problems:    * No resolved hospital problems. *      FOLLOW UP  Future Appointments   Date Time Provider Department Center   11/21/2022  2:40 PM Payton Galo P.A.-C. [address]   1/20/2023  9:15 AM [facility] EXAM 9 ECHO [facility]     Payton Galo P.A.-C.  [address]  [facility] [address]  [facility] [address]  [phone]    Follow up      MEDICATIONS ON DISCHARGE     Medication List        START taking these medications        Instructions   bacitracin 500 UNIT/GM Oint   Apply 1 Each topically 2 times a day for 7 days.  Dose: 1 Each            CHANGE how you take these medications        Instructions   apixaban 5mg Tabs  What changed:   how much to take  additional instructions  Commonly known as: ELIQUIS   Take 1 Tablet by mouth 2 times a day for 30 days. Indications: Thromboembolism secondary to Atrial Fibrillation  Dose: 5 mg     potassium chloride SA 10 MEQ Tbcr  What changed: when to take this  Commonly known as: K-DUR   TAKE 1 TABLET BY MOUTH EVERY DAY     promethazine 25 MG Tabs  What changed: See the new instructions.  Commonly known as: PHENERGAN   TAKE 1 TABLET BY MOUTH 3 TIMES A DAY AS NEEDED FOR NAUSEA AND VOMITING     triamterene/hctz 37.5-25 MG Caps  What changed:   when to take this  reasons to take this  Commonly known as: MAXZIDE-25/DYAZIDE   TAKE 1 CAPSULE BY MOUTH EVERY DAY            CONTINUE taking these medications        Instructions   acetaminophen 500 MG Tabs  Commonly known as: TYLENOL   Take 500 mg by mouth 3 times a day as needed for Mild Pain. Indications: Pain  Dose: 500 mg     atorvastatin 10 MG Tabs  Commonly known as: LIPITOR    Take 1 Tablet by mouth every day.  Dose: 10 mg     fenofibrate 160 MG tablet  Commonly known as: TRIGLIDE   Take 1 Tablet by mouth every day.  Dose: 160 mg     HYDROcodone-acetaminophen 7.5-325 MG tab  Commonly known as: NORCO   Take 1 Tablet by mouth every 8 hours as needed for Moderate Pain.  Dose: 1 Tablet     hydrOXYzine HCl 25 MG Tabs  Commonly known as: ATARAX   Take 25 mg by mouth at bedtime.  Dose: 25 mg     latanoprost 0.005 % Soln  Commonly known as: XALATAN   Administer 1 Drop into both eyes every evening.  Dose: 1 Drop     levothyroxine 112 MCG Tabs  Commonly known as: SYNTHROID   TAKE 1 TABLET BY MOUTH EVERY DAY IN THE MORNING ON AN EMPTY STOMACH     Lidocaine 1.8 % Ptch   Apply 1 Each topically 1 time a day as needed (pain). Apply patch to painful area. Patch may remain in place for up to 12 hours in any 24-hour period. No more than 1 patch should be used in a 24-hour period.  Dose: 1 Each     lisinopril 10 MG Tabs  Commonly known as: PRINIVIL   Take 1 Tablet by mouth every day.  Dose: 10 mg     Myrbetriq 50 MG Tb24  Generic drug: Mirabegron ER   Take 50 mg by mouth every day.  Dose: 50 mg     pantoprazole 40 MG Tbec  Commonly known as: PROTONIX   Take 1 Tablet by mouth 2 times a day.  Dose: 40 mg            STOP taking these medications      cefdinir 300 MG Caps  Commonly known as: OMNICEF              Allergies  Allergies   Allergen Reactions    Food Hives and Nausea     Oranges, Hives    Neomycin-Polymyxin B Rash     Rash       DIET  Orders Placed This Encounter   Procedures    Diet Order Diet: Cardiac     Standing Status:   Standing     Number of Occurrences:   1     Order Specific Question:   Diet:     Answer:   Cardiac [6]       ACTIVITY  As tolerated.  Weight bearing as tolerated    CONSULTATIONS  None    PROCEDURES  None    LABORATORY  Lab Results   Component Value Date    SODIUM 135 09/24/2022    POTASSIUM 3.9 09/24/2022    CHLORIDE 105 09/24/2022    CO2 16 (L) 09/24/2022    GLUCOSE 85  09/24/2022    BUN 20 09/24/2022    CREATININE 1.24 09/24/2022        Lab Results   Component Value Date    WBC 9.1 09/22/2022    HEMOGLOBIN 12.3 09/22/2022    HEMATOCRIT 37.5 09/22/2022    PLATELETCT 308 09/22/2022      DX-HIP-BILATERAL-WITH PELVIS-2 VIEWS   Final Result      No radiographic evidence of acute traumatic injury.         Total time of the discharge process exceeds 45 minutes.

## 2022-09-25 NOTE — DISCHARGE INSTRUCTIONS
Discharge Instructions    Discharged to home by car with relative. Discharged via wheelchair, hospital escort: Yes.  Special equipment needed: Walker    Be sure to schedule a follow-up appointment with your primary care doctor or any specialists as instructed.     Discharge Plan:   Diet Plan: Discussed  Activity Level: Discussed  Confirmed Follow up Appointment: Patient to Call and Schedule Appointment  Confirmed Symptoms Management: Discussed  Medication Reconciliation Updated: Yes    I understand that a diet low in cholesterol, fat, and sodium is recommended for good health. Unless I have been given specific instructions below for another diet, I accept this instruction as my diet prescription.   Other diet: Regular     Special Instructions: None    -Is this patient being discharged with medication to prevent blood clots?  No    Is patient discharged on Warfarin / Coumadin?   No     Urinary Tract Infection, Adult  A urinary tract infection (UTI) is an infection of any part of the urinary tract. The urinary tract includes:  The kidneys.  The ureters.  The bladder.  The urethra.  These organs make, store, and get rid of pee (urine) in the body.  What are the causes?  This is caused by germs (bacteria) in your genital area. These germs grow and cause swelling (inflammation) of your urinary tract.  What increases the risk?  You are more likely to develop this condition if:  You have a small, thin tube (catheter) to drain pee.  You cannot control when you pee or poop (incontinence).  You are female, and:  You use these methods to prevent pregnancy:  A medicine that kills sperm (spermicide).  A device that blocks sperm (diaphragm).  You have low levels of a female hormone (estrogen).  You are pregnant.  You have genes that add to your risk.  You are sexually active.  You take antibiotic medicines.  You have trouble peeing because of:  A prostate that is bigger than normal, if you are male.  A blockage in the part of  Problem: Patient Care Overview  Goal: Plan of Care/Patient Progress Review  Outcome: No Change  VSS. Quiet voice; slow to respond; A&Ox4; Numbness to bilat toes intermittently at baseline. PRN tylenol effective for H/A pain. Voiding spont. Reg diet. Up SBA+gb. VEEG leads in place; no events reported or witnessed. Pt  would like to speak to an MD from Neuro-Epilepsy team today- will let day RN know as well as charge RN.  name Vargas PH# [phone]. PMH Arnold Chiari malformation; VNS placement 2015. Continue with POC.       your body that drains pee from the bladder (urethra).  A kidney stone.  A nerve condition that affects your bladder (neurogenic bladder).  Not getting enough to drink.  Not peeing often enough.  You have other conditions, such as:  Diabetes.  A weak disease-fighting system (immune system).  Sickle cell disease.  Gout.  Injury of the spine.  What are the signs or symptoms?  Symptoms of this condition include:  Needing to pee right away (urgently).  Peeing often.  Peeing small amounts often.  Pain or burning when peeing.  Blood in the pee.  Pee that smells bad or not like normal.  Trouble peeing.  Pee that is cloudy.  Fluid coming from the vagina, if you are female.  Pain in the belly or lower back.  Other symptoms include:  Throwing up (vomiting).  No urge to eat.  Feeling mixed up (confused).  Being tired and grouchy (irritable).  A fever.  Watery poop (diarrhea).  How is this treated?  This condition may be treated with:  Antibiotic medicine.  Other medicines.  Drinking enough water.  Follow these instructions at home:    Medicines  Take over-the-counter and prescription medicines only as told by your doctor.  If you were prescribed an antibiotic medicine, take it as told by your doctor. Do not stop taking it even if you start to feel better.  General instructions  Make sure you:  Pee until your bladder is empty.  Do not hold pee for a long time.  Empty your bladder after sex.  Wipe from front to back after pooping if you are a female. Use each tissue one time when you wipe.  Drink enough fluid to keep your pee pale yellow.  Keep all follow-up visits as told by your doctor. This is important.  Contact a doctor if:  You do not get better after 1-2 days.  Your symptoms go away and then come back.  Get help right away if:  You have very bad back pain.  You have very bad pain in your lower belly.  You have a fever.  You are sick to your stomach (nauseous).  You are throwing up.  Summary  A urinary tract infection (UTI)  is an infection of any part of the urinary tract.  This condition is caused by germs in your genital area.  There are many risk factors for a UTI. These include having a small, thin tube to drain pee and not being able to control when you pee or poop.  Treatment includes antibiotic medicines for germs.  Drink enough fluid to keep your pee pale yellow.  This information is not intended to replace advice given to you by your health care provider. Make sure you discuss any questions you have with your health care provider.  Document Released: 06/05/2009 Document Revised: 12/05/2019 Document Reviewed: 06/27/2019  Elsevier Patient Education © 2020 [employer]

## 2022-09-25 NOTE — DISCHARGE PLANNING
Case Management Discharge Planning    Admission Date: 9/23/2022  GMLOS:    ALOS: 2    6-Clicks ADL Score: 17  6-Clicks Mobility Score: 18  PT and/or OT Eval ordered: Yes  Post-acute Referrals Ordered: Yes  Post-acute Choice Obtained: Yes  Has referral(s) been sent to post-acute provider:  Yes      Anticipated Discharge Dispo: Discharge Disposition: D/T to home under HHA care in anticipation of covered skilled care (06)    DME Needed: Yes    DME Ordered: Yes    Action(s) Taken: Patient discussed during IDT rounds, patient is clear for discharge today.  HH and a FWW have been ordered for the patient.    RN CM met with the patient at bedside to discuss the discharge plan.  RN CM explained that the HH referral with [facility] is pending, they will answer the referral tomorrow.  Patient is ok with discharging today, she explained that her daughter works at [employer].  Patient still needs a FWW, she provided DME choice for [employer].  RN CM delivered the 2nd IMM to the patient, she signed the IMM at 1225.  Patient stated that her daughter can provide a ride home today.  IMM and choice form faxed to [facility].    Escalations Completed: None    Medically Clear: Yes    Next Steps: Case coordination available to discuss any further discharge barriers.    Barriers to Discharge: None    Is the patient up for discharge tomorrow: Discharging today.

## 2022-09-25 NOTE — CARE PLAN
The patient is Watcher - Medium risk of patient condition declining or worsening    Shift Goals  Clinical Goals: pt free of falls, ambualtory,  Patient Goals: peeing  Family Goals: uti to clear up    Progress made toward(s) clinical / shift goals:  Pt up and ambulatory x3, and remained free of falls. Will continue to monitor.     Patient is not progressing towards the following goals:

## 2022-09-26 ENCOUNTER — PATIENT OUTREACH (OUTPATIENT)
Dept: MEDICAL GROUP | Facility: MEDICAL CENTER | Age: 82
End: 2022-09-26
Payer: MEDICARE

## 2022-09-26 DIAGNOSIS — J44.89 COPD (CHRONIC OBSTRUCTIVE PULMONARY DISEASE) WITH CHRONIC BRONCHITIS (HCC): ICD-10-CM

## 2022-09-26 NOTE — PROGRESS NOTES
09/26/2022:     1006: TCM outreach, attempt x1, LVM w/contact information.       08/27/2022:     0900: TCM outreach, attempt x2, LVM w/contact information. Will close out program at end of day if call back is not received.     1350: RN Transitional Care Management discharge follow-up call completed. Per Mihaela, the patient is doing pretty good. She is still experiencing some frequency and incontinence. Patient had no questions regarding medications or follow-up care. All discharge instructions and medication gone over with patient. Patient has picked up her medication. Discharge follow-up appointment scheduled with PCP on 09/28/2022 to further assess continue frequency and incontinence. Patient does qualify for the CCM program. Program introduced to family member and was informed that we will be following up with them at the appointment for more information and enrollment.     Sincerely,     KEYONNA Calderon Care Coordinator  Transitional Care Management [phone]

## 2022-09-27 RX ORDER — CAMPHOR, MENTHOL, METHYL SALICYLATE 21.56; 41.73; 69.55 MG/1; MG/1; MG/1
1 PATCH PERCUTANEOUS; TOPICAL; TRANSDERMAL 2 TIMES DAILY
Qty: 20 PATCH | Refills: 2 | Status: SHIPPED
Start: 2022-09-27 | End: 2023-09-21

## 2022-09-27 NOTE — PROGRESS NOTES
Subjective:     Margaret Garcia is a 81 y.o. female who presents for Hospital Follow-up.    POST DISCHARGE CALL:  Discharge Date:9/25/2022   Date of Outreach Call: 9/27/2022  1:19 PM  Now that you're home, how are you doing? Very Good  Comment:Per Mihaela, she is doing pretty good. She is  still experiencing some frequency and incontinence.  Did you receive any new prescriptions? Yes  Were you able to fill those medications? Yes  How did you fill those prescriptions? Pharmacy  If not able to fill prescriptions, why? *    Do you have questions about your medications? No  Do you have a follow-up appointment scheduled?Yes  Any issues or paperwork you wish to discuss with your PCP? Patient still experiencing some frequency and incontinence.  Does patient qualify for CCM Program? Yes  If patient qualifies, was CCM Program Introduced? Yes  If patient does not qualify, comment? *  Number of Attempts: 1  Current or previous attempts completed within two business days of discharge? Yes  Provided education regarding treatment plan, medication, self-management, ADLs? Yes  Has patient completed Advance Directive? If yes, advise them to bring to appointment. No  Care Manager phone number provided? No  Is there anything else I can help you with? No  Chief Complaint? generalized weakness resulting in GLF  Admitting Dx? UTI, Acute kidney injury  Discharge Diagnosis? Acute cystitis without hematuria, Acute kidney injury superimposed on chronic kidney disease, Traumatic rhabdomyolysis  Additional Comments? *    HPI:   Recently hospitalized for     Patient hospitalized 9/23/2022 through 9/25/2022 due to generalized weakness resulting in ground-level fall, significant for SHARIFA on CKD rhabdomyolysis and UTI.  CT of head showed mild cerebral atrophy and chronic changes but no acute intracranial abnormalities.  Patient started on Rocephin for UTI and antibiotic course while in hospital.  Blood cultures no growth to date.  Labs were  "significant for rhabdomyolysis, patient started on IV fluids.  Patient sent home with home health and walker.    Patient presents today noting that she is still experiencing frequency and incontinence.  Concerned that patient was only on antibiotics for 2 days and not sent home with antibiotics.  Will complete urinalysis and culture.    Patient notes that she is sore from her falls.  Patient experiencing lower back pain and neck pain.  Patient had CT of head that was reassuring.  Patient has full range of movement of neck.    Dizziness  Patient presents today with concerns about dizziness.  Patient appears to have had dizziness while in the hospital, noted as reason for CT but cannot find any information in notes.  Patient notes that when she turns her head too fast or gets up too quickly she will become dizzy.  Patient notes it only takes a few seconds for dizziness to resolve.  Patient does not believe dizziness is the cause of her falls.    ROS:  Gen: no fevers/chills  Pulm: no sob, no cough  CV: no chest pain, no palpitations  GI: no nausea/vomiting, no diarrhea      Objective:     Exam:  /74 (BP Location: Left arm, Patient Position: Sitting, BP Cuff Size: Adult)   Pulse 81   Temp 36.2 °C (97.2 °F) (Temporal)   Resp 16   Ht 1.575 m (5' 2\")   Wt 81.2 kg (179 lb)   SpO2 97%   BMI 32.74 kg/m²  Body mass index is 32.74 kg/m².    Gen: Alert and oriented, No apparent distress.  Neck: Neck is supple without lymphadenopathy.  Full range of motion, tenderness to paraspinal muscles.  Lungs: Normal effort, CTA bilaterally, no wheezes, rhonchi, or rales  CV: Regular rate and rhythm. No murmurs, rubs, or gallops.  Ext: No clubbing, cyanosis, edema.        Assessment and Plan:   1. Dysuria  Acute, stable.  Patient notes incontinence and urgency and is concerned about still having a urinary tract infection.  Urinalysis in office shows trace leukocytes and large blood, culture for further evaluation no treatment at " this time.  - URINE CULTURE(NEW); Future  - POCT Urinalysis    2. Essential hypertension  Chronic, stable.  Patient with chronic hypertension and CKD.  Patient taking lisinopril 10 mg for blood pressure.  Patient not on any other blood pressure medications.  Patient's daughter notes she has been having low blood pressure readings with systolics in the 80s and 90s and possible orthostatic hypotension.  Will decrease lisinopril down to 5 mg.  Discussed not discontinuing lisinopril due to kidney protection.  - lisinopril (PRINIVIL) 5 MG Tab; Take 1 Tablet by mouth every day.  Dispense: 30 Tablet; Refill: 1    3. Acute cystitis without hematuria  Acute, stable.  Following up from hospital visit.  4. Traumatic rhabdomyolysis, subsequent encounter  Acute, improved.  5. Secondary hypercoagulable state (HCC)  Chronic, stable.  Patient continues on Eliquis 5 mg twice daily.  Patient will be working with physical therapy at home health to prevent falls.  6. Obesity (BMI 30-39.9)  Chronic, stable BMI 32.  7. Acute kidney injury superimposed on chronic kidney disease (HCC)  Acute, improved.  8. Dementia without behavioral disturbance, unspecified dementia type  Chronic, stable.    - Chart and discharge summary were reviewed.   - Hospitalization and results reviewed with patient.   - Medications reviewed including instructions regarding high risk medications, dosing and side effects.  - Recommended Services: Referral to Home Health  - Advance directive/POLST on file?  No     Follow-up:Return if symptoms worsen or fail to improve.    Face-to-face transitional care management services with [facility] (today's visit is within 14 days post discharge & LACE+ score of 28-58) medical decision complexity were provided.

## 2022-09-28 ENCOUNTER — OFFICE VISIT (OUTPATIENT)
Dept: MEDICAL GROUP | Facility: MEDICAL CENTER | Age: 82
End: 2022-09-28
Payer: MEDICARE

## 2022-09-28 ENCOUNTER — PATIENT OUTREACH (OUTPATIENT)
Dept: HEALTH INFORMATION MANAGEMENT | Facility: OTHER | Age: 82
End: 2022-09-28

## 2022-09-28 ENCOUNTER — HOSPITAL ENCOUNTER (OUTPATIENT)
Facility: MEDICAL CENTER | Age: 82
End: 2022-09-28
Attending: STUDENT IN AN ORGANIZED HEALTH CARE EDUCATION/TRAINING PROGRAM
Payer: MEDICARE

## 2022-09-28 VITALS
BODY MASS INDEX: 32.94 KG/M2 | WEIGHT: 179 LBS | SYSTOLIC BLOOD PRESSURE: 128 MMHG | OXYGEN SATURATION: 97 % | HEIGHT: 62 IN | RESPIRATION RATE: 16 BRPM | TEMPERATURE: 97.2 F | HEART RATE: 81 BPM | DIASTOLIC BLOOD PRESSURE: 74 MMHG

## 2022-09-28 DIAGNOSIS — N17.9 ACUTE RENAL FAILURE SUPERIMPOSED ON STAGE 3 CHRONIC KIDNEY DISEASE, UNSPECIFIED ACUTE RENAL FAILURE TYPE, UNSPECIFIED WHETHER STAGE 3A OR 3B CKD (HCC): ICD-10-CM

## 2022-09-28 DIAGNOSIS — J44.89 COPD (CHRONIC OBSTRUCTIVE PULMONARY DISEASE) WITH CHRONIC BRONCHITIS (HCC): ICD-10-CM

## 2022-09-28 DIAGNOSIS — E66.9 OBESITY (BMI 30-39.9): ICD-10-CM

## 2022-09-28 DIAGNOSIS — N17.9 ACUTE KIDNEY INJURY SUPERIMPOSED ON CHRONIC KIDNEY DISEASE (HCC): ICD-10-CM

## 2022-09-28 DIAGNOSIS — T79.6XXD TRAUMATIC RHABDOMYOLYSIS, SUBSEQUENT ENCOUNTER: ICD-10-CM

## 2022-09-28 DIAGNOSIS — D68.69 SECONDARY HYPERCOAGULABLE STATE (HCC): ICD-10-CM

## 2022-09-28 DIAGNOSIS — N18.30 ACUTE RENAL FAILURE SUPERIMPOSED ON STAGE 3 CHRONIC KIDNEY DISEASE, UNSPECIFIED ACUTE RENAL FAILURE TYPE, UNSPECIFIED WHETHER STAGE 3A OR 3B CKD (HCC): ICD-10-CM

## 2022-09-28 DIAGNOSIS — N18.9 ACUTE KIDNEY INJURY SUPERIMPOSED ON CHRONIC KIDNEY DISEASE (HCC): ICD-10-CM

## 2022-09-28 DIAGNOSIS — R30.0 DYSURIA: ICD-10-CM

## 2022-09-28 DIAGNOSIS — N30.00 ACUTE CYSTITIS WITHOUT HEMATURIA: ICD-10-CM

## 2022-09-28 DIAGNOSIS — I10 ESSENTIAL HYPERTENSION: ICD-10-CM

## 2022-09-28 DIAGNOSIS — F03.90 DEMENTIA WITHOUT BEHAVIORAL DISTURBANCE, UNSPECIFIED DEMENTIA TYPE: Chronic | ICD-10-CM

## 2022-09-28 PROBLEM — E66.01 MORBID OBESITY (HCC): Chronic | Status: RESOLVED | Noted: 2018-04-27 | Resolved: 2022-09-28

## 2022-09-28 LAB
APPEARANCE UR: NORMAL
BILIRUB UR STRIP-MCNC: NORMAL MG/DL
COLOR UR AUTO: YELLOW
GLUCOSE UR STRIP.AUTO-MCNC: NORMAL MG/DL
KETONES UR STRIP.AUTO-MCNC: NORMAL MG/DL
LEUKOCYTE ESTERASE UR QL STRIP.AUTO: NORMAL
NITRITE UR QL STRIP.AUTO: NORMAL
PH UR STRIP.AUTO: 5.5 [PH] (ref 5–8)
PROT UR QL STRIP: NORMAL MG/DL
RBC UR QL AUTO: NORMAL
SP GR UR STRIP.AUTO: 1.02
UROBILINOGEN UR STRIP-MCNC: 0.2 MG/DL

## 2022-09-28 PROCEDURE — 81002 URINALYSIS NONAUTO W/O SCOPE: CPT | Performed by: STUDENT IN AN ORGANIZED HEALTH CARE EDUCATION/TRAINING PROGRAM

## 2022-09-28 PROCEDURE — 87186 SC STD MICRODIL/AGAR DIL: CPT

## 2022-09-28 PROCEDURE — 87077 CULTURE AEROBIC IDENTIFY: CPT

## 2022-09-28 PROCEDURE — 99495 TRANSJ CARE MGMT MOD F2F 14D: CPT | Performed by: STUDENT IN AN ORGANIZED HEALTH CARE EDUCATION/TRAINING PROGRAM

## 2022-09-28 PROCEDURE — 87086 URINE CULTURE/COLONY COUNT: CPT

## 2022-09-28 RX ORDER — LISINOPRIL 5 MG/1
5 TABLET ORAL DAILY
Qty: 30 TABLET | Refills: 1 | Status: SHIPPED | OUTPATIENT
Start: 2022-09-28 | End: 2022-10-26

## 2022-09-28 ASSESSMENT — FIBROSIS 4 INDEX: FIB4 SCORE: 2.52

## 2022-09-28 NOTE — PROGRESS NOTES
Met with patient before scheduled PCP appointment to introduce program. This RN explained program and all the benefits. Patient is interested in enrolling in CCM. Enrollment scheduled for 10/13/2022 @ 1030. Brochure and contact information given to patient. Thanked patient for their time.

## 2022-09-29 DIAGNOSIS — R30.0 DYSURIA: ICD-10-CM

## 2022-09-29 LAB
BACTERIA BLD CULT: NORMAL
BACTERIA BLD CULT: NORMAL
SIGNIFICANT IND 70042: NORMAL
SIGNIFICANT IND 70042: NORMAL
SITE SITE: NORMAL
SITE SITE: NORMAL
SOURCE SOURCE: NORMAL
SOURCE SOURCE: NORMAL

## 2022-09-30 DIAGNOSIS — N39.0 RECURRENT UTI: ICD-10-CM

## 2022-09-30 RX ORDER — CEFDINIR 300 MG/1
300 CAPSULE ORAL 2 TIMES DAILY
Qty: 10 CAPSULE | Refills: 0 | Status: SHIPPED | OUTPATIENT
Start: 2022-09-30 | End: 2022-10-05

## 2022-10-06 ENCOUNTER — HOSPITAL ENCOUNTER (OUTPATIENT)
Dept: LAB | Facility: MEDICAL CENTER | Age: 82
End: 2022-10-06
Attending: PHYSICIAN ASSISTANT
Payer: MEDICARE

## 2022-10-06 PROCEDURE — 87086 URINE CULTURE/COLONY COUNT: CPT

## 2022-10-08 LAB
BACTERIA UR CULT: NORMAL
SIGNIFICANT IND 70042: NORMAL
SITE SITE: NORMAL
SOURCE SOURCE: NORMAL

## 2022-10-13 ENCOUNTER — PATIENT OUTREACH (OUTPATIENT)
Dept: HEALTH INFORMATION MANAGEMENT | Facility: OTHER | Age: 82
End: 2022-10-13
Payer: MEDICARE

## 2022-10-13 DIAGNOSIS — J44.89 COPD (CHRONIC OBSTRUCTIVE PULMONARY DISEASE) WITH CHRONIC BRONCHITIS (HCC): ICD-10-CM

## 2022-10-13 DIAGNOSIS — Z91.81 AT MODERATE RISK FOR FALL: ICD-10-CM

## 2022-10-13 ASSESSMENT — PATIENT HEALTH QUESTIONNAIRE - PHQ9: CLINICAL INTERPRETATION OF PHQ2 SCORE: 0

## 2022-10-13 NOTE — PROGRESS NOTES
INITIAL CARE MANAGEMENT CARE PLAN/ASSESSMENT     Ella is a 82 year old female seen in office with her granddaughter following their PCP visit to introduce the CCM program. Enrollment completed over the phone with Mihaela on 10/13/2022. She has verbalized her full name and  as well as given verbal consent to enroll in the CCM program. Patient has a support system that consists of her granddaughter and family. Patient lives in a mobile home alone that does not consists of stairs as her family did build her a ramp to get into her place. Bother her daughter and granddaughter live in the same neighborhood. Patient does currently have home services (PT/OT/SN) coming to her home. Patient does not have spiritual beliefs that may affect care given to patient. Patient does have an advanced plan of care. Family is aware of their wishes. Patient does have medical equipment at home consisting of a walker and CPAP, but she refuses to use her CPAP. Patient does have communication issues including dementia. Patient does have reliable transportation as her granddaughter takes her to appointments. Patient is eating a regular diet at home. Patient is taking medication how they should and when they should. Patient has expressed goals of prevent falls and lessening her UTIs.       Medication Self-Management Goals:     Reviewed medications listed below with patient.      Current Outpatient Medications:     lisinopril (PRINIVIL) 5 MG Tab, Take 1 Tablet by mouth every day., Disp: 30 Tablet, Rfl: 1    apixaban (ELIQUIS) 5mg Tab, Take 1 Tablet by mouth 2 times a day for 30 days. Indications: Thromboembolism secondary to Atrial Fibrillation, Disp: 60 Tablet, Rfl: 0    promethazine (PHENERGAN) 25 MG Tab, TAKE 1 TABLET BY MOUTH 3 TIMES A DAY AS NEEDED FOR NAUSEA AND VOMITING (Patient taking differently: Take 25 mg by mouth 3 times a day as needed for Nausea/Vomiting. TAKE 1 TABLET BY MOUTH 3 TIMES A DAY AS NEEDED FOR NAUSEA AND VOMITING),  Disp: 60 Tablet, Rfl: 0    atorvastatin (LIPITOR) 10 MG Tab, Take 1 Tablet by mouth every day., Disp: 90 Tablet, Rfl: 3    fenofibrate (TRIGLIDE) 160 MG tablet, Take 1 Tablet by mouth every day., Disp: 90 Tablet, Rfl: 3    levothyroxine (SYNTHROID) 112 MCG Tab, TAKE 1 TABLET BY MOUTH EVERY DAY IN THE MORNING ON AN EMPTY STOMACH (Patient taking differently: Take 112 mcg by mouth every morning on an empty stomach.), Disp: 90 Tablet, Rfl: 3    potassium chloride SA (K-DUR) 10 MEQ Tab CR, TAKE 1 TABLET BY MOUTH EVERY DAY (Patient taking differently: Take 10 mEq by mouth every day.), Disp: 90 Tablet, Rfl: 3    pantoprazole (PROTONIX) 40 MG Tablet Delayed Response, Take 1 Tablet by mouth 2 times a day., Disp: 180 Tablet, Rfl: 2    HYDROcodone-acetaminophen (NORCO) 7.5-325 MG per tablet, Take 1 Tablet by mouth every 8 hours as needed for Moderate Pain., Disp: , Rfl:     hydrOXYzine HCl (ATARAX) 25 MG Tab, Take 25 mg by mouth at bedtime., Disp: , Rfl:     latanoprost (XALATAN) 0.005 % Solution, Administer 1 Drop into both eyes every evening., Disp: , Rfl:     triamterene/hctz (MAXZIDE-25/DYAZIDE) 37.5-25 MG Cap, TAKE 1 CAPSULE BY MOUTH EVERY DAY (Patient taking differently: Take 1 Capsule by mouth 1 time a day as needed. Indications: Edema), Disp: 90 Capsule, Rfl: 3    Mirabegron ER (MYRBETRIQ) 50 MG TABLET SR 24 HR, Take 50 mg by mouth every day., Disp: , Rfl:     acetaminophen (TYLENOL) 500 MG TABS, Take 500 mg by mouth 3 times a day as needed for Mild Pain. Indications: Pain, Disp: , Rfl:     Camphor-Menthol-Methyl Sal (SALONPAS) 3.1-6-10 % Patch, Apply 1 Application topically 2 times a day. (Patient not taking: Reported on 10/13/2022), Disp: 20 Patch, Rfl: 2         Goal: Continue current medication regimen.        Physical/Functional/Environmental Status:     Activities of Daily Living:  Bathing: independent   Dressing: independent  Grooming: independent  Mouth Care: independent  Toileting: independent  Climbing a  Flight of Stairs: needs assistance    Independent Activities of Daily Living:  Shopping: needs assistance  Cooking: needs assistance  Managing Medications: needs assistance  Using the phone and looking up numbers: needs assistance  Driving or using public transportation: needs assistance  Managing Finances: needs assistance    STEADI Fall Risk 10/13/2022   STEADI Risk for Falling Score 11   One or more falls in the last year Yes   Advised to use a cane or walker to get around safely Yes   Feels unsteady when walking Yes   Steadies self on furniture while walking at home Yes   Worried about falling Yes   Needs to push with hands when rising from a chair Yes   Has trouble stepping up onto a curb / using stairs Yes   Often has to rush to the toilet Yes   Has lost some feeling in feet Yes   Takes medicine that makes him/her feel lightheaded or more tired than usual No   Takes medicine to sleep or improve mood No   Often feels sad or depressed No               Goal: Prevent future falls + increase mobility      Financial Status:     CASI Ramirez to complete [facility].     Goal: N/A     Transportation Status:    Patient has reliable transportation at this time.     Goal: N/A     Mental/Behavioral/Psychosocial Status:    Depression Screen (PHQ-2/PHQ-9) 9/6/2022 9/23/2022 10/13/2022   PHQ-2 Total Score - 0 -   PHQ-2 Total Score - - -   PHQ-2 Total Score 0 - 0       Interpretation of PHQ-9 Total Score   Score Severity   1-4 No Depression   5-9 Mild Depression   10-14 Moderate Depression   15-19 Moderately Severe Depression   20-27 Severe Depression       Goal:  N/A      Chronic Care Management Care Plan         Goal:  Increase mobility and prevent falls   Barriers: Unwilling to do PT, patient states she it tired  Interventions: In-person PT, proper walker usage     Start Date: 10/13/2022  End Date:      Goal:  Work to lessen her UTIs  Barriers: Patient is forgetful  Interventions: Continue to see urology, education on proper  wiping/cleaning, symptoms to look out for    Start Date: 10/13/2022  End Date:     Discussion: Goals, barriers, and interventions discussed with patient.     Goals: Created.     Next Scheduled patient outreach: 11/14/2022 @ [facility].

## 2022-10-28 DIAGNOSIS — R60.9 EDEMA, UNSPECIFIED TYPE: ICD-10-CM

## 2022-10-28 RX ORDER — TRIAMTERENE AND HYDROCHLOROTHIAZIDE 37.5; 25 MG/1; MG/1
1 CAPSULE ORAL EVERY MORNING
Qty: 30 CAPSULE | Refills: 0 | Status: SHIPPED | OUTPATIENT
Start: 2022-10-28 | End: 2022-11-27

## 2022-10-30 DIAGNOSIS — R11.2 NAUSEA AND VOMITING IN ADULT: ICD-10-CM

## 2022-11-01 ENCOUNTER — PHYSICAL THERAPY (OUTPATIENT)
Dept: PHYSICAL THERAPY | Facility: REHABILITATION | Age: 82
End: 2022-11-01
Attending: STUDENT IN AN ORGANIZED HEALTH CARE EDUCATION/TRAINING PROGRAM
Payer: MEDICARE

## 2022-11-01 DIAGNOSIS — J44.89 COPD (CHRONIC OBSTRUCTIVE PULMONARY DISEASE) WITH CHRONIC BRONCHITIS (HCC): ICD-10-CM

## 2022-11-01 DIAGNOSIS — Z91.81 AT MODERATE RISK FOR FALL: ICD-10-CM

## 2022-11-01 PROCEDURE — 97110 THERAPEUTIC EXERCISES: CPT

## 2022-11-01 PROCEDURE — 97161 PT EVAL LOW COMPLEX 20 MIN: CPT

## 2022-11-01 ASSESSMENT — ENCOUNTER SYMPTOMS
PAIN SCALE AT HIGHEST: 4
QUALITY: SHARP
PAIN SCALE AT LOWEST: 0
PAIN SCALE: 4
QUALITY: DULL ACHE

## 2022-11-01 ASSESSMENT — BALANCE ASSESSMENTS
BALANCE - STANDING STATIC: FAIR
BALANCE - SITTING STATIC: NORMAL
BALANCE - STANDING DYNAMIC: POOR +
BALANCE - SITTING DYNAMIC: NORMAL

## 2022-11-01 NOTE — OP THERAPY EVALUATION
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 At moderate risk for fall  Z91.81       Date of onset of impairment: 10/1/2022 (about 1 month)    Subjective   History of Present Illness:     History of chief complaint:  Margaret Garcia is a 82 y.o. female that presents to therapy with a history of 3 falls in the last month. She states that symptoms came one with insidious onset. Her most recent fall was about 10 days ago In the bed room and it occurred while turning and/or walking. Pt is in general a poor historian and is accompanied by family to fill in the blanks. She does not use any assistive devices in the home but regularly uses a walker or cane when out of the home depending on how long she is going to be walking. She reports chronic back pain. Patient denies fevers/chills, numbness of lower extremities, bowel/bladder incontinence, saddle anesthesia.    Aggravating factors: bending. Tends to fall forward.      Potential fall risk; 3x in the last month       Pain:     Current pain ratin    At best pain ratin    At worst pain ratin    Quality:  Dull ache and sharp      Past Medical History:   Diagnosis Date    Arthritis     knees, and hips-osteo    ASTHMA     Body mass index  40.0-44.9, adult ([facility]) 4/27/2018    Chronic renal impairment, stage 3 (moderate) ([facility]) 5/13/2019    COPD (chronic obstructive pulmonary disease) with chronic bronchitis ([facility]) 4/20/2021    Dental disorder     upper    Dysuria 10/18/2016    Fall     Generalized edema 4/27/2018    Glaucoma     bilateral    Hay fever 4/19/2016    Heart burn     High cholesterol     Hx: UTI (urinary tract infection)     Hyperglycemia 11/24/2015    Hypertension     Hypothyroidism     Indigestion     Ischemic bowel disease ([facility]) 9/9/2019    Migraines     pt has similar symptoms with migraines not for a long time though    Nausea 10/14/2019    Neuropathy ([facility])     Obesity (BMI 30-39.9) 7/20/2015    Pneumonia     Routine general medical examination at a health care facility 7/20/2015    7/20/15    Sleep apnea 12/2019    Had sleep study, didn't tolerate cpap    Snoring     Syncope 6/5/2020    Tobacco use 4/29/2019    URI (upper respiratory infection) 11/4/2015    Urinary bladder disorder     hx of uti's     Past Surgical History:   Procedure Laterality Date    GASTROSCOPY  12/13/2019    Procedure: GASTROSCOPY;  Surgeon: Ang Angel M.D.;  Location: [facility];  Service: Gastroenterology    COLONOSCOPY  12/13/2019    Procedure: COLONOSCOPY;  Surgeon: Ang Angel M.D.;  Location: [facility];  Service: Gastroenterology    NIRU BY LAPAROSCOPY  9/2/2011    Performed by KAYLEIGH PORRAS at SURGERY SAME DAY [facility] ORS    LUMBAR DECOMPRESSION  6/29/2009    Performed by ANG YAN at SURGERY [facility] ORS    LUMBAR LAMINECTOMY DISKECTOMY  6/29/2009    Performed by ANG YAN at SURGERY [facility] ORS    ABDOMINAL HYSTERECTOMY TOTAL      GYN SURGERY      hysterectomy    HEMORRHOIDECTOMY         Precautions:       Objective   Range of motion and strength:    Lumbar/trunk rotation limited 50% bilaterally, hip extension limited to 10 degrees bilaterally,      Sensation and reflexes:     Sensation is  intact.    Palpation and joint mobility:     No tenderness to palpation noted.    Joint mobility is normal.    Balance:     Sitting (static): Normal    Sitting (dynamic): Normal    Standing (static): Fair    Standing (dynamic): Poor +    Limited single limb stance, difficulty with obtaining tandem and holding, difficulty with single leg tap to step. For sit to stand and transfers observed excessive UE assistance/ reliance/ unable to control descent.     Gait:      Small step pattern, Wide EMI    Coordination and tone:     Coordination is intact.    Tone is normal.    Basic self care and IADL's:     Independent with all self care.    Family for transport and potentially cooking.     Cognition and visual perception:     No visual perception deficits noted.    Poor recall, unable to remember fall incidences or particular methods of falling        Therapeutic Exercises (CPT 12652):       Therapeutic Exercise Summary: HEP instruction/performance and development. Handout provided and exercises located below:  Access Code: [email or web address]  URL: [email or web address]  Date: 11/01/2022  Prepared by: Kale Johnson    Exercises        Tandem Stance with Support - 2 x daily - 2 sets - 30 hold      Sit to Stand - 2 x daily - 3-5 reps    Time-based treatments/modalities:    Physical Therapy Timed Treatment Charges  Therapeutic exercise minutes (CPT 42287): 8 minutes      Assessment, Response and Plan:   Assessment details:  Margaret Garcia is a 82 y.o. female with signs and symptoms consistent with a moderate fall risk.She requires skilled physical therapy intervention to decrease fall risk, increase functional mobility, improve ADL completion and establish a home exercise program.  Goals:   Short Term Goals:   1. Patient will be Independent with prescribed Home Exercise Program (HEP) and will be able to demonstrate exercises without cues for improved overall symptoms/activity tolerance.   2. Pt will improve ability to get out of  a chair with minimal need for UE assist indicative of improved strength and safety.  Short term goal time span:  2-4 weeks      Long Term Goals:    3. Pt will improve ability to control descent to chair indicative of improved hip strength and control  4. Pt will improve ESCOBAR score to greater than 56/56 indicative of improved function and decreased fall risk.  Long term goal time span:  4-6 weeks    Plan:   Planned therapy interventions:  Therapeutic Exercise (CPT 85582), Manual Therapy (CPT 32579), Neuromuscular Re-education (CPT 46767) and E Stim Unattended (CPT 13246)  Frequency: 1-2x/week.  Duration in weeks:  8  Discussed with:  Patient    Functional Assessment Used  PT Functional Assessment Tool Used: ESCOBAR  PT Functional Assessment Score: 45/56     Referring provider co-signature:  I have reviewed this plan of care and my co-signature certifies the need for services.    Certification Period: 11/01/2022 to  12/27/22    Physician Signature: ________________________________ Date: ______________

## 2022-11-03 ENCOUNTER — PATIENT MESSAGE (OUTPATIENT)
Dept: HEALTH INFORMATION MANAGEMENT | Facility: OTHER | Age: 82
End: 2022-11-03

## 2022-11-03 RX ORDER — PROMETHAZINE HYDROCHLORIDE 25 MG/1
25 TABLET ORAL EVERY 8 HOURS PRN
Qty: 60 TABLET | Refills: 0 | Status: SHIPPED | OUTPATIENT
Start: 2022-11-03 | End: 2023-01-14 | Stop reason: SDUPTHER

## 2022-11-07 ENCOUNTER — PATIENT OUTREACH (OUTPATIENT)
Dept: HEALTH INFORMATION MANAGEMENT | Facility: OTHER | Age: 82
End: 2022-11-07
Payer: MEDICARE

## 2022-11-07 DIAGNOSIS — J44.89 COPD (CHRONIC OBSTRUCTIVE PULMONARY DISEASE) WITH CHRONIC BRONCHITIS (HCC): Chronic | ICD-10-CM

## 2022-11-07 DIAGNOSIS — I10 ESSENTIAL HYPERTENSION: ICD-10-CM

## 2022-11-07 PROCEDURE — 99999 PR NO CHARGE: CPT | Performed by: STUDENT IN AN ORGANIZED HEALTH CARE EDUCATION/TRAINING PROGRAM

## 2022-11-07 NOTE — PROGRESS NOTES
Assessment    Reached out to patient's granddaughter Mihaela for monthly CCM follow up call. Per Mihaela, they are doing well and she does not have any questions or needs at this time. She stated the patient is doing much better this month. She was seen for the PT evaluation on 11/01/2022 and it went well. She likes her PT and feels motivated to go. Patient scheduled again for 11/15/2022. Mihaela stated they learned a lot during this meeting and look forward to working with Kale. Patient is getting around a lot better and has not had any additional UTIs.     Education    General check-in     Care Plan    Continue with plan of care    Progress:    On-track    Next outreach: 12/05/2022 @ 1600.

## 2022-11-15 ENCOUNTER — APPOINTMENT (OUTPATIENT)
Dept: PHYSICAL THERAPY | Facility: REHABILITATION | Age: 82
End: 2022-11-15
Attending: STUDENT IN AN ORGANIZED HEALTH CARE EDUCATION/TRAINING PROGRAM
Payer: MEDICARE

## 2022-11-17 ENCOUNTER — PHYSICAL THERAPY (OUTPATIENT)
Dept: PHYSICAL THERAPY | Facility: REHABILITATION | Age: 82
End: 2022-11-17
Attending: STUDENT IN AN ORGANIZED HEALTH CARE EDUCATION/TRAINING PROGRAM
Payer: MEDICARE

## 2022-11-17 DIAGNOSIS — J44.89 COPD (CHRONIC OBSTRUCTIVE PULMONARY DISEASE) WITH CHRONIC BRONCHITIS (HCC): ICD-10-CM

## 2022-11-17 DIAGNOSIS — Z91.81 AT MODERATE RISK FOR FALL: ICD-10-CM

## 2022-11-17 PROCEDURE — 97110 THERAPEUTIC EXERCISES: CPT

## 2022-11-17 NOTE — OP THERAPY DAILY TREATMENT
"  Outpatient Physical Therapy  DAILY TREATMENT     [facility] [place]  [address], [address]  [facility] [facility] [address]  Phone:  [phone]  Fax:  [phone]    Date: 11/17/2022    Patient: Ella Garcia  YOB: 1940  MRN: [id number]     Time Calculation    Start time: 1101  Stop time: 1144 Time Calculation (min): 43 minutes         Chief Complaint: Difficulty Walking (Balance/strength)    Visit #: 2    SUBJECTIVE:  Patient reports that she has not had any falls since evaluation. She states that she feels her balance is getting a little better. She reports she has been working on the HEP previously provided however when patient asked to demonstrate she completes LAQs and states she walks between her living room and kitchen sink several times a day. Educated patient on sit<>stands for HEP. Patient reports that she has noticed her \"arthritis pain\" in her low back is much better.     About 1/2 way through session, patient requires use of bathroom. Demonstrates some labored breathing after bathroom break and requires seated rest break to recover.     OBJECTIVE:  Current objective measures:     Gait: with SPC throughout PT treatment; demonstrates no overt LOB but occasional unsteadiness noted, especially with increased luis carlos; discussed safety with gait regarding paying attention and slowing down; also discussed SPC v FWW however patient is unable to use FWW in home due to space            Therapeutic Exercises (CPT 31704):     1. NuStep, x10 minutes; level 1    2. sit<>stands, x10; utilizes B UE on arm chair    3. tandem standing, L and R LE lead foot positioning, 3 x30 seconds; each, with B UE support    4. standing on air ex pad, x20 seconds>x30 seconds, SBA/CGA from therapist; no UE support    5. heel raises, x10, B UE support    6. standing hip abd, x10 B, B UE support    7. standing hip ext, x10 B, B UE support    8. standing marches, x10; B, B UE support    20. PN due 12/1 or " 10th visit; Recert due 12/27      Therapeutic Exercise Summary: HEP instruction/performance and development. Handout provided and exercises located below:  Access Code: [email or web address]  URL: [email or web address]  Date: 11/01/2022  Prepared by: Kale Johnson    Exercises        Tandem Stance with Support - 2 x daily - 2 sets - 30 hold      Sit to Stand - 2 x daily - 3-5 reps    Also discussed continuing her LAQ and walking between rooms at home; reports standing at kitchen sink for marching at home as well, encouraged to continue     Time-based treatments/modalities:    Physical Therapy Timed Treatment Charges  Therapeutic exercise minutes (CPT 85637): 43 minutes      Pain rating (1-10) before treatment:  0  Pain rating (1-10) after treatment:  0    ASSESSMENT:   Response to treatment: Patient tolerates PT treatment well today. Patient fatigues quickly with labored breathing noted intermittently throughout session resulting in frequent rest breaks. Patient demonstrates gait with SPC and no LOB but occasionally unsteadiness throughout session. Patient and PT discussed SPC v FWW utilization however patient reports that she is unable to use a FWW in her trailer which is why she uses her SPC. At this time, patient will benefit from continued skilled PT to promote improved LE strength and balance for increased safety and improved quality of life.     PLAN/RECOMMENDATIONS:   Plan for treatment: therapy treatment to continue next visit.  Planned interventions for next visit: continue with current treatment.

## 2022-11-18 ENCOUNTER — PATIENT MESSAGE (OUTPATIENT)
Dept: CARDIOLOGY | Facility: MEDICAL CENTER | Age: 82
End: 2022-11-18
Payer: MEDICARE

## 2022-11-18 DIAGNOSIS — I48.0 PAROXYSMAL ATRIAL FIBRILLATION (HCC): ICD-10-CM

## 2022-11-23 ENCOUNTER — PHYSICAL THERAPY (OUTPATIENT)
Dept: PHYSICAL THERAPY | Facility: REHABILITATION | Age: 82
End: 2022-11-23
Attending: STUDENT IN AN ORGANIZED HEALTH CARE EDUCATION/TRAINING PROGRAM
Payer: MEDICARE

## 2022-11-23 DIAGNOSIS — J44.89 COPD (CHRONIC OBSTRUCTIVE PULMONARY DISEASE) WITH CHRONIC BRONCHITIS (HCC): ICD-10-CM

## 2022-11-23 DIAGNOSIS — R53.1 WEAK: ICD-10-CM

## 2022-11-23 DIAGNOSIS — E66.9 OBESITY (BMI 30-39.9): ICD-10-CM

## 2022-11-23 DIAGNOSIS — Z91.81 AT MODERATE RISK FOR FALL: ICD-10-CM

## 2022-11-23 PROCEDURE — 97110 THERAPEUTIC EXERCISES: CPT

## 2022-11-23 NOTE — OP THERAPY DAILY TREATMENT
Outpatient Physical Therapy  DAILY TREATMENT     [facility] [place]  [address], [address]  [place] [facility] [address]  Phone:  [phone]  Fax:  [phone]    Date: 11/23/2022    Patient: Ella Garcia  YOB: 1940  MRN: [id number]     Time Calculation    Start time: 0900  Stop time: 0925 Time Calculation (min): 25 minutes         Chief Complaint: balance/ back pain/ general weakness    Visit #: 3    SUBJECTIVE:  No reported falls. Reports that she was feeling tired/slightly winded after last session but that she was doing well.      OBJECTIVE:  Current objective measures:     Gait: with SPC throughout PT treatment; demonstrates no overt LOB but occasional unsteadiness noted, especially with increased luis carlos; discussed safety with gait regarding paying attention and slowing down; also discussed SPC v FWW however patient is unable to use FWW in home due to space            Therapeutic Exercises (CPT 77624):     1. NuStep, x10 minutes; level 1    2. sit<>stands, x10; utilizes B UE on arm chair, 35 seconds    3. tandem standing, L and R LE lead foot positioning, 3 x30 seconds; each, with B UE support    4. standing on air ex pad, x20 seconds>x30 seconds, SBA/CGA from therapist; no UE support    5. heel raises, x12, B UE support    6. standing hip abd, x12 B, B UE support    7. standing hip ext, x12 B, B UE support    8. standing marches, x10; B, B UE support    9. ankle rocker, 2min    10. Standing tap to step, single UE hold x 30 alternaiting, standing exercises: 12minutes    20. PN due 12/1 or 10th visit; Recert due 12/27      Therapeutic Exercise Summary: HEP instruction/performance and development. Handout provided and exercises located below:  Access Code: [email or web address]  URL: [email or web address]  Date: 11/01/2022  Prepared by: Kale Johnson    Exercises        Tandem Stance with Support - 2 x daily - 2 sets - 30 hold      Sit to Stand - 2 x daily - 3-5 reps    Time-based  treatments/modalities:    Physical Therapy Timed Treatment Charges  Therapeutic exercise minutes (CPT 55602): 25 minutes      Pain rating (1-10) before treatment:  0  Pain rating (1-10) after treatment:  0    ASSESSMENT:   Response to treatment:  Patient demonstrated fatigue with labored breathing but also demonstrated improved activity tolerance to all exercises in comparison to last visit.  F/u next week. Encouraged use of SPC/ AD around the home for safety.     PLAN/RECOMMENDATIONS:   Plan for treatment: therapy treatment to continue next visit.  Planned interventions for next visit: continue with current treatment.

## 2022-11-28 ENCOUNTER — PHYSICAL THERAPY (OUTPATIENT)
Dept: PHYSICAL THERAPY | Facility: REHABILITATION | Age: 82
End: 2022-11-28
Attending: STUDENT IN AN ORGANIZED HEALTH CARE EDUCATION/TRAINING PROGRAM
Payer: MEDICARE

## 2022-11-28 DIAGNOSIS — E66.9 OBESITY (BMI 30-39.9): ICD-10-CM

## 2022-11-28 DIAGNOSIS — R53.1 WEAK: ICD-10-CM

## 2022-11-28 DIAGNOSIS — Z91.81 AT MODERATE RISK FOR FALL: ICD-10-CM

## 2022-11-28 DIAGNOSIS — J44.89 COPD (CHRONIC OBSTRUCTIVE PULMONARY DISEASE) WITH CHRONIC BRONCHITIS (HCC): ICD-10-CM

## 2022-11-28 PROCEDURE — 97110 THERAPEUTIC EXERCISES: CPT

## 2022-11-28 NOTE — OP THERAPY DAILY TREATMENT
Outpatient Physical Therapy  DAILY TREATMENT     [facility] [place]  [address]  [place] [facility] [address]  Phone:  [phone]  Fax:  [phone]    Date: 11/28/2022    Patient: Ella Garcia  YOB: 1940  MRN: [id number]     Time Calculation    Start time: 0945  Stop time: 1015 Time Calculation (min): 30 minutes         Chief Complaint: balance/ back pain/ general weakness    Visit #: 4    SUBJECTIVE:  Reports that she has been sore after sweeping and mopping her kitchen and bathroom. Overall reports no falls.     OBJECTIVE:  Current objective measures:     Gait: with SPC throughout PT treatment; demonstrates no overt LOB but occasional unsteadiness noted, especially with increased luis carlos; discussed safety with gait regarding paying attention and slowing down; also discussed SPC v FWW however patient is unable to use FWW in home due to space            Therapeutic Exercises (CPT 77244):     1. NuStep, x8 minutes; level 1    2. sit<>stands, x5, 35 seconds    3. tandem standing, L and R LE lead foot positioning, 3 x30 seconds; each, with B UE support    4. standing on air ex pad, x20 seconds>x30 seconds, SBA/CGA from therapist; no UE support    5. heel raises, x12    11. Supine ball rotations, 2min    12. Supine ball rolls, 2min    13. Seated pelvic tilt, 3min    20. PN due 12/1 or 10th visit; Recert due 12/27      Therapeutic Exercise Summary: HEP instruction/performance and development. Handout provided and exercises located below:  Access Code: [email or web address]  URL: [email or web address]  Date: 11/01/2022  Prepared by: Kale Johnson    Exercises        Tandem Stance with Support - 2 x daily - 2 sets - 30 hold      Sit to Stand - 2 x daily - 3-5 reps    Time-based treatments/modalities:    Physical Therapy Timed Treatment Charges  Therapeutic exercise minutes (CPT 34808): 30 minutes      Pain rating (1-10) before treatment:  5  Pain rating (1-10) after treatment:   4    ASSESSMENT:   Response to treatment: pain limited treatment but overall did not increase during treatment. F/u pending wait list.      PLAN/RECOMMENDATIONS:   Plan for treatment: therapy treatment to continue next visit.  Planned interventions for next visit: continue with current treatment.

## 2022-12-05 ENCOUNTER — PATIENT OUTREACH (OUTPATIENT)
Dept: HEALTH INFORMATION MANAGEMENT | Facility: OTHER | Age: 82
End: 2022-12-05
Payer: MEDICARE

## 2022-12-05 DIAGNOSIS — J44.89 COPD (CHRONIC OBSTRUCTIVE PULMONARY DISEASE) WITH CHRONIC BRONCHITIS (HCC): Chronic | ICD-10-CM

## 2022-12-06 NOTE — PROGRESS NOTES
12/05/2022:    1614: Attempt x1 to reach patient for monthly CCM follow up call. No answer. M with contact information.     12/07/2022:    1530:     Assessment    Reached out to [facility] for patient's monthly CCM follow up call. Per Mihaela, patient is doing pretty well. She is still enjoying PT. Patient keeps stating that it is really working and Mihaela has noticed a difference, too. She states that Kale is great and has given them home exercises to do. She is on a wait list for her last two appointments due to their schedule being full, so they are hopeful she can finish out soon. At this time, they have no other needs or questions.     Education    General check in     Care Plan    Continue with plan of care    Progress:    On-track    Next outreach: 1/10/2023 @ 1400.

## 2023-01-10 ENCOUNTER — PATIENT OUTREACH (OUTPATIENT)
Dept: HEALTH INFORMATION MANAGEMENT | Facility: OTHER | Age: 83
End: 2023-01-10
Payer: MEDICARE

## 2023-01-10 DIAGNOSIS — J44.89 COPD (CHRONIC OBSTRUCTIVE PULMONARY DISEASE) WITH CHRONIC BRONCHITIS (HCC): Chronic | ICD-10-CM

## 2023-01-10 NOTE — PROGRESS NOTES
01/10/2023:    1402: Attempt x1 to reach patient for monthly CCM follow up call. No answer. LVM with contact information.      01/12/2023:    1323: Attempt x2 to reach patient for monthly CCM follow up call. No answer. LVM with contact information.      01/16/2023:    1319: Attempt x3 to reach patient for monthly CCM follow up call. No answer. LVM with contact information.     01/18/2023:    Mihaela called this RN back. She states they have no needs or questions at the moment.

## 2023-01-20 ENCOUNTER — HOSPITAL ENCOUNTER (OUTPATIENT)
Dept: CARDIOLOGY | Facility: MEDICAL CENTER | Age: 83
End: 2023-01-20
Attending: NURSE PRACTITIONER
Payer: MEDICARE

## 2023-01-20 DIAGNOSIS — I35.0 MILD AORTIC STENOSIS: ICD-10-CM

## 2023-01-20 DIAGNOSIS — R06.09 DOE (DYSPNEA ON EXERTION): ICD-10-CM

## 2023-01-20 DIAGNOSIS — K55.9 ISCHEMIC BOWEL DISEASE (HCC): ICD-10-CM

## 2023-01-20 DIAGNOSIS — I10 ESSENTIAL HYPERTENSION: ICD-10-CM

## 2023-01-20 DIAGNOSIS — I48.0 PAROXYSMAL ATRIAL FIBRILLATION (HCC): ICD-10-CM

## 2023-01-20 DIAGNOSIS — N28.9 RENAL INSUFFICIENCY SYNDROME: ICD-10-CM

## 2023-01-20 PROCEDURE — 93306 TTE W/DOPPLER COMPLETE: CPT

## 2023-01-20 PROCEDURE — 93306 TTE W/DOPPLER COMPLETE: CPT | Mod: 26 | Performed by: INTERNAL MEDICINE

## 2023-02-08 ENCOUNTER — HOSPITAL ENCOUNTER (OUTPATIENT)
Dept: PULMONOLOGY | Facility: MEDICAL CENTER | Age: 83
End: 2023-02-08
Attending: NURSE PRACTITIONER
Payer: MEDICARE

## 2023-02-08 DIAGNOSIS — K55.9 ISCHEMIC BOWEL DISEASE (HCC): ICD-10-CM

## 2023-02-08 DIAGNOSIS — I10 ESSENTIAL HYPERTENSION: ICD-10-CM

## 2023-02-08 DIAGNOSIS — N28.9 RENAL INSUFFICIENCY SYNDROME: ICD-10-CM

## 2023-02-08 DIAGNOSIS — I48.0 PAROXYSMAL ATRIAL FIBRILLATION (HCC): ICD-10-CM

## 2023-02-08 DIAGNOSIS — R06.09 DOE (DYSPNEA ON EXERTION): ICD-10-CM

## 2023-02-08 PROCEDURE — 94060 EVALUATION OF WHEEZING: CPT

## 2023-02-08 PROCEDURE — 94726 PLETHYSMOGRAPHY LUNG VOLUMES: CPT

## 2023-02-08 PROCEDURE — 94060 EVALUATION OF WHEEZING: CPT | Mod: 26 | Performed by: INTERNAL MEDICINE

## 2023-02-08 PROCEDURE — 94726 PLETHYSMOGRAPHY LUNG VOLUMES: CPT | Mod: 26 | Performed by: INTERNAL MEDICINE

## 2023-02-08 RX ADMIN — Medication 2.5 MG: at 13:34

## 2023-02-08 ASSESSMENT — PULMONARY FUNCTION TESTS
FEV1_LLN: 1.43
FEV1/FVC_PERCENT_LLN: 64
FEV1/FVC_PERCENT_PREDICTED: 102
FEV1/FVC_PERCENT_PREDICTED: 76
FEV1: 1.39
FEV1/FVC_PERCENT_CHANGE: 79
FVC: 1.59
FEV1/FVC: 76.37
FEV1_PERCENT_PREDICTED: 81
FEV1_LLN: 1.43
FEV1: 1.25
FEV1_PERCENT_CHANGE: 14
FVC_PREDICTED: 2.27
FEV1_PERCENT_PREDICTED: 72
FVC_PERCENT_PREDICTED: 80
FEV1_PREDICTED: 1.72
FVC_PERCENT_PREDICTED: 70
FVC_LLN: 1.89
FVC_LLN: 1.89
FEV1/FVC: 77
FEV1/FVC: 79
FEV1/FVC_PERCENT_PREDICTED: 99
FEV1/FVC_PERCENT_LLN: 64
FEV1/FVC_PERCENT_CHANGE: -2
FEV1/FVC_PERCENT_PREDICTED: 101
FEV1/FVC_PERCENT_PREDICTED: 103
FEV1/FVC: 79
FVC: 1.82
FEV1/FVC_PREDICTED: 77
FEV1_PERCENT_CHANGE: 11

## 2023-02-09 NOTE — PROCEDURES
DATE OF SERVICE:  02/08/2023     PULMONARY FUNCTION TEST INTERPRETATION.     REFERRING PROVIDER:  RADHA Calderón     INTERPRETING PHYSICIAN:  Lois Hernández MD     REASON FOR STUDY:  Ischemic bowel disease, dyspnea on exertion.     STUDY ADEQUACY:  Good efforts; the patient met ATS standards by flow volume   loop and expiratory time.     RESULTS:  SPIROMETRY:  FVC is 1.59 liters, which is 70% predicted with a 14% and greater than 200 mL   change postbronchodilator.  FEV1 is 1.25, which is 72% predicted without a significant change   postbronchodilator.  FEV1/FVC is 77.     LUNG VOLUMES:  TLC is 5.23, which is 110% predicted.  RV is 3.17 liters, which is 133% predicted.     INTERPRETATION:  1.  Spirometry is normal.  2.  There is a significant bronchodilator response in the FVC values as well   as a suggestion of bronchoreactivity in the mid flow values, which can be   consistent with reactive airways disease or asthma.  3.  The flow volume loop appears to be slightly restricted.  4.  Lung volumes are normal.  5.  The patient was unable to perform DLCO testing due to smoking prior to the   exam.  6.  There are no prior studies for comparison.        ______________________________  MD RANDI Carmichael/LESLI    DD:  02/08/2023 17:30  DT:  02/08/2023 18:13    Job#:  [id number]

## 2023-02-10 ENCOUNTER — PATIENT OUTREACH (OUTPATIENT)
Dept: HEALTH INFORMATION MANAGEMENT | Facility: OTHER | Age: 83
End: 2023-02-10
Payer: MEDICARE

## 2023-02-10 DIAGNOSIS — J44.89 COPD (CHRONIC OBSTRUCTIVE PULMONARY DISEASE) WITH CHRONIC BRONCHITIS (HCC): Chronic | ICD-10-CM

## 2023-02-10 DIAGNOSIS — N17.9 ACUTE KIDNEY INJURY SUPERIMPOSED ON CHRONIC KIDNEY DISEASE (HCC): ICD-10-CM

## 2023-02-10 DIAGNOSIS — N18.9 ACUTE KIDNEY INJURY SUPERIMPOSED ON CHRONIC KIDNEY DISEASE (HCC): ICD-10-CM

## 2023-02-11 NOTE — PROGRESS NOTES
02/10/2023:    1616: Attempt x1 to reach patient for monthly [facility] follow up call. No answer. LVM with contact information.      02/16/2023:     1037: Spoke with patient's granddaughter Mihaela at PCP appointment for Ella. Per Mihaela everything is going well and they have no questions/concerns at this time. Patient doesn't seem to be Medicare DCE anymore.     We completed the quarterly assessment for Ella:    - improve mobility and reduce falls: Mihaela states that her mobility has improved and she has had no recent falls.     - lessen UTI occurrence: Mihaela states she has not had any UTIs recently as well.    Mihaela states they have no other goals that need to be worked on. This RN will send to [facility] for General Care Management for a few months. Will graduate from [facility].

## 2023-02-16 ENCOUNTER — OFFICE VISIT (OUTPATIENT)
Dept: MEDICAL GROUP | Facility: MEDICAL CENTER | Age: 83
End: 2023-02-16
Payer: MEDICARE

## 2023-02-16 VITALS
SYSTOLIC BLOOD PRESSURE: 124 MMHG | OXYGEN SATURATION: 100 % | HEIGHT: 62 IN | RESPIRATION RATE: 16 BRPM | HEART RATE: 85 BPM | DIASTOLIC BLOOD PRESSURE: 72 MMHG | BODY MASS INDEX: 34.6 KG/M2 | WEIGHT: 188 LBS

## 2023-02-16 DIAGNOSIS — D68.69 SECONDARY HYPERCOAGULABLE STATE (HCC): ICD-10-CM

## 2023-02-16 DIAGNOSIS — J96.11 CHRONIC RESPIRATORY FAILURE WITH HYPOXIA (HCC): Chronic | ICD-10-CM

## 2023-02-16 DIAGNOSIS — R06.09 DYSPNEA ON EXERTION: ICD-10-CM

## 2023-02-16 DIAGNOSIS — I48.0 PAROXYSMAL ATRIAL FIBRILLATION (HCC): Chronic | ICD-10-CM

## 2023-02-16 DIAGNOSIS — G47.30 SLEEP APNEA, UNSPECIFIED TYPE: ICD-10-CM

## 2023-02-16 PROCEDURE — 99214 OFFICE O/P EST MOD 30 MIN: CPT | Performed by: STUDENT IN AN ORGANIZED HEALTH CARE EDUCATION/TRAINING PROGRAM

## 2023-02-16 RX ORDER — FLUTICASONE FUROATE AND VILANTEROL 100; 25 UG/1; UG/1
1 POWDER RESPIRATORY (INHALATION) DAILY
Qty: 60 EACH | Refills: 1 | Status: SHIPPED | OUTPATIENT
Start: 2023-02-16 | End: 2023-04-10

## 2023-02-16 ASSESSMENT — FIBROSIS 4 INDEX: FIB4 SCORE: 2.55

## 2023-02-16 ASSESSMENT — PATIENT HEALTH QUESTIONNAIRE - PHQ9: CLINICAL INTERPRETATION OF PHQ2 SCORE: 0

## 2023-02-16 NOTE — PROGRESS NOTES
"Subjective:     Chief Complaint   Patient presents with    Results         HPI:   Margaret presents today with     Chronic respiratory failure  Continues to smoke approximately half pack per day.  Patient is not currently on oxygen.  Patient had recent Pulmonary function & following up for results.  Pulmonary function testing ordered by cardiologist.  Advised that she needs follow-up with cardiologist, has not seen cardiology in over 6 months.      Pulmonary function testing was ordered due to dyspnea on exertion.  Spirometry was normal.  There was significant bronchodilator response in the FVC values, bronchial reactivity, distant with reactive airway disease or asthma.  FEV1 FVC ratio is do not show COPD.  Slightly restricted flow-volume loop and normal lung volumes.  We will start patient on daily inhaler for relief    6-minute walk test performed in office.  Had recent echo, echo showed moderate aortic insufficiency compared to previous echo in 2020.  Patient advised to follow-up with cardiology for further evaluation of this new finding.  Patient with mild aortic valve stenosis, normal EF.    6 min walk:  1-99%  2-99%  3-99%  4- 99%  5- 98%  6- 99%    SHABANA   Patient is noncompliant with CPAP. Never wore    Hypertension  Patient continues on lisinopril 5 mg.    Secondary hypercoagulable state  Patient continues on Eliquis 5 mg twice daily.    Thyroid  Patient continues on levothyroxine 112 mcg.      ROS:  Gen: no fevers/chills  Pulm: no sob, no cough  CV: no chest pain, no palpitations  GI: no nausea/vomiting, no diarrhea      Objective:     Exam:  /72 (BP Location: Left arm, Patient Position: Sitting, BP Cuff Size: Adult)   Pulse 85   Resp 16   Ht 1.575 m (5' 2\")   Wt 85.3 kg (188 lb)   SpO2 100%   BMI 34.39 kg/m²  Body mass index is 34.39 kg/m².    Gen: Alert and oriented, No apparent distress.  Neck: Neck is supple without lymphadenopathy.  Lungs: Normal effort, CTA bilaterally, no wheezes, rhonchi, " or rales  CV: Regular rate and rhythm. No murmurs, rubs, or gallops.  Ext: No clubbing, cyanosis, edema.      Assessment & Plan:     82 y.o. female with the following -     1. Dyspnea on exertion  Chronic, stable.  Patient continues to complain of dyspnea on exertion.  Cardiology further evaluated with pulmonary function testing, 6-minute walk test performed in office and oxygen saturations did not drop.  Dyspnea on exertion does not appear to be secondary to a respiratory concern.  Things reviewed with patient, patient advised to follow-up with cardiology.  - Pulmonary Stress Testing (6-minute walk); Future    2. Chronic respiratory failure with hypoxia (HCC)  Chronic, stable.  Will trial patient on a Breo or daily combination inhaled corticosteroid and long-acting agonist due to pulmonary function testing showing significant bronchodilator response.    3. Sleep apnea, unspecified type  Chronic, stable.  Patient previously diagnosed with sleep apnea.  Patient notes that she does not tolerate the machine.  4. Paroxysmal atrial fibrillation (HCC)  5. Secondary hypercoagulable state (HCC)  Chronic, stable.  Discussed with patient the importance of follow-up with cardiology.      Return in about 3 months (around 5/16/2023).    Please note that this dictation was created using voice recognition software. I have made every reasonable attempt to correct obvious errors, but I expect that there are errors of grammar and possibly content that I did not discover before finalizing the note.

## 2023-02-17 ENCOUNTER — PATIENT OUTREACH (OUTPATIENT)
Dept: HEALTH INFORMATION MANAGEMENT | Facility: OTHER | Age: 83
End: 2023-02-17
Payer: MEDICARE

## 2023-02-17 NOTE — PROGRESS NOTES
[facility] was referred the pt because KEYONNA khan Graduated the pt from [facility] and this CHW has now put the pt on General care Management with this CHW for the next month.

## 2023-03-21 ENCOUNTER — TELEPHONE (OUTPATIENT)
Dept: HEALTH INFORMATION MANAGEMENT | Facility: OTHER | Age: 83
End: 2023-03-21

## 2023-03-21 ENCOUNTER — OFFICE VISIT (OUTPATIENT)
Dept: CARDIOLOGY | Facility: MEDICAL CENTER | Age: 83
End: 2023-03-21
Payer: MEDICARE

## 2023-03-21 VITALS
BODY MASS INDEX: 35.66 KG/M2 | DIASTOLIC BLOOD PRESSURE: 68 MMHG | WEIGHT: 193.8 LBS | HEART RATE: 86 BPM | HEIGHT: 62 IN | OXYGEN SATURATION: 99 % | SYSTOLIC BLOOD PRESSURE: 136 MMHG | RESPIRATION RATE: 17 BRPM

## 2023-03-21 DIAGNOSIS — N18.31 CHRONIC RENAL IMPAIRMENT, STAGE 3A: ICD-10-CM

## 2023-03-21 DIAGNOSIS — I10 ESSENTIAL HYPERTENSION: ICD-10-CM

## 2023-03-21 DIAGNOSIS — I35.0 NONRHEUMATIC AORTIC VALVE STENOSIS: ICD-10-CM

## 2023-03-21 DIAGNOSIS — N28.9 RENAL INSUFFICIENCY SYNDROME: ICD-10-CM

## 2023-03-21 DIAGNOSIS — I35.0 MILD AORTIC STENOSIS: ICD-10-CM

## 2023-03-21 DIAGNOSIS — G47.30 SLEEP APNEA, UNSPECIFIED TYPE: ICD-10-CM

## 2023-03-21 DIAGNOSIS — I10 ESSENTIAL HYPERTENSION, BENIGN: ICD-10-CM

## 2023-03-21 DIAGNOSIS — I48.0 PAROXYSMAL ATRIAL FIBRILLATION (HCC): ICD-10-CM

## 2023-03-21 DIAGNOSIS — J44.89 COPD (CHRONIC OBSTRUCTIVE PULMONARY DISEASE) WITH CHRONIC BRONCHITIS (HCC): ICD-10-CM

## 2023-03-21 DIAGNOSIS — R06.09 DOE (DYSPNEA ON EXERTION): ICD-10-CM

## 2023-03-21 PROCEDURE — 99214 OFFICE O/P EST MOD 30 MIN: CPT | Performed by: NURSE PRACTITIONER

## 2023-03-21 RX ORDER — LISINOPRIL 10 MG/1
10 TABLET ORAL DAILY
Qty: 10 TABLET | Refills: 3 | Status: SHIPPED | OUTPATIENT
Start: 2023-03-21 | End: 2023-05-15

## 2023-03-21 ASSESSMENT — ENCOUNTER SYMPTOMS
DIZZINESS: 0
PALPITATIONS: 0
BRUISES/BLEEDS EASILY: 0
NAUSEA: 0
BLOOD IN STOOL: 0
VOMITING: 0
ABDOMINAL PAIN: 0
BLURRED VISION: 0
CLAUDICATION: 0
WEIGHT LOSS: 0
FALLS: 0
ORTHOPNEA: 0
COUGH: 0
PND: 0
FEVER: 0
TINGLING: 0
BACK PAIN: 1
SHORTNESS OF BREATH: 1
LOSS OF CONSCIOUSNESS: 0
MYALGIAS: 0

## 2023-03-21 ASSESSMENT — FIBROSIS 4 INDEX: FIB4 SCORE: 2.55

## 2023-03-21 NOTE — PATIENT INSTRUCTIONS
Checking Blood Pressure:  -Blood pressure cuff, spend in the $40-65, with good return policy  -It should be automatic, upper arm, measure your arm to get the correct size, probably adult Large  -Put the cuff in place, rest arm on table near height of your heart, sit quietly for 5 min, legs uncrossed, with back support, then take your blood pressure, write it down, keep a log  -Check once a day. Ideally, 2 hours after medications.  -Can bring your cuff to at least one appointment where it can be calibrated to a manual cuff if you are concerned.  -Goal blood pressure is at least under 130/80, ideally under 120/80.  If you think your BP is overall too high, let us know in the office, we can adjust medications, can use [employer] or call the [employer] office: [phone].  -If you feel dizzy, please check your blood pressure.  If your blood pressure is less than 90/60 with dizziness call our office at [phone].    -Continue to monitor blood pressures, heart rates, and daily weights in log provided in office today. Please bring to next appointment to review with provider.     Salt=sodium=sea salt, guidelines say stay under 2,500 mg daily   Get salt smart, start looking at labels, count it up.  Salt is hidden in everything, salad dressing, sauces, cheese, most canned food, any processed meat.

## 2023-03-21 NOTE — PROGRESS NOTES
Chief Complaint   Patient presents with    Hypertension     F/V DX: Essential hypertension        Subjective     Ella Garcia is a 80 y.o. female who presents today hypertension, proximal A. fib, mild aortic stenosis follow-up.  Patient was last seen by myself on 7/20/2022 and Dr. Kapoor on 7/30/2021.  Since patient was last seen she was following and found to have UTI which was treated with a hospitalization in September.    In addition to above patient has past medical history of COPD, dysuria, SHABANA.    Today, patient reports she continues to smoke tobacco with a pack a day, and has same level of SCHERER.  Patient also reports often feels cold with fatigue.  Otherwise, patient feels well, denies chest pain, shortness of breath, palpitations, dizziness/lightheadedness, orthopnea, PND or Edema.  Patient is not monitoring her blood pressure at home.      Her daughter is concerned regarding updated echocardiogram findings and moderate aortic insufficiency with mild stenosis.  We also reviewed PFT testing showing mild chronic obstructive lung disease, patient reports she feels better on inhalers.  We will optimize her medications for her blood pressure per below.  Recommend follow-up with PCP regarding fatigue and feeling cold.  Patient also reports bright red blood in her stool.  We will check CBC and BMP for high risk medication use.      Past Medical History:   Diagnosis Date    Arthritis     knees, and hips-osteo    ASTHMA     Body mass index 40.0-44.9, adult ([facility]) 4/27/2018    Chronic renal impairment, stage 3 (moderate) ([facility]) 5/13/2019    COPD (chronic obstructive pulmonary disease) with chronic bronchitis ([facility]) 4/20/2021    Dental disorder     upper    Dysuria 10/18/2016    Fall     Generalized edema 4/27/2018    Glaucoma     bilateral    Hay fever 4/19/2016    Heart burn     High cholesterol     Hx: UTI (urinary tract infection)     Hyperglycemia 11/24/2015    Hypertension     Hypothyroidism     Indigestion      Ischemic bowel disease (HCC) 9/9/2019    Migraines     pt has similar symptoms with migraines not for a long time though    Nausea 10/14/2019    Neuropathy (HCC)     Obesity (BMI 30-39.9) 7/20/2015    Pneumonia     Routine general medical examination at a health care facility 7/20/2015    7/20/15    Sleep apnea 12/2019    Had sleep study, didn't tolerate cpap    Snoring     Syncope 6/5/2020    Tobacco use 4/29/2019    URI (upper respiratory infection) 11/4/2015    Urinary bladder disorder     hx of uti's     Past Surgical History:   Procedure Laterality Date    GASTROSCOPY  12/13/2019    Procedure: GASTROSCOPY;  Surgeon: Ang Angel M.D.;  Location: SURGERY [facility];  Service: Gastroenterology    COLONOSCOPY  12/13/2019    Procedure: COLONOSCOPY;  Surgeon: Ang Angel M.D.;  Location: SURGERY [facility];  Service: Gastroenterology    NIRU BY LAPAROSCOPY  9/2/2011    [facility]    LUMBAR DECOMPRESSION  6/29/2009    Performed by ANG YAN at SURGERY [facility] ORS    LUMBAR LAMINECTOMY DISKECTOMY  6/29/2009    Performed by ANG YAN at SURGERY [facility] ORS    ABDOMINAL HYSTERECTOMY TOTAL      GYN SURGERY      hysterectomy    HEMORRHOIDECTOMY       Family History   Problem Relation Age of Onset    Stroke Mother     Hyperlipidemia Mother     Hypertension Mother     Arthritis Mother     Diabetes Father     Lung Disease Father         pneumonia    Arthritis Sister     Rheumatologic Disease Sister     Hypertension Sister     Hyperlipidemia Sister     Other Sister         Anusha/Fibermyalgia    Hyperlipidemia Brother     Hypertension Brother     Arthritis Brother     Lung Disease Maternal Grandmother         pneumonia    Stroke Maternal Grandfather     Cancer Maternal Grandfather         skin     No Known Problems Paternal Grandmother     No Known Problems Paternal Grandfather     Hyperlipidemia Daughter     Diabetes Daughter     No Known  Problems Son      Social History     Socioeconomic History    Marital status: Single     Spouse name: Not on file    Number of children: Not on file    Years of education: Not on file    Highest education level: Not on file   Occupational History    Not on file   Tobacco Use    Smoking status: Every Day     Packs/day: 1.00     Years: 56.00     Pack years: 56.00     Types: Cigarettes    Smokeless tobacco: Never    Tobacco comments:     started smoking at age 12, little mor than half a pack   Vaping Use    Vaping Use: Never used   Substance and Sexual Activity    Alcohol use: No    Drug use: No    Sexual activity: Never     Partners: Male     Birth control/protection: Post-Menopausal   Other Topics Concern    Not on file   Social History Narrative    Not on file     Social Determinants of Health     Financial Resource Strain: Not on file   Food Insecurity: Not on file   Transportation Needs: Not on file   Physical Activity: Not on file   Stress: Not on file   Social Connections: Not on file   Intimate Partner Violence: Not on file   Housing Stability: Not on file     Allergies   Allergen Reactions    Food Hives and Nausea     Oranges, Hives    Neomycin-Polymyxin B Rash     Rash     Outpatient Encounter Medications as of 3/21/2023   Medication Sig Dispense Refill    lisinopril (PRINIVIL) 10 MG Tab Take 1 Tablet by mouth every day. 10 Tablet 3    fluticasone furoate-vilanterol (BREO ELLIPTA) 100-25 MCG/ACT AEROSOL POWDER, BREATH ACTIVATED Inhale 1 Puff every day. 60 Each 1    promethazine (PHENERGAN) 25 MG Tab Take 1 Tablet by mouth every 8 hours as needed for Nausea/Vomiting. 60 Tablet 3    triamterene/hctz (MAXZIDE-25/DYAZIDE) 37.5-25 MG Cap TAKE 1 CAPSULE BY MOUTH EVERY DAY IN THE MORNING 90 Capsule 3    apixaban (ELIQUIS) 5mg Tab Take 1 Tablet by mouth 2 times a day. 180 Tablet 2    levothyroxine (SYNTHROID) 112 MCG Tab TAKE 1 TABLET BY MOUTH EVERY DAY IN THE MORNING ON AN EMPTY STOMACH 90 Tablet 3    promethazine  (PHENERGAN) 25 MG Tab TAKE 1 TABLET BY MOUTH 3 TIMES A DAY AS NEEDED FOR NAUSEA AND VOMITING 60 Tablet 0    pantoprazole (PROTONIX) 40 MG Tablet Delayed Response TAKE 1 TABLET BY MOUTH TWICE A  Tablet 2    Camphor-Menthol-Methyl Sal (SALONPAS) 3.1-6-10 % Patch Apply 1 Application topically 2 times a day. 20 Patch 2    atorvastatin (LIPITOR) 10 MG Tab Take 1 Tablet by mouth every day. 90 Tablet 3    fenofibrate (TRIGLIDE) 160 MG tablet Take 1 Tablet by mouth every day. 90 Tablet 3    potassium chloride SA (K-DUR) 10 MEQ Tab CR TAKE 1 TABLET BY MOUTH EVERY DAY (Patient taking differently: Take 10 mEq by mouth every day.) 90 Tablet 3    HYDROcodone-acetaminophen (NORCO) 7.5-325 MG per tablet Take 1 Tablet by mouth every 8 hours as needed for Moderate Pain.      hydrOXYzine HCl (ATARAX) 25 MG Tab Take 25 mg by mouth at bedtime.      latanoprost (XALATAN) 0.005 % Solution Administer 1 Drop into both eyes every evening.      Mirabegron ER (MYRBETRIQ) 50 MG TABLET SR 24 HR Take 50 mg by mouth every day.      acetaminophen (TYLENOL) 500 MG TABS Take 500 mg by mouth 3 times a day as needed for Mild Pain. Indications: Pain      [DISCONTINUED] lisinopril (PRINIVIL) 5 MG Tab TAKE 1 TABLET BY MOUTH EVERY DAY 90 Tablet 3     No facility-administered encounter medications on file as of 3/21/2023.     Review of Systems   Constitutional:  Negative for fever, malaise/fatigue and weight loss.   Eyes:  Negative for blurred vision.   Respiratory:  Positive for shortness of breath. Negative for cough.    Cardiovascular:  Negative for chest pain, palpitations, orthopnea, claudication, leg swelling and PND.   Gastrointestinal:  Negative for abdominal pain, blood in stool, nausea and vomiting.   Genitourinary:  Negative for dysuria, frequency and hematuria.   Musculoskeletal:  Positive for back pain. Negative for falls and myalgias.   Neurological:  Negative for dizziness, tingling and loss of consciousness.   Endo/Heme/Allergies:   "Does not bruise/bleed easily.            Objective     /68 (BP Location: Left arm, Patient Position: Sitting, BP Cuff Size: Adult)   Pulse 86   Resp 17   Ht 1.575 m (5' 2\")   Wt 87.9 kg (193 lb 12.8 oz)   SpO2 99%   BMI 35.45 kg/m²     Physical Exam  Vitals reviewed.   Constitutional:       Appearance: She is well-developed.      Comments: BMI 33   HENT:      Head: Normocephalic and atraumatic.   Eyes:      Pupils: Pupils are equal, round, and reactive to light.   Neck:      Vascular: No JVD.   Cardiovascular:      Rate and Rhythm: Normal rate and regular rhythm.      Heart sounds: Murmur heard.   Systolic murmur is present.     No friction rub. No gallop.   Pulmonary:      Effort: Pulmonary effort is normal. No respiratory distress.      Breath sounds: Examination of the right-lower field reveals decreased breath sounds. Examination of the left-lower field reveals decreased breath sounds. Decreased breath sounds present.   Abdominal:      General: Bowel sounds are normal. There is no distension.      Palpations: Abdomen is soft.   Musculoskeletal:      Right lower leg: No edema.      Left lower leg: No edema.   Skin:     General: Skin is warm and dry.      Findings: No erythema.   Neurological:      Mental Status: She is alert and oriented to person, place, and time.   Psychiatric:         Behavior: Behavior normal.          Lab Results   Component Value Date/Time    CHOLSTRLTOT 121 08/11/2022 08:57 AM    LDL 53 08/11/2022 08:57 AM    HDL 39 (A) 08/11/2022 08:57 AM    TRIGLYCERIDE 145 08/11/2022 08:57 AM       Lab Results   Component Value Date/Time    SODIUM 135 09/24/2022 05:23 AM    POTASSIUM 3.9 09/24/2022 05:23 AM    CHLORIDE 105 09/24/2022 05:23 AM    CO2 16 (L) 09/24/2022 05:23 AM    GLUCOSE 85 09/24/2022 05:23 AM    BUN 20 09/24/2022 05:23 AM    CREATININE 1.24 09/24/2022 05:23 AM     Lab Results   Component Value Date/Time    ALKPHOSPHAT 41 09/22/2022 01:49 PM    ASTSGOT 35 09/22/2022 01:49 PM "    ALTSGPT 14 09/22/2022 01:49 PM    TBILIRUBIN 0.6 09/22/2022 01:49 PM      Transthoracic echocardiogram (10/6/2020):Compared to the images of the prior study done 12/06/2019, no   significant changes are noted.     Left ventricular ejection fraction is visually estimated to be 65%.  Right ventricular systolic pressure is estimated to be 28 mmHg    Cardiac event monitor (8/21/2021): Predominant sinus rhythm average heart rate 79 bpm no concerning blocks or bradycardia.  2% burden of atrial fibrillation with an average of 82 bpm longest interval 3 hours 16 minutes.    Transthoracic echocardiogram (1/20/2023):  CONCLUSIONS  Compared to the images of the prior study 10-6-2020, there is now   moderate aortic insufficiency.  Normal left ventricular systolic function.  The left ventricular ejection fraction is visually estimated to be 65%.  Normal diastolic function.  The right ventricle is normal in size and systolic function.  Mild aortic valve stenosis.  Moderate aortic insufficiency.    Assessment & Plan     1. Renal insufficiency syndrome  CBC WITHOUT DIFFERENTIAL    Basic Metabolic Panel      2. Paroxysmal atrial fibrillation (HCC)  CBC WITHOUT DIFFERENTIAL    Basic Metabolic Panel      3. Essential hypertension  CBC WITHOUT DIFFERENTIAL    Basic Metabolic Panel    lisinopril (PRINIVIL) 10 MG Tab      4. SCHERER (dyspnea on exertion)  CBC WITHOUT DIFFERENTIAL    Basic Metabolic Panel      5. Mild aortic stenosis  CBC WITHOUT DIFFERENTIAL    Basic Metabolic Panel      6. Chronic renal impairment, stage 3a (HCC)  CBC WITHOUT DIFFERENTIAL    Basic Metabolic Panel      7. 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 Nonrheumatic aortic valve stenosis  CBC WITHOUT DIFFERENTIAL    Basic Metabolic Panel      9. Sleep apnea, unspecified type  CBC WITHOUT DIFFERENTIAL    Basic Metabolic Panel      10. Essential hypertension, benign  CBC  WITHOUT DIFFERENTIAL    Basic Metabolic Panel          Medical Decision Making: Today's Assessment/Status/Plan:        Paroxysmal Atrial Fibrillation (PAF); secondary thrombocytopenia  - Asymptomatic, denies AF breakthrough, rate controlled.   -[id number]  - On OAC with Eliquis, continue.  -Patient reports bright red blood in stool.  Check CBC and BMP.  -Thyroid function per PCP    Mild aortic stenosis; moderate aortic regurgitation  -Optimize afterload reduction with increasing lisinopril  -CTM    SHABANA; COPD; Tobacco Abuse 1 PPD for 50 years  -Patient reports CPAP non-compliance  -We discussed importance of treating nocturnal hypoxia due to causing increased risk for cardiac disease; patient verbalizes understanding.  -Per sleep medicine.  Inhalers per PCP    Hyperlipidemia  -Continue atorvastatin 10 mg daily  -LDL at goal    Hypertension  -Resume lisinopril 10 mg daily  -Continue triamteren/HCTZ 25.7-25 mg daily  -Today in office blood pressure is elevated.  -Encouraged to resume home BP monitoring/log.  Written instructions regarding blood pressure monitoring at home provided.  Patient to bring log to next appointment.    CKD3a; history UTI  -Crt 1.24  -Stable    FU in clinic in 6 months with general cardiology. Sooner if needed.    Patient verbalizes understanding and agrees with the plan of care.     I personally spent a total of 35 minutes which includes face-to-face time and non-face-to-face time spent on preparing to see the patient, reviewing prior notes and tests, obtaining history from the patient, performing a medically appropriate exam, counseling and educating the patient, ordering medications/tests/procedures/referrals as clinically indicated, and documenting information in the electronic medical record.    CALI Calderón.   [facility]  [phone]    PLEASE NOTE: This Note was created using voice recognition Software. I have made every reasonable  attempt to correct obvious errors, but I expect that there are errors of grammar and possibly content that I did not discover before finalizing the note

## 2023-03-23 ENCOUNTER — TELEPHONE (OUTPATIENT)
Dept: HEALTH INFORMATION MANAGEMENT | Facility: OTHER | Age: 83
End: 2023-03-23
Payer: MEDICARE

## 2023-03-27 ENCOUNTER — HOSPITAL ENCOUNTER (OUTPATIENT)
Dept: LAB | Facility: MEDICAL CENTER | Age: 83
End: 2023-03-27
Attending: NURSE PRACTITIONER
Payer: MEDICARE

## 2023-03-27 DIAGNOSIS — N28.9 RENAL INSUFFICIENCY SYNDROME: ICD-10-CM

## 2023-03-27 DIAGNOSIS — I35.0 MILD AORTIC STENOSIS: ICD-10-CM

## 2023-03-27 DIAGNOSIS — J44.89 COPD (CHRONIC OBSTRUCTIVE PULMONARY DISEASE) WITH CHRONIC BRONCHITIS (HCC): ICD-10-CM

## 2023-03-27 DIAGNOSIS — N18.31 CHRONIC RENAL IMPAIRMENT, STAGE 3A: ICD-10-CM

## 2023-03-27 DIAGNOSIS — I10 ESSENTIAL HYPERTENSION, BENIGN: ICD-10-CM

## 2023-03-27 DIAGNOSIS — G47.30 SLEEP APNEA, UNSPECIFIED TYPE: ICD-10-CM

## 2023-03-27 DIAGNOSIS — I35.0 NONRHEUMATIC AORTIC VALVE STENOSIS: ICD-10-CM

## 2023-03-27 DIAGNOSIS — I48.0 PAROXYSMAL ATRIAL FIBRILLATION (HCC): ICD-10-CM

## 2023-03-27 DIAGNOSIS — R06.09 DOE (DYSPNEA ON EXERTION): ICD-10-CM

## 2023-03-27 DIAGNOSIS — I10 ESSENTIAL HYPERTENSION: ICD-10-CM

## 2023-03-27 LAB
ANION GAP SERPL CALC-SCNC: 12 MMOL/L (ref 7–16)
BUN SERPL-MCNC: 26 MG/DL (ref 8–22)
CALCIUM SERPL-MCNC: 9.5 MG/DL (ref 8.5–10.5)
CHLORIDE SERPL-SCNC: 110 MMOL/L (ref 96–112)
CO2 SERPL-SCNC: 20 MMOL/L (ref 20–33)
CREAT SERPL-MCNC: 1.37 MG/DL (ref 0.5–1.4)
ERYTHROCYTE [DISTWIDTH] IN BLOOD BY AUTOMATED COUNT: 51.6 FL (ref 35.9–50)
GFR SERPLBLD CREATININE-BSD FMLA CKD-EPI: 38 ML/MIN/1.73 M 2
GLUCOSE SERPL-MCNC: 123 MG/DL (ref 65–99)
HCT VFR BLD AUTO: 42.2 % (ref 37–47)
HGB BLD-MCNC: 13.6 G/DL (ref 12–16)
MCH RBC QN AUTO: 30.6 PG (ref 27–33)
MCHC RBC AUTO-ENTMCNC: 32.2 G/DL (ref 33.6–35)
MCV RBC AUTO: 95 FL (ref 81.4–97.8)
PLATELET # BLD AUTO: 295 K/UL (ref 164–446)
PMV BLD AUTO: 9.2 FL (ref 9–12.9)
POTASSIUM SERPL-SCNC: 4.5 MMOL/L (ref 3.6–5.5)
RBC # BLD AUTO: 4.44 M/UL (ref 4.2–5.4)
SODIUM SERPL-SCNC: 142 MMOL/L (ref 135–145)
WBC # BLD AUTO: 7.2 K/UL (ref 4.8–10.8)

## 2023-03-27 PROCEDURE — 85027 COMPLETE CBC AUTOMATED: CPT

## 2023-03-27 PROCEDURE — 80048 BASIC METABOLIC PNL TOTAL CA: CPT

## 2023-03-27 PROCEDURE — 36415 COLL VENOUS BLD VENIPUNCTURE: CPT

## 2023-04-04 ENCOUNTER — NON-PROVIDER VISIT (OUTPATIENT)
Dept: SLEEP MEDICINE | Facility: MEDICAL CENTER | Age: 83
End: 2023-04-04
Attending: STUDENT IN AN ORGANIZED HEALTH CARE EDUCATION/TRAINING PROGRAM
Payer: MEDICARE

## 2023-04-04 DIAGNOSIS — R06.09 DYSPNEA ON EXERTION: ICD-10-CM

## 2023-04-04 ASSESSMENT — 6 MINUTE WALK TEST (6MWT)
PERCEIVED BREATHLESSNESS AT 1 MIN: 3
SAO2 AT 1 MIN: 97
HEART RATE AT 4 MIN: 101
HEART RATE AT 1 MIN: 94
NUMBER OF RESTS: 3
PERCEIVED FATIGUE AT 5 MIN: 0
AMBULATES WITH O2: WITHOUT O2
COMMENTS: TEST ON ROOM AIR.
HEART RATE AT 6 MIN: 100
PERCEIVED BREATHLESSNESS AT 2 MIN: 2
PERCEIVED FATIGUE AT 6 MIN: 0
PERCEIVED BREATHLESSNESS AT 6 MIN: 3
PERCENT OF NORMAL WALKED: 46
SITTING BLOOD PRESSURE: 112/64
PERCEIVED FATIGUE AT 3 MIN: 0
BLOOD PRESSURE AT 1 MIN: 112/64
PERCEIVED BREATHLESSNESS AT 2 MIN: 3
HEART RATE AT 5 MIN: 98
PERCEIVED BREATHLESSNESS AT 4 MIN: 2
TOTAL REST TIME: 180
SAO2 AT 3 MIN: 98
HEART RATE AT 3 MIN: 100
HEART RATE AT 2 MIN: 97
SAO2 AT 1 MIN: 99
HEART RATE AT 1 MIN: 92
PERCEIVED FATIGUE AT 1 MIN: 0
PERCEIVED FATIGUE AT 4 MIN: 0
HEART RATE: 82
PERCEIVED FATIGUE AT 1 MIN: 0
PERCEIVED BREATHLESSNESS AT 1 MIN: 2
SAO2 AT 4 MIN: 96
SAO2 AT 5 MIN: 100
SAO2 AT 2 MIN: 99
HEART RATE AT 2 MIN: 93
SAO2 AT 6 MIN: 99
PERCEIVED BREATHLESSNESS AT 5 MIN: 2
SAO2 AT 2 MIN: 97
BLOOD PRESSURE: LEFT ARM
PERCEIVED BREATHLESSNESS AT 3 MIN: 2
PERCEIVED FATIGUE AT 2 MIN: 0
PERCEIVED FATIGUE AT 2 MIN: 0

## 2023-04-04 NOTE — PROCEDURES
Test on Room Air.    Patient walked 480 feet which is 46% predicted for her age.  She did not desaturate on room air.    Marbella Wong, DO   Pulmonary and Critical Care

## 2023-04-28 ENCOUNTER — PATIENT OUTREACH (OUTPATIENT)
Dept: HEALTH INFORMATION MANAGEMENT | Facility: OTHER | Age: 83
End: 2023-04-28
Payer: MEDICARE

## 2023-04-28 NOTE — PROGRESS NOTES
CHW Juan called the pt to see how she has been doing since she graduated from [facility] from [employer]. This CHW left a  requesting a return call. This CHW will call in 2 weeks to check in and discuss releasing the pt from [facility].

## 2023-05-10 ENCOUNTER — PATIENT MESSAGE (OUTPATIENT)
Dept: CARDIOLOGY | Facility: MEDICAL CENTER | Age: 83
End: 2023-05-10
Payer: MEDICARE

## 2023-05-10 DIAGNOSIS — I10 ESSENTIAL HYPERTENSION: ICD-10-CM

## 2023-05-10 PROCEDURE — 1126F AMNT PAIN NOTED NONE PRSNT: CPT | Performed by: NURSE PRACTITIONER

## 2023-05-11 ENCOUNTER — PATIENT OUTREACH (OUTPATIENT)
Dept: HEALTH INFORMATION MANAGEMENT | Facility: OTHER | Age: 83
End: 2023-05-11
Payer: MEDICARE

## 2023-05-11 NOTE — PROGRESS NOTES
CHW Juan Called the pt to check in with her and to see if she has any needs at this time. Pt did not answer and this CHW left a VM requesting a return call. This CHW will close the case at this time ands no longer follow.

## 2023-05-12 NOTE — TELEPHONE ENCOUNTER
To: SHAUN    Patient reports taking 5 mg of Lisinopril instead of 10. Okay for me to refill prescription for 5 mg? Lisinopril 10 mg is currently in MAR. Please advise. Thank you

## 2023-05-13 NOTE — RESPIRATORY CARE
"Patient attended week 1 of Quit Tobacco class from 11 am to 12 pm. Patient has not quit smoking. Patient had a pharmacy consult on 12/6/2017. Patient has not set a quit date  This week we covered the health effects of smoking, having a personal plan to quit, the benefits of quitting, and the 5 day count down to quit. The patient received the first part of their personal Tobacco Quit Plan \"Reasons For Quitting\". We will see her back next week for week 2.  "
Awake/Alert/Cooperative

## 2023-05-15 RX ORDER — LISINOPRIL 5 MG/1
10 TABLET ORAL DAILY
Qty: 90 TABLET | Refills: 2 | Status: SHIPPED | OUTPATIENT
Start: 2023-05-15 | End: 2023-09-21 | Stop reason: SDUPTHER

## 2023-06-06 ENCOUNTER — HOSPITAL ENCOUNTER (OUTPATIENT)
Dept: RADIOLOGY | Facility: MEDICAL CENTER | Age: 83
End: 2023-06-06
Attending: NURSE PRACTITIONER
Payer: MEDICARE

## 2023-06-06 DIAGNOSIS — M79.671 PAIN IN RIGHT FOOT: ICD-10-CM

## 2023-06-06 PROCEDURE — 73630 X-RAY EXAM OF FOOT: CPT | Mod: RT

## 2023-06-08 ENCOUNTER — PATIENT MESSAGE (OUTPATIENT)
Dept: MEDICAL GROUP | Facility: MEDICAL CENTER | Age: 83
End: 2023-06-08
Payer: MEDICARE

## 2023-06-08 DIAGNOSIS — M21.619 BUNION: ICD-10-CM

## 2023-07-10 ENCOUNTER — TELEPHONE (OUTPATIENT)
Dept: PHYSICAL THERAPY | Facility: REHABILITATION | Age: 83
End: 2023-07-10
Payer: MEDICARE

## 2023-07-10 NOTE — OP THERAPY DISCHARGE SUMMARY
Outpatient Physical Therapy  DISCHARGE SUMMARY NOTE      [facility]  [address], [address]  [facility] [address]  Phone:  [phone]  Fax:  [phone]    Date of Visit: 07/10/2023    Patient: Ella Garcia  YOB: 1940  MRN: [id number]   Referring Provider: Payton Galo P.A.-C.  [address] [place] [address]  [place] [id number]   Referring Diagnosis 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) [J44.9];At moderate risk for fall [Z91.81]       Your patient is being discharged from Physical Therapy with the following comments:   Goals not met    Comments:  Margaret Garcia has completed 4 physical therapy sessions on her previous prescription. She was placed on the wait list for future appointments but failed to return calls/ schedule further appointments. . Recommend to discharge patient to full independent home exercise program at this time. Thank you for the opportunity to assist you and your patient.         Limitations Remaining:  unknown    Recommendations:  See MD as needed    Kale Johnson, PT, DPT    Date: 7/10/2023

## 2023-08-03 ENCOUNTER — HOSPITAL ENCOUNTER (OUTPATIENT)
Dept: RADIOLOGY | Facility: MEDICAL CENTER | Age: 83
End: 2023-08-03
Attending: NURSE PRACTITIONER
Payer: MEDICARE

## 2023-08-03 DIAGNOSIS — M25.552 LEFT HIP PAIN: ICD-10-CM

## 2023-08-03 DIAGNOSIS — M25.551 RIGHT HIP PAIN: ICD-10-CM

## 2023-08-03 PROCEDURE — 73521 X-RAY EXAM HIPS BI 2 VIEWS: CPT

## 2023-08-15 ENCOUNTER — HOSPITAL ENCOUNTER (OUTPATIENT)
Dept: LAB | Facility: MEDICAL CENTER | Age: 83
End: 2023-08-15
Attending: INTERNAL MEDICINE
Payer: MEDICARE

## 2023-08-15 LAB
BASOPHILS # BLD AUTO: 0.9 % (ref 0–1.8)
BASOPHILS # BLD: 0.06 K/UL (ref 0–0.12)
CREAT UR-MCNC: 55.59 MG/DL
EOSINOPHIL # BLD AUTO: 0.14 K/UL (ref 0–0.51)
EOSINOPHIL NFR BLD: 2 % (ref 0–6.9)
ERYTHROCYTE [DISTWIDTH] IN BLOOD BY AUTOMATED COUNT: 50.4 FL (ref 35.9–50)
HCT VFR BLD AUTO: 39.6 % (ref 37–47)
HGB BLD-MCNC: 13 G/DL (ref 12–16)
IMM GRANULOCYTES # BLD AUTO: 0.05 K/UL (ref 0–0.11)
IMM GRANULOCYTES NFR BLD AUTO: 0.7 % (ref 0–0.9)
LYMPHOCYTES # BLD AUTO: 1.32 K/UL (ref 1–4.8)
LYMPHOCYTES NFR BLD: 19 % (ref 22–41)
MCH RBC QN AUTO: 31.1 PG (ref 27–33)
MCHC RBC AUTO-ENTMCNC: 32.8 G/DL (ref 32.2–35.5)
MCV RBC AUTO: 94.7 FL (ref 81.4–97.8)
MICROALBUMIN UR-MCNC: <1.2 MG/DL
MICROALBUMIN/CREAT UR: NORMAL MG/G (ref 0–30)
MONOCYTES # BLD AUTO: 0.59 K/UL (ref 0–0.85)
MONOCYTES NFR BLD AUTO: 8.5 % (ref 0–13.4)
NEUTROPHILS # BLD AUTO: 4.8 K/UL (ref 1.82–7.42)
NEUTROPHILS NFR BLD: 68.9 % (ref 44–72)
NRBC # BLD AUTO: 0 K/UL
NRBC BLD-RTO: 0 /100 WBC (ref 0–0.2)
PLATELET # BLD AUTO: 296 K/UL (ref 164–446)
PMV BLD AUTO: 9.5 FL (ref 9–12.9)
RBC # BLD AUTO: 4.18 M/UL (ref 4.2–5.4)
WBC # BLD AUTO: 7 K/UL (ref 4.8–10.8)

## 2023-08-15 PROCEDURE — 85025 COMPLETE CBC W/AUTO DIFF WBC: CPT

## 2023-08-15 PROCEDURE — 36415 COLL VENOUS BLD VENIPUNCTURE: CPT

## 2023-08-15 PROCEDURE — 80048 BASIC METABOLIC PNL TOTAL CA: CPT

## 2023-08-15 PROCEDURE — 82570 ASSAY OF URINE CREATININE: CPT

## 2023-08-15 PROCEDURE — 82043 UR ALBUMIN QUANTITATIVE: CPT

## 2023-08-16 LAB
ANION GAP SERPL CALC-SCNC: 13 MMOL/L (ref 7–16)
BUN SERPL-MCNC: 27 MG/DL (ref 8–22)
CALCIUM SERPL-MCNC: 9 MG/DL (ref 8.5–10.5)
CHLORIDE SERPL-SCNC: 108 MMOL/L (ref 96–112)
CO2 SERPL-SCNC: 20 MMOL/L (ref 20–33)
CREAT SERPL-MCNC: 1.41 MG/DL (ref 0.5–1.4)
GFR SERPLBLD CREATININE-BSD FMLA CKD-EPI: 37 ML/MIN/1.73 M 2
GLUCOSE SERPL-MCNC: 76 MG/DL (ref 65–99)
POTASSIUM SERPL-SCNC: 4.1 MMOL/L (ref 3.6–5.5)
SODIUM SERPL-SCNC: 141 MMOL/L (ref 135–145)

## 2023-08-23 ENCOUNTER — OFFICE VISIT (OUTPATIENT)
Dept: NEPHROLOGY | Facility: MEDICAL CENTER | Age: 83
End: 2023-08-23
Payer: MEDICARE

## 2023-08-23 VITALS
TEMPERATURE: 97.2 F | SYSTOLIC BLOOD PRESSURE: 118 MMHG | DIASTOLIC BLOOD PRESSURE: 74 MMHG | HEART RATE: 83 BPM | HEIGHT: 62 IN | OXYGEN SATURATION: 97 % | WEIGHT: 190 LBS | RESPIRATION RATE: 16 BRPM | BODY MASS INDEX: 34.96 KG/M2

## 2023-08-23 DIAGNOSIS — I10 ESSENTIAL HYPERTENSION: ICD-10-CM

## 2023-08-23 DIAGNOSIS — Z71.6 TOBACCO ABUSE COUNSELING: ICD-10-CM

## 2023-08-23 DIAGNOSIS — Z72.0 TOBACCO ABUSE: ICD-10-CM

## 2023-08-23 DIAGNOSIS — R60.9 EDEMA, UNSPECIFIED TYPE: ICD-10-CM

## 2023-08-23 DIAGNOSIS — N18.32 STAGE 3B CHRONIC KIDNEY DISEASE: ICD-10-CM

## 2023-08-23 PROCEDURE — 3074F SYST BP LT 130 MM HG: CPT | Performed by: INTERNAL MEDICINE

## 2023-08-23 PROCEDURE — 3078F DIAST BP <80 MM HG: CPT | Performed by: INTERNAL MEDICINE

## 2023-08-23 PROCEDURE — 99214 OFFICE O/P EST MOD 30 MIN: CPT | Performed by: INTERNAL MEDICINE

## 2023-08-23 RX ORDER — TRIAMTERENE AND HYDROCHLOROTHIAZIDE 37.5; 25 MG/1; MG/1
1 CAPSULE ORAL EVERY MORNING
COMMUNITY
End: 2023-11-02

## 2023-08-23 ASSESSMENT — FIBROSIS 4 INDEX: FIB4 SCORE: 2.65

## 2023-08-23 ASSESSMENT — ENCOUNTER SYMPTOMS
HYPERTENSION: 1
FEVER: 0
COUGH: 0
CHILLS: 0
NAUSEA: 0
SHORTNESS OF BREATH: 0
VOMITING: 0

## 2023-08-23 NOTE — PROGRESS NOTES
"Ayesha Garcia is a 82 y.o. female who presents with Hypertension and Chronic Kidney Disease            Hypertension  This is a chronic problem. The current episode started more than 1 year ago. The problem is unchanged. The problem is controlled. Associated symptoms include peripheral edema. Pertinent negatives include no chest pain, malaise/fatigue or shortness of breath. Risk factors for coronary artery disease include post-menopausal state and smoking/tobacco exposure. Past treatments include ACE inhibitors. The current treatment provides significant improvement. There are no compliance problems.  Hypertensive end-organ damage includes kidney disease. Identifiable causes of hypertension include chronic renal disease.   Chronic Kidney Disease  This is a chronic problem. The current episode started more than 1 year ago. The problem occurs constantly. Pertinent negatives include no chest pain, chills, coughing, fever, nausea, urinary symptoms or vomiting.       Review of Systems   Constitutional:  Negative for chills, fever and malaise/fatigue.   Respiratory:  Negative for cough and shortness of breath.    Cardiovascular:  Positive for leg swelling. Negative for chest pain.   Gastrointestinal:  Negative for nausea and vomiting.   Genitourinary:  Negative for dysuria, frequency and urgency.              Objective     /74 (BP Location: Right arm, Patient Position: Sitting, BP Cuff Size: Adult)   Pulse 83   Temp 36.2 °C (97.2 °F) (Temporal)   Resp 16   Ht 1.575 m (5' 2\")   Wt 86.2 kg (190 lb)   SpO2 97%   BMI 34.75 kg/m²      Physical Exam  Vitals and nursing note reviewed.   Constitutional:       General: She is not in acute distress.     Appearance: Normal appearance. She is well-developed. She is not diaphoretic.   HENT:      Head: Normocephalic and atraumatic.      Right Ear: External ear normal.      Left Ear: External ear normal.      Nose: Nose normal.   Eyes:      General: No " Assessment and Intervention Vital Signs/Input and Output/Plan of Care/Assessment and Intervention Vital Signs/Plan of Care/Assessment and Intervention scleral icterus.        Right eye: No discharge.         Left eye: No discharge.      Conjunctiva/sclera: Conjunctivae normal.   Cardiovascular:      Rate and Rhythm: Normal rate and regular rhythm.      Heart sounds: No murmur heard.  Pulmonary:      Effort: Pulmonary effort is normal. No respiratory distress.      Breath sounds: Normal breath sounds.   Musculoskeletal:         General: No tenderness.      Right lower leg: Edema present.      Left lower leg: Edema present.   Skin:     General: Skin is warm and dry.      Findings: No erythema.   Neurological:      General: No focal deficit present.      Mental Status: She is alert and oriented to person, place, and time.      Cranial Nerves: No cranial nerve deficit.   Psychiatric:         Mood and Affect: Mood normal.         Behavior: Behavior normal.       Past Medical History:   Diagnosis Date    Arthritis     knees, and hips-osteo    ASTHMA     Body mass index 40.0-44.9, adult ([facility]) 4/27/2018    Chronic renal impairment, stage 3 (moderate) ([facility]) 5/13/2019    COPD (chronic obstructive pulmonary disease) with chronic bronchitis ([facility]) 4/20/2021    Dental disorder     upper    Dysuria 10/18/2016    Fall     Generalized edema 4/27/2018    Glaucoma     bilateral    Hay fever 4/19/2016    Heart burn     High cholesterol     Hx: UTI (urinary tract infection)     Hyperglycemia 11/24/2015    Hypertension     Hypothyroidism     Indigestion     Ischemic bowel disease ([facility]) 9/9/2019    Migraines     pt has similar symptoms with migraines not for a long time though    Nausea 10/14/2019    Neuropathy ([facility])     Obesity (BMI 30-39.9) 7/20/2015    Pneumonia     Routine general medical examination at a health care facility 7/20/2015    7/20/15    Sleep apnea 12/2019    Had sleep study, didn't tolerate cpap    Snoring     Syncope 6/5/2020    Tobacco use 4/29/2019    URI (upper respiratory infection) 11/4/2015    Urinary bladder disorder     hx of uti's     Social History      Socioeconomic History    Marital status: Single     Spouse name: Not on file    Number of children: Not on file    Years of education: Not on file    Highest education level: Not on file   Occupational History    Not on file   Tobacco Use    Smoking status: Every Day     Current packs/day: 1.00     Average packs/day: 1 pack/day for 56.0 years (56.0 ttl pk-yrs)     Types: Cigarettes    Smokeless tobacco: Never    Tobacco comments:     started smoking at age 12, little mor than half a pack   Vaping Use    Vaping Use: Never used   Substance and Sexual Activity    Alcohol use: No    Drug use: No    Sexual activity: Never     Partners: Male     Birth control/protection: Post-Menopausal   Other Topics Concern    Not on file   Social History Narrative    Not on file     Social Determinants of Health     Financial Resource Strain: Not on file   Food Insecurity: Not on file   Transportation Needs: Not on file   Physical Activity: Not on file   Stress: Not on file   Social Connections: Not on file   Intimate Partner Violence: Not on file   Housing Stability: Not on file     Family History   Problem Relation Age of Onset    Stroke Mother     Hyperlipidemia Mother     Hypertension Mother     Arthritis Mother     Diabetes Father     Lung Disease Father         pneumonia    Arthritis Sister     Rheumatologic Disease Sister     Hypertension Sister     Hyperlipidemia Sister     Other Sister         Anusha/Fibermyalgia    Hyperlipidemia Brother     Hypertension Brother     Arthritis Brother     Lung Disease Maternal Grandmother         pneumonia    Stroke Maternal Grandfather     Cancer Maternal Grandfather         skin     No Known Problems Paternal Grandmother     No Known Problems Paternal Grandfather     Hyperlipidemia Daughter     Diabetes Daughter     No Known Problems Son      Recent Labs     09/22/22  1349 09/22/22  1434 09/23/22  1016 09/24/22  0523 03/27/23  1050 08/15/23  1130   ALBUMIN 3.4  --   --   --   --   --     SODIUM 136  --  134* 135 142 141   POTASSIUM 4.2  --  3.9 3.9 4.5 4.1   CHLORIDE 104  --  103 105 110 108   CO2 16*  --  16* 16* 20 20   BUN 28*  --  24* 20 26* 27*   CREATININE 1.80*  --  1.42* 1.24 1.37 1.41*   PHOSPHORUS  --  2.5 2.9  --   --   --                              Assessment & Plan        1. Essential hypertension  Controlled  Continue same medication regimen  Continue low-sodium diet      2. Stage 3b chronic kidney disease (HCC)  Stable  No uremic symptoms  Renal dose of medication  Avoid nephrotoxins  Continue same medication regimen  Patient was advised to call us if symptoms worsen      3. Edema, unspecified type  Patient was advised to continue low-sodium diet  Continue oral diuretics  Consider switching to loop diuretics like furosemide if no significant improvement    4. Tobacco abuse    5. Tobacco abuse counseling  I spent 3 minutes discussing the need for smoking/tobacco cessation. We discussed measures for quitting including replacements such as nicotine gum/nicotine patch

## 2023-08-25 DIAGNOSIS — I48.0 PAROXYSMAL ATRIAL FIBRILLATION (HCC): ICD-10-CM

## 2023-08-25 DIAGNOSIS — E78.5 HYPERLIPIDEMIA, UNSPECIFIED HYPERLIPIDEMIA TYPE: ICD-10-CM

## 2023-08-25 RX ORDER — ATORVASTATIN CALCIUM 10 MG/1
10 TABLET, FILM COATED ORAL
Qty: 90 TABLET | Refills: 2 | Status: SHIPPED | OUTPATIENT
Start: 2023-08-25

## 2023-08-25 NOTE — TELEPHONE ENCOUNTER
Is the patient due for a refill? Yes    Was the patient seen the past year? Yes    Date of last office visit: 3/21/2023    Does the patient have an upcoming appointment?  Yes   If yes, When? 9/21/2023    Provider to refill:SHAUN    Does the patients insurance require a 100 day supply?  No

## 2023-09-21 ENCOUNTER — OFFICE VISIT (OUTPATIENT)
Dept: CARDIOLOGY | Facility: MEDICAL CENTER | Age: 83
End: 2023-09-21
Attending: INTERNAL MEDICINE
Payer: MEDICARE

## 2023-09-21 VITALS
WEIGHT: 189 LBS | SYSTOLIC BLOOD PRESSURE: 134 MMHG | HEART RATE: 80 BPM | DIASTOLIC BLOOD PRESSURE: 60 MMHG | HEIGHT: 62 IN | BODY MASS INDEX: 34.78 KG/M2 | OXYGEN SATURATION: 98 % | RESPIRATION RATE: 18 BRPM

## 2023-09-21 DIAGNOSIS — I48.0 PAROXYSMAL ATRIAL FIBRILLATION (HCC): Chronic | ICD-10-CM

## 2023-09-21 DIAGNOSIS — I87.2 VENOUS INSUFFICIENCY: ICD-10-CM

## 2023-09-21 DIAGNOSIS — D68.69 SECONDARY HYPERCOAGULABLE STATE (HCC): ICD-10-CM

## 2023-09-21 DIAGNOSIS — I10 ESSENTIAL HYPERTENSION: ICD-10-CM

## 2023-09-21 DIAGNOSIS — I35.0 NONRHEUMATIC AORTIC VALVE STENOSIS: ICD-10-CM

## 2023-09-21 DIAGNOSIS — I35.1 AORTIC VALVE INSUFFICIENCY, ETIOLOGY OF CARDIAC VALVE DISEASE UNSPECIFIED: ICD-10-CM

## 2023-09-21 DIAGNOSIS — E78.2 MIXED HYPERLIPIDEMIA: ICD-10-CM

## 2023-09-21 PROCEDURE — 3078F DIAST BP <80 MM HG: CPT | Performed by: INTERNAL MEDICINE

## 2023-09-21 PROCEDURE — 99213 OFFICE O/P EST LOW 20 MIN: CPT | Performed by: INTERNAL MEDICINE

## 2023-09-21 PROCEDURE — 3075F SYST BP GE 130 - 139MM HG: CPT | Performed by: INTERNAL MEDICINE

## 2023-09-21 PROCEDURE — 99215 OFFICE O/P EST HI 40 MIN: CPT | Performed by: INTERNAL MEDICINE

## 2023-09-21 RX ORDER — LISINOPRIL 5 MG/1
5 TABLET ORAL DAILY
Qty: 90 TABLET | Refills: 3 | Status: SHIPPED | OUTPATIENT
Start: 2023-09-21

## 2023-09-21 ASSESSMENT — ENCOUNTER SYMPTOMS
ORTHOPNEA: 0
SYNCOPE: 0
DEPRESSION: 0
PALPITATIONS: 0
BLURRED VISION: 0
NEAR-SYNCOPE: 0
BACK PAIN: 0
DIZZINESS: 0
COUGH: 0
DECREASED APPETITE: 0
FEVER: 0
ABDOMINAL PAIN: 0
HEARTBURN: 0
FLANK PAIN: 0
IRREGULAR HEARTBEAT: 0
CONSTIPATION: 0
PND: 0
WEIGHT LOSS: 0
CLAUDICATION: 0
DYSPNEA ON EXERTION: 0
SHORTNESS OF BREATH: 0
NAUSEA: 0
DIARRHEA: 0
ALTERED MENTAL STATUS: 0
VOMITING: 0
WEIGHT GAIN: 0

## 2023-09-21 ASSESSMENT — FIBROSIS 4 INDEX: FIB4 SCORE: 2.65

## 2023-09-21 NOTE — PROGRESS NOTES
Cardiology Note    Chief Complaint   Patient presents with    Hypertension    Atrial Fibrillation     F/v Dx:Paroxysmal atrial fibrillation (HCC)    Syncope       History of Present Illness: Margaret Garcia is a 82 y.o. female PMH COPD, SHABANA, paroxysmal AF, HTN, aortic stenosis who presents for follow up.    Describes leg edema worsening with prolonged standing, improved by next morning and with leg elevation, and responding well to compression stockings. Also gets winded with activities but able to walk slowly. No other cardiac complaints. Compliant with medications and denies adverse effects.     Review of Systems   Constitutional: Negative for decreased appetite, fever, malaise/fatigue, weight gain and weight loss.   HENT:  Negative for congestion and nosebleeds.    Eyes:  Negative for blurred vision.   Cardiovascular:  Negative for chest pain, claudication, dyspnea on exertion, irregular heartbeat, leg swelling, near-syncope, orthopnea, palpitations, paroxysmal nocturnal dyspnea and syncope.   Respiratory:  Negative for cough and shortness of breath.    Endocrine: Negative for cold intolerance and heat intolerance.   Skin:  Negative for rash.   Musculoskeletal:  Negative for back pain.   Gastrointestinal:  Negative for abdominal pain, constipation, diarrhea, heartburn, melena, nausea and vomiting.   Genitourinary:  Negative for dysuria, flank pain and hematuria.   Neurological:  Negative for dizziness.   Psychiatric/Behavioral:  Negative for altered mental status and depression.          Past Medical History:   Diagnosis Date    Arthritis     knees, and hips-osteo    ASTHMA     Body mass index 40.0-44.9, adult ([facility]) 4/27/2018    Chronic renal impairment, stage 3 (moderate) ([facility]) 5/13/2019    COPD (chronic obstructive pulmonary disease) with chronic bronchitis ([facility]) 4/20/2021    Dental disorder     upper    Dysuria 10/18/2016    Fall     Generalized edema 4/27/2018    Glaucoma     bilateral    Hay fever  4/19/2016    Heart burn     High cholesterol     Hx: UTI (urinary tract infection)     Hyperglycemia 11/24/2015    Hypertension     Hypothyroidism     Indigestion     Ischemic bowel disease (HCC) 9/9/2019    Migraines     pt has similar symptoms with migraines not for a long time though    Nausea 10/14/2019    Neuropathy (HCC)     Obesity (BMI 30-39.9) 7/20/2015    Pneumonia     Routine general medical examination at a health care facility 7/20/2015    7/20/15    Sleep apnea 12/2019    Had sleep study, didn't tolerate cpap    Snoring     Syncope 6/5/2020    Tobacco use 4/29/2019    URI (upper respiratory infection) 11/4/2015    Urinary bladder disorder     hx of uti's         Past Surgical History:   Procedure Laterality Date    GASTROSCOPY  12/13/2019    Procedure: GASTROSCOPY;  Surgeon: Ang Angel M.D.;  Location: [facility];  Service: Gastroenterology    COLONOSCOPY  12/13/2019    Procedure: COLONOSCOPY;  Surgeon: Ang Angel M.D.;  Location: [facility];  Service: Gastroenterology    NIRU BY LAPAROSCOPY  9/2/2011    Performed by KAYLEIGH PORRAS at SURGERY SAME DAY [facility] ORS    LUMBAR DECOMPRESSION  6/29/2009    Performed by ANG YAN at SURGERY [facility] ORS    LUMBAR LAMINECTOMY DISKECTOMY  6/29/2009    Performed by ANG YAN at SURGERY [facility] ORS    ABDOMINAL HYSTERECTOMY TOTAL      GYN SURGERY      hysterectomy    HEMORRHOIDECTOMY           Current Outpatient Medications   Medication Sig Dispense Refill    lisinopril (PRINIVIL) 5 MG Tab Take 1 Tablet by mouth every day. 90 Tablet 3    levothyroxine (SYNTHROID) 112 MCG Tab Take 1 Tablet by mouth every morning on an empty stomach. 90 Tablet 3    atorvastatin (LIPITOR) 10 MG Tab Take 1 Tablet by mouth every day. 90 Tablet 2    ELIQUIS 5 MG Tab TAKE 1 TABLET BY MOUTH TWICE A  Tablet 1    BREO ELLIPTA 100-25 MCG/ACT AEROSOL POWDER, BREATH ACTIVATED INHALE 1 PUFF BY MOUTH EVERY DAY 60 Each 1     pantoprazole (PROTONIX) 40 MG Tablet Delayed Response TAKE 1 TABLET BY MOUTH TWICE A  Tablet 2    promethazine (PHENERGAN) 25 MG Tab Take 1 Tablet by mouth every 6 hours as needed for Nausea/Vomiting. 90 Tablet 3    potassium chloride SA (K-DUR) 10 MEQ Tab CR TAKE 1 TABLET BY MOUTH EVERY DAY 90 Tablet 2    triamterene/hctz (MAXZIDE-25/DYAZIDE) 37.5-25 MG Cap TAKE 1 CAPSULE BY MOUTH EVERY DAY IN THE MORNING 90 Capsule 3    fenofibrate (TRIGLIDE) 160 MG tablet Take 1 Tablet by mouth every day. 90 Tablet 3    HYDROcodone-acetaminophen (NORCO) 7.5-325 MG per tablet Take 1 Tablet by mouth every 8 hours as needed for Moderate Pain.      hydrOXYzine HCl (ATARAX) 25 MG Tab Take 25 mg by mouth at bedtime.      latanoprost (XALATAN) 0.005 % Solution Administer 1 Drop into both eyes every evening.      Mirabegron ER (MYRBETRIQ) 50 MG TABLET SR 24 HR Take 50 mg by mouth every day.      acetaminophen (TYLENOL) 500 MG TABS Take 500 mg by mouth 3 times a day as needed for Mild Pain. Indications: Pain      triamterene/hctz (MAXZIDE-25/DYAZIDE) 37.5-25 MG Cap Take 1 Capsule by mouth every morning.       No current facility-administered medications for this visit.         Allergies   Allergen Reactions    Food Hives and Nausea     Oranges, Hives    Neomycin-Polymyxin B Rash     Rash         Family History   Problem Relation Age of Onset    Stroke Mother     Hyperlipidemia Mother     Hypertension Mother     Arthritis Mother     Diabetes Father     Lung Disease Father         pneumonia    Arthritis Sister     Rheumatologic Disease Sister     Hypertension Sister     Hyperlipidemia Sister     Other Sister         Anusha/Fibermyalgia    Hyperlipidemia Brother     Hypertension Brother     Arthritis Brother     Lung Disease Maternal Grandmother         pneumonia    Stroke Maternal Grandfather     Cancer Maternal Grandfather         skin     No Known Problems Paternal Grandmother     No Known Problems Paternal Grandfather      "Hyperlipidemia Daughter     Diabetes Daughter     No Known Problems Son          Social History     Socioeconomic History    Marital status: Single     Spouse name: Not on file    Number of children: Not on file    Years of education: Not on file    Highest education level: Not on file   Occupational History    Not on file   Tobacco Use    Smoking status: Every Day     Current packs/day: 1.00     Average packs/day: 1 pack/day for 56.0 years (56.0 ttl pk-yrs)     Types: Cigarettes    Smokeless tobacco: Never    Tobacco comments:     started smoking at age 12, little mor than half a pack   Vaping Use    Vaping Use: Never used   Substance and Sexual Activity    Alcohol use: No    Drug use: No    Sexual activity: Never     Partners: Male     Birth control/protection: Post-Menopausal   Other Topics Concern    Not on file   Social History Narrative    Not on file     Social Determinants of Health     Financial Resource Strain: Not on file   Food Insecurity: Not on file   Transportation Needs: Not on file   Physical Activity: Not on file   Stress: Not on file   Social Connections: Not on file   Intimate Partner Violence: Not on file   Housing Stability: Not on file         Physical Exam:  Ambulatory Vitals  /60 (BP Location: Left arm, Patient Position: Sitting, BP Cuff Size: Adult)   Pulse 80   Resp 18   Ht 1.575 m (5' 2\")   Wt 85.7 kg (189 lb)   SpO2 98%    BP Readings from Last 4 Encounters:   09/21/23 134/60   08/23/23 118/74   03/21/23 136/68   02/16/23 124/72     Weight/BMI:   Vitals:    09/21/23 1008   BP: 134/60   Weight: 85.7 kg (189 lb)   Height: 1.575 m (5' 2\")    Body mass index is 34.57 kg/m².  Wt Readings from Last 4 Encounters:   09/21/23 85.7 kg (189 lb)   08/23/23 86.2 kg (190 lb)   03/21/23 87.9 kg (193 lb 12.8 oz)   02/16/23 85.3 kg (188 lb)       Physical Exam  Constitutional:       General: She is not in acute distress.  HENT:      Head: Normocephalic and atraumatic.   Eyes:      " "Conjunctiva/sclera: Conjunctivae normal.      Pupils: Pupils are equal, round, and reactive to light.   Neck:      Vascular: No JVD.   Cardiovascular:      Rate and Rhythm: Normal rate and regular rhythm.      Heart sounds: Normal heart sounds. No murmur heard.     No friction rub. No gallop.   Pulmonary:      Effort: Pulmonary effort is normal. No respiratory distress.      Breath sounds: Normal breath sounds. No wheezing or rales.   Chest:      Chest wall: No tenderness.   Abdominal:      General: Bowel sounds are normal. There is no distension.      Palpations: Abdomen is soft.   Musculoskeletal:      Cervical back: Normal range of motion and neck supple.   Skin:     General: Skin is warm and dry.   Neurological:      Mental Status: She is alert and oriented to person, place, and time.   Psychiatric:         Mood and Affect: Affect normal.         Judgment: Judgment normal.         Lab Data Review:  Lab Results   Component Value Date/Time    CHOLSTRLTOT 121 08/11/2022 08:57 AM    LDL 53 08/11/2022 08:57 AM    HDL 39 (A) 08/11/2022 08:57 AM    TRIGLYCERIDE 145 08/11/2022 08:57 AM       Lab Results   Component Value Date/Time    SODIUM 141 08/15/2023 11:30 AM    POTASSIUM 4.1 08/15/2023 11:30 AM    CHLORIDE 108 08/15/2023 11:30 AM    CO2 20 08/15/2023 11:30 AM    GLUCOSE 76 08/15/2023 11:30 AM    BUN 27 (H) 08/15/2023 11:30 AM    CREATININE 1.41 (H) 08/15/2023 11:30 AM     CrCl cannot be calculated (Patient's most recent lab result is older than the maximum 7 days allowed.).  Lab Results   Component Value Date/Time    ALKPHOSPHAT 41 09/22/2022 01:49 PM    ASTSGOT 35 09/22/2022 01:49 PM    ALTSGPT 14 09/22/2022 01:49 PM    TBILIRUBIN 0.6 09/22/2022 01:49 PM      Lab Results   Component Value Date/Time    WBC 7.0 08/15/2023 11:30 AM     Lab Results   Component Value Date/Time    HBA1C 5.7 (H) 05/28/2011 10:34 PM     No components found for: \"TROP\"      Cardiac Imaging and Procedures Review:      TTE " 1/2023  CONCLUSIONS  Compared to the images of the prior study 10-6-2020, there is now   moderate aortic insufficiency.  Normal left ventricular systolic function.  The left ventricular ejection fraction is visually estimated to be 65%.  Normal diastolic function.  The right ventricle is normal in size and systolic function.  Mild aortic valve stenosis.  Moderate aortic insufficiency.    Medical Decision Making:  Problem List Items Addressed This Visit       Paroxysmal atrial fibrillation (HCC) (Chronic)    Relevant Medications    lisinopril (PRINIVIL) 5 MG Tab    Hyperlipidemia    Relevant Medications    lisinopril (PRINIVIL) 5 MG Tab    Essential hypertension    Relevant Medications    lisinopril (PRINIVIL) 5 MG Tab    Nonrheumatic aortic valve stenosis    Relevant Medications    lisinopril (PRINIVIL) 5 MG Tab    Other Relevant Orders    EC-ECHOCARDIOGRAM COMPLETE W/O CONT    Secondary hypercoagulable state (HCC)    Aortic valve regurgitation    Relevant Medications    lisinopril (PRINIVIL) 5 MG Tab    Other Relevant Orders    EC-ECHOCARDIOGRAM COMPLETE W/O CONT    Venous insufficiency    Relevant Medications    lisinopril (PRINIVIL) 5 MG Tab    Other Relevant Orders    US-EXTREMITY VENOUS LOWER BILAT     Venous insufficiency - compression stockings and elevation for conservative management. Check venous ultrasound.     AR / AS - serial TTE    Paroxysmal AF - chadsvasc 4; doac for cva prevention. Rate control if evidence of tachycardia.     HTN - goal <130/80. Previously attempted titrating lisinopril resulted in hypotension. Send home readings as likely white coat hypertension.     HLD - continue statin.    It was my pleasure to meet with Ms. Garcia.    A total of 60 minutes of time was spent on day of encounter reviewing medical record, performing history and examination, counseling, ordering medication/test/consults and documentation.

## 2023-09-26 DIAGNOSIS — N18.32 STAGE 3B CHRONIC KIDNEY DISEASE: ICD-10-CM

## 2023-09-26 DIAGNOSIS — R60.9 EDEMA, UNSPECIFIED TYPE: ICD-10-CM

## 2023-09-26 RX ORDER — FUROSEMIDE 20 MG/1
20 TABLET ORAL DAILY
Qty: 30 TABLET | Refills: 3 | Status: SHIPPED | OUTPATIENT
Start: 2023-09-26 | End: 2023-10-19

## 2023-10-17 ENCOUNTER — HOSPITAL ENCOUNTER (OUTPATIENT)
Dept: LAB | Facility: MEDICAL CENTER | Age: 83
End: 2023-10-17
Attending: INTERNAL MEDICINE
Payer: MEDICARE

## 2023-10-17 DIAGNOSIS — N18.32 STAGE 3B CHRONIC KIDNEY DISEASE: ICD-10-CM

## 2023-10-17 DIAGNOSIS — R60.9 EDEMA, UNSPECIFIED TYPE: ICD-10-CM

## 2023-10-17 LAB
ANION GAP SERPL CALC-SCNC: 14 MMOL/L (ref 7–16)
BUN SERPL-MCNC: 20 MG/DL (ref 8–22)
CALCIUM SERPL-MCNC: 9 MG/DL (ref 8.5–10.5)
CHLORIDE SERPL-SCNC: 106 MMOL/L (ref 96–112)
CO2 SERPL-SCNC: 20 MMOL/L (ref 20–33)
CREAT SERPL-MCNC: 1.42 MG/DL (ref 0.5–1.4)
FASTING STATUS PATIENT QL REPORTED: NORMAL
GFR SERPLBLD CREATININE-BSD FMLA CKD-EPI: 36 ML/MIN/1.73 M 2
GLUCOSE SERPL-MCNC: 85 MG/DL (ref 65–99)
POTASSIUM SERPL-SCNC: 3.9 MMOL/L (ref 3.6–5.5)
SODIUM SERPL-SCNC: 140 MMOL/L (ref 135–145)

## 2023-10-17 PROCEDURE — 36415 COLL VENOUS BLD VENIPUNCTURE: CPT

## 2023-10-17 PROCEDURE — 80048 BASIC METABOLIC PNL TOTAL CA: CPT

## 2023-11-02 ENCOUNTER — OFFICE VISIT (OUTPATIENT)
Dept: MEDICAL GROUP | Facility: MEDICAL CENTER | Age: 83
End: 2023-11-02
Payer: MEDICARE

## 2023-11-02 ENCOUNTER — HOSPITAL ENCOUNTER (OUTPATIENT)
Facility: MEDICAL CENTER | Age: 83
End: 2023-11-02
Attending: STUDENT IN AN ORGANIZED HEALTH CARE EDUCATION/TRAINING PROGRAM
Payer: MEDICARE

## 2023-11-02 VITALS
BODY MASS INDEX: 34.96 KG/M2 | RESPIRATION RATE: 16 BRPM | OXYGEN SATURATION: 94 % | DIASTOLIC BLOOD PRESSURE: 60 MMHG | SYSTOLIC BLOOD PRESSURE: 124 MMHG | WEIGHT: 190 LBS | HEART RATE: 80 BPM | TEMPERATURE: 97.4 F | HEIGHT: 62 IN

## 2023-11-02 DIAGNOSIS — J96.11 CHRONIC RESPIRATORY FAILURE WITH HYPOXIA (HCC): Chronic | ICD-10-CM

## 2023-11-02 DIAGNOSIS — R30.0 DYSURIA: ICD-10-CM

## 2023-11-02 DIAGNOSIS — I87.2 VENOUS INSUFFICIENCY: ICD-10-CM

## 2023-11-02 DIAGNOSIS — I48.0 PAROXYSMAL ATRIAL FIBRILLATION (HCC): Chronic | ICD-10-CM

## 2023-11-02 DIAGNOSIS — E03.9 HYPOTHYROIDISM, UNSPECIFIED TYPE: ICD-10-CM

## 2023-11-02 DIAGNOSIS — R60.1 GENERALIZED EDEMA: ICD-10-CM

## 2023-11-02 DIAGNOSIS — N30.01 ACUTE CYSTITIS WITH HEMATURIA: ICD-10-CM

## 2023-11-02 DIAGNOSIS — E66.9 OBESITY (BMI 30-39.9): ICD-10-CM

## 2023-11-02 DIAGNOSIS — D68.69 SECONDARY HYPERCOAGULABLE STATE (HCC): ICD-10-CM

## 2023-11-02 PROBLEM — R10.9 FLANK PAIN: Status: RESOLVED | Noted: 2017-11-14 | Resolved: 2023-11-02

## 2023-11-02 PROBLEM — Z71.89 ADVANCE CARE PLANNING: Status: RESOLVED | Noted: 2022-09-22 | Resolved: 2023-11-02
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
## 2023-11-02 PROBLEM — R11.0 NAUSEA: Status: RESOLVED | Noted: 2019-10-14 | Resolved: 2023-11-02

## 2023-11-02 PROBLEM — N39.0 URINARY TRACT INFECTIOUS DISEASE: Status: RESOLVED | Noted: 2020-12-14 | Resolved: 2023-11-02

## 2023-11-02 PROBLEM — R50.9 FEVER: Status: RESOLVED | Noted: 2022-09-24 | Resolved: 2023-11-02

## 2023-11-02 PROBLEM — J44.89 COPD (CHRONIC OBSTRUCTIVE PULMONARY DISEASE) WITH CHRONIC BRONCHITIS (HCC): Chronic | Status: RESOLVED | Noted: 2021-04-20 | Resolved: 2023-11-02

## 2023-11-02 PROBLEM — N95.2 ATROPHIC VAGINITIS: Status: ACTIVE | Noted: 2023-05-08

## 2023-11-02 PROBLEM — R55 SYNCOPE: Status: RESOLVED | Noted: 2020-06-05 | Resolved: 2023-11-02

## 2023-11-02 PROBLEM — Z91.81 AT MODERATE RISK FOR FALL: Status: RESOLVED | Noted: 2017-11-14 | Resolved: 2023-11-02

## 2023-11-02 PROBLEM — T79.6XXA TRAUMATIC RHABDOMYOLYSIS (HCC): Status: RESOLVED | Noted: 2022-09-23 | Resolved: 2023-11-02

## 2023-11-02 PROBLEM — N28.9 RENAL INSUFFICIENCY SYNDROME: Status: ACTIVE | Noted: 2021-02-18

## 2023-11-02 PROBLEM — N39.41 URGE INCONTINENCE OF URINE: Status: ACTIVE | Noted: 2020-12-13

## 2023-11-02 PROBLEM — R53.83 OTHER FATIGUE: Status: RESOLVED | Noted: 2019-05-13 | Resolved: 2023-11-02

## 2023-11-02 PROBLEM — N30.00 ACUTE CYSTITIS WITHOUT HEMATURIA: Status: RESOLVED | Noted: 2022-09-23 | Resolved: 2023-11-02

## 2023-11-02 LAB
APPEARANCE UR: NORMAL
BILIRUB UR STRIP-MCNC: NEGATIVE MG/DL
COLOR UR AUTO: YELLOW
GLUCOSE UR STRIP.AUTO-MCNC: NEGATIVE MG/DL
KETONES UR STRIP.AUTO-MCNC: NEGATIVE MG/DL
LEUKOCYTE ESTERASE UR QL STRIP.AUTO: NORMAL
NITRITE UR QL STRIP.AUTO: NEGATIVE
PH UR STRIP.AUTO: 5.5 [PH] (ref 5–8)
PROT UR QL STRIP: NEGATIVE MG/DL
RBC UR QL AUTO: NORMAL
SP GR UR STRIP.AUTO: 1.01
UROBILINOGEN UR STRIP-MCNC: 0.2 MG/DL

## 2023-11-02 PROCEDURE — 87086 URINE CULTURE/COLONY COUNT: CPT

## 2023-11-02 PROCEDURE — 99214 OFFICE O/P EST MOD 30 MIN: CPT | Performed by: STUDENT IN AN ORGANIZED HEALTH CARE EDUCATION/TRAINING PROGRAM

## 2023-11-02 PROCEDURE — 3078F DIAST BP <80 MM HG: CPT | Performed by: STUDENT IN AN ORGANIZED HEALTH CARE EDUCATION/TRAINING PROGRAM

## 2023-11-02 PROCEDURE — 87186 SC STD MICRODIL/AGAR DIL: CPT

## 2023-11-02 PROCEDURE — 87077 CULTURE AEROBIC IDENTIFY: CPT

## 2023-11-02 PROCEDURE — 3074F SYST BP LT 130 MM HG: CPT | Performed by: STUDENT IN AN ORGANIZED HEALTH CARE EDUCATION/TRAINING PROGRAM

## 2023-11-02 PROCEDURE — 81002 URINALYSIS NONAUTO W/O SCOPE: CPT | Performed by: STUDENT IN AN ORGANIZED HEALTH CARE EDUCATION/TRAINING PROGRAM

## 2023-11-02 RX ORDER — DIAZEPAM 5 MG/1
TABLET ORAL
COMMUNITY
Start: 2023-09-08 | End: 2023-11-02

## 2023-11-02 RX ORDER — PROMETHAZINE HYDROCHLORIDE 25 MG/1
1 TABLET ORAL EVERY 6 HOURS PRN
COMMUNITY
End: 2024-02-13

## 2023-11-02 RX ORDER — LISINOPRIL 10 MG/1
1 TABLET ORAL
COMMUNITY
End: 2023-11-02

## 2023-11-02 RX ORDER — MIRABEGRON 50 MG/1
TABLET, FILM COATED, EXTENDED RELEASE ORAL
COMMUNITY
End: 2023-11-02

## 2023-11-02 RX ORDER — CEFDINIR 300 MG/1
CAPSULE ORAL
COMMUNITY
End: 2023-11-02

## 2023-11-02 RX ORDER — METOCLOPRAMIDE 5 MG/1
TABLET ORAL
COMMUNITY
End: 2023-11-02

## 2023-11-02 RX ORDER — LIDOCAINE 36 MG/1
PATCH TOPICAL
COMMUNITY
End: 2023-11-02

## 2023-11-02 RX ORDER — LATANOPROST 50 UG/ML
1 SOLUTION/ DROPS OPHTHALMIC
COMMUNITY
End: 2023-11-02

## 2023-11-02 RX ORDER — ATORVASTATIN CALCIUM 10 MG/1
1 TABLET, FILM COATED ORAL
COMMUNITY
End: 2023-11-02

## 2023-11-02 RX ORDER — HYDROCODONE BITARTRATE AND ACETAMINOPHEN 7.5; 325 MG/1; MG/1
1 TABLET ORAL 3 TIMES DAILY PRN
COMMUNITY
End: 2023-11-02

## 2023-11-02 RX ORDER — HYDROXYZINE HYDROCHLORIDE 25 MG/1
1 TABLET, FILM COATED ORAL
COMMUNITY
End: 2023-11-02

## 2023-11-02 RX ORDER — NALOXONE HYDROCHLORIDE 4 MG/.1ML
SPRAY NASAL
COMMUNITY

## 2023-11-02 RX ORDER — FLUTICASONE FUROATE AND VILANTEROL 100; 25 UG/1; UG/1
POWDER RESPIRATORY (INHALATION)
COMMUNITY
End: 2023-11-02

## 2023-11-02 RX ORDER — ESTRADIOL 0.1 MG/G
CREAM VAGINAL
COMMUNITY

## 2023-11-02 RX ORDER — DIAZEPAM 5 MG/1
TABLET ORAL
COMMUNITY
End: 2023-11-02

## 2023-11-02 RX ORDER — CEFDINIR 300 MG/1
300 CAPSULE ORAL EVERY 12 HOURS
Qty: 10 CAPSULE | Refills: 0 | Status: SHIPPED | OUTPATIENT
Start: 2023-11-02 | End: 2023-11-07

## 2023-11-02 RX ORDER — FLUTICASONE FUROATE AND VILANTEROL 100; 25 UG/1; UG/1
1 POWDER RESPIRATORY (INHALATION)
COMMUNITY
End: 2023-11-02

## 2023-11-02 RX ORDER — LISINOPRIL 5 MG/1
2 TABLET ORAL
COMMUNITY
End: 2023-11-02

## 2023-11-02 RX ORDER — FENOFIBRATE 160 MG/1
1 TABLET ORAL
COMMUNITY
End: 2023-11-02

## 2023-11-02 RX ORDER — LEVOTHYROXINE SODIUM 137 UG/1
1 TABLET ORAL
COMMUNITY
End: 2023-11-02

## 2023-11-02 RX ORDER — LEVOTHYROXINE SODIUM 112 UG/1
1 TABLET ORAL
COMMUNITY
End: 2023-11-02

## 2023-11-02 RX ORDER — POTASSIUM CHLORIDE 750 MG/1
1 TABLET, EXTENDED RELEASE ORAL
COMMUNITY
End: 2023-11-02

## 2023-11-02 RX ORDER — PANTOPRAZOLE SODIUM 40 MG/1
1 TABLET, DELAYED RELEASE ORAL 2 TIMES DAILY
COMMUNITY
End: 2023-11-02

## 2023-11-02 ASSESSMENT — FIBROSIS 4 INDEX: FIB4 SCORE: 2.69

## 2023-11-02 NOTE — PROGRESS NOTES
Subjective:     Chief Complaint   Patient presents with    Dysuria     Back pain, urgency, been drinking cranberry X last night     Follow-Up     General check up for medication refill          HPI:   Margaret presents today with     UTI  Patient presents today for concern about a urinary tract infection.  Patient notes that she has been having difficulty with urgency and dysuria x 4 days.  Patient notes discomfort in her lower back.  Patient notes symptoms started yesterday and has been drinking cranberry juice since last night.  Patient does have history of recurrent urinary tract infections.    Chronic respiratory failure  Patient continues to smoke approximately half pack per day.  Patient is not currently on oxygen.  Patient had pulmonary function testing due to dyspnea on exertion, spirometry was normal and significant bronchodilator response, concern for reactive airway or asthma.  No COPD.  Patient started on inhaler at last visit.    Atrial fibrillation  Patient following with cardiology for A-fib, hypertension, nonrheumatic aortic valve stenosis and venous insufficiency.  Patient continues on lisinopril 5 mg.  Patient is not on medication for rate control of tachycardia.  Patient continues on Eliquis 5 mg twice daily.    Edema  Patient with new onset edema, significant venous insufficiency.  Cardiology has already evaluated echo and bilateral lower extremity ultrasounds.  Patient notes that swelling does improve when wearing compression socks but difficulty to get compression socks on.  Patient advised to avoid prolonged sitting/standing and elevate legs.  Nephrologist started furosemide but no significant change or relief in swelling.  Will refer to PT for lymphedema.    Hyperlipidemia  Patient continues on atorvastatin 10 mg and fenofibrate 160 mg daily.    Hypertension  Patient continues on lisinopril 5 mg.    CKD  Recent follow-up with nephrology.  Patient advised to avoid nephrotoxins and stay  "well-hydrated.    Secondary hypercoagulable state  Patient continues on Eliquis 5 mg twice daily.     Thyroid  Patient continues on levothyroxine 112 mcg.    ROS:  Gen: no fevers/chills  Pulm: no sob, no cough  CV: no chest pain, no palpitations  GI: no nausea/vomiting, no diarrhea        Objective:     Exam:  /60   Pulse 80   Temp 36.3 °C (97.4 °F) (Temporal)   Resp 16   Ht 1.575 m (5' 2\")   Wt 86.2 kg (190 lb) Comment: per pt  SpO2 94%   BMI 34.75 kg/m²  Body mass index is 34.75 kg/m².    Gen: Alert and oriented, No apparent distress.  Neck: Neck is supple without lymphadenopathy.  Lungs: Normal effort, CTA bilaterally, no wheezes, rhonchi, or rales  CV: Regular rate and rhythm. No murmurs, rubs, or gallops.  Ext: No clubbing, cyanosis, edema.      Assessment & Plan:     83 y.o. female with the following -     1. Acute cystitis with hematuria  - cefdinir (OMNICEF) 300 MG Cap; Take 1 Capsule by mouth every 12 hours for 5 days.  Dispense: 10 Capsule; Refill: 0    2. Dysuria  - POCT Urinalysis  - URINE CULTURE(NEW); Future    3. Hypothyroidism, unspecified type  - TSH; Future  - FREE THYROXINE; Future    4. Venous insufficiency  5. Generalized edema  - REFERRAL TO [facility]YMPhED[facility]-PHYSICAL THERAPY    6. Paroxysmal atrial fibrillation (HCC)  7. Secondary hypercoagulable state (HCC)  Chronic, stable.  Continues to follow with cardiology.    8. Chronic respiratory failure with hypoxia (HCC)  Chronic, stable.    9. Obesity (BMI 30-39.9)  Chronic, stable.  BMI 34.      No follow-ups on file.    Please note that this dictation was created using voice recognition software. I have made every reasonable attempt to correct obvious errors, but I expect that there are errors of grammar and possibly content that I did not discover before finalizing the note.        "

## 2023-11-12 DIAGNOSIS — J96.11 CHRONIC RESPIRATORY FAILURE WITH HYPOXIA (HCC): Chronic | ICD-10-CM

## 2023-11-12 DIAGNOSIS — R06.09 DYSPNEA ON EXERTION: ICD-10-CM

## 2023-11-13 RX ORDER — FLUTICASONE FUROATE AND VILANTEROL TRIFENATATE 100; 25 UG/1; UG/1
POWDER RESPIRATORY (INHALATION)
Qty: 60 EACH | Refills: 11 | Status: SHIPPED | OUTPATIENT
Start: 2023-11-13 | End: 2024-01-23 | Stop reason: SDUPTHER

## 2023-12-05 SDOH — ECONOMIC STABILITY: HOUSING INSECURITY: IN THE LAST 12 MONTHS, HOW MANY PLACES HAVE YOU LIVED?: 1

## 2023-12-05 SDOH — ECONOMIC STABILITY: FOOD INSECURITY: WITHIN THE PAST 12 MONTHS, THE FOOD YOU BOUGHT JUST DIDN'T LAST AND YOU DIDN'T HAVE MONEY TO GET MORE.: NEVER TRUE

## 2023-12-05 SDOH — ECONOMIC STABILITY: INCOME INSECURITY: HOW HARD IS IT FOR YOU TO PAY FOR THE VERY BASICS LIKE FOOD, HOUSING, MEDICAL CARE, AND HEATING?: SOMEWHAT HARD

## 2023-12-05 SDOH — ECONOMIC STABILITY: FOOD INSECURITY: WITHIN THE PAST 12 MONTHS, YOU WORRIED THAT YOUR FOOD WOULD RUN OUT BEFORE YOU GOT MONEY TO BUY MORE.: NEVER TRUE

## 2023-12-05 SDOH — ECONOMIC STABILITY: TRANSPORTATION INSECURITY
IN THE PAST 12 MONTHS, HAS LACK OF TRANSPORTATION KEPT YOU FROM MEETINGS, WORK, OR FROM GETTING THINGS NEEDED FOR DAILY LIVING?: NO

## 2023-12-05 SDOH — ECONOMIC STABILITY: HOUSING INSECURITY
IN THE LAST 12 MONTHS, WAS THERE A TIME WHEN YOU DID NOT HAVE A STEADY PLACE TO SLEEP OR SLEPT IN A SHELTER (INCLUDING NOW)?: NO

## 2023-12-05 SDOH — ECONOMIC STABILITY: INCOME INSECURITY: IN THE LAST 12 MONTHS, WAS THERE A TIME WHEN YOU WERE NOT ABLE TO PAY THE MORTGAGE OR RENT ON TIME?: NO

## 2023-12-05 SDOH — HEALTH STABILITY: PHYSICAL HEALTH: ON AVERAGE, HOW MANY MINUTES DO YOU ENGAGE IN EXERCISE AT THIS LEVEL?: 0 MIN

## 2023-12-05 SDOH — HEALTH STABILITY: PHYSICAL HEALTH: ON AVERAGE, HOW MANY DAYS PER WEEK DO YOU ENGAGE IN MODERATE TO STRENUOUS EXERCISE (LIKE A BRISK WALK)?: 0 DAYS

## 2023-12-05 SDOH — ECONOMIC STABILITY: TRANSPORTATION INSECURITY
IN THE PAST 12 MONTHS, HAS THE LACK OF TRANSPORTATION KEPT YOU FROM MEDICAL APPOINTMENTS OR FROM GETTING MEDICATIONS?: NO

## 2023-12-05 ASSESSMENT — LIFESTYLE VARIABLES
SKIP TO QUESTIONS 9-10: 1
HOW MANY STANDARD DRINKS CONTAINING ALCOHOL DO YOU HAVE ON A TYPICAL DAY: PATIENT DOES NOT DRINK
HOW OFTEN DO YOU HAVE A DRINK CONTAINING ALCOHOL: NEVER
AUDIT-C TOTAL SCORE: 0
HOW OFTEN DO YOU HAVE SIX OR MORE DRINKS ON ONE OCCASION: NEVER

## 2023-12-05 ASSESSMENT — SOCIAL DETERMINANTS OF HEALTH (SDOH)
HOW OFTEN DO YOU ATTEND CHURCH OR RELIGIOUS SERVICES?: NEVER
HOW OFTEN DO YOU GET TOGETHER WITH FRIENDS OR RELATIVES?: MORE THAN THREE TIMES A WEEK
IN A TYPICAL WEEK, HOW MANY TIMES DO YOU TALK ON THE PHONE WITH FAMILY, FRIENDS, OR NEIGHBORS?: MORE THAN THREE TIMES A WEEK
HOW OFTEN DO YOU ATTENT MEETINGS OF THE CLUB OR ORGANIZATION YOU BELONG TO?: NEVER
DO YOU BELONG TO ANY CLUBS OR ORGANIZATIONS SUCH AS CHURCH GROUPS UNIONS, FRATERNAL OR ATHLETIC GROUPS, OR SCHOOL GROUPS?: NO

## 2023-12-07 SDOH — ECONOMIC STABILITY: FOOD INSECURITY: WITHIN THE PAST 12 MONTHS, THE FOOD YOU BOUGHT JUST DIDN'T LAST AND YOU DIDN'T HAVE MONEY TO GET MORE.: NEVER TRUE

## 2023-12-07 SDOH — HEALTH STABILITY: PHYSICAL HEALTH: ON AVERAGE, HOW MANY MINUTES DO YOU ENGAGE IN EXERCISE AT THIS LEVEL?: 0 MIN

## 2023-12-07 SDOH — ECONOMIC STABILITY: INCOME INSECURITY: HOW HARD IS IT FOR YOU TO PAY FOR THE VERY BASICS LIKE FOOD, HOUSING, MEDICAL CARE, AND HEATING?: SOMEWHAT HARD

## 2023-12-07 SDOH — HEALTH STABILITY: MENTAL HEALTH
STRESS IS WHEN SOMEONE FEELS TENSE, NERVOUS, ANXIOUS, OR CAN'T SLEEP AT NIGHT BECAUSE THEIR MIND IS TROUBLED. HOW STRESSED ARE YOU?: ONLY A LITTLE

## 2023-12-07 SDOH — ECONOMIC STABILITY: HOUSING INSECURITY: IN THE LAST 12 MONTHS, HOW MANY PLACES HAVE YOU LIVED?: 1

## 2023-12-07 SDOH — HEALTH STABILITY: PHYSICAL HEALTH: ON AVERAGE, HOW MANY DAYS PER WEEK DO YOU ENGAGE IN MODERATE TO STRENUOUS EXERCISE (LIKE A BRISK WALK)?: 0 DAYS

## 2023-12-07 SDOH — ECONOMIC STABILITY: FOOD INSECURITY: WITHIN THE PAST 12 MONTHS, YOU WORRIED THAT YOUR FOOD WOULD RUN OUT BEFORE YOU GOT MONEY TO BUY MORE.: NEVER TRUE

## 2023-12-07 SDOH — ECONOMIC STABILITY: INCOME INSECURITY: IN THE LAST 12 MONTHS, WAS THERE A TIME WHEN YOU WERE NOT ABLE TO PAY THE MORTGAGE OR RENT ON TIME?: NO

## 2023-12-07 SDOH — ECONOMIC STABILITY: TRANSPORTATION INSECURITY
IN THE PAST 12 MONTHS, HAS LACK OF RELIABLE TRANSPORTATION KEPT YOU FROM MEDICAL APPOINTMENTS, MEETINGS, WORK OR FROM GETTING THINGS NEEDED FOR DAILY LIVING?: NO

## 2023-12-07 ASSESSMENT — SOCIAL DETERMINANTS OF HEALTH (SDOH)
HOW OFTEN DO YOU GET TOGETHER WITH FRIENDS OR RELATIVES?: MORE THAN THREE TIMES A WEEK
HOW OFTEN DO YOU GET TOGETHER WITH FRIENDS OR RELATIVES?: MORE THAN THREE TIMES A WEEK
WITHIN THE PAST 12 MONTHS, YOU WORRIED THAT YOUR FOOD WOULD RUN OUT BEFORE YOU GOT THE MONEY TO BUY MORE: NEVER TRUE
HOW OFTEN DO YOU ATTENT MEETINGS OF THE CLUB OR ORGANIZATION YOU BELONG TO?: NEVER
HOW OFTEN DO YOU ATTENT MEETINGS OF THE CLUB OR ORGANIZATION YOU BELONG TO?: NEVER
IN A TYPICAL WEEK, HOW MANY TIMES DO YOU TALK ON THE PHONE WITH FAMILY, FRIENDS, OR NEIGHBORS?: MORE THAN THREE TIMES A WEEK
HOW OFTEN DO YOU HAVE SIX OR MORE DRINKS ON ONE OCCASION: NEVER
IN A TYPICAL WEEK, HOW MANY TIMES DO YOU TALK ON THE PHONE WITH FAMILY, FRIENDS, OR NEIGHBORS?: MORE THAN THREE TIMES A WEEK
HOW HARD IS IT FOR YOU TO PAY FOR THE VERY BASICS LIKE FOOD, HOUSING, MEDICAL CARE, AND HEATING?: SOMEWHAT HARD
HOW OFTEN DO YOU HAVE A DRINK CONTAINING ALCOHOL: NEVER
HOW OFTEN DO YOU ATTEND CHURCH OR RELIGIOUS SERVICES?: NEVER
DO YOU BELONG TO ANY CLUBS OR ORGANIZATIONS SUCH AS CHURCH GROUPS UNIONS, FRATERNAL OR ATHLETIC GROUPS, OR SCHOOL GROUPS?: NO
HOW OFTEN DO YOU ATTEND CHURCH OR RELIGIOUS SERVICES?: NEVER
DO YOU BELONG TO ANY CLUBS OR ORGANIZATIONS SUCH AS CHURCH GROUPS UNIONS, FRATERNAL OR ATHLETIC GROUPS, OR SCHOOL GROUPS?: NO
HOW MANY DRINKS CONTAINING ALCOHOL DO YOU HAVE ON A TYPICAL DAY WHEN YOU ARE DRINKING: PATIENT DOES NOT DRINK

## 2023-12-07 ASSESSMENT — LIFESTYLE VARIABLES
HOW OFTEN DO YOU HAVE SIX OR MORE DRINKS ON ONE OCCASION: NEVER
SKIP TO QUESTIONS 9-10: 1
AUDIT-C TOTAL SCORE: 0
HOW MANY STANDARD DRINKS CONTAINING ALCOHOL DO YOU HAVE ON A TYPICAL DAY: PATIENT DOES NOT DRINK
HOW OFTEN DO YOU HAVE A DRINK CONTAINING ALCOHOL: NEVER

## 2023-12-08 ENCOUNTER — OFFICE VISIT (OUTPATIENT)
Dept: MEDICAL GROUP | Facility: PHYSICIAN GROUP | Age: 83
End: 2023-12-08
Payer: MEDICARE

## 2023-12-08 VITALS
HEART RATE: 88 BPM | TEMPERATURE: 97.6 F | WEIGHT: 193 LBS | RESPIRATION RATE: 16 BRPM | DIASTOLIC BLOOD PRESSURE: 74 MMHG | OXYGEN SATURATION: 96 % | BODY MASS INDEX: 35.51 KG/M2 | SYSTOLIC BLOOD PRESSURE: 130 MMHG | HEIGHT: 62 IN

## 2023-12-08 DIAGNOSIS — G47.30 SLEEP APNEA, UNSPECIFIED TYPE: ICD-10-CM

## 2023-12-08 DIAGNOSIS — I48.0 PAROXYSMAL ATRIAL FIBRILLATION (HCC): Chronic | ICD-10-CM

## 2023-12-08 DIAGNOSIS — N18.32 STAGE 3B CHRONIC KIDNEY DISEASE: ICD-10-CM

## 2023-12-08 DIAGNOSIS — I10 ESSENTIAL HYPERTENSION: ICD-10-CM

## 2023-12-08 DIAGNOSIS — F03.A0 MILD DEMENTIA WITHOUT BEHAVIORAL DISTURBANCE, PSYCHOTIC DISTURBANCE, MOOD DISTURBANCE, OR ANXIETY, UNSPECIFIED DEMENTIA TYPE (HCC): Chronic | ICD-10-CM

## 2023-12-08 DIAGNOSIS — J43.8 OTHER EMPHYSEMA (HCC): ICD-10-CM

## 2023-12-08 DIAGNOSIS — D68.69 SECONDARY HYPERCOAGULABLE STATE (HCC): ICD-10-CM

## 2023-12-08 DIAGNOSIS — N39.41 URGE INCONTINENCE OF URINE: ICD-10-CM

## 2023-12-08 DIAGNOSIS — G89.29 CHRONIC MIDLINE LOW BACK PAIN WITHOUT SCIATICA: ICD-10-CM

## 2023-12-08 DIAGNOSIS — R73.9 HYPERGLYCEMIA: ICD-10-CM

## 2023-12-08 DIAGNOSIS — E78.2 MIXED HYPERLIPIDEMIA: ICD-10-CM

## 2023-12-08 DIAGNOSIS — M54.50 CHRONIC MIDLINE LOW BACK PAIN WITHOUT SCIATICA: ICD-10-CM

## 2023-12-08 DIAGNOSIS — E03.9 HYPOTHYROIDISM, UNSPECIFIED TYPE: ICD-10-CM

## 2023-12-08 PROCEDURE — 3075F SYST BP GE 130 - 139MM HG: CPT | Performed by: NURSE PRACTITIONER

## 2023-12-08 PROCEDURE — 3078F DIAST BP <80 MM HG: CPT | Performed by: NURSE PRACTITIONER

## 2023-12-08 PROCEDURE — 99214 OFFICE O/P EST MOD 30 MIN: CPT | Performed by: NURSE PRACTITIONER

## 2023-12-08 ASSESSMENT — FIBROSIS 4 INDEX: FIB4 SCORE: 2.69

## 2023-12-08 NOTE — PROGRESS NOTES
Subjective       CC:    Chief Complaint   Patient presents with    Establish Care        HISTORY OF THE PRESENT ILLNESS: Patient is a 83 y.o. female, here today with granddaughter to establish care. She is accompanied by her granddaughter Janis today. Patient lives with a roommate Kwame in the same trailer park as her granddaughter. She is independent with ADLs, ambulates with walker assistance. She has mild dementia with short term memory loss.     Prior PCP was TERRELL Galo. She has a medical history of hypothyroid, COPD, SHABANA, hyperlipidemia, paroxysmal AF, HTN, aortic stenosis (followed by cardiology), CKD (followed by nephrology). She is also seeing pain specialist for chronic back pain.     Patient Active Problem List   Diagnosis    Dementia (HCC)    Hypothyroidism    Peripheral neuropathy    Obesity (BMI 30-39.9)    Sleep apnea    Hyperlipidemia    Essential hypertension    Hyperglycemia    Generalized edema    Tobacco use    Acute kidney injury superimposed on chronic kidney disease (HCC)    Ischemic bowel disease (HCC)    Chronic midline low back pain without sciatica    Nonrheumatic aortic valve stenosis    Renal insufficiency syndrome    Urge incontinence of urine    Stage 3b chronic kidney disease (HCC)    Paroxysmal atrial fibrillation (HCC)    Other emphysema (HCC)    Secondary hypercoagulable state (HCC)    Aortic valve regurgitation    Venous insufficiency    Atrophic vaginitis       Past Medical History:   Diagnosis Date    Arthritis     knees, and hips-osteo    ASTHMA     Body mass index 40.0-44.9, adult ([facility]) 4/27/2018    Chronic renal impairment, stage 3 (moderate) ([facility]) 5/13/2019    COPD (chronic obstructive pulmonary disease) with chronic bronchitis 4/20/2021    Dental disorder     upper    Dysuria 10/18/2016    Fall     Generalized edema 4/27/2018    Glaucoma     bilateral    Hay fever 4/19/2016    Heart burn     High cholesterol     Hx: UTI (urinary tract infection)     Hyperglycemia  11/24/2015    Hypertension     Hypothyroidism     Indigestion     Ischemic bowel disease (HCC) 9/9/2019    Migraines     pt has similar symptoms with migraines not for a long time though    Nausea 10/14/2019    Neuropathy (HCC)     Obesity (BMI 30-39.9) 7/20/2015    Pneumonia     Routine general medical examination at a health care facility 7/20/2015    7/20/15    Sleep apnea 12/2019    Had sleep study, didn't tolerate cpap    Snoring     Syncope 6/5/2020    Tobacco use 4/29/2019    URI (upper respiratory infection) 11/4/2015    Urinary bladder disorder     hx of uti's        Current Outpatient Medications Ordered in Epic   Medication Sig Dispense Refill    fluticasone furoate-vilanterol (BREO ELLIPTA) 100-25 MCG/ACT AEROSOL POWDER, BREATH ACTIVATED INHALE 1 PUFF EVERY DAY. NEEDS APT. FOR REFILLS 60 Each 11    estradiol (ESTRACE) 0.1 MG/GM vaginal cream PLEASE SEE ATTACHED FOR DETAILED DIRECTIONS      Naloxone (NARCAN) 4 MG/0.1ML Liquid USE AS DIRECTED      promethazine (PHENERGAN) 25 MG Tab Take 1 Tablet by mouth every 6 hours as needed.      hydrocortisone 2.5 % Cream topical cream APPLY TWICE A DAY TO THE AFFECTED AREA AS DIRECTED FOR 2 WEEKS      furosemide (LASIX) 20 MG Tab TAKE 1 TABLET BY MOUTH EVERY DAY 90 Tablet 3    promethazine (PHENERGAN) 25 MG Tab Take 1 Tablet by mouth every 6 hours as needed for Nausea/Vomiting. 90 Tablet 3    fenofibrate (TRIGLIDE) 160 MG tablet TAKE 1 TABLET BY MOUTH EVERY DAY 90 Tablet 3    lisinopril (PRINIVIL) 5 MG Tab Take 1 Tablet by mouth every day. 90 Tablet 3    levothyroxine (SYNTHROID) 112 MCG Tab Take 1 Tablet by mouth every morning on an empty stomach. 90 Tablet 3    atorvastatin (LIPITOR) 10 MG Tab Take 1 Tablet by mouth every day. 90 Tablet 2    ELIQUIS 5 MG Tab TAKE 1 TABLET BY MOUTH TWICE A  Tablet 1    pantoprazole (PROTONIX) 40 MG Tablet Delayed Response TAKE 1 TABLET BY MOUTH TWICE A  Tablet 2    potassium chloride SA (K-DUR) 10 MEQ Tab CR TAKE 1  TABLET BY MOUTH EVERY DAY 90 Tablet 2    HYDROcodone-acetaminophen (NORCO) 7.5-325 MG per tablet Take 1 Tablet by mouth every 8 hours as needed for Moderate Pain.      hydrOXYzine HCl (ATARAX) 25 MG Tab Take 25 mg by mouth at bedtime.      latanoprost (XALATAN) 0.005 % Solution Administer 1 Drop into both eyes every evening.      Mirabegron ER (MYRBETRIQ) 50 MG TABLET SR 24 HR Take 50 mg by mouth every day.      acetaminophen (TYLENOL) 500 MG TABS Take 500 mg by mouth 3 times a day as needed for Mild Pain. Indications: Pain       No current Epic-ordered facility-administered medications on file.        Past Surgical History:   Procedure Laterality Date    GASTROSCOPY  12/13/2019    Procedure: GASTROSCOPY;  Surgeon: Ang Angel M.D.;  Location: [facility];  Service: Gastroenterology    COLONOSCOPY  12/13/2019    Procedure: COLONOSCOPY;  Surgeon: Ang Angel M.D.;  Location: [facility];  Service: Gastroenterology    NIRU BY LAPAROSCOPY  9/2/2011    Performed by KAYLEIGH PORRAS at SURGERY SAME DAY [facility] ORS    LUMBAR DECOMPRESSION  6/29/2009    Performed by ANG YAN at SURGERY [facility] ORS    LUMBAR LAMINECTOMY DISKECTOMY  6/29/2009    Performed by ANG YAN at SURGERY [facility] ORS    ABDOMINAL HYSTERECTOMY TOTAL      GYN SURGERY      hysterectomy    HEMORRHOIDECTOMY          Allergies:  Food and Neomycin-polymyxin b    Health Maintenance: Completed    ROS:   Review of Systems   Constitutional: Negative.  Negative for fever and malaise/fatigue.   HENT: Negative.     Eyes: Negative.    Respiratory:  Negative for cough, sputum production and shortness of breath.    Cardiovascular:  Negative for chest pain, palpitations and leg swelling.   Gastrointestinal: Negative.    Genitourinary: Negative.    Musculoskeletal:  Positive for back pain.        Ambulates with walker   Neurological: Negative.    Endo/Heme/Allergies: Negative.    Psychiatric/Behavioral:  Positive for  "memory loss.          Objective       Exam: /74   Pulse 88   Temp 36.4 °C (97.6 °F) (Temporal)   Resp 16   Ht 1.575 m (5' 2\")   Wt 87.5 kg (193 lb)   SpO2 96%  Body mass index is 35.3 kg/m².    Physical Exam  Constitutional:       Appearance: Normal appearance. She is obese.   Cardiovascular:      Rate and Rhythm: Normal rate and regular rhythm.      Pulses: Normal pulses.      Heart sounds: Murmur heard.   Pulmonary:      Effort: Pulmonary effort is normal.      Breath sounds: Normal breath sounds. No wheezing or rhonchi.   Musculoskeletal:      Cervical back: Normal range of motion and neck supple.      Right lower leg: Edema present.      Left lower leg: Edema present.      Comments: Ambulates with walker assistance   Skin:     General: Skin is warm and dry.   Neurological:      General: No focal deficit present.      Mental Status: She is alert and oriented to person, place, and time.   Psychiatric:         Mood and Affect: Mood normal.         Behavior: Behavior normal.         Thought Content: Thought content normal.         Judgment: Judgment normal.         Labs: reviewed     Assessment & Plan     83 y.o. female with the following -    Problem List Items Addressed This Visit       Dementia (HCC) (Chronic)     Chronic mild dementia, progressive over the past several years. She last saw Dr Allen/neurology in 9/2022, not requiring medication at this time. Per granddaughter and patient, she struggles with recent/new memory recall and new changes in her routine. She no longer drives. She lives in the same trailer park as her granddaughter Janis who helps with transportation, coordinating medical care etc. Patient is independent with most ADLs at home. She does ambulates with walker assistance, this is more due to her chronic back pain. She is starting PT next month for gait stability.          Paroxysmal atrial fibrillation (HCC) (Chronic)     Chronic afib with hypertension, nonrheumatic aortic valve " stenosis and venous insufficiency. Dr Ramirez/[facility] following.  She is on Eliquis 5mg BID, Lisinopril 5mg QD; not needing rate control at this time. She is also seeing nephrology for CKD, chronic ankle edema on Lasix 20mg + KCL 10meq. Hyperlipidemia managed on atorvastatin, fenofibrate  BP in clinic today 130/74, HR 88; no CP, palpitations, SOB; she has +3 edema in her feet & ankles, states subsides with elevation.  - Continue plan per cardiology  - last TTE 1/2023 showed moderate aortic insufficiency, normal left ventricular systolic function with EF 65%. Normal diastolic function. The right ventricle is normal in size and systolic function. Mild aortic valve stenosis. Moderate aortic insufficiency.         Hypothyroidism      Latest Reference Range & Units 05/13/22 13:23 08/11/22 08:57   TSH 0.380 - 5.330 uIU/mL 1.310 2.740   Free T-4 0.93 - 1.70 ng/dL 2.10 (H) 1.78 (H)   Current on levothyroxine 112 mcg (reduced from 125mcg a year ago). No excessive weight changes, cold intolerance, fatigue reported today  - continue Levothyroxine 112mcg QD  - check TSH/FT4 with upcoming labs         Relevant Orders    TSH    FREE THYROXINE    Sleep apnea     Chronic COPD, SHABANA, +56yr smoker. Not currently using CPAP         Hyperlipidemia      Latest Reference Range & Units 08/11/22 08:57   Cholesterol,Tot 100 - 199 mg/dL 121   Triglycerides 0 - 149 mg/dL 145   HDL >=40 mg/dL 39 !   LDL <100 mg/dL 53   Chronic; LDL goal < 100; [facility] following; hx afib, hypertension, aortic stenosis. Tolerating statin  - continue Atorvastatin 10mg QD, Fenofibrate 160mg QD  - counseled on heart healthy eating; adequate fiber  - monitor annual fasting lipid.            Relevant Orders    Lipid Profile    Essential hypertension     Chronic with hx afib, aortic stenosis, hyperlipidemia, SHABANA, CKD. Followed by Dr Ramirez/[facility]  BP in Clinic today 130/74; no CP, palpitations, dizziness, SOB; has dependent edema in ankles.  -  currently on Lisinopril 5mg QD with eliquis 5mg BID, Lasix 40mg QD + KCL 10meq, atorvastatin, fenofibrate  - monitor fasting CMP, lipid         Hyperglycemia      earlier this year. We will check A1c with upcoming labs. Counseled on dietary intake         Relevant Orders    HEMOGLOBIN A1C    Chronic midline low back pain without sciatica     Chronic low back pain; she sees [facility]; currently taking NORCO 7.5-325 TID. She is getting next epidural on Monday 12/11/2023 & then follow up appointment on Tuesday 12/12/2023. She ambulates with walker assistance. She is starting PT next month for gait stability.         Urge incontinence of urine     Chronic urge incontinence of urine for several years. States symptoms are managed well on current dosing of Myrbetriq  - continue myrbetriq 50mg QD         Stage 3b chronic kidney disease (HCC)      Latest Reference Range & Units 08/11/22 08:57 09/22/22 13:49 09/23/22 10:16 09/24/22 05:23 03/27/23 10:50 08/15/23 11:30 10/17/23 12:19   GFR (CKD-EPI) >60 mL/min/1.73 m 2 29 !  30 !  30 ! 27 ! 36 ! 43 ! 38 ! 37 ! 36 !   Chronic CKD3 > 1 year. Seeing Dr Najjar/nephrology  Edema managed with Furosemide 20mg QD + KCL 10meq QD (was on triamterene);   Hypertension managed on Lisinopril; also on eliquis for afib  Avoid nephrotoxins          Other emphysema (HCC)     Chronic COPD, + 56yr 1PPD smoker, SHABANA. Not currently requiring supplemental oxygen; not seeing pulmonology; no cough, SOB reported today. SaO2 96% on RA, clear BS on exam  - currently on Breo-100 1puff QD  - no recent respiratory related hospitalization; has not needed albuterol this year  - counseled on smoking cessation         Secondary hypercoagulable state (HCC)     On BID Eliquis for afib          Educated in proper administration of medication(s) ordered today including safety, possible SE, risks, benefits, rationale and alternatives to therapy.     Return in about 3 months (around 3/8/2024) for  Medicare Annual Visit.    Please note that this dictation was created using voice recognition software. I have made every reasonable attempt to correct obvious errors, but I expect that there are errors of grammar and possibly content that I did not discover before finalizing the note.

## 2023-12-11 ENCOUNTER — HOSPITAL ENCOUNTER (OUTPATIENT)
Dept: CARDIOLOGY | Facility: MEDICAL CENTER | Age: 83
End: 2023-12-11
Attending: INTERNAL MEDICINE
Payer: MEDICARE

## 2023-12-11 DIAGNOSIS — I35.1 AORTIC VALVE INSUFFICIENCY, ETIOLOGY OF CARDIAC VALVE DISEASE UNSPECIFIED: ICD-10-CM

## 2023-12-11 DIAGNOSIS — I35.0 NONRHEUMATIC AORTIC VALVE STENOSIS: ICD-10-CM

## 2023-12-11 PROBLEM — J43.8 OTHER EMPHYSEMA (HCC): Status: ACTIVE | Noted: 2021-08-02

## 2023-12-11 PROBLEM — N18.32 STAGE 3B CHRONIC KIDNEY DISEASE: Status: ACTIVE | Noted: 2021-07-23

## 2023-12-11 PROCEDURE — 93306 TTE W/DOPPLER COMPLETE: CPT

## 2023-12-11 ASSESSMENT — ENCOUNTER SYMPTOMS
COUGH: 0
MEMORY LOSS: 1
SPUTUM PRODUCTION: 0
FEVER: 0
PALPITATIONS: 0
EYES NEGATIVE: 1
BACK PAIN: 1
CONSTITUTIONAL NEGATIVE: 1
SHORTNESS OF BREATH: 0
NEUROLOGICAL NEGATIVE: 1
GASTROINTESTINAL NEGATIVE: 1

## 2023-12-11 NOTE — ASSESSMENT & PLAN NOTE
Chronic low back pain; she sees [facility]; currently taking NORCO 7.5-325 TID. She is getting next epidural on Monday 12/11/2023 & then follow up appointment on Tuesday 12/12/2023. She ambulates with walker assistance. She is starting PT next month for gait stability.

## 2023-12-11 NOTE — ASSESSMENT & PLAN NOTE
Latest Reference Range & Units 08/11/22 08:57 09/22/22 13:49 09/23/22 10:16 09/24/22 05:23 03/27/23 10:50 08/15/23 11:30 10/17/23 12:19   GFR (CKD-EPI) >60 mL/min/1.73 m 2 29 !  30 !  30 ! 27 ! 36 ! 43 ! 38 ! 37 ! 36 !     Chronic CKD3 > 1 year. Seeing Dr Najjar/nephrology  Edema managed with Furosemide 20mg QD + KCL 10meq QD (was on triamterene);   Hypertension managed on Lisinopril; also on eliquis for afib  Avoid nephrotoxins

## 2023-12-11 NOTE — ASSESSMENT & PLAN NOTE
Latest Reference Range & Units 05/13/22 13:23 08/11/22 08:57   TSH 0.380 - 5.330 uIU/mL 1.310 2.740   Free T-4 0.93 - 1.70 ng/dL 2.10 (H) 1.78 (H)     Current on levothyroxine 112 mcg (reduced from 125mcg a year ago). No excessive weight changes, cold intolerance, fatigue reported today  - continue Levothyroxine 112mcg QD  - check TSH/FT4 with upcoming labs

## 2023-12-11 NOTE — ASSESSMENT & PLAN NOTE
Chronic COPD, + 56yr 1PPD smoker, SHABANA. Not currently requiring supplemental oxygen; not seeing pulmonology; no cough, SOB reported today. SaO2 96% on RA, clear BS on exam  - currently on Breo-100 1puff QD  - no recent respiratory related hospitalization; has not needed albuterol this year  - counseled on smoking cessation

## 2023-12-11 NOTE — ASSESSMENT & PLAN NOTE
[facility] following  last TTE 1/2023 showed moderate aortic insufficiency, normal left ventricular systolic function with EF 65%. Normal diastolic function. The right ventricle is normal in size and systolic function. Mild aortic valve stenosis. Moderate aortic insufficiency.   On lisinopril, furosemide, eliquis for afib, atorvastatin, fenofiobrate

## 2023-12-11 NOTE — ASSESSMENT & PLAN NOTE
Chronic with hx afib, aortic stenosis, hyperlipidemia, SHABANA, CKD. Followed by Dr Ramirez/[facility]  BP in Clinic today 130/74; no CP, palpitations, dizziness, SOB; has dependent edema in ankles.  - currently on Lisinopril 5mg QD with eliquis 5mg BID, Lasix 40mg QD + KCL 10meq, atorvastatin, fenofibrate  - monitor fasting CMP, lipid

## 2023-12-11 NOTE — ASSESSMENT & PLAN NOTE
Chronic mild dementia, progressive over the past several years. She last saw Dr Allen/neurology in 9/2022, not requiring medication at this time. Per granddaughter and patient, she struggles with recent/new memory recall and new changes in her routine. She no longer drives. She lives in the same trailer park as her granddaughter Janis who helps with transportation, coordinating medical care etc. Patient is independent with most ADLs at home. She does ambulates with walker assistance, this is more due to her chronic back pain. She is starting PT next month for gait stability.

## 2023-12-11 NOTE — ASSESSMENT & PLAN NOTE
Latest Reference Range & Units 08/11/22 08:57   Cholesterol,Tot 100 - 199 mg/dL 121   Triglycerides 0 - 149 mg/dL 145   HDL >=40 mg/dL 39 !   LDL <100 mg/dL 53     Chronic; LDL goal < 100; [facility] following; hx afib, hypertension, aortic stenosis. Tolerating statin  - continue Atorvastatin 10mg QD, Fenofibrate 160mg QD  - counseled on heart healthy eating; adequate fiber  - monitor annual fasting lipid.

## 2023-12-11 NOTE — ASSESSMENT & PLAN NOTE
Chronic afib with hypertension, nonrheumatic aortic valve stenosis and venous insufficiency. Dr Ramirez/[facility] following.  She is on Eliquis 5mg BID, Lisinopril 5mg QD; not needing rate control at this time. She is also seeing nephrology for CKD, chronic ankle edema on Lasix 20mg + KCL 10meq. Hyperlipidemia managed on atorvastatin, fenofibrate  BP in clinic today 130/74, HR 88; no CP, palpitations, SOB; she has +3 edema in her feet & ankles, states subsides with elevation.  - Continue plan per cardiology  - last TTE 1/2023 showed moderate aortic insufficiency, normal left ventricular systolic function with EF 65%. Normal diastolic function. The right ventricle is normal in size and systolic function. Mild aortic valve stenosis. Moderate aortic insufficiency.

## 2023-12-13 LAB
LV EJECT FRACT  99904: 59
LV EJECT FRACT MOD 2C 99903: 58.37
LV EJECT FRACT MOD 4C 99902: 60.23
LV EJECT FRACT MOD BP 99901: 59.2

## 2023-12-13 PROCEDURE — 93306 TTE W/DOPPLER COMPLETE: CPT | Mod: 26 | Performed by: INTERNAL MEDICINE

## 2023-12-17 DIAGNOSIS — R60.0 EDEMA LEG: ICD-10-CM

## 2023-12-18 ENCOUNTER — PHYSICAL THERAPY (OUTPATIENT)
Dept: PHYSICAL THERAPY | Facility: REHABILITATION | Age: 83
End: 2023-12-18
Attending: STUDENT IN AN ORGANIZED HEALTH CARE EDUCATION/TRAINING PROGRAM
Payer: MEDICARE

## 2023-12-18 DIAGNOSIS — R60.1 GENERALIZED EDEMA: ICD-10-CM

## 2023-12-18 PROCEDURE — 97163 PT EVAL HIGH COMPLEX 45 MIN: CPT

## 2023-12-18 RX ORDER — POTASSIUM CHLORIDE 750 MG/1
TABLET, EXTENDED RELEASE ORAL
Qty: 90 TABLET | Refills: 2 | Status: SHIPPED | OUTPATIENT
Start: 2023-12-18

## 2023-12-18 NOTE — OP THERAPY EVALUATION
Outpatient Physical Therapy  LYMPHEDEMA THERAPY INITIAL EVALUATION    [facility] [place] [address] [address]  [address]  [place] [address]  Phone:  [phone]  Fax:  [phone]    Date of Evaluation: 12/18/2023    Patient: Ella Garcia  YOB: 1940  MRN: [id number]     Referring Provider: Payton Galo P.A.-C.  [address] [place] [address]  [place] [address]   Referring Diagnosis Generalized edema [R60.1]     Time Calculation    Start time: 1415  Stop time: 1500 Time Calculation (min): 45 minutes             Chief Complaint: Leg Swelling    Visit Diagnoses     ICD-10-CM   1. Generalized edema  R60.1       Subjective:   History of Present Illness:     Mechanism of injury:  Pt reports her legs swell. Thinks it is water retention. Usually they go up and down but since April or May, they have gone up and not down. Did buy compression socks but had too much trouble donning. Legs do reduce overnight, but not to normal. Also elevates with improvement, but not to normal. Does take a diuretic (has for years) recently had a change in rx, but this has not helped the legs.       Past Medical History:   Diagnosis Date    Arthritis     knees, and hips-osteo    ASTHMA     Body mass index 40.0-44.9, adult ([facility]) 4/27/2018    Chronic renal impairment, stage 3 (moderate) ([facility]) 5/13/2019    COPD (chronic obstructive pulmonary disease) with chronic bronchitis 4/20/2021    Dental disorder     upper    Dysuria 10/18/2016    Fall     Generalized edema 4/27/2018    Glaucoma     bilateral    Hay fever 4/19/2016    Heart burn     High cholesterol     Hx: UTI (urinary tract infection)     Hyperglycemia 11/24/2015    Hypertension     Hypothyroidism     Indigestion     Ischemic bowel disease ([facility]) 9/9/2019    Migraines     pt has similar symptoms with migraines not for a long time though    Nausea 10/14/2019    Neuropathy ([facility])     Obesity (BMI 30-39.9) 7/20/2015    Pneumonia     Routine general medical  examination at a health care facility 7/20/2015    7/20/15    Sleep apnea 12/2019    Had sleep study, didn't tolerate cpap    Snoring     Syncope 6/5/2020    Tobacco use 4/29/2019    URI (upper respiratory infection) 11/4/2015    Urinary bladder disorder     hx of uti's     Past Surgical History:   Procedure Laterality Date    GASTROSCOPY  12/13/2019    Procedure: GASTROSCOPY;  Surgeon: Ang Angel M.D.;  Location: SURGERY [facility];  Service: Gastroenterology    COLONOSCOPY  12/13/2019    Procedure: COLONOSCOPY;  Surgeon: Ang Angel M.D.;  Location: SURGERY [facility];  Service: Gastroenterology    NIRU BY LAPAROSCOPY  9/2/2011    [facility]    LUMBAR DECOMPRESSION  6/29/2009    Performed by ANG YAN at SURGERY [facility] ORS    LUMBAR LAMINECTOMY DISKECTOMY  6/29/2009    Performed by ANG YAN at SURGERY [facility] ORS    ABDOMINAL HYSTERECTOMY TOTAL      GYN SURGERY      hysterectomy    HEMORRHOIDECTOMY       Social History     Tobacco Use    Smoking status: Every Day     Current packs/day: 1.00     Average packs/day: 1 pack/day for 56.0 years (56.0 total pack years)     Types: Cigarettes    Smokeless tobacco: Never    Tobacco comments:     started smoking at age 12, little mor than half a pack   Substance Use Topics    Alcohol use: No     Family and Occupational History     Socioeconomic History    Marital status: Single     Spouse name: Not on file    Number of children: Not on file    Years of education: Not on file    Highest education level: 12th grade   Occupational History    Not on file       Lymphedema Objective    Visit type  Phelebolymphedema    Left Lower Extremity Circumferential Measurements  Waist: cm  Hip: cm  Scrotum: cm  Ground/Upper Thigh: cm  Mid Thigh: cm  Knee: 36 cm  Upper Calf: 35.9 cm  Mid Calf: 29.6 cm  Ankle: 27.5 cm  Heel to Foot: 25.7 cm  Total: 154.7 cm    Right Lower Extremity Circumferential  Measurements  Waist: cm  Hip: cm  Scrotum: cm  Ground/Upper Thigh: cm  Mid Thigh: cm  Knee: 36 cm  Upper Calf: 36.2 cm  Mid Calf: 28.7 cm  Ankle: 27.7 cm  Heel to Foot: 25.7 cm  Total: 154.3 cm    Lymphedema Stage  Stage 2 Lymphedema          Therapeutic Treatments and Modalities:     Therapeutic Treatment and Modalities Summary: Educated patient on pathogenesis of lymphedema. Distributed brochure. Patient has also been educated on skin care precautions including hygiene, lotions with pH 5-5.5, and avoidance of lacerations/mosquito bites/heat. Also educated pt on signs/symptoms of cellulitis.     Patient was educated in regular, self MLD of bilateral lower extremity utilizing inguinal and axillary lymph nodes bilaterally with associated pathways.  Correct strokes were emphasized and handout was provided.  Patient was invited to return for in clinic, skilled physical therapist administered MLD for which patient will set an additional appointment.       Discussed the need for external compression and educated pt regarding compression garment options.  Patient was educated the optimal use of garment (all day, every day,). Brand/sizing information provided on Velcro for foot and ankle. Lymphedema risk factors were discussed with a lymphedema brochure handout provided.  Patient and granddaughter verbalized understanding to all education today.     Time-based treatments/modalities:           Assessment and Plan:   Functional Impairments: swelling    Assessment details:  Ella is an 82yo F who reports an increase in persistence in swelling to her B LE. Swelling appears to extend from her toes to the distal portion of her B LE. Elevation and compression have been unsuccessful as have diuretics. Ella has been instructed on performance of manual lymphatic drainage and granddaughter was present for instruction as well. Ella will likely most benefit from Velcro compression at her foot and ankle given the difficulty of donning  compression socks (granddaughter also reports arthritis that makes it challenging to help Ella). Although Complete Decongestive Therapy including multilayer compression bandages would likely most benefit Ella, her already unstable mobility would place her at risk of falls, moreso than she is now (granddaughter reports Ella has fallen before). Therefore, a more conservative approach will be implemented including self-MLD and Velcro, along with very basic LE exercises and skin care. Ella and granddaughter have verbalized understanding and will return once she has received her compression.   Prognosis: fair      Goals:   Short Term Goals:  1. Patient will obtain a compression garment for her bilateral feet/akles to allow for fluid removal from B LE.  2. Pt will achieve a total limb circumference reduction of 3cm in order to decrease risk for infection and progression of disease process.  3. Pt will be independent with self-MLD to facilitate fluid migration to healthy tissues and lymph nodes.        Short term goal time span:  2-4 weeks    Long Term Goals:  1. Pt will have a reduction in total limb circumference of 6cm in order to decrease risk for infection and progression of disease process.   2. Patient will be independent with maintenance phase management of lymphedema including: compression garments, skin care education, risk reduction strategies, manual lymphatic drainage, and therapeutic exercise.   Long term goal time span:  4-6 weeks    Plan:  Therapy options:  Physical therapy treatment to continue  Planned therapy interventions:  Caregiver education, decongestive exercises, home exercise program, intermittent compression, manual lymph drainage, Velcro wraps, strengthening exercises, skin/wound care, soft tissue manual techniques (CPT 21859), sequential compression pump, self-care/training (CPT 30976), referral for compression garment & instructions for don/doffing, range of motion exercises, postural  exercises, patient education, orthotic measurements/fitting and myofascial release techniques  Planned education:  Functional anatomy and physiology of the lymphatic system, pathophysiology of lymphedema, lymphedema exercise, lymphedema precautions, proper skin care/nutrition, compression bandaging, self massage, infection prevention, long term self-management of lymphedema, home pump use, skin care guidelines, activity guidelines and scar tissue management  Frequency:  1x week  Duration in weeks:  8  Discussed with:  Patient and family      Functional Assessment Used    LLIS    Referring provider co-signature:  I have reviewed this plan of care and my co-signature certifies the need for services.    Certification Period: 12/18/2023 to  02/12/24    Physician Signature: ________________________________ Date: ______________

## 2023-12-21 ENCOUNTER — HOSPITAL ENCOUNTER (OUTPATIENT)
Dept: RADIOLOGY | Facility: MEDICAL CENTER | Age: 83
End: 2023-12-21
Attending: INTERNAL MEDICINE
Payer: MEDICARE

## 2023-12-21 DIAGNOSIS — I87.2 VENOUS INSUFFICIENCY: ICD-10-CM

## 2023-12-21 PROCEDURE — 93970 EXTREMITY STUDY: CPT

## 2023-12-21 PROCEDURE — 93970 EXTREMITY STUDY: CPT | Mod: 26 | Performed by: INTERNAL MEDICINE

## 2024-01-02 ENCOUNTER — APPOINTMENT (OUTPATIENT)
Dept: PHYSICAL THERAPY | Facility: REHABILITATION | Age: 84
End: 2024-01-02
Attending: STUDENT IN AN ORGANIZED HEALTH CARE EDUCATION/TRAINING PROGRAM
Payer: MEDICARE

## 2024-01-09 ENCOUNTER — APPOINTMENT (OUTPATIENT)
Dept: PHYSICAL THERAPY | Facility: REHABILITATION | Age: 84
End: 2024-01-09
Attending: STUDENT IN AN ORGANIZED HEALTH CARE EDUCATION/TRAINING PROGRAM
Payer: MEDICARE

## 2024-01-16 ENCOUNTER — APPOINTMENT (OUTPATIENT)
Dept: PHYSICAL THERAPY | Facility: REHABILITATION | Age: 84
End: 2024-01-16
Attending: STUDENT IN AN ORGANIZED HEALTH CARE EDUCATION/TRAINING PROGRAM
Payer: MEDICARE

## 2024-01-19 ENCOUNTER — OFFICE VISIT (OUTPATIENT)
Dept: URGENT CARE | Facility: PHYSICIAN GROUP | Age: 84
End: 2024-01-19
Payer: MEDICARE

## 2024-01-19 ENCOUNTER — HOSPITAL ENCOUNTER (OUTPATIENT)
Facility: MEDICAL CENTER | Age: 84
End: 2024-01-19
Attending: NURSE PRACTITIONER
Payer: MEDICARE

## 2024-01-19 VITALS
RESPIRATION RATE: 20 BRPM | WEIGHT: 175 LBS | HEART RATE: 80 BPM | SYSTOLIC BLOOD PRESSURE: 104 MMHG | TEMPERATURE: 97.2 F | HEIGHT: 62 IN | OXYGEN SATURATION: 97 % | BODY MASS INDEX: 32.2 KG/M2 | DIASTOLIC BLOOD PRESSURE: 46 MMHG

## 2024-01-19 DIAGNOSIS — N30.01 ACUTE CYSTITIS WITH HEMATURIA: ICD-10-CM

## 2024-01-19 LAB
APPEARANCE UR: NORMAL
BILIRUB UR STRIP-MCNC: NEGATIVE MG/DL
COLOR UR AUTO: NORMAL
GLUCOSE UR STRIP.AUTO-MCNC: NEGATIVE MG/DL
KETONES UR STRIP.AUTO-MCNC: NEGATIVE MG/DL
LEUKOCYTE ESTERASE UR QL STRIP.AUTO: NORMAL
NITRITE UR QL STRIP.AUTO: NEGATIVE
PH UR STRIP.AUTO: 5 [PH] (ref 5–8)
PROT UR QL STRIP: NEGATIVE MG/DL
RBC UR QL AUTO: NORMAL
SP GR UR STRIP.AUTO: 1.01
UROBILINOGEN UR STRIP-MCNC: 0.2 MG/DL

## 2024-01-19 PROCEDURE — 3078F DIAST BP <80 MM HG: CPT | Performed by: NURSE PRACTITIONER

## 2024-01-19 PROCEDURE — 81002 URINALYSIS NONAUTO W/O SCOPE: CPT | Performed by: NURSE PRACTITIONER

## 2024-01-19 PROCEDURE — 99213 OFFICE O/P EST LOW 20 MIN: CPT | Performed by: NURSE PRACTITIONER

## 2024-01-19 PROCEDURE — 3074F SYST BP LT 130 MM HG: CPT | Performed by: NURSE PRACTITIONER

## 2024-01-19 PROCEDURE — 87086 URINE CULTURE/COLONY COUNT: CPT

## 2024-01-19 RX ORDER — CEFDINIR 300 MG/1
300 CAPSULE ORAL 2 TIMES DAILY
Qty: 14 CAPSULE | Refills: 0 | Status: SHIPPED | OUTPATIENT
Start: 2024-01-19 | End: 2024-01-26

## 2024-01-19 ASSESSMENT — ENCOUNTER SYMPTOMS
ABDOMINAL PAIN: 0
VOMITING: 0
BACK PAIN: 1
FEVER: 0
FLANK PAIN: 0

## 2024-01-19 ASSESSMENT — FIBROSIS 4 INDEX: FIB4 SCORE: 2.69

## 2024-01-19 NOTE — PATIENT INSTRUCTIONS
-Oral Hydration: Drink plenty of water.  -Take antibiotic as prescribed.  -Follow up with PCP.    Follow up urgently for new or persistent abdominal pain, flank pain, difficulty with urination, fevers, vomiting, weakness, tachycardia, cognitive changes, or any other concerns.

## 2024-01-19 NOTE — PROGRESS NOTES
Subjective:     Margaret Garcia is a 83 y.o. female who presents for UTI (Lower back pain, cognitive problems/X 2 days )      Presents with family, with concerns for UTI. Family states she noticed some cognitive changes. The patient has a history of this with previous UTIs. Reports she forgot how to make the bed the night before last, and called her twice yesterday about the same subject.     UTI  This is a recurrent problem. Associated symptoms include urinary symptoms. Pertinent negatives include no abdominal pain, fever or vomiting.       Past Medical History:   Diagnosis Date    Arthritis     knees, and hips-osteo    ASTHMA     Body mass index 40.0-44.9, adult ([facility]) 4/27/2018    Chronic renal impairment, stage 3 (moderate) ([facility]) 5/13/2019    COPD (chronic obstructive pulmonary disease) with chronic bronchitis 4/20/2021    Dental disorder     upper    Dysuria 10/18/2016    Fall     Generalized edema 4/27/2018    Glaucoma     bilateral    Hay fever 4/19/2016    Heart burn     High cholesterol     Hx: UTI (urinary tract infection)     Hyperglycemia 11/24/2015    Hypertension     Hypothyroidism     Indigestion     Ischemic bowel disease ([facility]) 9/9/2019    Migraines     pt has similar symptoms with migraines not for a long time though    Nausea 10/14/2019    Neuropathy ([facility])     Obesity (BMI 30-39.9) 7/20/2015    Pneumonia     Routine general medical examination at a health care facility 7/20/2015    7/20/15    Sleep apnea 12/2019    Had sleep study, didn't tolerate cpap    Snoring     Syncope 6/5/2020    Tobacco use 4/29/2019    URI (upper respiratory infection) 11/4/2015    Urinary bladder disorder     hx of uti's       Past Surgical History:   Procedure Laterality Date    GASTROSCOPY  12/13/2019    Procedure: GASTROSCOPY;  Surgeon: Scottie Angel M.D.;  Location: SURGERY [facility];  Service: Gastroenterology    COLONOSCOPY  12/13/2019    Procedure: COLONOSCOPY;  Surgeon: Scottie Angel M.D.;   Location: SURGERY [facility];  Service: Gastroenterology    NIRU BY LAPAROSCOPY  9/2/2011    [facility]    LUMBAR DECOMPRESSION  6/29/2009    Performed by ANG YAN at SURGERY [facility] ORS    LUMBAR LAMINECTOMY DISKECTOMY  6/29/2009    Performed by ANG YAN at SURGERY [facility] ORS    ABDOMINAL HYSTERECTOMY TOTAL      GYN SURGERY      hysterectomy    HEMORRHOIDECTOMY         Social History     Socioeconomic History    Marital status: Single     Spouse name: Not on file    Number of children: Not on file    Years of education: Not on file    Highest education level: 12th grade   Occupational History    Not on file   Tobacco Use    Smoking status: Every Day     Current packs/day: 1.00     Average packs/day: 1 pack/day for 56.0 years (56.0 ttl pk-yrs)     Types: Cigarettes    Smokeless tobacco: Never    Tobacco comments:     started smoking at age 12, little mor than half a pack   Vaping Use    Vaping Use: Never used   Substance and Sexual Activity    Alcohol use: No    Drug use: No    Sexual activity: Never     Partners: Male     Birth control/protection: Post-Menopausal   Other Topics Concern    Not on file   Social History Narrative    Not on file     Social Determinants of Health     Financial Resource Strain: Medium Risk (12/7/2023)    Overall Financial Resource Strain (CARDIA)     Difficulty of Paying Living Expenses: Somewhat hard   Food Insecurity: No Food Insecurity (12/7/2023)    Hunger Vital Sign     Worried About Running Out of Food in the Last Year: Never true     Ran Out of Food in the Last Year: Never true   Transportation Needs: No Transportation Needs (12/7/2023)    PRAPARE - Transportation     Lack of Transportation (Medical): No     Lack of Transportation (Non-Medical): No   Physical Activity: Inactive (12/7/2023)    Exercise Vital Sign     Days of Exercise per Week: 0 days     Minutes of Exercise per Session: 0 min   Stress: No  Stress Concern Present (12/7/2023)    [employer] - Occupational Stress Questionnaire     Feeling of Stress : Only a little   Social Connections: Socially Isolated (12/7/2023)    Social Connection and Isolation Panel [NHANES]     Frequency of Communication with Friends and Family: More than three times a week     Frequency of Social Gatherings with Friends and Family: More than three times a week     Attends Muslim Services: Never     Active Member of Clubs or Organizations: No     Attends Club or Organization Meetings: Never     Marital Status:    Intimate Partner Violence: Not on file   Housing Stability: Low Risk  (12/7/2023)    Housing Stability Vital Sign     Unable to Pay for Housing in the Last Year: No     Number of Places Lived in the Last Year: 1     Unstable Housing in the Last Year: No        Family History   Problem Relation Age of Onset    Stroke Mother     Hyperlipidemia Mother     Hypertension Mother     Arthritis Mother     Diabetes Father     Lung Disease Father         pneumonia    Arthritis Sister     Rheumatologic Disease Sister     Hypertension Sister     Hyperlipidemia Sister     Other Sister         Anusha/Fibermyalgia    Hyperlipidemia Brother     Hypertension Brother     Arthritis Brother     Lung Disease Maternal Grandmother         pneumonia    Stroke Maternal Grandfather     Cancer Maternal Grandfather         skin     No Known Problems Paternal Grandmother     No Known Problems Paternal Grandfather     Hyperlipidemia Daughter     Diabetes Daughter     No Known Problems Son         Allergies   Allergen Reactions    Food Hives and Nausea     Oranges, Hives    Neomycin-Polymyxin B Rash     Rash       Review of Systems   Constitutional:  Negative for fever.   Gastrointestinal:  Negative for abdominal pain and vomiting.   Genitourinary:  Positive for frequency. Negative for flank pain and hematuria.   Musculoskeletal:  Positive for back pain.  "  Neurological:         Dementia.    All other systems reviewed and are negative.       Objective:   /46   Pulse 80   Temp 36.2 °C (97.2 °F) (Temporal)   Resp 20   Ht 1.575 m (5' 2\")   Wt 79.4 kg (175 lb)   SpO2 97%   BMI 32.01 kg/m²     Physical Exam  Vitals reviewed.   Constitutional:       General: She is not in acute distress.     Appearance: She is not toxic-appearing.   Cardiovascular:      Rate and Rhythm: Normal rate.   Pulmonary:      Effort: Pulmonary effort is normal.   Abdominal:      General: Bowel sounds are normal. There is no distension.      Palpations: Abdomen is soft.      Tenderness: There is no abdominal tenderness. There is no right CVA tenderness or left CVA tenderness.   Skin:     General: Skin is warm and dry.   Neurological:      Mental Status: She is alert. Mental status is at baseline.      GCS: GCS eye subscore is 4. GCS motor subscore is 6.      Cranial Nerves: No facial asymmetry.   Psychiatric:         Mood and Affect: Mood normal.         Behavior: Behavior normal.         Assessment/Plan:   1. Acute cystitis with hematuria  - POCT Urinalysis  - URINE CULTURE(NEW); Future  - cefdinir (OMNICEF) 300 MG Cap; Take 1 Capsule by mouth 2 times a day for 7 days.  Dispense: 14 Capsule; Refill: 0    Results for orders placed or performed in visit on 01/19/24   POCT Urinalysis   Result Value Ref Range    POC Color LIGHT YELLOW Negative    POC Appearance SLIGHTLY CLOUDY Negative    POC Glucose NEGATIVE Negative mg/dL    POC Bilirubin NEGATIVE Negative mg/dL    POC Ketones NEGATIVE Negative mg/dL    POC Specific Gravity 1.010 <1.005 - >1.030    POC Blood MODERATE Negative    POC Urine PH 5.0 5.0 - 8.0    POC Protein NEGATIVE Negative mg/dL    POC Urobiligen 0.2 Negative (0.2) mg/dL    POC Nitrites NEGATIVE Negative    POC Leukocyte Esterase TRACE Negative     -Oral Hydration: Drink plenty of water.  -Take antibiotic as prescribed.  -Follow up with PCP.    Follow up urgently for new " or persistent abdominal pain, flank pain, difficulty with urination, fevers, vomiting, weakness, tachycardia, cognitive changes, or any other concerns.    -Stable vitals. Afebrile. No CVA or abdominal tenderness to palpation. Dementia with history of acute cognitive changes with previous UTIs, with family stating that's often how they know she has a UTI, in addition to her c/o of an increase in her back pain. Advised continued monitoring with f/u for persistent or worsening sympomts.      Differential diagnosis, natural history, supportive care, and indications for immediate follow-up discussed.

## 2024-01-22 LAB
BACTERIA UR CULT: NORMAL
SIGNIFICANT IND 70042: NORMAL
SITE SITE: NORMAL
SOURCE SOURCE: NORMAL

## 2024-01-23 ENCOUNTER — PATIENT MESSAGE (OUTPATIENT)
Dept: MEDICAL GROUP | Facility: PHYSICIAN GROUP | Age: 84
End: 2024-01-23

## 2024-01-23 ENCOUNTER — HOSPITAL ENCOUNTER (OUTPATIENT)
Dept: LAB | Facility: MEDICAL CENTER | Age: 84
End: 2024-01-23
Attending: INTERNAL MEDICINE
Payer: MEDICARE

## 2024-01-23 ENCOUNTER — PHYSICAL THERAPY (OUTPATIENT)
Dept: PHYSICAL THERAPY | Facility: REHABILITATION | Age: 84
End: 2024-01-23
Attending: STUDENT IN AN ORGANIZED HEALTH CARE EDUCATION/TRAINING PROGRAM
Payer: MEDICARE

## 2024-01-23 DIAGNOSIS — I10 ESSENTIAL HYPERTENSION: ICD-10-CM

## 2024-01-23 DIAGNOSIS — R60.9 EDEMA, UNSPECIFIED TYPE: ICD-10-CM

## 2024-01-23 DIAGNOSIS — N18.32 STAGE 3B CHRONIC KIDNEY DISEASE: ICD-10-CM

## 2024-01-23 DIAGNOSIS — R06.09 DYSPNEA ON EXERTION: ICD-10-CM

## 2024-01-23 DIAGNOSIS — J96.11 CHRONIC RESPIRATORY FAILURE WITH HYPOXIA (HCC): Chronic | ICD-10-CM

## 2024-01-23 DIAGNOSIS — R60.1 GENERALIZED EDEMA: ICD-10-CM

## 2024-01-23 LAB
ANION GAP SERPL CALC-SCNC: 16 MMOL/L (ref 7–16)
BUN SERPL-MCNC: 17 MG/DL (ref 8–22)
CALCIUM SERPL-MCNC: 8.6 MG/DL (ref 8.5–10.5)
CHLORIDE SERPL-SCNC: 109 MMOL/L (ref 96–112)
CO2 SERPL-SCNC: 19 MMOL/L (ref 20–33)
CREAT SERPL-MCNC: 1.27 MG/DL (ref 0.5–1.4)
ERYTHROCYTE [DISTWIDTH] IN BLOOD BY AUTOMATED COUNT: 46.4 FL (ref 35.9–50)
GFR SERPLBLD CREATININE-BSD FMLA CKD-EPI: 41 ML/MIN/1.73 M 2
GLUCOSE SERPL-MCNC: 88 MG/DL (ref 65–99)
HCT VFR BLD AUTO: 40.5 % (ref 37–47)
HGB BLD-MCNC: 13.2 G/DL (ref 12–16)
MCH RBC QN AUTO: 30.6 PG (ref 27–33)
MCHC RBC AUTO-ENTMCNC: 32.6 G/DL (ref 32.2–35.5)
MCV RBC AUTO: 94 FL (ref 81.4–97.8)
PLATELET # BLD AUTO: 346 K/UL (ref 164–446)
PMV BLD AUTO: 9.5 FL (ref 9–12.9)
POTASSIUM SERPL-SCNC: 3.8 MMOL/L (ref 3.6–5.5)
RBC # BLD AUTO: 4.31 M/UL (ref 4.2–5.4)
SODIUM SERPL-SCNC: 144 MMOL/L (ref 135–145)
WBC # BLD AUTO: 9 K/UL (ref 4.8–10.8)

## 2024-01-23 PROCEDURE — 36415 COLL VENOUS BLD VENIPUNCTURE: CPT

## 2024-01-23 PROCEDURE — 80048 BASIC METABOLIC PNL TOTAL CA: CPT

## 2024-01-23 PROCEDURE — 85027 COMPLETE CBC AUTOMATED: CPT

## 2024-01-23 PROCEDURE — 97535 SELF CARE MNGMENT TRAINING: CPT

## 2024-01-23 NOTE — OP THERAPY PROGRESS SUMMARY
Outpatient Physical Therapy  PROGRESS SUMMARY NOTE      [facility] [address].  [address]  [place] [address]  Phone:  [phone]  Fax:  [phone]    Date of Visit: 01/23/2024    Patient: Ella Garcia  YOB: 1940  MRN: [id number]     Referring Provider: Payton Galo P.A.-C.  [address] [place] [address] [address]  [place] [address]   Referring Diagnosis Generalized edema [R60.1]     {No diagnosis found. (Refresh or delete this SmartLink)}    Rehab Potential: {excellent/good/fair/poor:52832}    Progress Report Period: ***    Functional Assessment Used        Progress Summary Details    Referring provider co-signature:  I have reviewed this plan of care and my co-signature certifies the need for services.     Certification Period: 01/23/2024 to {Future Date:24418}    Physician Signature: ________________________________ Date: ______________

## 2024-01-23 NOTE — OP THERAPY DAILY TREATMENT
Outpatient Physical Therapy  LYMPHEDEMA THERAPY DAILY TREATMENT     [facility] [address].  [address]  [place] [address]  Phone:  [phone]  Fax:  [phone]    Date: 01/23/2024    Patient: Ella Garcia  YOB: 1940  MRN: [id number]     Time Calculation    Start time: 1331  Stop time: 1355 Time Calculation (min): 24 minutes         Chief Complaint: Lymphedema Aquired    Visit #: 2    Subjective:   History of Present Illness:     Mechanism of injury:  Feel/ankles seem a little better since receiving Velcro.       Lymphedema Objective    Left Lower Extremity Circumferential Measurements  Waist: cm  Hip: cm  Scrotum: cm  Ground/Upper Thigh: cm  Mid Thigh: cm  Knee: 36 cm  Upper Calf: 33.7 cm  Mid Calf: 28.4 cm  Ankle: 27.1 cm  Heel to Foot: 24.5 cm  Total: 149.7 cm    Right Lower Extremity Circumferential Measurements  Waist: cm  Hip: cm  Scrotum: cm  Ground/Upper Thigh: cm  Mid Thigh: cm  Knee: 36 cm  Upper Calf: 35.4 cm  Mid Calf: 28.3 cm  Ankle: 27.7 cm  Heel to Foot: 24.6 cm  Total: 152 cm        Left Lower Extremity Circumferential Measurements EVAL  Waist: cm  Hip: cm  Scrotum: cm  Ground/Upper Thigh: cm  Mid Thigh: cm  Knee: 36 cm  Upper Calf: 35.9 cm  Mid Calf: 29.6 cm  Ankle: 27.5 cm  Heel to Foot: 25.7 cm  Total: 154.7 cm     Right Lower Extremity Circumferential Measurements EVAL  Waist: cm  Hip: cm  Scrotum: cm  Ground/Upper Thigh: cm  Mid Thigh: cm  Knee: 36 cm  Upper Calf: 36.2 cm  Mid Calf: 28.7 cm  Ankle: 27.7 cm  Heel to Foot: 25.7 cm  Total: 154.3 cm       Therapeutic Treatments and Modalities:     1. Self Care ADL Training (CPT 15224)    Therapeutic Treatment and Modalities Summary: -discussed current routine with Ella's granddaughter: dons Velcro in AM and Ella's daughter doffs Velcro in PM and applies moisturizer to feet/legs.    -tried a basic [employer] pump for feet. Ella did not like. Will not pursue medical grade pump    -discussed  "likelihood of flares in swelling with salty food and heat. This is expected. Can wear Velcro at night if needed.     Time-based treatments/modalities:    Physical Therapy Timed Treatment Charges  Functional training, self care minutes (CPT 72928): 24 minutes      Assessment and Plan:   Assessment details:  Ella has reduced her B LE by 2-4cm each with use of ankle/foot Velcro compression. As she is a fall risk with significant memory problems, this will likely be her management strategy for long-term. Granddaughter understands feet/ankles will likely not reduce to \"normal\". At this time, will DC.     Plan:  Therapy options:  No further treatment needed  Discussed with:  Patient             "

## 2024-01-23 NOTE — OP THERAPY DISCHARGE SUMMARY
"  Outpatient Physical Therapy  DISCHARGE SUMMARY NOTE      [facility] [address].  [address]  [place] [address]  Phone:  [phone]  Fax:  [phone]    Date of Visit: 01/23/2024    Patient: Ella Garcia  YOB: 1940  MRN: [id number]     Referring Provider: Payton Galo P.A.-C.  [address] [place] [address] [address]  [place] [address]   Referring Diagnosis Generalized edema [R60.1]             Your patient is being discharged from Physical Therapy with the following comments:   Goals met    Comments:  Ella has reduced her B LE by 2-4cm each with use of ankle/foot Velcro compression. As she is a fall risk with significant memory problems, this will likely be her management strategy for long-term. Granddaughter understands feet/ankles will likely not reduce to \"normal\". At this time, will DC.     Thank you for the referral,    Anastasia Varela, PT, DPT, CLT    Date: 1/23/2024         "

## 2024-01-26 ENCOUNTER — PATIENT MESSAGE (OUTPATIENT)
Dept: MEDICAL GROUP | Facility: PHYSICIAN GROUP | Age: 84
End: 2024-01-26
Payer: MEDICARE

## 2024-01-26 DIAGNOSIS — R30.0 DYSURIA: ICD-10-CM

## 2024-01-27 ENCOUNTER — HOSPITAL ENCOUNTER (OUTPATIENT)
Facility: MEDICAL CENTER | Age: 84
End: 2024-01-27
Attending: NURSE PRACTITIONER
Payer: MEDICARE

## 2024-01-27 ENCOUNTER — HOSPITAL ENCOUNTER (OUTPATIENT)
Facility: MEDICAL CENTER | Age: 84
End: 2024-01-27
Attending: INTERNAL MEDICINE
Payer: MEDICARE

## 2024-01-27 DIAGNOSIS — R30.0 DYSURIA: ICD-10-CM

## 2024-01-27 LAB
APPEARANCE UR: CLEAR
BILIRUB UR QL STRIP.AUTO: NEGATIVE
COLOR UR: YELLOW
CREAT UR-MCNC: 17.23 MG/DL
GLUCOSE UR STRIP.AUTO-MCNC: NEGATIVE MG/DL
KETONES UR STRIP.AUTO-MCNC: NEGATIVE MG/DL
LEUKOCYTE ESTERASE UR QL STRIP.AUTO: NEGATIVE
MICRO URNS: NORMAL
MICROALBUMIN UR-MCNC: <1.2 MG/DL
MICROALBUMIN/CREAT UR: NORMAL MG/G (ref 0–30)
NITRITE UR QL STRIP.AUTO: NEGATIVE
PH UR STRIP.AUTO: 6.5 [PH] (ref 5–8)
PROT UR QL STRIP: NEGATIVE MG/DL
RBC UR QL AUTO: NEGATIVE
SP GR UR STRIP.AUTO: 1.01
UROBILINOGEN UR STRIP.AUTO-MCNC: 0.2 MG/DL

## 2024-01-27 PROCEDURE — 81003 URINALYSIS AUTO W/O SCOPE: CPT

## 2024-01-27 PROCEDURE — 82043 UR ALBUMIN QUANTITATIVE: CPT

## 2024-01-27 PROCEDURE — 82570 ASSAY OF URINE CREATININE: CPT

## 2024-01-27 NOTE — PROGRESS NOTES
1. Dysuria  UC visit on 1/19/2024; Completed 7 day cefdinir;   - URINALYSIS,CULTURE IF INDICATED; Future

## 2024-01-30 ENCOUNTER — APPOINTMENT (OUTPATIENT)
Dept: PHYSICAL THERAPY | Facility: REHABILITATION | Age: 84
End: 2024-01-30
Attending: STUDENT IN AN ORGANIZED HEALTH CARE EDUCATION/TRAINING PROGRAM
Payer: MEDICARE

## 2024-01-30 ENCOUNTER — OFFICE VISIT (OUTPATIENT)
Dept: MEDICAL GROUP | Facility: PHYSICIAN GROUP | Age: 84
End: 2024-01-30
Payer: MEDICARE

## 2024-01-30 ENCOUNTER — APPOINTMENT (OUTPATIENT)
Dept: URGENT CARE | Facility: PHYSICIAN GROUP | Age: 84
End: 2024-01-30
Payer: MEDICARE

## 2024-01-30 VITALS
OXYGEN SATURATION: 99 % | HEIGHT: 62 IN | BODY MASS INDEX: 34.78 KG/M2 | TEMPERATURE: 99.2 F | HEART RATE: 98 BPM | SYSTOLIC BLOOD PRESSURE: 104 MMHG | DIASTOLIC BLOOD PRESSURE: 50 MMHG | WEIGHT: 189 LBS

## 2024-01-30 DIAGNOSIS — L50.9 URTICARIAL RASH: ICD-10-CM

## 2024-01-30 PROCEDURE — 3074F SYST BP LT 130 MM HG: CPT | Performed by: NURSE PRACTITIONER

## 2024-01-30 PROCEDURE — 3078F DIAST BP <80 MM HG: CPT | Performed by: NURSE PRACTITIONER

## 2024-01-30 PROCEDURE — 99213 OFFICE O/P EST LOW 20 MIN: CPT | Performed by: NURSE PRACTITIONER

## 2024-01-30 RX ORDER — TRIAMCINOLONE ACETONIDE 40 MG/ML
40 INJECTION, SUSPENSION INTRA-ARTICULAR; INTRAMUSCULAR ONCE
Status: COMPLETED | OUTPATIENT
Start: 2024-01-30 | End: 2024-01-30

## 2024-01-30 RX ADMIN — TRIAMCINOLONE ACETONIDE 40 MG: 40 INJECTION, SUSPENSION INTRA-ARTICULAR; INTRAMUSCULAR at 11:32

## 2024-01-30 ASSESSMENT — FIBROSIS 4 INDEX: FIB4 SCORE: 2.3

## 2024-01-30 ASSESSMENT — PATIENT HEALTH QUESTIONNAIRE - PHQ9: CLINICAL INTERPRETATION OF PHQ2 SCORE: 0

## 2024-01-30 NOTE — PROGRESS NOTES
Chief Complaint   Patient presents with    Rash     Rash on arms and back, started yeserday and today worse, itchy.        HISTORY OF PRESENT ILLNESS: Margaret Garcia is a 83 y.o. female established patient who presents today accompanied by her daughter to discuss:  - urticarial rash started on her arms and back yesterday; spreading to her scalp and rest of her body today  - does not recall what she had for lunch yesterday; had oatmeal with toast for breakfast which is her usual; ravioli for dinner which was new food for her  - denies any recent changes in soaps, detergent or cream  - has not tried anything over the counter yet.     Current Outpatient Medications on File Prior to Visit   Medication Sig Dispense Refill    potassium chloride SA (K-DUR) 10 MEQ Tab CR TAKE 1 TABLET BY MOUTH EVERY DAY 90 Tablet 2    fluticasone furoate-vilanterol (BREO ELLIPTA) 100-25 MCG/ACT AEROSOL POWDER, BREATH ACTIVATED INHALE 1 PUFF EVERY DAY. NEEDS APT. FOR REFILLS 60 Each 11    estradiol (ESTRACE) 0.1 MG/GM vaginal cream PLEASE SEE ATTACHED FOR DETAILED DIRECTIONS      Naloxone (NARCAN) 4 MG/0.1ML Liquid USE AS DIRECTED      promethazine (PHENERGAN) 25 MG Tab Take 1 Tablet by mouth every 6 hours as needed.      hydrocortisone 2.5 % Cream topical cream APPLY TWICE A DAY TO THE AFFECTED AREA AS DIRECTED FOR 2 WEEKS      furosemide (LASIX) 20 MG Tab TAKE 1 TABLET BY MOUTH EVERY DAY 90 Tablet 3    promethazine (PHENERGAN) 25 MG Tab Take 1 Tablet by mouth every 6 hours as needed for Nausea/Vomiting. 90 Tablet 3    fenofibrate (TRIGLIDE) 160 MG tablet TAKE 1 TABLET BY MOUTH EVERY DAY 90 Tablet 3    lisinopril (PRINIVIL) 5 MG Tab Take 1 Tablet by mouth every day. 90 Tablet 3    levothyroxine (SYNTHROID) 112 MCG Tab Take 1 Tablet by mouth every morning on an empty stomach. 90 Tablet 3    atorvastatin (LIPITOR) 10 MG Tab Take 1 Tablet by mouth every day. 90 Tablet 2    ELIQUIS 5 MG Tab TAKE 1 TABLET BY MOUTH TWICE A  Tablet 1     pantoprazole (PROTONIX) 40 MG Tablet Delayed Response TAKE 1 TABLET BY MOUTH TWICE A  Tablet 2    HYDROcodone-acetaminophen (NORCO) 7.5-325 MG per tablet Take 1 Tablet by mouth every 8 hours as needed for Moderate Pain.      hydrOXYzine HCl (ATARAX) 25 MG Tab Take 25 mg by mouth at bedtime.      latanoprost (XALATAN) 0.005 % Solution Administer 1 Drop into both eyes every evening.      Mirabegron ER (MYRBETRIQ) 50 MG TABLET SR 24 HR Take 50 mg by mouth every day.      acetaminophen (TYLENOL) 500 MG TABS Take 500 mg by mouth 3 times a day as needed for Mild Pain. Indications: Pain       No current facility-administered medications on file prior to visit.       has a past medical history of Arthritis, ASTHMA, Body mass index 40.0-44.9, adult ([facility]) (4/27/2018), Chronic renal impairment, stage 3 (moderate) ([facility]) (5/13/2019), COPD (chronic obstructive pulmonary disease) with chronic bronchitis (4/20/2021), Dental disorder, Dysuria (10/18/2016), Fall, Generalized edema (4/27/2018), Glaucoma, Hay fever (4/19/2016), Heart burn, High cholesterol, UTI (urinary tract infection), Hyperglycemia (11/24/2015), Hypertension, Hypothyroidism, Indigestion, Ischemic bowel disease ([facility]) (9/9/2019), Migraines, Nausea (10/14/2019), Neuropathy ([facility]), Obesity (BMI 30-39.9) (7/20/2015), Pneumonia, Routine general medical examination at a health care facility (7/20/2015), Sleep apnea (12/2019), Snoring, Syncope (6/5/2020), Tobacco use (4/29/2019), URI (upper respiratory infection) (11/4/2015), and Urinary bladder disorder.     Patient Active Problem List   Diagnosis    Dementia ([facility])    Hypothyroidism    Peripheral neuropathy    Obesity (BMI 30-39.9)    Sleep apnea    Hyperlipidemia    Essential hypertension    Hyperglycemia    Generalized edema    Tobacco use    Acute kidney injury superimposed on chronic kidney disease ([facility])    Ischemic bowel disease ([facility])    Chronic midline low back pain without sciatica    Nonrheumatic aortic valve  "stenosis    Renal insufficiency syndrome    Urge incontinence of urine    Stage 3b chronic kidney disease (HCC)    Paroxysmal atrial fibrillation (HCC)    Other emphysema (HCC)    Secondary hypercoagulable state (HCC)    Aortic valve regurgitation    Venous insufficiency    Atrophic vaginitis        Allergies:Food and Neomycin-polymyxin b    Health Maintenance: deferred  Review of Systems -included above  Exam:   /50   Pulse 98   Temp 37.3 °C (99.2 °F)   Ht 1.575 m (5' 2\")   Wt 85.7 kg (189 lb)   SpO2 99%   Body mass index is 34.57 kg/m².   Physical Exam  Skin:     Findings: Rash present. Rash is urticarial.                    Assessment/Plan:  1. Urticarial rash  Sudden onset of itchy red hives x 1 day; continues to spread across her body today. Could have been from food yesterday but she is not sure. Treated with steroid IM today in clinic. She is to continue with QD Famotidine and QD antihistamine of choice at home for the next 5-7 days. May also use prn topical calamine all over body or prn hydrocortisone on smaller patches.     - triamcinolone acetonide (Kenalog-40) injection 40 mg     Follow up:  Return in about 1 week (around 2/6/2024), or if symptoms worsen or fail to improve.    Educated in proper administration of medication(s) ordered today including safety, possible SE, risks, benefits, rationale and alternatives to therapy.       Please note that this dictation was created using voice recognition software. I have made every reasonable attempt to correct obvious errors, but I expect that there are errors of grammar and possibly content that I did not discover before finalizing the note.      "

## 2024-01-31 ENCOUNTER — APPOINTMENT (OUTPATIENT)
Dept: NEPHROLOGY | Facility: MEDICAL CENTER | Age: 84
End: 2024-01-31
Payer: MEDICARE

## 2024-02-06 ENCOUNTER — APPOINTMENT (OUTPATIENT)
Dept: PHYSICAL THERAPY | Facility: REHABILITATION | Age: 84
End: 2024-02-06
Attending: STUDENT IN AN ORGANIZED HEALTH CARE EDUCATION/TRAINING PROGRAM
Payer: MEDICARE

## 2024-02-08 RX ORDER — FLUTICASONE FUROATE AND VILANTEROL 100; 25 UG/1; UG/1
POWDER RESPIRATORY (INHALATION)
Qty: 60 EACH | Refills: 6 | Status: SHIPPED | OUTPATIENT
Start: 2024-02-08

## 2024-02-11 DIAGNOSIS — I48.0 PAROXYSMAL ATRIAL FIBRILLATION (HCC): ICD-10-CM

## 2024-02-12 RX ORDER — APIXABAN 5 MG/1
5 TABLET, FILM COATED ORAL 2 TIMES DAILY
Qty: 180 TABLET | Refills: 2 | Status: SHIPPED | OUTPATIENT
Start: 2024-02-12

## 2024-02-13 ENCOUNTER — OFFICE VISIT (OUTPATIENT)
Dept: NEPHROLOGY | Facility: MEDICAL CENTER | Age: 84
End: 2024-02-13
Payer: MEDICARE

## 2024-02-13 ENCOUNTER — APPOINTMENT (OUTPATIENT)
Dept: PHYSICAL THERAPY | Facility: REHABILITATION | Age: 84
End: 2024-02-13
Attending: STUDENT IN AN ORGANIZED HEALTH CARE EDUCATION/TRAINING PROGRAM
Payer: MEDICARE

## 2024-02-13 VITALS
WEIGHT: 183 LBS | TEMPERATURE: 97.9 F | HEIGHT: 62 IN | HEART RATE: 83 BPM | BODY MASS INDEX: 33.68 KG/M2 | DIASTOLIC BLOOD PRESSURE: 68 MMHG | OXYGEN SATURATION: 99 % | SYSTOLIC BLOOD PRESSURE: 126 MMHG

## 2024-02-13 DIAGNOSIS — N18.32 STAGE 3B CHRONIC KIDNEY DISEASE: ICD-10-CM

## 2024-02-13 DIAGNOSIS — Z72.0 TOBACCO ABUSE: ICD-10-CM

## 2024-02-13 DIAGNOSIS — R60.9 EDEMA, UNSPECIFIED TYPE: ICD-10-CM

## 2024-02-13 DIAGNOSIS — Z71.6 TOBACCO ABUSE COUNSELING: ICD-10-CM

## 2024-02-13 DIAGNOSIS — I10 ESSENTIAL HYPERTENSION: ICD-10-CM

## 2024-02-13 PROCEDURE — 3078F DIAST BP <80 MM HG: CPT | Performed by: INTERNAL MEDICINE

## 2024-02-13 PROCEDURE — 99214 OFFICE O/P EST MOD 30 MIN: CPT | Performed by: INTERNAL MEDICINE

## 2024-02-13 PROCEDURE — 3074F SYST BP LT 130 MM HG: CPT | Performed by: INTERNAL MEDICINE

## 2024-02-13 ASSESSMENT — ENCOUNTER SYMPTOMS
COUGH: 0
HYPERTENSION: 1
FEVER: 0
SHORTNESS OF BREATH: 0
CHILLS: 0
VOMITING: 0
NAUSEA: 0

## 2024-02-13 ASSESSMENT — FIBROSIS 4 INDEX: FIB4 SCORE: 2.3

## 2024-02-13 NOTE — PROGRESS NOTES
"Ayesha Garcia is a 83 y.o. female who presents with Hypertension and Chronic Kidney Disease            Hypertension  This is a chronic problem. The current episode started more than 1 year ago. The problem is unchanged. The problem is controlled. Pertinent negatives include no chest pain, malaise/fatigue, peripheral edema or shortness of breath. Risk factors for coronary artery disease include post-menopausal state and smoking/tobacco exposure. Past treatments include ACE inhibitors and diuretics. The current treatment provides significant improvement. There are no compliance problems.  Hypertensive end-organ damage includes kidney disease. Identifiable causes of hypertension include chronic renal disease.   Chronic Kidney Disease  This is a chronic problem. The current episode started more than 1 year ago. The problem occurs constantly. The problem has been unchanged. Pertinent negatives include no chest pain, chills, coughing, fever, nausea, urinary symptoms or vomiting.       Review of Systems   Constitutional:  Negative for chills, fever and malaise/fatigue.   Respiratory:  Negative for cough and shortness of breath.    Cardiovascular:  Negative for chest pain and leg swelling.   Gastrointestinal:  Negative for nausea and vomiting.   Genitourinary:  Negative for dysuria, frequency and urgency.              Objective     /68 (BP Location: Right arm, Patient Position: Sitting, BP Cuff Size: Adult)   Pulse 83   Temp 36.6 °C (97.9 °F) (Temporal)   Ht 1.575 m (5' 2\")   Wt 83 kg (183 lb)   SpO2 99%   BMI 33.47 kg/m²      Physical Exam  Vitals and nursing note reviewed.   Constitutional:       General: She is not in acute distress.     Appearance: Normal appearance. She is well-developed. She is not diaphoretic.   HENT:      Head: Normocephalic and atraumatic.      Right Ear: External ear normal.      Left Ear: External ear normal.      Nose: Nose normal.   Eyes:      General: No scleral " icterus.        Right eye: No discharge.         Left eye: No discharge.      Conjunctiva/sclera: Conjunctivae normal.   Cardiovascular:      Rate and Rhythm: Normal rate and regular rhythm.      Heart sounds: No murmur heard.  Pulmonary:      Effort: Pulmonary effort is normal. No respiratory distress.      Breath sounds: Normal breath sounds.   Musculoskeletal:         General: No tenderness.      Right lower leg: No edema.      Left lower leg: No edema.   Skin:     General: Skin is warm and dry.      Findings: No erythema.   Neurological:      General: No focal deficit present.      Mental Status: She is alert and oriented to person, place, and time.      Cranial Nerves: No cranial nerve deficit.   Psychiatric:         Mood and Affect: Mood normal.         Behavior: Behavior normal.       Past Medical History:   Diagnosis Date    Arthritis     knees, and hips-osteo    ASTHMA     Body mass index 40.0-44.9, adult ([facility]) 4/27/2018    Chronic renal impairment, stage 3 (moderate) ([facility]) 5/13/2019    COPD (chronic obstructive pulmonary disease) with chronic bronchitis 4/20/2021    Dental disorder     upper    Dysuria 10/18/2016    Fall     Generalized edema 4/27/2018    Glaucoma     bilateral    Hay fever 4/19/2016    Heart burn     High cholesterol     Hx: UTI (urinary tract infection)     Hyperglycemia 11/24/2015    Hypertension     Hypothyroidism     Indigestion     Ischemic bowel disease ([facility]) 9/9/2019    Migraines     pt has similar symptoms with migraines not for a long time though    Nausea 10/14/2019    Neuropathy ([facility])     Obesity (BMI 30-39.9) 7/20/2015    Pneumonia     Routine general medical examination at a health care facility 7/20/2015    7/20/15    Sleep apnea 12/2019    Had sleep study, didn't tolerate cpap    Snoring     Syncope 6/5/2020    Tobacco use 4/29/2019    URI (upper respiratory infection) 11/4/2015    Urinary bladder disorder     hx of uti's     Social History     Socioeconomic History     Marital status: Single     Spouse name: Not on file    Number of children: Not on file    Years of education: Not on file    Highest education level: 12th grade   Occupational History    Not on file   Tobacco Use    Smoking status: Every Day     Current packs/day: 1.00     Average packs/day: 1 pack/day for 56.0 years (56.0 ttl pk-yrs)     Types: Cigarettes    Smokeless tobacco: Never    Tobacco comments:     started smoking at age 12, little mor than half a pack   Vaping Use    Vaping Use: Never used   Substance and Sexual Activity    Alcohol use: No    Drug use: No    Sexual activity: Never     Partners: Male     Birth control/protection: Post-Menopausal   Other Topics Concern    Not on file   Social History Narrative    Not on file     Social Determinants of Health     Financial Resource Strain: Medium Risk (12/7/2023)    Overall Financial Resource Strain (CARDIA)     Difficulty of Paying Living Expenses: Somewhat hard   Food Insecurity: No Food Insecurity (12/7/2023)    Hunger Vital Sign     Worried About Running Out of Food in the Last Year: Never true     Ran Out of Food in the Last Year: Never true   Transportation Needs: No Transportation Needs (12/7/2023)    PRAPARE - Transportation     Lack of Transportation (Medical): No     Lack of Transportation (Non-Medical): No   Physical Activity: Inactive (12/7/2023)    Exercise Vital Sign     Days of Exercise per Week: 0 days     Minutes of Exercise per Session: 0 min   Stress: No Stress Concern Present (12/7/2023)    [employer] [place] [employer] - Occupational Stress Questionnaire     Feeling of Stress : Only a little   Social Connections: Socially Isolated (12/7/2023)    Social Connection and Isolation Panel [NHANES]     Frequency of Communication with Friends and Family: More than three times a week     Frequency of Social Gatherings with Friends and Family: More than three times a week     Attends Muslim Services: Never     Active Member of Clubs  or Organizations: No     Attends Club or Organization Meetings: Never     Marital Status:    Intimate Partner Violence: Not on file   Housing Stability: Low Risk  (12/7/2023)    Housing Stability Vital Sign     Unable to Pay for Housing in the Last Year: No     Number of Places Lived in the Last Year: 1     Unstable Housing in the Last Year: No     Family History   Problem Relation Age of Onset    Stroke Mother     Hyperlipidemia Mother     Hypertension Mother     Arthritis Mother     Diabetes Father     Lung Disease Father         pneumonia    Arthritis Sister     Rheumatologic Disease Sister     Hypertension Sister     Hyperlipidemia Sister     Other Sister         Anusha/Fibermyalgia    Hyperlipidemia Brother     Hypertension Brother     Arthritis Brother     Lung Disease Maternal Grandmother         pneumonia    Stroke Maternal Grandfather     Cancer Maternal Grandfather         skin     No Known Problems Paternal Grandmother     No Known Problems Paternal Grandfather     Hyperlipidemia Daughter     Diabetes Daughter     No Known Problems Son      Recent Labs     08/15/23  1130 10/17/23  1219 01/23/24  1306   SODIUM 141 140 144   POTASSIUM 4.1 3.9 3.8   CHLORIDE 108 106 109   CO2 20 20 19*   BUN 27* 20 17   CREATININE 1.41* 1.42* 1.27                             Assessment & Plan        1. Stage 3b chronic kidney disease (HCC)  Stable  No uremic symptoms  Renal dose of medication  Avoid nephrotoxins  Continue same medication regimen  Patient was advised to call us if symptoms worsen      2. Essential hypertension  Controlled  Continue same medication regimen  Continue low-sodium diet      3. Edema, unspecified type  Improved  Continue low-sodium diet  Furosemide on as-needed basis    4. Tobacco abuse    5. Tobacco abuse counseling  I spent 3 minutes discussing the need for smoking/tobacco cessation. We discussed measures for quitting including replacements such as nicotine gum/nicotine patch

## 2024-02-16 ENCOUNTER — PATIENT MESSAGE (OUTPATIENT)
Dept: HEALTH INFORMATION MANAGEMENT | Facility: OTHER | Age: 84
End: 2024-02-16

## 2024-02-20 ENCOUNTER — APPOINTMENT (OUTPATIENT)
Dept: PHYSICAL THERAPY | Facility: REHABILITATION | Age: 84
End: 2024-02-20
Attending: STUDENT IN AN ORGANIZED HEALTH CARE EDUCATION/TRAINING PROGRAM
Payer: MEDICARE

## 2024-03-16 ENCOUNTER — HOSPITAL ENCOUNTER (OUTPATIENT)
Facility: MEDICAL CENTER | Age: 84
End: 2024-03-16
Attending: PHYSICIAN ASSISTANT
Payer: MEDICARE

## 2024-03-16 ENCOUNTER — OFFICE VISIT (OUTPATIENT)
Dept: URGENT CARE | Facility: PHYSICIAN GROUP | Age: 84
End: 2024-03-16
Payer: MEDICARE

## 2024-03-16 VITALS
RESPIRATION RATE: 16 BRPM | DIASTOLIC BLOOD PRESSURE: 88 MMHG | HEIGHT: 62 IN | BODY MASS INDEX: 33.13 KG/M2 | HEART RATE: 84 BPM | TEMPERATURE: 97.1 F | WEIGHT: 180 LBS | OXYGEN SATURATION: 96 % | SYSTOLIC BLOOD PRESSURE: 126 MMHG

## 2024-03-16 DIAGNOSIS — R41.82 ALTERED MENTAL STATUS, UNSPECIFIED ALTERED MENTAL STATUS TYPE: ICD-10-CM

## 2024-03-16 DIAGNOSIS — R94.4 DECREASED GFR: ICD-10-CM

## 2024-03-16 DIAGNOSIS — M54.50 ACUTE BILATERAL LOW BACK PAIN WITHOUT SCIATICA: ICD-10-CM

## 2024-03-16 DIAGNOSIS — Z87.440 HISTORY OF URINARY TRACT INFECTION: ICD-10-CM

## 2024-03-16 PROCEDURE — 87186 SC STD MICRODIL/AGAR DIL: CPT

## 2024-03-16 PROCEDURE — 3079F DIAST BP 80-89 MM HG: CPT | Performed by: PHYSICIAN ASSISTANT

## 2024-03-16 PROCEDURE — 99213 OFFICE O/P EST LOW 20 MIN: CPT | Performed by: PHYSICIAN ASSISTANT

## 2024-03-16 PROCEDURE — 3074F SYST BP LT 130 MM HG: CPT | Performed by: PHYSICIAN ASSISTANT

## 2024-03-16 PROCEDURE — 87077 CULTURE AEROBIC IDENTIFY: CPT

## 2024-03-16 PROCEDURE — 87086 URINE CULTURE/COLONY COUNT: CPT

## 2024-03-16 RX ORDER — CEFADROXIL 500 MG/1
500 CAPSULE ORAL DAILY
Qty: 5 CAPSULE | Refills: 0 | Status: SHIPPED | OUTPATIENT
Start: 2024-03-16 | End: 2024-03-21

## 2024-03-16 ASSESSMENT — ENCOUNTER SYMPTOMS
HEADACHES: 0
CARDIOVASCULAR NEGATIVE: 1
COUGH: 0
EYES NEGATIVE: 1
PSYCHIATRIC NEGATIVE: 1
MYALGIAS: 0
NEUROLOGICAL NEGATIVE: 1
CHILLS: 0
SORE THROAT: 0
ABDOMINAL PAIN: 0

## 2024-03-16 ASSESSMENT — FIBROSIS 4 INDEX: FIB4 SCORE: 2.3

## 2024-03-16 NOTE — PROGRESS NOTES
Chief Complaint   Patient presents with    Back Pain     Lower back px        HISTORY OF PRESENT ILLNESS: Patient is a 83 y.o. female who presents today because low back pain which she likens to possible urinary tract infection.  She has had previous urine culture which shows sulfa antibiotic resistance as well as penicillin.  She presents with her daughter who provides most of her history.  Ella has a history of altered mental status due to urinary tract infections and has improved with empiric therapy in the past.  She has been describing low back pain, but no n fever or chills or dysuria.  She does report nausea which is new.    Patient Active Problem List    Diagnosis Date Noted    Aortic valve regurgitation 09/21/2023    Venous insufficiency 09/21/2023    Atrophic vaginitis 05/08/2023    Secondary hypercoagulable state ([facility]) 09/20/2022    Other emphysema ([facility]) 08/02/2021    Paroxysmal atrial fibrillation ([facility]) 07/30/2021    Stage 3b chronic kidney disease ([facility]) 07/23/2021    Renal insufficiency syndrome 02/18/2021    Urge incontinence of urine 12/13/2020    Nonrheumatic aortic valve stenosis 09/16/2020    Chronic midline low back pain without sciatica 09/02/2020    Ischemic bowel disease ([facility]) 09/09/2019    Acute kidney injury superimposed on chronic kidney disease ([facility]) 05/13/2019    Tobacco use 04/29/2019    Generalized edema 04/27/2018    Hyperlipidemia 11/24/2015    Essential hypertension 11/24/2015    Hyperglycemia 11/24/2015    Obesity (BMI 30-39.9) 07/20/2015    Sleep apnea 07/20/2015    Dementia ([facility]) 05/29/2011    Hypothyroidism 05/29/2011    Peripheral neuropathy 05/29/2011       Allergies:Food and Neomycin-polymyxin b    Current Outpatient Medications Ordered in Epic   Medication Sig Dispense Refill    ELIQUIS 5 MG Tab TAKE 1 TABLET BY MOUTH TWICE A  Tablet 2    fluticasone furoate-vilanterol (BREO ELLIPTA) 100-25 MCG/ACT AEROSOL POWDER, BREATH ACTIVATED INHALE 1 PUFF EVERY DAY. NEEDS APT.  FOR REFILLS 60 Each 6    potassium chloride SA (K-DUR) 10 MEQ Tab CR TAKE 1 TABLET BY MOUTH EVERY DAY 90 Tablet 2    estradiol (ESTRACE) 0.1 MG/GM vaginal cream PLEASE SEE ATTACHED FOR DETAILED DIRECTIONS      Naloxone (NARCAN) 4 MG/0.1ML Liquid USE AS DIRECTED      hydrocortisone 2.5 % Cream topical cream APPLY TWICE A DAY TO THE AFFECTED AREA AS DIRECTED FOR 2 WEEKS      furosemide (LASIX) 20 MG Tab TAKE 1 TABLET BY MOUTH EVERY DAY 90 Tablet 3    promethazine (PHENERGAN) 25 MG Tab Take 1 Tablet by mouth every 6 hours as needed for Nausea/Vomiting. 90 Tablet 3    fenofibrate (TRIGLIDE) 160 MG tablet TAKE 1 TABLET BY MOUTH EVERY DAY 90 Tablet 3    lisinopril (PRINIVIL) 5 MG Tab Take 1 Tablet by mouth every day. 90 Tablet 3    levothyroxine (SYNTHROID) 112 MCG Tab Take 1 Tablet by mouth every morning on an empty stomach. 90 Tablet 3    atorvastatin (LIPITOR) 10 MG Tab Take 1 Tablet by mouth every day. 90 Tablet 2    pantoprazole (PROTONIX) 40 MG Tablet Delayed Response TAKE 1 TABLET BY MOUTH TWICE A  Tablet 2    HYDROcodone-acetaminophen (NORCO) 7.5-325 MG per tablet Take 1 Tablet by mouth every 8 hours as needed for Moderate Pain.      hydrOXYzine HCl (ATARAX) 25 MG Tab Take 25 mg by mouth at bedtime.      latanoprost (XALATAN) 0.005 % Solution Administer 1 Drop into both eyes every evening.      Mirabegron ER (MYRBETRIQ) 50 MG TABLET SR 24 HR Take 50 mg by mouth every day.      acetaminophen (TYLENOL) 500 MG TABS Take 500 mg by mouth 3 times a day as needed for Mild Pain. Indications: Pain       No current Epic-ordered facility-administered medications on file.       Past Medical History:   Diagnosis Date    Arthritis     knees, and hips-osteo    ASTHMA     Body mass index 40.0-44.9, adult ([facility]) 4/27/2018    Chronic renal impairment, stage 3 (moderate) ([facility]) 5/13/2019    COPD (chronic obstructive pulmonary disease) with chronic bronchitis 4/20/2021    Dental disorder     upper    Dysuria 10/18/2016    Fall      Generalized edema 2018    Glaucoma     bilateral    Hay fever 2016    Heart burn     High cholesterol     Hx: UTI (urinary tract infection)     Hyperglycemia 2015    Hypertension     Hypothyroidism     Indigestion     Ischemic bowel disease (HCC) 2019    Migraines     pt has similar symptoms with migraines not for a long time though    Nausea 10/14/2019    Neuropathy (HCC)     Obesity (BMI 30-39.9) 2015    Pneumonia     Routine general medical examination at a health care facility 2015    7/20/15    Sleep apnea 2019    Had sleep study, didn't tolerate cpap    Snoring     Syncope 2020    Tobacco use 2019    URI (upper respiratory infection) 2015    Urinary bladder disorder     hx of uti's       Social History     Tobacco Use    Smoking status: Every Day     Current packs/day: 1.00     Average packs/day: 1 pack/day for 56.0 years (56.0 ttl pk-yrs)     Types: Cigarettes    Smokeless tobacco: Never    Tobacco comments:     started smoking at age 12, little mor than half a pack   Vaping Use    Vaping Use: Never used   Substance Use Topics    Alcohol use: No    Drug use: No       Family Status   Relation Name Status    Mo 87  at age 87        CVA    Fa mid 60  at age 70        pneumonia    Sis Neil 72 Alive    Dhruv Coats 76 Alive    MGMo 78  at age 78        pneumonia    MGFa 66  at age 66    PGMo      PGFa      Jennifer Madrigal 62 Alive    Jennifer butler 52 Alive    Son Arjun 59 Alive     Family History   Problem Relation Age of Onset    Stroke Mother     Hyperlipidemia Mother     Hypertension Mother     Arthritis Mother     Diabetes Father     Lung Disease Father         pneumonia    Arthritis Sister     Rheumatologic Disease Sister     Hypertension Sister     Hyperlipidemia Sister     Other Sister         Anusha/Fibermyalgia    Hyperlipidemia Brother     Hypertension Brother     Arthritis Brother     Lung Disease Maternal  "Grandmother         pneumonia    Stroke Maternal Grandfather     Cancer Maternal Grandfather         skin     No Known Problems Paternal Grandmother     No Known Problems Paternal Grandfather     Hyperlipidemia Daughter     Diabetes Daughter     No Known Problems Son        Review of Systems   Constitutional:  Negative for chills and malaise/fatigue.   HENT:  Negative for congestion and sore throat.    Eyes: Negative.    Respiratory:  Negative for cough.    Cardiovascular: Negative.    Gastrointestinal:  Negative for abdominal pain.   Genitourinary:  Positive for dysuria, frequency and urgency.   Musculoskeletal:  Negative for joint pain and myalgias.   Skin: Negative.    Neurological: Negative.  Negative for headaches.   Endo/Heme/Allergies: Negative.    Psychiatric/Behavioral: Negative.        Exam:  /88   Pulse 84   Temp 36.2 °C (97.1 °F) (Temporal)   Resp 16   Ht 1.575 m (5' 2\")   Wt 81.6 kg (180 lb)   SpO2 96%     Constitutional:   Appropriately groomed, pleasant affect, well nourished, in NAD.    Head:   Normocephalic, atraumatic.    Eyes:   EOM's full, sclera white, conjunctiva not erythematous, and medial canthus without exudate bilaterally.    Throat:  Dentition wnl, mucosa moist without lesions.      Lungs:  Respiratory effort not labored without accessory muscle use.      Abdominal:  Abdomen soft to palpation with mild suprapubic ttp.  No masses or hepatosplenomegaly.  No CVA tenderness bilaterally to percussion.    Musculoskeletal:  Gait nonantalgic with a narrow base.    Derm:  Skin without rashes or lesions with good turgor pressure.      Psychiatric:  Mood, affect, and judgement appropriate.        Please note that this dictation was created using voice recognition software. I have made every reasonable attempt to correct obvious errors, but I expect that there are errors of grammar and possibly content that I did not discover before finalizing the note.    Assessment/Plan:  1. Acute " bilateral low back pain without sciatica  cefadroxil (DURICEF) 500 MG capsule      2. History of urinary tract infection  cefadroxil (DURICEF) 500 MG capsule      3. Altered mental status, unspecified altered mental status type  cefadroxil (DURICEF) 500 MG capsule      4. Decreased GFR  cefadroxil (DURICEF) 500 MG capsule        Patient presents with concern for urinary tract infection given history of altered mental status secondary to infection.  We reviewed her urinalysis which was essentially normal.  I did recommend culture and empiric therapy with cephalosporin.  We plan to treat with cefadroxil 500 mg once daily due to decreased GFR.    Instructed to return to Urgent Care or nearest Emergency Department if symptoms fail to improve, for any change in condition, further concerns, or new concerning symptoms. Patient states understanding of the plan of care and discharge instructions.    Hernandez Cunningham PA-C

## 2024-03-17 DIAGNOSIS — R41.82 ALTERED MENTAL STATUS, UNSPECIFIED ALTERED MENTAL STATUS TYPE: ICD-10-CM

## 2024-03-17 DIAGNOSIS — Z87.440 HISTORY OF URINARY TRACT INFECTION: ICD-10-CM

## 2024-03-17 DIAGNOSIS — M54.50 ACUTE BILATERAL LOW BACK PAIN WITHOUT SCIATICA: ICD-10-CM

## 2024-03-17 DIAGNOSIS — R94.4 DECREASED GFR: ICD-10-CM

## 2024-04-01 ENCOUNTER — OFFICE VISIT (OUTPATIENT)
Dept: URGENT CARE | Facility: PHYSICIAN GROUP | Age: 84
End: 2024-04-01
Payer: MEDICARE

## 2024-04-01 ENCOUNTER — OFFICE VISIT (OUTPATIENT)
Dept: CARDIOLOGY | Facility: MEDICAL CENTER | Age: 84
End: 2024-04-01
Attending: INTERNAL MEDICINE
Payer: MEDICARE

## 2024-04-01 ENCOUNTER — HOSPITAL ENCOUNTER (OUTPATIENT)
Facility: MEDICAL CENTER | Age: 84
End: 2024-04-01
Attending: PHYSICIAN ASSISTANT
Payer: MEDICARE

## 2024-04-01 VITALS
DIASTOLIC BLOOD PRESSURE: 60 MMHG | OXYGEN SATURATION: 96 % | WEIGHT: 180.3 LBS | HEART RATE: 84 BPM | HEIGHT: 62 IN | RESPIRATION RATE: 16 BRPM | SYSTOLIC BLOOD PRESSURE: 118 MMHG | BODY MASS INDEX: 33.18 KG/M2

## 2024-04-01 VITALS
OXYGEN SATURATION: 97 % | RESPIRATION RATE: 14 BRPM | SYSTOLIC BLOOD PRESSURE: 122 MMHG | TEMPERATURE: 97.6 F | HEART RATE: 78 BPM | DIASTOLIC BLOOD PRESSURE: 58 MMHG | BODY MASS INDEX: 33.13 KG/M2 | WEIGHT: 180 LBS | HEIGHT: 62 IN

## 2024-04-01 DIAGNOSIS — K55.9 ISCHEMIC BOWEL DISEASE (HCC): Chronic | ICD-10-CM

## 2024-04-01 DIAGNOSIS — I35.1 AORTIC VALVE INSUFFICIENCY, ETIOLOGY OF CARDIAC VALVE DISEASE UNSPECIFIED: ICD-10-CM

## 2024-04-01 DIAGNOSIS — N30.00 ACUTE CYSTITIS WITHOUT HEMATURIA: ICD-10-CM

## 2024-04-01 DIAGNOSIS — E78.2 MIXED HYPERLIPIDEMIA: ICD-10-CM

## 2024-04-01 DIAGNOSIS — I10 ESSENTIAL HYPERTENSION: ICD-10-CM

## 2024-04-01 DIAGNOSIS — R30.0 DYSURIA: ICD-10-CM

## 2024-04-01 DIAGNOSIS — D68.69 SECONDARY HYPERCOAGULABLE STATE (HCC): ICD-10-CM

## 2024-04-01 DIAGNOSIS — M54.50 ACUTE BILATERAL LOW BACK PAIN WITHOUT SCIATICA: ICD-10-CM

## 2024-04-01 DIAGNOSIS — I48.0 PAROXYSMAL ATRIAL FIBRILLATION (HCC): Chronic | ICD-10-CM

## 2024-04-01 DIAGNOSIS — I87.2 VENOUS INSUFFICIENCY: ICD-10-CM

## 2024-04-01 LAB
APPEARANCE UR: CLEAR
BILIRUB UR STRIP-MCNC: NEGATIVE MG/DL
COLOR UR AUTO: YELLOW
GLUCOSE UR STRIP.AUTO-MCNC: NEGATIVE MG/DL
KETONES UR STRIP.AUTO-MCNC: NEGATIVE MG/DL
LEUKOCYTE ESTERASE UR QL STRIP.AUTO: NEGATIVE
NITRITE UR QL STRIP.AUTO: NEGATIVE
PH UR STRIP.AUTO: 5.5 [PH] (ref 5–8)
PROT UR QL STRIP: NEGATIVE MG/DL
RBC UR QL AUTO: NORMAL
SP GR UR STRIP.AUTO: 1.01
UROBILINOGEN UR STRIP-MCNC: 0.2 MG/DL

## 2024-04-01 PROCEDURE — 3078F DIAST BP <80 MM HG: CPT | Performed by: PHYSICIAN ASSISTANT

## 2024-04-01 PROCEDURE — 3074F SYST BP LT 130 MM HG: CPT | Performed by: INTERNAL MEDICINE

## 2024-04-01 PROCEDURE — 3074F SYST BP LT 130 MM HG: CPT | Performed by: PHYSICIAN ASSISTANT

## 2024-04-01 PROCEDURE — 99213 OFFICE O/P EST LOW 20 MIN: CPT | Performed by: INTERNAL MEDICINE

## 2024-04-01 PROCEDURE — 3078F DIAST BP <80 MM HG: CPT | Performed by: INTERNAL MEDICINE

## 2024-04-01 PROCEDURE — 87086 URINE CULTURE/COLONY COUNT: CPT

## 2024-04-01 PROCEDURE — 81002 URINALYSIS NONAUTO W/O SCOPE: CPT | Performed by: PHYSICIAN ASSISTANT

## 2024-04-01 PROCEDURE — 99214 OFFICE O/P EST MOD 30 MIN: CPT | Performed by: INTERNAL MEDICINE

## 2024-04-01 PROCEDURE — 99214 OFFICE O/P EST MOD 30 MIN: CPT | Performed by: PHYSICIAN ASSISTANT

## 2024-04-01 RX ORDER — CEFDINIR 300 MG/1
300 CAPSULE ORAL 2 TIMES DAILY
Qty: 10 CAPSULE | Refills: 0 | Status: SHIPPED | OUTPATIENT
Start: 2024-04-01 | End: 2024-04-06

## 2024-04-01 ASSESSMENT — ENCOUNTER SYMPTOMS
DIZZINESS: 0
DIARRHEA: 0
HEARTBURN: 0
ORTHOPNEA: 0
NEAR-SYNCOPE: 0
PND: 0
BACK PAIN: 0
SHORTNESS OF BREATH: 0
WEIGHT GAIN: 0
FLANK PAIN: 0
SYNCOPE: 0
IRREGULAR HEARTBEAT: 0
DYSPNEA ON EXERTION: 0
WEIGHT LOSS: 0
ALTERED MENTAL STATUS: 0
DECREASED APPETITE: 0
CLAUDICATION: 0
FEVER: 0
CONSTIPATION: 0
BACK PAIN: 1
FLANK PAIN: 0
CHILLS: 0
ABDOMINAL PAIN: 0
COUGH: 0
NAUSEA: 0
DEPRESSION: 0
BLURRED VISION: 0
FEVER: 0
PALPITATIONS: 0
VOMITING: 0

## 2024-04-01 ASSESSMENT — FIBROSIS 4 INDEX
FIB4 SCORE: 2.3
FIB4 SCORE: 2.3

## 2024-04-01 NOTE — PROGRESS NOTES
Subjective:   Margaret Garcia is a 83 y.o. female who presents today with   Chief Complaint   Patient presents with    UTI     X 4 days, lower back pain       UTI  This is a new problem. Episode onset: 4 days. The problem occurs constantly. The problem has been unchanged. Associated symptoms include urinary symptoms (frequency). Pertinent negatives include no chills or fever. She has tried nothing for the symptoms.     Patient was seen on March 16 and prescribed appropriate antibiotics at that time for urinary tract infection based on urine culture that was positive for E. coli.  Patient's granddaughter is present with her today.  Patient states symptoms feel similar to when she had UTI couple weeks ago.  Had close follow-up with kidney specialist recently.  Kidney function has been stable.    PMH:  has a past medical history of Arthritis, ASTHMA, Body mass index 40.0-44.9, adult ([facility]) (4/27/2018), Chronic renal impairment, stage 3 (moderate) (5/13/2019), COPD (chronic obstructive pulmonary disease) with chronic bronchitis ([facility]) (4/20/2021), Dental disorder, Dysuria (10/18/2016), Fall, Generalized edema (4/27/2018), Glaucoma, Hay fever (4/19/2016), Heart burn, High cholesterol, UTI (urinary tract infection), Hyperglycemia (11/24/2015), Hypertension, Hypothyroidism, Indigestion, Ischemic bowel disease ([facility]) (9/9/2019), Migraines, Nausea (10/14/2019), Neuropathy ([facility]), Obesity (BMI 30-39.9) (7/20/2015), Pneumonia, Routine general medical examination at a health care facility (7/20/2015), Sleep apnea (12/2019), Snoring, Syncope (6/5/2020), Tobacco use (4/29/2019), URI (upper respiratory infection) (11/4/2015), and Urinary bladder disorder.  MEDS:   Current Outpatient Medications:     cefdinir (OMNICEF) 300 MG Cap, Take 1 Capsule by mouth 2 times a day for 5 days., Disp: 10 Capsule, Rfl: 0    ELIQUIS 5 MG Tab, TAKE 1 TABLET BY MOUTH TWICE A DAY, Disp: 180 Tablet, Rfl: 2    fluticasone furoate-vilanterol (BREO  ELLIPTA) 100-25 MCG/ACT AEROSOL POWDER, BREATH ACTIVATED, INHALE 1 PUFF EVERY DAY. NEEDS APT. FOR REFILLS, Disp: 60 Each, Rfl: 6    potassium chloride SA (K-DUR) 10 MEQ Tab CR, TAKE 1 TABLET BY MOUTH EVERY DAY, Disp: 90 Tablet, Rfl: 2    estradiol (ESTRACE) 0.1 MG/GM vaginal cream, PLEASE SEE ATTACHED FOR DETAILED DIRECTIONS, Disp: , Rfl:     Naloxone (NARCAN) 4 MG/0.1ML Liquid, USE AS DIRECTED, Disp: , Rfl:     furosemide (LASIX) 20 MG Tab, TAKE 1 TABLET BY MOUTH EVERY DAY, Disp: 90 Tablet, Rfl: 3    promethazine (PHENERGAN) 25 MG Tab, Take 1 Tablet by mouth every 6 hours as needed for Nausea/Vomiting., Disp: 90 Tablet, Rfl: 3    fenofibrate (TRIGLIDE) 160 MG tablet, TAKE 1 TABLET BY MOUTH EVERY DAY, Disp: 90 Tablet, Rfl: 3    lisinopril (PRINIVIL) 5 MG Tab, Take 1 Tablet by mouth every day., Disp: 90 Tablet, Rfl: 3    levothyroxine (SYNTHROID) 112 MCG Tab, Take 1 Tablet by mouth every morning on an empty stomach., Disp: 90 Tablet, Rfl: 3    atorvastatin (LIPITOR) 10 MG Tab, Take 1 Tablet by mouth every day., Disp: 90 Tablet, Rfl: 2    pantoprazole (PROTONIX) 40 MG Tablet Delayed Response, TAKE 1 TABLET BY MOUTH TWICE A DAY, Disp: 180 Tablet, Rfl: 2    HYDROcodone-acetaminophen (NORCO) 7.5-325 MG per tablet, Take 1 Tablet by mouth every 8 hours as needed for Moderate Pain., Disp: , Rfl:     hydrOXYzine HCl (ATARAX) 25 MG Tab, Take 25 mg by mouth at bedtime., Disp: , Rfl:     latanoprost (XALATAN) 0.005 % Solution, Administer 1 Drop into both eyes every evening., Disp: , Rfl:     Mirabegron ER (MYRBETRIQ) 50 MG TABLET SR 24 HR, Take 50 mg by mouth every day., Disp: , Rfl:     acetaminophen (TYLENOL) 500 MG TABS, Take 500 mg by mouth 3 times a day as needed for Mild Pain. Indications: Pain, Disp: , Rfl:   ALLERGIES:   Allergies   Allergen Reactions    Food Hives and Nausea     Oranges, Hives    Neomycin-Polymyxin B Rash     Rash     SURGHX:   Past Surgical History:   Procedure Laterality Date    GASTROSCOPY   "12/13/2019    Procedure: GASTROSCOPY;  Surgeon: Ang Angel M.D.;  Location: SURGERY [facility];  Service: Gastroenterology    COLONOSCOPY  12/13/2019    Procedure: COLONOSCOPY;  Surgeon: Ang Angel M.D.;  Location: SURGERY [facility];  Service: Gastroenterology    NIRU BY LAPAROSCOPY  9/2/2011    Performed by KAYLEIGH PORRAS at SURGERY [facility]    LUMBAR DECOMPRESSION  6/29/2009    Performed by ANG YAN at SURGERY [facility]    LUMBAR LAMINECTOMY DISKECTOMY  6/29/2009    Performed by ANG YAN at SURGERY [facility]S    ABDOMINAL HYSTERECTOMY TOTAL      GYN SURGERY      hysterectomy    HEMORRHOIDECTOMY       SOCHX:  reports that she has been smoking cigarettes. She has a 56 pack-year smoking history. She has never used smokeless tobacco. She reports that she does not drink alcohol and does not use drugs.  FH: Reviewed with patient, not pertinent to this visit.       Review of Systems   Constitutional:  Negative for chills and fever.   Genitourinary:  Positive for frequency. Negative for dysuria, flank pain, hematuria and urgency.   Musculoskeletal:  Positive for back pain.        Objective:   /58 (BP Location: Left arm, Patient Position: Sitting, BP Cuff Size: Large adult)   Pulse 78   Temp 36.4 °C (97.6 °F) (Temporal)   Resp 14   Ht 1.57 m (5' 1.8\")   Wt 81.6 kg (180 lb)   SpO2 97%   BMI 33.14 kg/m²   Physical Exam  Vitals and nursing note reviewed.   Constitutional:       General: She is not in acute distress.     Appearance: Normal appearance. She is well-developed. She is not ill-appearing or toxic-appearing.   HENT:      Head: Normocephalic and atraumatic.      Right Ear: Hearing normal.      Left Ear: Hearing normal.   Cardiovascular:      Rate and Rhythm: Normal rate.   Pulmonary:      Effort: Pulmonary effort is normal.   Abdominal:      Tenderness: There is abdominal tenderness in the suprapubic area. There is no right CVA tenderness or " left CVA tenderness.   Genitourinary:     Comments: Patient deferred  exam  Musculoskeletal:      Comments: Normal movement in all 4 extremities.  Ambulating with walker at baseline.  No midline spinous process tenderness noted.  Slight tenderness to the lumbar area bilaterally.  No step-off deformity.   Skin:     General: Skin is warm and dry.   Neurological:      Mental Status: She is alert.      Coordination: Coordination normal.   Psychiatric:         Mood and Affect: Mood normal.       UA + blood    Assessment/Plan:   Assessment    1. Dysuria  - POCT Urinalysis    2. Acute bilateral low back pain without sciatica    3. Acute cystitis without hematuria  - URINE CULTURE(NEW); Future  - cefdinir (OMNICEF) 300 MG Cap; Take 1 Capsule by mouth 2 times a day for 5 days.  Dispense: 10 Capsule; Refill: 0      Discussed with patient and her granddaughter that urinalysis similar to last time did not show any concerning findings of infection besides slight blood in urine but given that urine culture was positive at her last visit with similar symptoms we can go ahead and start on antibiotics and this is what they are requesting today as well.  Patient's granddaughter states they usually do twice a day for 5 to 7 days.  Discussed with them that it could be her lower back chronic pain that could be causing the symptoms as well and if urine culture comes back negative I would recommend discontinuing antibiotics at that time.    Most recent GFR reviewed from 2 months ago at 41.  Creatinine clearance calculated at 42.  According to up-to-date guidelines given that her creatinine clearance was greater than 30 no dose adjustment needed.    Please note that this dictation was created using voice recognition software. I have made every reasonable attempt to correct obvious errors, but I expect that there are errors of grammar and possibly content that I did not discover before finalizing the note.    Sam Vera PA-C

## 2024-04-01 NOTE — PROGRESS NOTES
Cardiology Note    Chief Complaint   Patient presents with    Hypertension     F/v dx: Essential hypertension         History of Present Illness: Margaret Garcia is a 83 y.o. female PMH COPD, SHABANA, paroxysmal AF, HTN, aortic stenosis who presents for follow up.    Doing well this visit. Leg swelling from venous insufficiency controlled with compression stockings and diuretics. She is following lymphedema clinic she says. No other cardiac complaints. Home vitals controlled. Compliant with medications and denies adverse effects.       Review of Systems   Constitutional: Negative for decreased appetite, fever, malaise/fatigue, weight gain and weight loss.   HENT:  Negative for congestion and nosebleeds.    Eyes:  Negative for blurred vision.   Cardiovascular:  Negative for chest pain, claudication, dyspnea on exertion, irregular heartbeat, leg swelling, near-syncope, orthopnea, palpitations, paroxysmal nocturnal dyspnea and syncope.   Respiratory:  Negative for cough and shortness of breath.    Endocrine: Negative for cold intolerance and heat intolerance.   Skin:  Negative for rash.   Musculoskeletal:  Negative for back pain.   Gastrointestinal:  Negative for abdominal pain, constipation, diarrhea, heartburn, melena, nausea and vomiting.   Genitourinary:  Negative for dysuria, flank pain and hematuria.   Neurological:  Negative for dizziness.   Psychiatric/Behavioral:  Negative for altered mental status and depression.          Past Medical History:   Diagnosis Date    Arthritis     knees, and hips-osteo    ASTHMA     Body mass index 40.0-44.9, adult ([facility]) 4/27/2018    Chronic renal impairment, stage 3 (moderate) 5/13/2019    COPD (chronic obstructive pulmonary disease) with chronic bronchitis ([facility]) 4/20/2021    Dental disorder     upper    Dysuria 10/18/2016    Fall     Generalized edema 4/27/2018    Glaucoma     bilateral    Hay fever 4/19/2016    Heart burn     High cholesterol     Hx: UTI (urinary tract  infection)     Hyperglycemia 11/24/2015    Hypertension     Hypothyroidism     Indigestion     Ischemic bowel disease (HCC) 9/9/2019    Migraines     pt has similar symptoms with migraines not for a long time though    Nausea 10/14/2019    Neuropathy (HCC)     Obesity (BMI 30-39.9) 7/20/2015    Pneumonia     Routine general medical examination at a health care facility 7/20/2015    7/20/15    Sleep apnea 12/2019    Had sleep study, didn't tolerate cpap    Snoring     Syncope 6/5/2020    Tobacco use 4/29/2019    URI (upper respiratory infection) 11/4/2015    Urinary bladder disorder     hx of uti's         Past Surgical History:   Procedure Laterality Date    GASTROSCOPY  12/13/2019    Procedure: GASTROSCOPY;  Surgeon: Ang Angel M.D.;  Location: SURGERY [facility];  Service: Gastroenterology    COLONOSCOPY  12/13/2019    Procedure: COLONOSCOPY;  Surgeon: Ang Angel M.D.;  Location: [facility];  Service: Gastroenterology    NIRU BY LAPAROSCOPY  9/2/2011    Performed by KAYLEIGH PORARS at SURGERY [facility]    LUMBAR DECOMPRESSION  6/29/2009    Performed by ANG YAN at SURGERY [facility]    LUMBAR LAMINECTOMY DISKECTOMY  6/29/2009    Performed by ANG YAN at SURGERY [facility]S    ABDOMINAL HYSTERECTOMY TOTAL      GYN SURGERY      hysterectomy    HEMORRHOIDECTOMY           Current Outpatient Medications   Medication Sig Dispense Refill    ELIQUIS 5 MG Tab TAKE 1 TABLET BY MOUTH TWICE A  Tablet 2    fluticasone furoate-vilanterol (BREO ELLIPTA) 100-25 MCG/ACT AEROSOL POWDER, BREATH ACTIVATED INHALE 1 PUFF EVERY DAY. NEEDS APT. FOR REFILLS 60 Each 6    potassium chloride SA (K-DUR) 10 MEQ Tab CR TAKE 1 TABLET BY MOUTH EVERY DAY 90 Tablet 2    estradiol (ESTRACE) 0.1 MG/GM vaginal cream PLEASE SEE ATTACHED FOR DETAILED DIRECTIONS      Naloxone (NARCAN) 4 MG/0.1ML Liquid USE AS DIRECTED      furosemide (LASIX) 20 MG Tab TAKE 1 TABLET BY MOUTH EVERY DAY  90 Tablet 3    promethazine (PHENERGAN) 25 MG Tab Take 1 Tablet by mouth every 6 hours as needed for Nausea/Vomiting. 90 Tablet 3    fenofibrate (TRIGLIDE) 160 MG tablet TAKE 1 TABLET BY MOUTH EVERY DAY 90 Tablet 3    lisinopril (PRINIVIL) 5 MG Tab Take 1 Tablet by mouth every day. 90 Tablet 3    levothyroxine (SYNTHROID) 112 MCG Tab Take 1 Tablet by mouth every morning on an empty stomach. 90 Tablet 3    atorvastatin (LIPITOR) 10 MG Tab Take 1 Tablet by mouth every day. 90 Tablet 2    pantoprazole (PROTONIX) 40 MG Tablet Delayed Response TAKE 1 TABLET BY MOUTH TWICE A  Tablet 2    HYDROcodone-acetaminophen (NORCO) 7.5-325 MG per tablet Take 1 Tablet by mouth every 8 hours as needed for Moderate Pain.      hydrOXYzine HCl (ATARAX) 25 MG Tab Take 25 mg by mouth at bedtime.      latanoprost (XALATAN) 0.005 % Solution Administer 1 Drop into both eyes every evening.      Mirabegron ER (MYRBETRIQ) 50 MG TABLET SR 24 HR Take 50 mg by mouth every day.      acetaminophen (TYLENOL) 500 MG TABS Take 500 mg by mouth 3 times a day as needed for Mild Pain. Indications: Pain       No current facility-administered medications for this visit.         Allergies   Allergen Reactions    Food Hives and Nausea     Oranges, Hives    Neomycin-Polymyxin B Rash     Rash         Family History   Problem Relation Age of Onset    Stroke Mother     Hyperlipidemia Mother     Hypertension Mother     Arthritis Mother     Diabetes Father     Lung Disease Father         pneumonia    Arthritis Sister     Rheumatologic Disease Sister     Hypertension Sister     Hyperlipidemia Sister     Other Sister         Anusha/Fibermyalgia    Hyperlipidemia Brother     Hypertension Brother     Arthritis Brother     Lung Disease Maternal Grandmother         pneumonia    Stroke Maternal Grandfather     Cancer Maternal Grandfather         skin     No Known Problems Paternal Grandmother     No Known Problems Paternal Grandfather     Hyperlipidemia Daughter      Diabetes Daughter     No Known Problems Son          Social History     Socioeconomic History    Marital status: Single     Spouse name: Not on file    Number of children: Not on file    Years of education: Not on file    Highest education level: 12th grade   Occupational History    Not on file   Tobacco Use    Smoking status: Every Day     Current packs/day: 1.00     Average packs/day: 1 pack/day for 56.0 years (56.0 ttl pk-yrs)     Types: Cigarettes    Smokeless tobacco: Never    Tobacco comments:     started smoking at age 12, little mor than half a pack   Vaping Use    Vaping Use: Never used   Substance and Sexual Activity    Alcohol use: No    Drug use: No    Sexual activity: Never     Partners: Male     Birth control/protection: Post-Menopausal   Other Topics Concern    Not on file   Social History Narrative    Not on file     Social Determinants of Health     Financial Resource Strain: Medium Risk (12/7/2023)    Overall Financial Resource Strain (CARDIA)     Difficulty of Paying Living Expenses: Somewhat hard   Food Insecurity: No Food Insecurity (12/7/2023)    Hunger Vital Sign     Worried About Running Out of Food in the Last Year: Never true     Ran Out of Food in the Last Year: Never true   Transportation Needs: No Transportation Needs (12/7/2023)    PRAPARE - Transportation     Lack of Transportation (Medical): No     Lack of Transportation (Non-Medical): No   Physical Activity: Inactive (12/7/2023)    Exercise Vital Sign     Days of Exercise per Week: 0 days     Minutes of Exercise per Session: 0 min   Stress: No Stress Concern Present (12/7/2023)    [employer] [place] [employer] - Occupational Stress Questionnaire     Feeling of Stress : Only a little   Social Connections: Socially Isolated (12/7/2023)    Social Connection and Isolation Panel [NHANES]     Frequency of Communication with Friends and Family: More than three times a week     Frequency of Social Gatherings with Friends and  "Family: More than three times a week     Attends Protestant Services: Never     Active Member of Clubs or Organizations: No     Attends Club or Organization Meetings: Never     Marital Status:    Intimate Partner Violence: Not on file   Housing Stability: Low Risk  (12/7/2023)    Housing Stability Vital Sign     Unable to Pay for Housing in the Last Year: No     Number of Places Lived in the Last Year: 1     Unstable Housing in the Last Year: No         Physical Exam:  Ambulatory Vitals  /60 (BP Location: Left arm, Patient Position: Sitting, BP Cuff Size: Adult)   Pulse 84   Resp 16   Ht 1.575 m (5' 2\")   Wt 81.8 kg (180 lb 4.8 oz)   SpO2 96%    BP Readings from Last 4 Encounters:   04/01/24 118/60   03/16/24 126/88   02/13/24 126/68   01/30/24 104/50     Weight/BMI:   Vitals:    04/01/24 1028   BP: 118/60   Weight: 81.8 kg (180 lb 4.8 oz)   Height: 1.575 m (5' 2\")    Body mass index is 32.98 kg/m².  Wt Readings from Last 4 Encounters:   04/01/24 81.8 kg (180 lb 4.8 oz)   03/16/24 81.6 kg (180 lb)   02/13/24 83 kg (183 lb)   01/30/24 85.7 kg (189 lb)       Physical Exam  Constitutional:       General: She is not in acute distress.  HENT:      Head: Normocephalic and atraumatic.   Eyes:      Conjunctiva/sclera: Conjunctivae normal.      Pupils: Pupils are equal, round, and reactive to light.   Neck:      Vascular: No JVD.   Cardiovascular:      Rate and Rhythm: Normal rate and regular rhythm.      Heart sounds: Normal heart sounds. No murmur heard.     No friction rub. No gallop.   Pulmonary:      Effort: Pulmonary effort is normal. No respiratory distress.      Breath sounds: Normal breath sounds. No wheezing or rales.   Chest:      Chest wall: No tenderness.   Abdominal:      General: Bowel sounds are normal. There is no distension.      Palpations: Abdomen is soft.   Musculoskeletal:      Cervical back: Normal range of motion and neck supple.   Skin:     General: Skin is warm and dry. " "  Neurological:      Mental Status: She is alert and oriented to person, place, and time.   Psychiatric:         Mood and Affect: Affect normal.         Judgment: Judgment normal.         Lab Data Review:  Lab Results   Component Value Date/Time    CHOLSTRLTOT 121 08/11/2022 08:57 AM    LDL 53 08/11/2022 08:57 AM    HDL 39 (A) 08/11/2022 08:57 AM    TRIGLYCERIDE 145 08/11/2022 08:57 AM       Lab Results   Component Value Date/Time    SODIUM 144 01/23/2024 01:06 PM    POTASSIUM 3.8 01/23/2024 01:06 PM    CHLORIDE 109 01/23/2024 01:06 PM    CO2 19 (L) 01/23/2024 01:06 PM    GLUCOSE 88 01/23/2024 01:06 PM    BUN 17 01/23/2024 01:06 PM    CREATININE 1.27 01/23/2024 01:06 PM     CrCl cannot be calculated (Patient's most recent lab result is older than the maximum 7 days allowed.).  Lab Results   Component Value Date/Time    ALKPHOSPHAT 41 09/22/2022 01:49 PM    ASTSGOT 35 09/22/2022 01:49 PM    ALTSGPT 14 09/22/2022 01:49 PM    TBILIRUBIN 0.6 09/22/2022 01:49 PM      Lab Results   Component Value Date/Time    WBC 9.0 01/23/2024 01:06 PM     Lab Results   Component Value Date/Time    HBA1C 5.7 (H) 05/28/2011 10:34 PM     No components found for: \"TROP\"      Cardiac Imaging and Procedures Review:      TTE 1/2023  CONCLUSIONS  Compared to the images of the prior study 10-6-2020, there is now   moderate aortic insufficiency.  Normal left ventricular systolic function.  The left ventricular ejection fraction is visually estimated to be 65%.  Normal diastolic function.  The right ventricle is normal in size and systolic function.  Mild aortic valve stenosis.  Moderate aortic insufficiency.    TTE 12/2023  CONCLUSIONS  Normal left ventricular size, thickness, and systolic function. Normal   diastolic function.  Normal right ventricular size and systolic function.  Mild mitral regurgitation.  Mild aortic valve stenosis.  Moderate aortic insufficiency.  No pericardial effusion.  Compared to the prior study on 01/20/2023, similar " findings.    Medical Decision Making:  Problem List Items Addressed This Visit       Hyperlipidemia    Essential hypertension    Ischemic bowel disease (HCC) (Chronic)    Paroxysmal atrial fibrillation (HCC) (Chronic)    Secondary hypercoagulable state (HCC)    Aortic valve regurgitation    Relevant Orders    EC-ECHOCARDIOGRAM COMPLETE W/O CONT    Venous insufficiency       Venous insufficiency controlled. Managed by others. Consider vascular surgery for vein ablation if symptoms worsen.     AR / AS - serial TTE    Paroxysmal AF - chadsvasc 4; doac for cva prevention. Rate control if evidence of tachycardia.     HTN - goal <130/80. Controlled. Previously attempted titrating lisinopril resulted in hypotension.     HLD - continue statin. Pending annual labs.     It was my pleasure to meet with Ms. Garcia.

## 2024-04-04 LAB
BACTERIA UR CULT: NORMAL
SIGNIFICANT IND 70042: NORMAL
SITE SITE: NORMAL
SOURCE SOURCE: NORMAL

## 2024-04-05 ENCOUNTER — HOSPITAL ENCOUNTER (OUTPATIENT)
Dept: LAB | Facility: MEDICAL CENTER | Age: 84
End: 2024-04-05
Attending: NURSE PRACTITIONER
Payer: MEDICARE

## 2024-04-05 DIAGNOSIS — R73.9 HYPERGLYCEMIA: ICD-10-CM

## 2024-04-05 DIAGNOSIS — E03.9 HYPOTHYROIDISM, UNSPECIFIED TYPE: ICD-10-CM

## 2024-04-05 DIAGNOSIS — E78.2 MIXED HYPERLIPIDEMIA: ICD-10-CM

## 2024-04-05 LAB
CHOLEST SERPL-MCNC: 113 MG/DL (ref 100–199)
EST. AVERAGE GLUCOSE BLD GHB EST-MCNC: 111 MG/DL
FASTING STATUS PATIENT QL REPORTED: NORMAL
HBA1C MFR BLD: 5.5 % (ref 4–5.6)
HDLC SERPL-MCNC: 39 MG/DL
LDLC SERPL CALC-MCNC: 53 MG/DL
T4 FREE SERPL-MCNC: 1.9 NG/DL (ref 0.93–1.7)
TRIGL SERPL-MCNC: 105 MG/DL (ref 0–149)
TSH SERPL DL<=0.005 MIU/L-ACNC: 1.57 UIU/ML (ref 0.38–5.33)

## 2024-04-05 PROCEDURE — 84443 ASSAY THYROID STIM HORMONE: CPT

## 2024-04-05 PROCEDURE — 36415 COLL VENOUS BLD VENIPUNCTURE: CPT | Mod: GA

## 2024-04-05 PROCEDURE — 83036 HEMOGLOBIN GLYCOSYLATED A1C: CPT | Mod: GA

## 2024-04-05 PROCEDURE — 84439 ASSAY OF FREE THYROXINE: CPT

## 2024-04-05 PROCEDURE — 80061 LIPID PANEL: CPT

## 2024-04-10 ENCOUNTER — APPOINTMENT (OUTPATIENT)
Dept: MEDICAL GROUP | Facility: PHYSICIAN GROUP | Age: 84
End: 2024-04-10
Payer: MEDICARE

## 2024-04-10 ENCOUNTER — OFFICE VISIT (OUTPATIENT)
Dept: MEDICAL GROUP | Facility: PHYSICIAN GROUP | Age: 84
End: 2024-04-10
Payer: MEDICARE

## 2024-04-10 VITALS
OXYGEN SATURATION: 98 % | TEMPERATURE: 98.5 F | BODY MASS INDEX: 33.49 KG/M2 | WEIGHT: 182 LBS | HEART RATE: 82 BPM | HEIGHT: 62 IN | DIASTOLIC BLOOD PRESSURE: 68 MMHG | SYSTOLIC BLOOD PRESSURE: 118 MMHG

## 2024-04-10 DIAGNOSIS — K21.9 GASTRO-ESOPHAGEAL REFLUX DISEASE WITHOUT ESOPHAGITIS: ICD-10-CM

## 2024-04-10 DIAGNOSIS — Z00.00 ENCOUNTER FOR ANNUAL WELLNESS VISIT (AWV) IN MEDICARE PATIENT: ICD-10-CM

## 2024-04-10 DIAGNOSIS — N18.32 STAGE 3B CHRONIC KIDNEY DISEASE: ICD-10-CM

## 2024-04-10 DIAGNOSIS — J43.8 OTHER EMPHYSEMA (HCC): ICD-10-CM

## 2024-04-10 DIAGNOSIS — I48.0 PAROXYSMAL ATRIAL FIBRILLATION (HCC): Chronic | ICD-10-CM

## 2024-04-10 DIAGNOSIS — G47.30 SLEEP APNEA, UNSPECIFIED TYPE: ICD-10-CM

## 2024-04-10 DIAGNOSIS — I35.0 NONRHEUMATIC AORTIC VALVE STENOSIS: ICD-10-CM

## 2024-04-10 DIAGNOSIS — E03.9 HYPOTHYROIDISM, UNSPECIFIED TYPE: ICD-10-CM

## 2024-04-10 DIAGNOSIS — E66.9 OBESITY (BMI 30-39.9): ICD-10-CM

## 2024-04-10 DIAGNOSIS — E78.2 MIXED HYPERLIPIDEMIA: ICD-10-CM

## 2024-04-10 DIAGNOSIS — M54.50 CHRONIC MIDLINE LOW BACK PAIN WITHOUT SCIATICA: ICD-10-CM

## 2024-04-10 DIAGNOSIS — D68.69 SECONDARY HYPERCOAGULABLE STATE (HCC): ICD-10-CM

## 2024-04-10 DIAGNOSIS — N39.41 URGE INCONTINENCE OF URINE: ICD-10-CM

## 2024-04-10 DIAGNOSIS — G89.29 CHRONIC MIDLINE LOW BACK PAIN WITHOUT SCIATICA: ICD-10-CM

## 2024-04-10 DIAGNOSIS — I10 ESSENTIAL HYPERTENSION: ICD-10-CM

## 2024-04-10 DIAGNOSIS — F03.A0 MILD DEMENTIA WITHOUT BEHAVIORAL DISTURBANCE, PSYCHOTIC DISTURBANCE, MOOD DISTURBANCE, OR ANXIETY, UNSPECIFIED DEMENTIA TYPE (HCC): Chronic | ICD-10-CM

## 2024-04-10 PROBLEM — K30 DELAYED GASTRIC EMPTYING: Status: ACTIVE | Noted: 2024-04-10

## 2024-04-10 PROCEDURE — 3074F SYST BP LT 130 MM HG: CPT | Performed by: NURSE PRACTITIONER

## 2024-04-10 PROCEDURE — 3078F DIAST BP <80 MM HG: CPT | Performed by: NURSE PRACTITIONER

## 2024-04-10 PROCEDURE — G0439 PPPS, SUBSEQ VISIT: HCPCS | Performed by: NURSE PRACTITIONER

## 2024-04-10 RX ORDER — MIRABEGRON 50 MG/1
50 TABLET, FILM COATED, EXTENDED RELEASE ORAL
Qty: 90 TABLET | Refills: 3 | Status: SHIPPED | OUTPATIENT
Start: 2024-04-10

## 2024-04-10 RX ORDER — LEVOTHYROXINE SODIUM 0.1 MG/1
100 TABLET ORAL
Qty: 90 TABLET | Refills: 3 | Status: SHIPPED | OUTPATIENT
Start: 2024-04-10

## 2024-04-10 ASSESSMENT — FIBROSIS 4 INDEX: FIB4 SCORE: 2.3

## 2024-04-10 ASSESSMENT — ENCOUNTER SYMPTOMS: GENERAL WELL-BEING: EXCELLENT

## 2024-04-10 ASSESSMENT — ACTIVITIES OF DAILY LIVING (ADL): BATHING_REQUIRES_ASSISTANCE: 0

## 2024-04-10 ASSESSMENT — PATIENT HEALTH QUESTIONNAIRE - PHQ9: CLINICAL INTERPRETATION OF PHQ2 SCORE: 0

## 2024-04-10 NOTE — ASSESSMENT & PLAN NOTE
Chronic urge incontinence of urine for several years. States symptoms are managed well on current dosing of Myrbetriq  - continue myrbetriq 50mg QD

## 2024-04-10 NOTE — ASSESSMENT & PLAN NOTE
[facility] following  last TTE 1/2023 showed moderate aortic insufficiency, normal left ventricular systolic function with EF 65%. Normal diastolic function. The right ventricle is normal in size and systolic function. Mild aortic valve stenosis. Moderate aortic insufficiency.   On lisinopril, furosemide, eliquis for afib, atorvastatin, fenofiobrate

## 2024-04-10 NOTE — ASSESSMENT & PLAN NOTE
Latest Reference Range & Units 04/05/24 08:42   TSH 0.380 - 5.330 uIU/mL 1.570   Free T-4 0.93 - 1.70 ng/dL 1.90 (H)     Current on levothyroxine 112 mcg (reduced from 125mcg a year ago). No excessive weight changes, cold intolerance, fatigue reported today. Her FT4 is elevated on recent labs, we will reduce dose today and recheck level in 1 month  - reduce Levothyroxine to 100mcg QD  - check TSH/FT4 in 1 month

## 2024-04-10 NOTE — ASSESSMENT & PLAN NOTE
Sees GI. Hx delayed gastric emptying, chronic nausea. She is on daily protonix 40mg, promethazine 25mg prn

## 2024-04-10 NOTE — ASSESSMENT & PLAN NOTE
Chronic mild dementia, progressive over the past several years. She last saw Dr Allen/neurology in 9/2022, not requiring medication at this time. Per granddaughter and patient, she struggles with recent/new memory recall and new changes in her routine. She no longer drives. She [place] her granddaughter Janis who helps with transportation, coordinating medical care etc. Patient is independent with most ADLs at home. She does ambulates with walker assistance, this is more due to her chronic back pain. She started in 1/2024 for gait stability.

## 2024-04-10 NOTE — ASSESSMENT & PLAN NOTE
Latest Reference Range & Units 04/05/24 08:42   Cholesterol,Tot 100 - 199 mg/dL 113   Triglycerides 0 - 149 mg/dL 105   HDL >=40 mg/dL 39 !   LDL <100 mg/dL 53     Chronic; LDL goal < 100; [facility] following; hx afib, hypertension, aortic stenosis. Tolerating statin  - continue Atorvastatin 10mg QD, Fenofibrate 160mg QD  - counseled on heart healthy eating; adequate fiber  - monitor annual fasting lipid.

## 2024-04-10 NOTE — ASSESSMENT & PLAN NOTE
Chronic afib with hypertension, nonrheumatic aortic valve stenosis and venous insufficiency. Dr Ramirez/[facility] following.  She is on Eliquis 5mg BID, Lisinopril 5mg QD; not needing rate control at this time. She is also seeing nephrology for CKD, chronic ankle edema on Lasix 20mg + KCL 10meq. Hyperlipidemia managed on atorvastatin, fenofibrate  BP in clinic today 118/68, HR 82; no CP, palpitations, SOB; she has +3 edema in her feet & ankles, states subsides with elevation.  - Continue plan per cardiology  - last TTE 1/2023 showed moderate aortic insufficiency, normal left ventricular systolic function with EF 65%. Normal diastolic function. The right ventricle is normal in size and systolic function. Mild aortic valve stenosis. Moderate aortic insufficiency.

## 2024-04-10 NOTE — PROGRESS NOTES
Chief Complaint   Patient presents with    Annual Exam       HPI:  Margaret Garcia is a 83 y.o. here for Medicare Annual Wellness Visit. No new concerns today. She is accompanied by her granddaughter.      Patient Active Problem List    Diagnosis Date Noted    Delayed gastric emptying 04/10/2024    Gastro-esophageal reflux disease without esophagitis 04/10/2024    Aortic valve regurgitation 09/21/2023    Venous insufficiency 09/21/2023    Atrophic vaginitis 05/08/2023    Secondary hypercoagulable state (HCC) 09/20/2022    Other emphysema ([facility]) 08/02/2021    Paroxysmal atrial fibrillation (HCC) 07/30/2021    Stage 3b chronic kidney disease 07/23/2021    Renal insufficiency syndrome 02/18/2021    Urge incontinence of urine 12/13/2020    Nonrheumatic aortic valve stenosis 09/16/2020    Chronic midline low back pain without sciatica 09/02/2020    Ischemic bowel disease ([facility]) 09/09/2019    Acute kidney injury superimposed on chronic kidney disease ([facility]) 05/13/2019    Tobacco use 04/29/2019    Generalized edema 04/27/2018    Hyperlipidemia 11/24/2015    Essential hypertension 11/24/2015    Hyperglycemia 11/24/2015    Obesity (BMI 30-39.9) 07/20/2015    Sleep apnea 07/20/2015    Dementia ([facility]) 05/29/2011    Hypothyroidism 05/29/2011    Peripheral neuropathy 05/29/2011       Current Outpatient Medications   Medication Sig Dispense Refill    Mirabegron ER (MYRBETRIQ) 50 MG TABLET SR 24 HR Take 50 mg by mouth every day. 90 Tablet 3    levothyroxine (SYNTHROID) 100 MCG Tab Take 1 Tablet by mouth every morning on an empty stomach. 90 Tablet 3    ELIQUIS 5 MG Tab TAKE 1 TABLET BY MOUTH TWICE A  Tablet 2    fluticasone furoate-vilanterol (BREO ELLIPTA) 100-25 MCG/ACT AEROSOL POWDER, BREATH ACTIVATED INHALE 1 PUFF EVERY DAY. NEEDS APT. FOR REFILLS 60 Each 6    potassium chloride SA (K-DUR) 10 MEQ Tab CR TAKE 1 TABLET BY MOUTH EVERY DAY 90 Tablet 2    estradiol (ESTRACE) 0.1 MG/GM vaginal cream PLEASE SEE ATTACHED FOR  DETAILED DIRECTIONS      Naloxone (NARCAN) 4 MG/0.1ML Liquid USE AS DIRECTED      furosemide (LASIX) 20 MG Tab TAKE 1 TABLET BY MOUTH EVERY DAY 90 Tablet 3    promethazine (PHENERGAN) 25 MG Tab Take 1 Tablet by mouth every 6 hours as needed for Nausea/Vomiting. 90 Tablet 3    fenofibrate (TRIGLIDE) 160 MG tablet TAKE 1 TABLET BY MOUTH EVERY DAY 90 Tablet 3    lisinopril (PRINIVIL) 5 MG Tab Take 1 Tablet by mouth every day. 90 Tablet 3    atorvastatin (LIPITOR) 10 MG Tab Take 1 Tablet by mouth every day. 90 Tablet 2    pantoprazole (PROTONIX) 40 MG Tablet Delayed Response TAKE 1 TABLET BY MOUTH TWICE A  Tablet 2    HYDROcodone-acetaminophen (NORCO) 7.5-325 MG per tablet Take 1 Tablet by mouth every 8 hours as needed for Moderate Pain.      hydrOXYzine HCl (ATARAX) 25 MG Tab Take 25 mg by mouth at bedtime.      latanoprost (XALATAN) 0.005 % Solution Administer 1 Drop into both eyes every evening.      acetaminophen (TYLENOL) 500 MG TABS Take 500 mg by mouth 3 times a day as needed for Mild Pain. Indications: Pain       No current facility-administered medications for this visit.          Current supplements as per medication list.     Allergies: Food and Neomycin-polymyxin b    Current social contact/activities:     She  reports that she has been smoking cigarettes. She has a 56 pack-year smoking history. She has never used smokeless tobacco. She reports that she does not drink alcohol and does not use drugs.  Ready to quit: Not Answered  Counseling given: Not Answered  Tobacco comments: started smoking at age 12, little mor than half a pack      ROS:    Gait: Uses a walker  Ostomy: No  Other tubes: No  Amputations: No  Chronic oxygen use: No  Last eye exam: 12/2023  Wears hearing aids: No   : Reports urinary leakage during the last 6 months that has somewhat interfered with their daily activities or sleep.    Screening:  Depression Screening  Little interest or pleasure in doing things?  0 - not at  all  Feeling down, depressed , or hopeless? 0 - not at all  Patient Health Questionnaire Score: 0     If depressive symptoms identified deferred to follow up visit unless specifically addressed in assessment and plan.    Interpretation of PHQ-9 Total Score   Score Severity   1-4 No Depression   5-9 Mild Depression   10-14 Moderate Depression   15-19 Moderately Severe Depression   20-27 Severe Depression    Screening for Cognitive Impairment  Do you or any of your friends or family members have any concern about your memory? Yes  Three Minute Recall (Leader, Season, Table) 1/3    Tremaine clock face with all 12 numbers and set the hands to show 10 minutes after 11.  Yes    Cognitive concerns identified deferred for follow up unless specifically addressed in assessment and plan.    Fall Risk Assessment  Has the patient had two or more falls in the last year or any fall with injury in the last year?  No    Safety Assessment  Do you always wear your seatbelt?  Yes  Any changes to home needed to function safely? No  Difficulty hearing.  No  Patient counseled about all safety risks that were identified.    Functional Assessment ADLs  Are there any barriers preventing you from cooking for yourself or meeting nutritional needs?  No.    Are there any barriers preventing you from driving safely or obtaining transportation?  No.    Are there any barriers preventing you from using a telephone or calling for help?  No    Are there any barriers preventing you from shopping?  No.    Are there any barriers preventing you from taking care of your own finances?  Yes Granddaughter does all finances.   Are there any barriers preventing you from managing your medications?  No    Are there any barriers preventing you from showering, bathing or dressing yourself? No    Are there any barriers preventing you from doing housework or laundry? No  Are there any barriers preventing you from using the toilet?No  Are you currently engaging in any  exercise or physical activity?  No.      Self-Assessment of Health  What is your perception of your health? Excellent  Do you sleep more than six hours a night? Yes  In the past 7 days, how much did pain keep you from doing your normal work? None  Do you spend quality time with family or friends (virtually or in person)? Yes  Do you usually eat a heart healthy diet that constists of a variety of fruits, vegetables, whole grains and fiber? Yes  Do you eat foods high in fat and/or Fast Food more than three times per week? No    Advance Care Planning  Do you have an Advance Directive, Living Will, Durable Power of , or POLST? Yes      Durable Power of    is on file      Health Maintenance Summary            Overdue - Annual Wellness Visit (Yearly) Overdue since 4/27/2019 04/27/2018  Done    11/14/2017  Reason not specified    10/18/2016  Reason not specified              Overdue - Annual Pulmonary Function Test / Spirometry (Yearly) Overdue since 2/8/2024 02/08/2023  PFT DICTATED RESULTS              Postponed - Zoster (Shingles) Vaccines (1 of 2) Postponed until 5/8/2024      10/18/2016  Imm Admin: Varicella Vaccine Live              Bone Density Scan (Every 5 Years) Tentatively due on 5/6/2026 05/06/2021  DS-BONE DENSITY STUDY (DEXA)    11/24/2015  Postponed - Has not had one since 2010 06/22/2010  DS-BONE DENSITY STUDY (DEXA)    01/09/2008  DS-BONE DENSITY STUDY (DEXA)              IMM DTaP/Tdap/Td Vaccine (3 - Td or Tdap) Next due on 5/27/2030 05/27/2020  Imm Admin: Tdap Vaccine    05/06/2013  Imm Admin: Tdap Vaccine              Pneumococcal Vaccine: 65+ Years (Series Information) Completed      11/14/2017  Imm Admin: Pneumococcal polysaccharide vaccine (PPSV-23)    04/19/2016  Imm Admin: Pneumococcal Conjugate Vaccine (Prevnar/PCV-13)              COVID-19 Vaccine (Series Information) Completed      10/02/2023  Imm Admin: Covid-19 Mrna (Spikevax) Moderna 12+ Years     05/10/2023  Imm Admin: MODERNA BIVALENT BOOSTER SARS-COV-2 VACCINE (6+)    09/12/2022  Imm Admin: MODERNA BIVALENT BOOSTER SARS-COV-2 VACCINE (6+)    04/04/2022  Imm Admin: MODERNA SARS-COV-2 VACCINE (12+)    10/29/2021  Imm Admin: PFIZER PURPLE CAP SARS-COV-2 VACCINATION (12+)    Only the first 5 history entries have been loaded, but more history exists.              Influenza Vaccine (Series Information) Completed      10/10/2023  Imm Admin: Influenza Vaccine Adult HD    09/02/2022  Imm Admin: Influenza, Unspecified - HISTORICAL DATA    09/27/2021  Imm Admin: Influenza Vaccine Adult HD    09/28/2020  Imm Admin: Influenza Vaccine Adult HD    09/27/2020  Imm Admin: Influenza Vaccine Adult HD    Only the first 5 history entries have been loaded, but more history exists.              Hepatitis A Vaccine (Hep A) (Series Information) Aged Out      No completion history exists for this topic.              Hepatitis B Vaccine (Hep B) (Series Information) Aged Out      No completion history exists for this topic.              HPV Vaccines (Series Information) Aged Out      No completion history exists for this topic.              Polio Vaccine (Inactivated Polio) (Series Information) Aged Out      No completion history exists for this topic.              Meningococcal Immunization (Series Information) Aged Out      No completion history exists for this topic.              Discontinued - Mammogram  Discontinued        Frequency changed to Never automatically (Topic No Longer Applies)    05/09/2019  MA-SCREENING MAMMO BILAT W/TOMOSYNTHESIS W/CAD    04/09/2018  MA-MAMMO SCREENING BILAT W/JESSE W/CAD    11/24/2015  Postponed - Has not had one since 2010 06/22/2010  MA-SCREENING DIGITAL MAMMO    Only the first 5 history entries have been loaded, but more history exists.              Discontinued - Colorectal Cancer Screening  Discontinued        Frequency changed to Never automatically (Topic No Longer Applies)    02/25/2021   REFERRAL TO GI FOR COLONOSCOPY    12/13/2019  Surgical Procedure: COLONOSCOPY    11/24/2015  Colonoscopy (Postponed - Does not remember)                    Patient Care Team:  Chiquita Sim D.N.P. as PCP - General (Family Medicine)  Tello Chilel M.D. as Consulting Physician (Ophthalmology)  Scottie Guevara M.D. (Neurosurgery)  Sundeep Jeffery M.D. as Consulting Physician (Anesthesiology)        Social History     Tobacco Use    Smoking status: Every Day     Current packs/day: 1.00     Average packs/day: 1 pack/day for 56.0 years (56.0 ttl pk-yrs)     Types: Cigarettes    Smokeless tobacco: Never    Tobacco comments:     started smoking at age 12, little mor than half a pack   Vaping Use    Vaping Use: Never used   Substance Use Topics    Alcohol use: No    Drug use: No     Family History   Problem Relation Age of Onset    Stroke Mother     Hyperlipidemia Mother     Hypertension Mother     Arthritis Mother     Diabetes Father     Lung Disease Father         pneumonia    Arthritis Sister     Rheumatologic Disease Sister     Hypertension Sister     Hyperlipidemia Sister     Other Sister         Anusha/Fibermyalgia    Hyperlipidemia Brother     Hypertension Brother     Arthritis Brother     Lung Disease Maternal Grandmother         pneumonia    Stroke Maternal Grandfather     Cancer Maternal Grandfather         skin     No Known Problems Paternal Grandmother     No Known Problems Paternal Grandfather     Hyperlipidemia Daughter     Diabetes Daughter     No Known Problems Son      She  has a past medical history of Arthritis, ASTHMA, Body mass index 40.0-44.9, adult (HCC) (4/27/2018), Chronic renal impairment, stage 3 (moderate) (5/13/2019), 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) (4/20/2021), Dental disorder, Dysuria (10/18/2016), Fall, Generalized edema (4/27/2018), Glaucoma, Hay fever (4/19/2016), Heart burn, High cholesterol, UTI (urinary tract infection), Hyperglycemia (11/24/2015),  "Hypertension, Hypothyroidism, Indigestion, Ischemic bowel disease (HCC) (9/9/2019), Migraines, Nausea (10/14/2019), Neuropathy (HCC), Obesity (BMI 30-39.9) (7/20/2015), Pneumonia, Routine general medical examination at a health care facility (7/20/2015), Sleep apnea (12/2019), Snoring, Syncope (6/5/2020), Tobacco use (4/29/2019), URI (upper respiratory infection) (11/4/2015), and Urinary bladder disorder.   Past Surgical History:   Procedure Laterality Date    GASTROSCOPY  12/13/2019    Procedure: GASTROSCOPY;  Surgeon: Ang Angel M.D.;  Location: SURGERY [facility];  Service: Gastroenterology    COLONOSCOPY  12/13/2019    Procedure: COLONOSCOPY;  Surgeon: Ang Angel M.D.;  Location: [facility];  Service: Gastroenterology    NIRU BY LAPAROSCOPY  9/2/2011    Performed by KAYLEIGH PORRAS at SURGERY SAME DAY [facility] ORS    LUMBAR DECOMPRESSION  6/29/2009    Performed by ANG YAN at SURGERY [facility] ORS    LUMBAR LAMINECTOMY DISKECTOMY  6/29/2009    Performed by ANG YAN at SURGERY [facility] ORS    ABDOMINAL HYSTERECTOMY TOTAL      GYN SURGERY      hysterectomy    HEMORRHOIDECTOMY         Exam:   /68 (BP Location: Left arm, Patient Position: Sitting, BP Cuff Size: Adult)   Pulse 82   Temp 36.9 °C (98.5 °F) (Temporal)   Ht 1.575 m (5' 2\")   Wt 82.6 kg (182 lb)   SpO2 98%  Body mass index is 33.29 kg/m².    Hearing good.    Dentition good  Alert, oriented in no acute distress.  Eye contact is good, speech goal directed, affect calm    Physical Exam  Constitutional:       Appearance: Normal appearance. She is obese.   Cardiovascular:      Rate and Rhythm: Normal rate and regular rhythm.      Pulses: Normal pulses.      Heart sounds: Murmur heard.   Pulmonary:      Effort: Pulmonary effort is normal.      Breath sounds: Normal breath sounds. No wheezing or rhonchi.   Musculoskeletal:      Cervical back: Normal range of motion and neck supple.      Right lower leg: " Edema present.      Left lower leg: Edema present.      Comments: Ambulates with walker assistance   Skin:     General: Skin is warm and dry.   Neurological:      General: No focal deficit present.      Mental Status: She is alert and oriented to person, place, and time.   Psychiatric:         Mood and Affect: Mood normal.         Behavior: Behavior normal.         Thought Content: Thought content normal.         Judgment: Judgment normal.           Assessment and Plan. The following treatment and monitoring plan is recommended:    Problem List Items Addressed This Visit       Dementia (HCC) (Chronic)     Chronic mild dementia, progressive over the past several years. She last saw Dr Allen/neurology in 9/2022, not requiring medication at this time. Per granddaughter and patient, she struggles with recent/new memory recall and new changes in her routine. She no longer drives. She [place] the same [place] as her granddaughter Janis who helps with transportation, coordinating medical care etc. Patient is independent with most ADLs at home. She does ambulates with walker assistance, this is more due to her chronic back pain. She started in 1/2024 for gait stability.          Paroxysmal atrial fibrillation (HCC) (Chronic)     Chronic afib with hypertension, nonrheumatic aortic valve stenosis and venous insufficiency. Dr Ramirez/[facility] following.  She is on Eliquis 5mg BID, Lisinopril 5mg QD; not needing rate control at this time. She is also seeing nephrology for CKD, chronic ankle edema on Lasix 20mg + KCL 10meq. Hyperlipidemia managed on atorvastatin, fenofibrate  BP in clinic today 118/68, HR 82; no CP, palpitations, SOB; she has +3 edema in her feet & ankles, states subsides with elevation.  - Continue plan per cardiology  - last TTE 1/2023 showed moderate aortic insufficiency, normal left ventricular systolic function with EF 65%. Normal diastolic function. The right ventricle is normal in size and  systolic function. Mild aortic valve stenosis. Moderate aortic insufficiency.         Hypothyroidism      Latest Reference Range & Units 04/05/24 08:42   TSH 0.380 - 5.330 uIU/mL 1.570   Free T-4 0.93 - 1.70 ng/dL 1.90 (H)   Current on levothyroxine 112 mcg (reduced from 125mcg a year ago). No excessive weight changes, cold intolerance, fatigue reported today. Her FT4 is elevated on recent labs, we will reduce dose today and recheck level in 1 month  - reduce Levothyroxine to 100mcg QD  - check TSH/FT4 in 1 month         Relevant Medications    levothyroxine (SYNTHROID) 100 MCG Tab    Other Relevant Orders    TSH    FREE THYROXINE    Obesity (BMI 30-39.9)    Relevant Orders    Patient identified as having weight management issue.  Appropriate orders and counseling given.    Sleep apnea     Chronic COPD, SHABANA, +56yr smoker. Not currently using CPAP         Hyperlipidemia      Latest Reference Range & Units 04/05/24 08:42   Cholesterol,Tot 100 - 199 mg/dL 113   Triglycerides 0 - 149 mg/dL 105   HDL >=40 mg/dL 39 !   LDL <100 mg/dL 53   Chronic; LDL goal < 100; [facility] following; hx afib, hypertension, aortic stenosis. Tolerating statin  - continue Atorvastatin 10mg QD, Fenofibrate 160mg QD  - counseled on heart healthy eating; adequate fiber  - monitor annual fasting lipid.            Essential hypertension     Chronic with hx afib, aortic stenosis, hyperlipidemia, SHABANA, CKD. Followed by Dr Ramirez/[facility]  BP in Clinic today 118/68; no CP, palpitations, dizziness, SOB; has dependent edema in ankles.  - currently on Lisinopril 5mg QD with eliquis 5mg BID, Lasix 40mg QD + KCL 10meq, atorvastatin, fenofibrate  - monitor fasting CMP, lipid         Chronic midline low back pain without sciatica     Chronic low back pain; she sees [facility]; currently taking NORCO 7.5-325 TID. She got epidural 12/11/2023. She ambulates with walker assistance. She completed PT in 1/2024         Nonrheumatic aortic  valve stenosis     [facility] following  last TTE 1/2023 showed moderate aortic insufficiency, normal left ventricular systolic function with EF 65%. Normal diastolic function. The right ventricle is normal in size and systolic function. Mild aortic valve stenosis. Moderate aortic insufficiency.   On lisinopril, furosemide, eliquis for afib, atorvastatin, fenofiobrate         Urge incontinence of urine     Chronic urge incontinence of urine for several years. States symptoms are managed well on current dosing of Myrbetriq  - continue myrbetriq 50mg QD         Relevant Medications    Mirabegron ER (MYRBETRIQ) 50 MG TABLET SR 24 HR    Stage 3b chronic kidney disease      Latest Reference Range & Units 08/11/22 08:57 09/22/22 13:49 09/23/22 10:16 09/24/22 05:23 03/27/23 10:50 08/15/23 11:30 10/17/23 12:19   GFR (CKD-EPI) >60 mL/min/1.73 m 2 29 !  30 !  30 ! 27 ! 36 ! 43 ! 38 ! 37 ! 36 !   Chronic CKD3 > 1 year. Seeing Dr Najjar/nephrology  Edema managed with Furosemide 20mg QD + KCL 10meq QD (was on triamterene);   Hypertension managed on Lisinopril; also on eliquis for afib  Avoid nephrotoxins          Other emphysema (HCC)     Chronic COPD, + 56yr 1PPD smoker, SHABANA. Not currently requiring supplemental oxygen; not seeing pulmonology; no cough, SOB reported today. SaO2 96% on RA, clear BS on exam  - currently on Breo-100 1puff QD  - no recent respiratory related hospitalization; has not needed albuterol this year  - counseled on smoking cessation         Secondary hypercoagulable state (HCC)     On BID Eliquis for afib         Gastro-esophageal reflux disease without esophagitis     Sees GI. Hx delayed gastric emptying, chronic nausea. She is on daily protonix 40mg, promethazine 25mg prn           Other Visit Diagnoses       Encounter for annual wellness visit (AWV) in Medicare patient              Services suggested: No services needed at this time  Health Care Screening: Age-appropriate preventive services  recommended by USPTF and ACIP covered by Medicare were discussed today. Services ordered if indicated and agreed upon by the patient.  Referrals offered: Community-based lifestyle interventions to reduce health risks and promote self-management and wellness, fall prevention, nutrition, physical activity, tobacco-use cessation, weight loss, and mental health services as per orders if indicated.    Discussion today about general wellness and lifestyle habits:    Prevent falls and reduce trip hazards; Cautioned about securing or removing rugs.  Have a working fire alarm and carbon monoxide detector;   Engage in regular physical activity and social activities     Follow-up: Return in about 3 months (around 7/10/2024) for establish care.

## 2024-04-10 NOTE — ASSESSMENT & PLAN NOTE
Chronic low back pain; she sees [facility]; currently taking NORCO 7.5-325 TID. She got epidural 12/11/2023. She ambulates with walker assistance. She completed PT in 1/2024

## 2024-04-10 NOTE — ASSESSMENT & PLAN NOTE
Chronic with hx afib, aortic stenosis, hyperlipidemia, SHABANA, CKD. Followed by Dr Ramirez/[facility]  BP in Clinic today 118/68; no CP, palpitations, dizziness, SOB; has dependent edema in ankles.  - currently on Lisinopril 5mg QD with eliquis 5mg BID, Lasix 40mg QD + KCL 10meq, atorvastatin, fenofibrate  - monitor fasting CMP, lipid

## 2024-05-02 DIAGNOSIS — E78.5 HYPERLIPIDEMIA, UNSPECIFIED HYPERLIPIDEMIA TYPE: ICD-10-CM

## 2024-05-02 RX ORDER — ATORVASTATIN CALCIUM 10 MG/1
10 TABLET, FILM COATED ORAL
Qty: 90 TABLET | Refills: 3 | Status: SHIPPED | OUTPATIENT
Start: 2024-05-02

## 2024-05-06 DIAGNOSIS — K21.9 GASTROESOPHAGEAL REFLUX DISEASE WITHOUT ESOPHAGITIS: ICD-10-CM

## 2024-05-06 RX ORDER — PANTOPRAZOLE SODIUM 40 MG/1
40 TABLET, DELAYED RELEASE ORAL 2 TIMES DAILY
Qty: 180 TABLET | Refills: 2 | Status: CANCELLED | OUTPATIENT
Start: 2024-05-06

## 2024-05-06 NOTE — TELEPHONE ENCOUNTER
From: Margaret Garcia  To: Office of Physician Assistant Payton Galo P.A.-C.  Sent: 5/4/2024 10:55 AM PDT  Subject: Medication Renewal Request    Refills have been requested for the following medications:     pantoprazole (PROTONIX) 40 MG Tablet Delayed Response [Physician Assistant Payton Galo P.A.-C.]    Preferred pharmacy: [id number] - [place], [place] - [address] AT [facility]  Delivery method: Pickup

## 2024-05-21 ENCOUNTER — DOCUMENTATION (OUTPATIENT)
Dept: HEALTH INFORMATION MANAGEMENT | Facility: OTHER | Age: 84
End: 2024-05-21
Payer: MEDICARE

## 2024-06-21 ENCOUNTER — TELEPHONE (OUTPATIENT)
Dept: HEALTH INFORMATION MANAGEMENT | Facility: OTHER | Age: 84
End: 2024-06-21
Payer: MEDICARE

## 2024-07-17 ENCOUNTER — OFFICE VISIT (OUTPATIENT)
Dept: URGENT CARE | Facility: PHYSICIAN GROUP | Age: 84
End: 2024-07-17
Payer: MEDICARE

## 2024-07-17 ENCOUNTER — HOSPITAL ENCOUNTER (OUTPATIENT)
Facility: MEDICAL CENTER | Age: 84
End: 2024-07-17
Attending: STUDENT IN AN ORGANIZED HEALTH CARE EDUCATION/TRAINING PROGRAM
Payer: MEDICARE

## 2024-07-17 VITALS
BODY MASS INDEX: 33.13 KG/M2 | RESPIRATION RATE: 20 BRPM | HEIGHT: 62 IN | WEIGHT: 180 LBS | TEMPERATURE: 97.7 F | SYSTOLIC BLOOD PRESSURE: 108 MMHG | HEART RATE: 85 BPM | OXYGEN SATURATION: 98 % | DIASTOLIC BLOOD PRESSURE: 54 MMHG

## 2024-07-17 DIAGNOSIS — R30.0 DYSURIA: ICD-10-CM

## 2024-07-17 DIAGNOSIS — M54.50 LUMBAR BACK PAIN: ICD-10-CM

## 2024-07-17 LAB
APPEARANCE UR: NORMAL
BILIRUB UR STRIP-MCNC: NORMAL MG/DL
COLOR UR AUTO: YELLOW
GLUCOSE UR STRIP.AUTO-MCNC: NORMAL MG/DL
KETONES UR STRIP.AUTO-MCNC: NORMAL MG/DL
LEUKOCYTE ESTERASE UR QL STRIP.AUTO: NORMAL
NITRITE UR QL STRIP.AUTO: NORMAL
PH UR STRIP.AUTO: 6 [PH] (ref 5–8)
PROT UR QL STRIP: NORMAL MG/DL
RBC UR QL AUTO: NORMAL
SP GR UR STRIP.AUTO: 1.01
UROBILINOGEN UR STRIP-MCNC: 0.2 MG/DL

## 2024-07-17 PROCEDURE — 87186 SC STD MICRODIL/AGAR DIL: CPT

## 2024-07-17 PROCEDURE — 3078F DIAST BP <80 MM HG: CPT | Performed by: STUDENT IN AN ORGANIZED HEALTH CARE EDUCATION/TRAINING PROGRAM

## 2024-07-17 PROCEDURE — 87077 CULTURE AEROBIC IDENTIFY: CPT

## 2024-07-17 PROCEDURE — 99214 OFFICE O/P EST MOD 30 MIN: CPT | Performed by: STUDENT IN AN ORGANIZED HEALTH CARE EDUCATION/TRAINING PROGRAM

## 2024-07-17 PROCEDURE — 87086 URINE CULTURE/COLONY COUNT: CPT

## 2024-07-17 PROCEDURE — 3074F SYST BP LT 130 MM HG: CPT | Performed by: STUDENT IN AN ORGANIZED HEALTH CARE EDUCATION/TRAINING PROGRAM

## 2024-07-17 PROCEDURE — 81002 URINALYSIS NONAUTO W/O SCOPE: CPT | Performed by: STUDENT IN AN ORGANIZED HEALTH CARE EDUCATION/TRAINING PROGRAM

## 2024-07-17 RX ORDER — CEFDINIR 300 MG/1
300 CAPSULE ORAL 2 TIMES DAILY
Qty: 14 CAPSULE | Refills: 0 | Status: SHIPPED | OUTPATIENT
Start: 2024-07-17 | End: 2024-07-24

## 2024-07-17 ASSESSMENT — FIBROSIS 4 INDEX: FIB4 SCORE: 2.3

## 2024-08-02 ENCOUNTER — HOSPITAL ENCOUNTER (OUTPATIENT)
Facility: MEDICAL CENTER | Age: 84
End: 2024-08-02
Attending: NURSE PRACTITIONER
Payer: MEDICARE

## 2024-08-02 ENCOUNTER — OFFICE VISIT (OUTPATIENT)
Dept: URGENT CARE | Facility: PHYSICIAN GROUP | Age: 84
End: 2024-08-02
Payer: MEDICARE

## 2024-08-02 VITALS
BODY MASS INDEX: 33.13 KG/M2 | TEMPERATURE: 97.2 F | WEIGHT: 180 LBS | SYSTOLIC BLOOD PRESSURE: 124 MMHG | OXYGEN SATURATION: 97 % | DIASTOLIC BLOOD PRESSURE: 54 MMHG | RESPIRATION RATE: 16 BRPM | HEIGHT: 62 IN | HEART RATE: 81 BPM

## 2024-08-02 DIAGNOSIS — M54.50 ACUTE LOW BACK PAIN, UNSPECIFIED BACK PAIN LATERALITY, UNSPECIFIED WHETHER SCIATICA PRESENT: ICD-10-CM

## 2024-08-02 DIAGNOSIS — N30.01 ACUTE CYSTITIS WITH HEMATURIA: ICD-10-CM

## 2024-08-02 LAB
APPEARANCE UR: CLEAR
BILIRUB UR STRIP-MCNC: NEGATIVE MG/DL
COLOR UR AUTO: YELLOW
GLUCOSE UR STRIP.AUTO-MCNC: NEGATIVE MG/DL
KETONES UR STRIP.AUTO-MCNC: NEGATIVE MG/DL
LEUKOCYTE ESTERASE UR QL STRIP.AUTO: NORMAL
NITRITE UR QL STRIP.AUTO: NEGATIVE
PH UR STRIP.AUTO: 7 [PH] (ref 5–8)
PROT UR QL STRIP: NEGATIVE MG/DL
RBC UR QL AUTO: NORMAL
SP GR UR STRIP.AUTO: 1.01
UROBILINOGEN UR STRIP-MCNC: 0.2 MG/DL

## 2024-08-02 PROCEDURE — 3078F DIAST BP <80 MM HG: CPT | Performed by: NURSE PRACTITIONER

## 2024-08-02 PROCEDURE — 81002 URINALYSIS NONAUTO W/O SCOPE: CPT | Performed by: NURSE PRACTITIONER

## 2024-08-02 PROCEDURE — 3074F SYST BP LT 130 MM HG: CPT | Performed by: NURSE PRACTITIONER

## 2024-08-02 PROCEDURE — 99214 OFFICE O/P EST MOD 30 MIN: CPT | Performed by: NURSE PRACTITIONER

## 2024-08-02 PROCEDURE — 87086 URINE CULTURE/COLONY COUNT: CPT

## 2024-08-02 RX ORDER — CIPROFLOXACIN 500 MG/1
500 TABLET, FILM COATED ORAL EVERY 12 HOURS
Qty: 14 TABLET | Refills: 0 | Status: SHIPPED | OUTPATIENT
Start: 2024-08-02 | End: 2024-08-09

## 2024-08-02 RX ORDER — PHENAZOPYRIDINE HYDROCHLORIDE 200 MG/1
200 TABLET, FILM COATED ORAL 3 TIMES DAILY PRN
Qty: 6 TABLET | Refills: 0 | Status: SHIPPED | OUTPATIENT
Start: 2024-08-02

## 2024-08-02 ASSESSMENT — FIBROSIS 4 INDEX: FIB4 SCORE: 2.3

## 2024-08-02 NOTE — PROGRESS NOTES
Margaret Garcia is a 83 y.o. female who presents for Back Pain, Shoulder Pain (Into neck pain), and UTI (Not fully gone)    BIB her dtr.   HPI  This is a new problem. Margaret Garcia is a 83 y.o. patient who presents to urgent care with c/o: 1 week of low back pain from hips to tailbone. 2-3 days of Neck discomfort. No falls or unusual activity.  Dtr reports that she is on a pain management plan and see's a pain specialist. They are really here today for possible UTI and not for back and neck pain.   Concerned that her UTI has not resolved. Took all antibiotic as it was prescribed.   Tx tried: tylenol.   Hx of chronic back pain.     ROS See HPI    Allergies:       Allergies   Allergen Reactions    Food Hives and Nausea     Oranges, Hives    Neomycin-Polymyxin B Rash     Rash       PMSFS Hx:  Past Medical History:   Diagnosis Date    Arthritis     knees, and hips-osteo    ASTHMA     Body mass index 40.0-44.9, adult ([facility]) 4/27/2018    Chronic renal impairment, stage 3 (moderate) 5/13/2019    COPD (chronic obstructive pulmonary disease) with chronic bronchitis ([facility]) 4/20/2021    Dental disorder     upper    Dysuria 10/18/2016    Fall     Generalized edema 4/27/2018    Glaucoma     bilateral    Hay fever 4/19/2016    Heart burn     High cholesterol     Hx: UTI (urinary tract infection)     Hyperglycemia 11/24/2015    Hypertension     Hypothyroidism     Indigestion     Ischemic bowel disease (HCC) 9/9/2019    Migraines     pt has similar symptoms with migraines not for a long time though    Nausea 10/14/2019    Neuropathy ([facility])     Obesity (BMI 30-39.9) 7/20/2015    Pneumonia     Routine general medical examination at a health care facility 7/20/2015    7/20/15    Sleep apnea 12/2019    Had sleep study, didn't tolerate cpap    Snoring     Syncope 6/5/2020    Tobacco use 4/29/2019    URI (upper respiratory infection) 11/4/2015    Urinary bladder disorder     hx of uti's     Past Surgical History:   Procedure  Laterality Date    GASTROSCOPY  12/13/2019    Procedure: GASTROSCOPY;  Surgeon: Ang Angel M.D.;  Location: SURGERY [facility];  Service: Gastroenterology    COLONOSCOPY  12/13/2019    Procedure: COLONOSCOPY;  Surgeon: Ang Angel M.D.;  Location: SURGERY [facility];  Service: Gastroenterology    NIRU BY LAPAROSCOPY  9/2/2011    [facility]    LUMBAR DECOMPRESSION  6/29/2009    Performed by ANG YAN at SURGERY [facility] ORS    LUMBAR LAMINECTOMY DISKECTOMY  6/29/2009    Performed by ANG YAN at SURGERY [facility] ORS    ABDOMINAL HYSTERECTOMY TOTAL      GYN SURGERY      hysterectomy    HEMORRHOIDECTOMY       Family History   Problem Relation Age of Onset    Stroke Mother     Hyperlipidemia Mother     Hypertension Mother     Arthritis Mother     Diabetes Father     Lung Disease Father         pneumonia    Arthritis Sister     Rheumatologic Disease Sister     Hypertension Sister     Hyperlipidemia Sister     Other Sister         Anusha/Fibermyalgia    Hyperlipidemia Brother     Hypertension Brother     Arthritis Brother     Lung Disease Maternal Grandmother         pneumonia    Stroke Maternal Grandfather     Cancer Maternal Grandfather         skin     No Known Problems Paternal Grandmother     No Known Problems Paternal Grandfather     Hyperlipidemia Daughter     Diabetes Daughter     No Known Problems Son      Social History     Tobacco Use    Smoking status: Every Day     Current packs/day: 1.00     Average packs/day: 1 pack/day for 56.0 years (56.0 ttl pk-yrs)     Types: Cigarettes    Smokeless tobacco: Never    Tobacco comments:     started smoking at age 12, little mor than half a pack   Substance Use Topics    Alcohol use: No       Problems:   Patient Active Problem List   Diagnosis    Dementia (HCC)    Hypothyroidism    Peripheral neuropathy    Obesity (BMI 30-39.9)    Sleep apnea    Hyperlipidemia    Essential hypertension     Hyperglycemia    Generalized edema    Tobacco use    Acute kidney injury superimposed on chronic kidney disease (HCC)    Ischemic bowel disease (HCC)    Chronic midline low back pain without sciatica    Nonrheumatic aortic valve stenosis    Renal insufficiency syndrome    Urge incontinence of urine    Stage 3b chronic kidney disease    Paroxysmal atrial fibrillation (HCC)    Other emphysema (HCC)    Secondary hypercoagulable state (HCC)    Aortic valve regurgitation    Venous insufficiency    Atrophic vaginitis    Delayed gastric emptying    Gastro-esophageal reflux disease without esophagitis       Medications:   Current Outpatient Medications on File Prior to Visit   Medication Sig Dispense Refill    atorvastatin (LIPITOR) 10 MG Tab TAKE 1 TABLET BY MOUTH EVERY DAY 90 Tablet 3    Mirabegron ER (MYRBETRIQ) 50 MG TABLET SR 24 HR Take 50 mg by mouth every day. 90 Tablet 3    levothyroxine (SYNTHROID) 100 MCG Tab Take 1 Tablet by mouth every morning on an empty stomach. 90 Tablet 3    ELIQUIS 5 MG Tab TAKE 1 TABLET BY MOUTH TWICE A  Tablet 2    fluticasone furoate-vilanterol (BREO ELLIPTA) 100-25 MCG/ACT AEROSOL POWDER, BREATH ACTIVATED INHALE 1 PUFF EVERY DAY. NEEDS APT. FOR REFILLS 60 Each 6    potassium chloride SA (K-DUR) 10 MEQ Tab CR TAKE 1 TABLET BY MOUTH EVERY DAY 90 Tablet 2    estradiol (ESTRACE) 0.1 MG/GM vaginal cream PLEASE SEE ATTACHED FOR DETAILED DIRECTIONS      Naloxone (NARCAN) 4 MG/0.1ML Liquid USE AS DIRECTED      furosemide (LASIX) 20 MG Tab TAKE 1 TABLET BY MOUTH EVERY DAY 90 Tablet 3    promethazine (PHENERGAN) 25 MG Tab Take 1 Tablet by mouth every 6 hours as needed for Nausea/Vomiting. 90 Tablet 3    fenofibrate (TRIGLIDE) 160 MG tablet TAKE 1 TABLET BY MOUTH EVERY DAY 90 Tablet 3    lisinopril (PRINIVIL) 5 MG Tab Take 1 Tablet by mouth every day. 90 Tablet 3    pantoprazole (PROTONIX) 40 MG Tablet Delayed Response TAKE 1 TABLET BY MOUTH TWICE A  Tablet 2     "HYDROcodone-acetaminophen (NORCO) 7.5-325 MG per tablet Take 1 Tablet by mouth every 8 hours as needed for Moderate Pain.      hydrOXYzine HCl (ATARAX) 25 MG Tab Take 25 mg by mouth at bedtime.      latanoprost (XALATAN) 0.005 % Solution Administer 1 Drop into both eyes every evening.      acetaminophen (TYLENOL) 500 MG TABS Take 500 mg by mouth 3 times a day as needed for Mild Pain. Indications: Pain       No current facility-administered medications on file prior to visit.        Objective:     /54   Pulse 81   Temp 36.2 °C (97.2 °F) (Temporal)   Resp 16   Ht 1.575 m (5' 2\")   Wt 81.6 kg (180 lb)   SpO2 97%   BMI 32.92 kg/m²     Physical Exam  Vitals and nursing note reviewed.   Constitutional:       General: She is not in acute distress.     Appearance: Normal appearance. She is well-developed. She is not ill-appearing or toxic-appearing.   HENT:      Head: Normocephalic.      Mouth/Throat:      Mouth: Mucous membranes are moist.   Cardiovascular:      Rate and Rhythm: Normal rate and regular rhythm.      Pulses: Normal pulses.      Heart sounds: Normal heart sounds.   Pulmonary:      Effort: Pulmonary effort is normal.      Breath sounds: Normal breath sounds.   Abdominal:      Palpations: Abdomen is soft. Abdomen is not rigid.      Tenderness: There is no right CVA tenderness or left CVA tenderness.   Musculoskeletal:      Cervical back: No tenderness. Pain with movement present.      Lumbar back: Normal.   Skin:     General: Skin is warm and dry.      Capillary Refill: Capillary refill takes less than 2 seconds.   Neurological:      Mental Status: She is alert and oriented to person, place, and time.   Psychiatric:         Mood and Affect: Mood normal.         Behavior: Behavior normal. Behavior is cooperative.           Results for orders placed or performed during the hospital encounter of 07/17/24   URINE CULTURE(NEW)    Specimen: Urine   Result Value Ref Range    Significant Indicator POS " (POS)     Source UR     Site -     Culture Result - (A)     Culture Result Escherichia coli  >100,000 cfu/mL   (A)        Susceptibility    Escherichia coli - RICH     Ampicillin/sulbactam <=4/2 Sensitive mcg/mL     Ampicillin <=8 Sensitive mcg/mL     Amoxicillin/CA <=8/4 Sensitive mcg/mL     Ceftriaxone <=1 Sensitive mcg/mL     Cefazolin* <=2 Sensitive mcg/mL      * Breakpoints when Cefazolin is used for therapy of infections  other than uncomplicated UTIs due to Enterobacterales are as  follows:  RICH and Interpretation:  <=2 S  4 I  >=8 R       Ciprofloxacin <=0.25 Sensitive mcg/mL     Cefepime <=2 Sensitive mcg/mL     Cefuroxime <=4 Sensitive mcg/mL     Nitrofurantoin <=32 Sensitive mcg/mL     Gentamicin <=2 Sensitive mcg/mL     Levofloxacin <=0.5 Sensitive mcg/mL     Minocycline <=4 Sensitive mcg/mL     Pip/Tazobactam <=8 Sensitive mcg/mL     Trimeth/Sulfa <=0.5/9.5 Sensitive mcg/mL     Tigecycline <=2 Sensitive mcg/mL     Tobramycin <=2 Sensitive mcg/mL     Results for orders placed or performed in visit on 08/02/24   POCT Urinalysis   Result Value Ref Range    POC Color Yellow Negative    POC Appearance Clear Negative    POC Glucose Negative Negative mg/dL    POC Bilirubin Negative Negative mg/dL    POC Ketones Negative Negative mg/dL    POC Specific Gravity 1.010 <1.005 - >1.030    POC Blood Moderate Negative    POC Urine PH 7.0 5.0 - 8.0    POC Protein Negative Negative mg/dL    POC Urobiligen 0.2 Negative (0.2) mg/dL    POC Nitrites Negative Negative    POC Leukocyte Esterase Small Negative       Assessment /Associated Orders:      1. Acute low back pain, unspecified back pain laterality, unspecified whether sciatica present  POCT Urinalysis      2. Acute cystitis with hematuria  Urine Culture    ciprofloxacin (CIPRO) 500 MG Tab    phenazopyridine (PYRIDIUM) 200 MG Tab            Medical Decision Making:    Ella is a very pleasant 83 y.o. female who is clinically stable at today's acute urgent care visit.   No acute distress noted.  VSS. Appropriate for outpatient care at this time.   Acute problem today with uncertain prognosis.     Est GFR 41 ( labs 01/23/24)  Urinalysis and clinical exam was consistent with acute cystitis that was not resolved with previous antibiotic.  She has been on cefdinir many times in the past for UTIs.  I have ordered her ciprofloxacin which her previous urine culture showed was sensitive.  Urine culture: Pending  Educated in proper administration of  prescription medication(s) ordered today including safety, possible SE, risks, benefits, rationale and alternatives to therapy.   Keep well hydrated  OTC acetaminophen for breakthrough pain. Dosage and directions per . Do not exceed 3000 mg in 24 hours.   Heat and ice packs prn     Discussed Dx, management options (risks,benefits, and alternatives to planned treatment), natural progression and supportive care.  Expressed understanding and the treatment plan was agreed upon.   Questions were encouraged and answered   Return to urgent care prn if new or worsening sx or if there is no improvement in condition prn.    Educated in Red flags and indications to immediately call 911 or present to the Emergency Department.       Time I spent evaluating Margaret Garcia in urgent care today was 30  minutes. This time includes preparing for visit, reviewing any pertinent notes or test results, counseling/education, exam, obtaining HPI, interpretation of lab tests, medication management and documentation as indicated above.Time does not include separately billable procedures noted .       Please note that this dictation was created using voice recognition software. I have worked with consultants from the vendor as well as technical experts from [facility] to optimize the interface. I have made every reasonable attempt to correct obvious errors, but I expect that there are errors of grammar and possibly content that I did not discover before  finalizing the note.  This note was electronically signed by provider

## 2024-08-05 LAB
BACTERIA UR CULT: NORMAL
SIGNIFICANT IND 70042: NORMAL
SITE SITE: NORMAL
SOURCE SOURCE: NORMAL

## 2024-09-06 DIAGNOSIS — E78.2 MIXED HYPERLIPIDEMIA: ICD-10-CM

## 2024-09-09 DIAGNOSIS — R60.0 EDEMA LEG: ICD-10-CM

## 2024-09-09 RX ORDER — POTASSIUM CHLORIDE 750 MG/1
10 TABLET, EXTENDED RELEASE ORAL
Qty: 90 TABLET | Refills: 0 | Status: SHIPPED | OUTPATIENT
Start: 2024-09-09

## 2024-09-10 RX ORDER — FENOFIBRATE 160 MG/1
160 TABLET ORAL
Qty: 90 TABLET | Refills: 2 | Status: SHIPPED | OUTPATIENT
Start: 2024-09-10

## 2024-09-10 NOTE — TELEPHONE ENCOUNTER
Requested Prescriptions     Signed Prescriptions Disp Refills    potassium chloride SA (K-DUR) 10 MEQ Tab CR 90 Tablet 0     Sig: Take 1 Tablet by mouth every day.     Authorizing Provider: SHANNON ROJAS A.P.R.N.

## 2024-09-10 NOTE — TELEPHONE ENCOUNTER
Received request via: Patient    Was the patient seen in the last year in this department? Yes    Does the patient have an active prescription (recently filled or refills available) for medication(s) requested? No    Pharmacy Name: [facility]    Does the patient have USP Plus and need 100-day supply? (This applies to ALL medications) Patient does not have SCP

## 2024-09-13 ENCOUNTER — APPOINTMENT (OUTPATIENT)
Dept: RADIOLOGY | Facility: MEDICAL CENTER | Age: 84
End: 2024-09-13
Attending: EMERGENCY MEDICINE
Payer: MEDICARE

## 2024-09-13 ENCOUNTER — HOSPITAL ENCOUNTER (EMERGENCY)
Facility: MEDICAL CENTER | Age: 84
End: 2024-09-13
Attending: EMERGENCY MEDICINE
Payer: MEDICARE

## 2024-09-13 VITALS
OXYGEN SATURATION: 97 % | DIASTOLIC BLOOD PRESSURE: 77 MMHG | TEMPERATURE: 97.6 F | RESPIRATION RATE: 18 BRPM | WEIGHT: 185.19 LBS | SYSTOLIC BLOOD PRESSURE: 148 MMHG | HEART RATE: 81 BPM | BODY MASS INDEX: 34.08 KG/M2 | HEIGHT: 62 IN

## 2024-09-13 DIAGNOSIS — S09.90XA CLOSED HEAD INJURY, INITIAL ENCOUNTER: ICD-10-CM

## 2024-09-13 DIAGNOSIS — S16.1XXA STRAIN OF NECK MUSCLE, INITIAL ENCOUNTER: ICD-10-CM

## 2024-09-13 DIAGNOSIS — S20.212A CHEST WALL CONTUSION, LEFT, INITIAL ENCOUNTER: ICD-10-CM

## 2024-09-13 DIAGNOSIS — B37.9 CANDIDIASIS: ICD-10-CM

## 2024-09-13 DIAGNOSIS — T14.8XXA MULTIPLE SKIN TEARS: ICD-10-CM

## 2024-09-13 LAB
ALBUMIN SERPL BCP-MCNC: 3.8 G/DL (ref 3.2–4.9)
ALBUMIN/GLOB SERPL: 1.2 G/DL
ALP SERPL-CCNC: 61 U/L (ref 30–99)
ALT SERPL-CCNC: 9 U/L (ref 2–50)
ANION GAP SERPL CALC-SCNC: 14 MMOL/L (ref 7–16)
APPEARANCE UR: CLEAR
AST SERPL-CCNC: 13 U/L (ref 12–45)
BACTERIA #/AREA URNS HPF: ABNORMAL /HPF
BASOPHILS # BLD AUTO: 1 % (ref 0–1.8)
BASOPHILS # BLD: 0.09 K/UL (ref 0–0.12)
BILIRUB SERPL-MCNC: 0.5 MG/DL (ref 0.1–1.5)
BILIRUB UR QL STRIP.AUTO: NEGATIVE
BUN SERPL-MCNC: 27 MG/DL (ref 8–22)
CALCIUM ALBUM COR SERPL-MCNC: 9.5 MG/DL (ref 8.5–10.5)
CALCIUM SERPL-MCNC: 9.3 MG/DL (ref 8.4–10.2)
CHLORIDE SERPL-SCNC: 105 MMOL/L (ref 96–112)
CO2 SERPL-SCNC: 21 MMOL/L (ref 20–33)
COLOR UR: ABNORMAL
CREAT SERPL-MCNC: 1.2 MG/DL (ref 0.5–1.4)
EOSINOPHIL # BLD AUTO: 0.1 K/UL (ref 0–0.51)
EOSINOPHIL NFR BLD: 1.1 % (ref 0–6.9)
EPI CELLS #/AREA URNS HPF: ABNORMAL /HPF
ERYTHROCYTE [DISTWIDTH] IN BLOOD BY AUTOMATED COUNT: 49 FL (ref 35.9–50)
GFR SERPLBLD CREATININE-BSD FMLA CKD-EPI: 44 ML/MIN/1.73 M 2
GLOBULIN SER CALC-MCNC: 3.2 G/DL (ref 1.9–3.5)
GLUCOSE SERPL-MCNC: 84 MG/DL (ref 65–99)
GLUCOSE UR STRIP.AUTO-MCNC: NEGATIVE MG/DL
HCT VFR BLD AUTO: 39.4 % (ref 37–47)
HGB BLD-MCNC: 12.9 G/DL (ref 12–16)
IMM GRANULOCYTES # BLD AUTO: 0.06 K/UL (ref 0–0.11)
IMM GRANULOCYTES NFR BLD AUTO: 0.7 % (ref 0–0.9)
KETONES UR STRIP.AUTO-MCNC: NEGATIVE MG/DL
LEUKOCYTE ESTERASE UR QL STRIP.AUTO: ABNORMAL
LYMPHOCYTES # BLD AUTO: 1.28 K/UL (ref 1–4.8)
LYMPHOCYTES NFR BLD: 14.4 % (ref 22–41)
MCH RBC QN AUTO: 30.3 PG (ref 27–33)
MCHC RBC AUTO-ENTMCNC: 32.7 G/DL (ref 32.2–35.5)
MCV RBC AUTO: 92.5 FL (ref 81.4–97.8)
MICRO URNS: ABNORMAL
MONOCYTES # BLD AUTO: 0.55 K/UL (ref 0–0.85)
MONOCYTES NFR BLD AUTO: 6.2 % (ref 0–13.4)
MUCOUS THREADS #/AREA URNS HPF: ABNORMAL /HPF
NEUTROPHILS # BLD AUTO: 6.79 K/UL (ref 1.82–7.42)
NEUTROPHILS NFR BLD: 76.6 % (ref 44–72)
NITRITE UR QL STRIP.AUTO: NEGATIVE
NRBC # BLD AUTO: 0 K/UL
NRBC BLD-RTO: 0 /100 WBC (ref 0–0.2)
PH UR STRIP.AUTO: 6 [PH] (ref 5–8)
PLATELET # BLD AUTO: 349 K/UL (ref 164–446)
PMV BLD AUTO: 9.1 FL (ref 9–12.9)
POTASSIUM SERPL-SCNC: 4.3 MMOL/L (ref 3.6–5.5)
PROT SERPL-MCNC: 7 G/DL (ref 6–8.2)
PROT UR QL STRIP: NEGATIVE MG/DL
RBC # BLD AUTO: 4.26 M/UL (ref 4.2–5.4)
RBC # URNS HPF: ABNORMAL /HPF
RBC UR QL AUTO: NEGATIVE
SODIUM SERPL-SCNC: 140 MMOL/L (ref 135–145)
SP GR UR STRIP.AUTO: <=1.005
WBC # BLD AUTO: 8.9 K/UL (ref 4.8–10.8)
WBC #/AREA URNS HPF: ABNORMAL /HPF

## 2024-09-13 PROCEDURE — 700101 HCHG RX REV CODE 250: Performed by: EMERGENCY MEDICINE

## 2024-09-13 PROCEDURE — 70450 CT HEAD/BRAIN W/O DYE: CPT

## 2024-09-13 PROCEDURE — 73110 X-RAY EXAM OF WRIST: CPT | Mod: RT

## 2024-09-13 PROCEDURE — 72125 CT NECK SPINE W/O DYE: CPT

## 2024-09-13 PROCEDURE — 36415 COLL VENOUS BLD VENIPUNCTURE: CPT

## 2024-09-13 PROCEDURE — 700102 HCHG RX REV CODE 250 W/ 637 OVERRIDE(OP): Performed by: EMERGENCY MEDICINE

## 2024-09-13 PROCEDURE — 87086 URINE CULTURE/COLONY COUNT: CPT

## 2024-09-13 PROCEDURE — 73130 X-RAY EXAM OF HAND: CPT | Mod: RT

## 2024-09-13 PROCEDURE — A9270 NON-COVERED ITEM OR SERVICE: HCPCS | Performed by: EMERGENCY MEDICINE

## 2024-09-13 PROCEDURE — 81001 URINALYSIS AUTO W/SCOPE: CPT

## 2024-09-13 PROCEDURE — 700117 HCHG RX CONTRAST REV CODE 255: Performed by: EMERGENCY MEDICINE

## 2024-09-13 PROCEDURE — 99285 EMERGENCY DEPT VISIT HI MDM: CPT

## 2024-09-13 PROCEDURE — 85025 COMPLETE CBC W/AUTO DIFF WBC: CPT

## 2024-09-13 PROCEDURE — 80053 COMPREHEN METABOLIC PANEL: CPT

## 2024-09-13 PROCEDURE — 71260 CT THORAX DX C+: CPT

## 2024-09-13 RX ORDER — NYSTATIN 100000 [USP'U]/G
1 POWDER TOPICAL 3 TIMES DAILY
Qty: 30 G | Refills: 0 | Status: SHIPPED | OUTPATIENT
Start: 2024-09-13

## 2024-09-13 RX ORDER — NYSTATIN 100000 [USP'U]/G
POWDER TOPICAL ONCE
Status: COMPLETED | OUTPATIENT
Start: 2024-09-13 | End: 2024-09-13

## 2024-09-13 RX ORDER — ACETAMINOPHEN 325 MG/1
650 TABLET ORAL ONCE
Status: DISCONTINUED | OUTPATIENT
Start: 2024-09-13 | End: 2024-09-13 | Stop reason: HOSPADM

## 2024-09-13 RX ORDER — NYSTATIN 100000 U/G
1 CREAM TOPICAL 2 TIMES DAILY
Qty: 30 G | Refills: 0 | Status: SHIPPED | OUTPATIENT
Start: 2024-09-13

## 2024-09-13 RX ORDER — HYDROCODONE BITARTRATE AND ACETAMINOPHEN 5; 325 MG/1; MG/1
1 TABLET ORAL ONCE
Status: COMPLETED | OUTPATIENT
Start: 2024-09-13 | End: 2024-09-13

## 2024-09-13 RX ORDER — LIDOCAINE HYDROCHLORIDE AND EPINEPHRINE 10; 10 MG/ML; UG/ML
10 INJECTION, SOLUTION INFILTRATION; PERINEURAL ONCE
Status: COMPLETED | OUTPATIENT
Start: 2024-09-13 | End: 2024-09-13

## 2024-09-13 RX ADMIN — IOHEXOL 100 ML: 350 INJECTION, SOLUTION INTRAVENOUS at 14:40

## 2024-09-13 RX ADMIN — HYDROCODONE BITARTRATE AND ACETAMINOPHEN 1 TABLET: 5; 325 TABLET ORAL at 14:20

## 2024-09-13 RX ADMIN — NYSTATIN: 100000 POWDER TOPICAL at 16:16

## 2024-09-13 RX ADMIN — LIDOCAINE HYDROCHLORIDE AND EPINEPHRINE 10 ML: 10; 10 INJECTION, SOLUTION INFILTRATION; PERINEURAL at 13:00

## 2024-09-13 ASSESSMENT — FIBROSIS 4 INDEX: FIB4 SCORE: 2.3

## 2024-09-13 NOTE — ED PROVIDER NOTES
"ED Provider Note    CHIEF COMPLAINT  Chief Complaint   Patient presents with    Fall    Head Injury    Hand Laceration    Blurred Vision     Pt had GLF at home attempting to get into car. Pt hit L side of head, laceration and abrasions to R hand, nausea, and c/o blurred L eye vision. Pt is on blood thinner-eloquis. Pt denies LOC.     Nausea       EXTERNAL RECORDS REVIEWED  Outpatient Notes patient was seen in urgent care August 2, 2024 for complaint of back pain and shoulder pain as well as UTI.  It was noted in that HPI that the daughter reported patient is on a pain management plan and sees a pain management specialist.  Primary reason for visit was UTI.  Patient had a UTI several weeks prior to that visit and they were concerned that the infection is not completely resolved.      Review of culture results from July 17, 2024 reveals patient had a pansensitive E. coli grew out in her urine.She also had urine culture turn positive in November 2023 and in March 2024.  All cultures have been positive for E. Coli except for NSAID 100 2022 which she brought Enterococcus.        HPI/ROS  LIMITATION TO HISTORY   Select: : None  OUTSIDE HISTORIAN(S):  None    Margaret Garcia is a 83 y.o. female who presents to the ER complaining of head injury.  The patient was bringing in her garbage can.  She fell as she was bringing in her can.  Friend at bedside said the patient falls several times a year because she \"trips over her own feet.\"  Patient is unsure why she fell today.  However, she adamantly denies feeling poorly before she fell.  She adamantly denies dizziness, chest pain, shortness of breath or syncope prior to falling.  She struck her head on the pavement.  She was out in her carport.  No LOC.  She is on Eliquis.  She says she has some \"goose eggs\" on her head.  No neck pain.  No back pain.  The garbage been helped her up.  When she was on her feet she was able to walk.  No hip pain.  No back pain.  No abdominal " "pain.  No chest pain.  No rib pain.  No shortness of breath.  No pain with deep breathing.  She has skin tears over the dorsum of her right hand.  Patient reports noticing some very mild blurriness in the left eye after hitting the left side of her head.  She says \"it is not bad.\"  No diplopia.  No vision loss.  She denies headache.  No nausea or vomiting.  No vertigo.  No dizziness.  No numbness/tingling/weakness of extremities.    PAST MEDICAL HISTORY   has a past medical history of Arthritis, ASTHMA, Body mass index 40.0-44.9, adult (Formerly [facility]) (4/27/2018), Chronic renal impairment, stage 3 (moderate) (5/13/2019), COPD (chronic obstructive pulmonary disease) with chronic bronchitis ([facility]) (4/20/2021), Dental disorder, Dysuria (10/18/2016), Fall, Generalized edema (4/27/2018), Glaucoma, Hay fever (4/19/2016), Heart burn, High cholesterol, UTI (urinary tract infection), Hyperglycemia (11/24/2015), Hypertension, Hypothyroidism, Indigestion, Ischemic bowel disease ([facility]) (9/9/2019), Migraines, Nausea (10/14/2019), Neuropathy ([facility]), Obesity (BMI 30-39.9) (7/20/2015), Pneumonia, Routine general medical examination at a health care facility (7/20/2015), Sleep apnea (12/2019), Snoring, Syncope (6/5/2020), Tobacco use (4/29/2019), URI (upper respiratory infection) (11/4/2015), and Urinary bladder disorder.    SURGICAL HISTORY   has a past surgical history that includes gyn surgery; lumbar decompression (6/29/2009); hemorrhoidectomy; abdominal hysterectomy total; jodi by laparoscopy (9/2/2011); lumbar laminectomy diskectomy (6/29/2009); gastroscopy (12/13/2019); and colonoscopy (12/13/2019).    FAMILY HISTORY  Family History   Problem Relation Age of Onset    Stroke Mother     Hyperlipidemia Mother     Hypertension Mother     Arthritis Mother     Diabetes Father     Lung Disease Father         pneumonia    Arthritis Sister     Rheumatologic Disease Sister     Hypertension Sister     Hyperlipidemia Sister     Other Sister         " "Anusha/Fibermyalgia    Hyperlipidemia Brother     Hypertension Brother     Arthritis Brother     Lung Disease Maternal Grandmother         pneumonia    Stroke Maternal Grandfather     Cancer Maternal Grandfather         skin     No Known Problems Paternal Grandmother     No Known Problems Paternal Grandfather     Hyperlipidemia Daughter     Diabetes Daughter     No Known Problems Son        SOCIAL HISTORY  Social History     Tobacco Use    Smoking status: Every Day     Current packs/day: 1.00     Average packs/day: 1 pack/day for 56.0 years (56.0 ttl pk-yrs)     Types: Cigarettes    Smokeless tobacco: Never    Tobacco comments:     started smoking at age 12, little mor than half a pack   Vaping Use    Vaping status: Never Used   Substance and Sexual Activity    Alcohol use: No    Drug use: No    Sexual activity: Never     Partners: Male     Birth control/protection: Post-Menopausal       CURRENT MEDICATIONS  Home Medications    **Home medications have not yet been reviewed for this encounter**       Audit from Redirected Encounters    **Home medications have not yet been reviewed for this encounter**         ALLERGIES  Allergies   Allergen Reactions    Food Hives and Nausea     Oranges, Hives    Neomycin-Polymyxin B Rash     Rash       PHYSICAL EXAM  VITAL SIGNS: /73   Pulse 71   Temp 36.4 °C (97.6 °F) (Tympanic)   Resp 16   Ht 1.575 m (5' 2\")   Wt 84 kg (185 lb 3 oz)   SpO2 93%   BMI 33.87 kg/m²    Constitutional:  Well developed, well nourished; No acute distress   HENT: Normocephalic, several hematomas without laceration to the left parietal scalp.  Bilateral external ears normal, slightly dry mucous membranes.  Eyes: PERRL, EOMI, Conjunctiva normal, No discharge.  No raccoons or kowalski's.  Neck: Normal range of motion, supple, nontender C-spine without step-off or deformity.  Lymphatic: No lymphadenopathy noted.   Cardiovascular: Normal heart rate, Normal rhythm, No murmurs, rubs or gallops   Thorax " & Lungs: CTA=bilaterally;  No respiratory distress,  No wheezing rales, or rhonchi; No chest tenderness.  No tenderness to the ribs. No crepitus or subQ air.   Abdomen: soft, good bowel sounds, no guarding no rebound, no masses, no pulsatile mass, no tenderness, no distention.  Patient has excoriations and raw skin with erythema and excess moisture under her pannus with areas of bleeding consistent with candidiasis.  Skin: Warm, Dry, No erythema, No rash.   Back: No tenderness to the T-spine or L-spine.  No step-off or deformity., No CVA tenderness.   Extremities: 2+ dp and pt pulses bilateral LEs;  Nontender; trace to 1+ pretibial edema.  No pain with range of motion of either hip.  No shortening or rotation.  No bruising to the knees.  Patient has 2 skin tears over the dorsum of the right hand.  The larger skin tear is more distal along the dorsal hand.  Bleeding is controlled.  Full range of motion digits.  2+ radial pulse.  Neurologic: Alert & oriented x 4, clear speech, moves all extremities with full range of motion.  Psychiatric: appropriate, normal affect     EKG/LABS  Results for orders placed or performed during the hospital encounter of 09/13/24   CBC WITH DIFFERENTIAL   Result Value Ref Range    WBC 8.9 4.8 - 10.8 K/uL    RBC 4.26 4.20 - 5.40 M/uL    Hemoglobin 12.9 12.0 - 16.0 g/dL    Hematocrit 39.4 37.0 - 47.0 %    MCV 92.5 81.4 - 97.8 fL    MCH 30.3 27.0 - 33.0 pg    MCHC 32.7 32.2 - 35.5 g/dL    RDW 49.0 35.9 - 50.0 fL    Platelet Count 349 164 - 446 K/uL    MPV 9.1 9.0 - 12.9 fL    Neutrophils-Polys 76.60 (H) 44.00 - 72.00 %    Lymphocytes 14.40 (L) 22.00 - 41.00 %    Monocytes 6.20 0.00 - 13.40 %    Eosinophils 1.10 0.00 - 6.90 %    Basophils 1.00 0.00 - 1.80 %    Immature Granulocytes 0.70 0.00 - 0.90 %    Nucleated RBC 0.00 0.00 - 0.20 /100 WBC    Neutrophils (Absolute) 6.79 1.82 - 7.42 K/uL    Lymphs (Absolute) 1.28 1.00 - 4.80 K/uL    Monos (Absolute) 0.55 0.00 - 0.85 K/uL    Eos (Absolute)  0.10 0.00 - 0.51 K/uL    Baso (Absolute) 0.09 0.00 - 0.12 K/uL    Immature Granulocytes (abs) 0.06 0.00 - 0.11 K/uL    NRBC (Absolute) 0.00 K/uL   COMP METABOLIC PANEL   Result Value Ref Range    Sodium 140 135 - 145 mmol/L    Potassium 4.3 3.6 - 5.5 mmol/L    Chloride 105 96 - 112 mmol/L    Co2 21 20 - 33 mmol/L    Anion Gap 14.0 7.0 - 16.0    Glucose 84 65 - 99 mg/dL    Bun 27 (H) 8 - 22 mg/dL    Creatinine 1.20 0.50 - 1.40 mg/dL    Calcium 9.3 8.4 - 10.2 mg/dL    Correct Calcium 9.5 8.5 - 10.5 mg/dL    AST(SGOT) 13 12 - 45 U/L    ALT(SGPT) 9 2 - 50 U/L    Alkaline Phosphatase 61 30 - 99 U/L    Total Bilirubin 0.5 0.1 - 1.5 mg/dL    Albumin 3.8 3.2 - 4.9 g/dL    Total Protein 7.0 6.0 - 8.2 g/dL    Globulin 3.2 1.9 - 3.5 g/dL    A-G Ratio 1.2 g/dL   URINALYSIS (UA)    Specimen: Blood   Result Value Ref Range    Color Straw     Character Clear     Specific Gravity <=1.005 <1.035    Ph 6.0 5.0 - 8.0    Glucose Negative Negative mg/dL    Ketones Negative Negative mg/dL    Protein Negative Negative mg/dL    Bilirubin Negative Negative    Nitrite Negative Negative    Leukocyte Esterase Trace (A) Negative    Occult Blood Negative Negative    Micro Urine Req Microscopic    URINE MICROSCOPIC (W/UA)   Result Value Ref Range    WBC 0-2 /hpf    RBC 2-5 (A) /hpf    Bacteria Rare (A) None /hpf    Epithelial Cells Few Few /hpf    Mucous Threads Few /hpf   ESTIMATED GFR   Result Value Ref Range    GFR (CKD-EPI) 44 (A) >60 mL/min/1.73 m 2          RADIOLOGY/PROCEDURES   I have independently interpreted the diagnostic imaging associated with this visit and am waiting the final reading from the radiologist.     My preliminary interpretation is as follows: GIGI ANDERSON is reviewed the patient's CT head.  No obvious head bleed.    Radiologist interpretation:  CT-CHEST,ABDOMEN,PELVIS WITH   Final Result         1. There is no evidence for acute traumatic injury to the chest, abdomen, or pelvis.   2. Atherosclerosis including coronary artery  "disease.   3. Old granulomatous disease.   4. Stable adrenal nodularity.   5. Diverticulosis.   6. Periumbilical hernia containing fat only.      DX-WRIST-COMPLETE 3+ RIGHT   Final Result      Advanced osteoarthrosis without a definite fracture.      DX-HAND 3+ RIGHT   Final Result      No definite fracture or dislocation.      CT-CSPINE WITHOUT PLUS RECONS   Final Result      Multilevel degenerative disease without a definite fracture.      CT-HEAD W/O   Final Result      There is no definite acute intracranial abnormality.                   COURSE & MEDICAL DECISION MAKING    ASSESSMENT, COURSE AND PLAN  Care Narrative: Patient presents to the ER after falling today in her carport.  She was bringing in the [employer] can.  It was witnessed by , who helped the patient to her feet.  Patient states she felt well before she fell.  She adamantly denies dizziness, chest pain, shortness of breath or syncope.  Patient believes this was a mechanical fall although she cannot say exactly why she fell although family members report the patient falls several times a year because she \"trips over her own feet.\"  Patient was able to stand and walk when she was helped to her feet.  She does have some hematomas over her left scalp.  No lacerations.  No neck pain.  Initially she denied rib pain or abdominal pain.  However, upon repeat examination patient was now tender in her left upper quadrant and did have some tenderness to her left lower chest wall.  I also noticed a little bit of blood on the front of her underwear which is clearly coming from skin erosions from a candidiasis infection under her pannus.  She has multiple erosions which Aldo should not says periodically bleed.  Some of which have scabbed over.  It has smell of yeast.  Patient is not diabetic.  I was able to get the patient hooked up with home health referral through our  here in the ER to get help with this pannus candidiasis as well as to " follow her for wound care for her skin tears over her right hand.  Referral was placed.  We also provided patient with a barrier cloth and nystatin dressing under the pannus prior to discharge.  With respect to her imaging, CT scan of the head was negative for any acute traumatic injury.  No skull fracture.  CT C-spine was performed given patient's age and head injury and is negative for acute fracture.  Patient has no neck pain and no complaints of numbness, tingling weakness of extremities.  No concern for C-spine fracture or cord compression.  Since the patient had developed tenderness in the abdomen and lower chest on the left on reevaluation she underwent a CT scan of the chest abdomen pelvis.  No intrathoracic or intra-abdominal organ injury.  No acute findings.  Patient had x-ray of her hand and wrist.  There is no bony abnormalities.  I repaired her skin tears by replacing the skin overlying the defect and then using Steri-Strips with a liquid adhesive to keep Steri-Strips holding tight.  And then placed Tegaderm over her hand.  Patient's vitals are normal and stable.  Her lab work looks good.  She may be slightly dehydrated with a mildly elevated BUN of 27 but her creatinine is at baseline.  No anemia.  She has history of UTI but urine looks pretty good today.  Will culture the urine and in the event that it comes back positive she will need antibiotics.  Patient's daughter works for home health and is very knowledgeable about medical issues and is in agreement that we will just follow the culture for now since patient does not have any symptoms and therefore does not need any antibiotics at this time.  Patient is ambulatory upon discharge.  Referral to home health has been placed.  I will send the patient home with prescription for nystatin cream and powder for her pannus candidiasis.  She has been given very strict return precautions and discharge instructions for head injury on blood thinners as well as  "for skin tears and candidiasis as well as for chest wall contusion and she understands treatment plan and follow-up.         On reevaluation after patient to come back from CT scan of the head and neck, I reexamined her abdomen and chest wall and now she is having tenderness in the left abdomen as well as over the left lower chest wall.  Daughter says that she likely fell on her left side as there were dirt and leaves on the left side of her close.  With respect to patient's slight blurry vision in left eye upon arrival, she says that has completely resolved.  I also noticed that patient had some blood on her underwear.  She has a obvious candidiasis infection under her pannus where the skin is quite irritated and raw and occasionally bleeds.  Patient says that sometimes it \"itches\", but does not cause her any pain.  She is not a diabetic.  Patient will need a CT scan of the chest abdomen pelvis now that she has abdominal pain is on Eliquis.  Will order some basic lab work for CT scanning, but also since patient has history of SHARIFA and UTI in the past which have contributed to her falls.  Daughter states that patient typically has more confusion with her UTIs and she has not seemed at all confused.    ADDITIONAL PROBLEMS MANAGED  Problem #1: Closed head injury after falling in carport today while taking in garbage can  Problem #2: Skin tears over right hand  Problem #3: Candidiasis under the pannus identified on physical examination here today    DISPOSITION AND DISCUSSIONS  I have discussed management of the patient with the following physicians and DELLA's: None    Discussion of management with other [facility] or appropriate source(s): Case Management discussed with Anum.  Will set patient up with home health and home health wound care for her skin tears as well as for the candidiasis skin breakdown under her pannus.    Escalation of care considered, and ultimately not performed:acute inpatient care management, " however at this time, the patient is most appropriate for outpatient management.  Patient has no intracranial bleeding.  No intrathoracic or intra-abdominal organ injury.  No need for transfer to trauma center for further evaluation or admission.    Barriers to care at this time, including but not limited to:  None known .     Decision tools and prescription drugs considered including, but not limited to:  Will give patient nystatin for her candidiasis under the pannus .    FINAL DIAGNOSIS  1. Multiple skin tears Acute   2. Closed head injury, initial encounter Acute   3. Chest wall contusion, left, initial encounter Acute   4. Strain of neck muscle, initial encounter Acute   5. Candidiasis Acute        This dictation has been created using voice recognition software. The accuracy of the dictation is limited by the abilities of the software. I expect there may be some errors of grammar and possibly content. I made every attempt to manually correct the errors within my dictation. However, errors related to voice recognition software may still exist and should be interpreted within the appropriate context.     Electronically signed by: Daisy Kenyon M.D., 9/13/2024 10:53 AM

## 2024-09-13 NOTE — DISCHARGE PLANNING
note:  Received notification from MD regarding disposition assistance.   Pt has several skin tears on left forearm are and open area under the abd skin fold.     CM suggested homehealth services. Daughter is agreeable as she worksfor [facility] . Choice signed by daughter for [facility]. Referral sent to [facility]. Mariam at [facility] is working on referral.

## 2024-09-13 NOTE — ED TRIAGE NOTES
".  Chief Complaint   Patient presents with    Fall    Head Injury    Hand Laceration    Blurred Vision     Pt had GLF at home attempting to get into car. Pt hit L side of head, laceration and abrasions to R hand, nausea, and c/o blurred L eye vision. Pt is on blood thinner-eloquis. Pt denies LOC.     Nausea   /73   Pulse 74   Temp 36.4 °C (97.6 °F) (Tympanic)   Resp 18   Ht 1.575 m (5' 2\")   Wt 84 kg (185 lb 3 oz)   SpO2 95%   BMI 33.87 kg/m²   "

## 2024-09-13 NOTE — ED NOTES
Dc instructions and medications discussed with patient at bedside. All questions answered at this time. VSS. No IV in place at this time. Pt to lobby without incident. Daughter to drive patient home.   Provided pt with education regarding wound care on her pannus, and hand skin tears.

## 2024-09-13 NOTE — DISCHARGE INSTRUCTIONS
Follow-up at the [facility] wound care center early this coming week.  A referral has been placed on your behalf.  The schedulers should be contacting you on Monday.  If you have not heard from them by Monday midday, please call for appointment.    Keep Steri-Stripped wounds clean and dry.    Return to the ER for any worsening headache, recurrent blurry vision, double vision, numbness/tingling/weakness of extremities, dizziness or vertigo, worsening rib pain, shortness of breath, coughing up rust colored phlegm or blood, abdominal pain, nausea, vomiting, or for any concerns.    Follow-up with your primary care physician early this coming week.  Please call for appointment.

## 2024-09-15 LAB
BACTERIA UR CULT: NORMAL
SIGNIFICANT IND 70042: NORMAL
SITE SITE: NORMAL
SOURCE SOURCE: NORMAL

## 2024-09-18 ENCOUNTER — TELEPHONE (OUTPATIENT)
Dept: HEALTH INFORMATION MANAGEMENT | Facility: OTHER | Age: 84
End: 2024-09-18
Payer: MEDICARE

## 2024-09-19 DIAGNOSIS — R06.09 DYSPNEA ON EXERTION: ICD-10-CM

## 2024-09-19 DIAGNOSIS — J96.11 CHRONIC RESPIRATORY FAILURE WITH HYPOXIA (HCC): Chronic | ICD-10-CM

## 2024-09-19 RX ORDER — FLUTICASONE FUROATE AND VILANTEROL 100; 25 UG/1; UG/1
POWDER RESPIRATORY (INHALATION)
Qty: 60 EACH | Refills: 0 | Status: SHIPPED | OUTPATIENT
Start: 2024-09-19 | End: 2024-09-27

## 2024-09-19 NOTE — TELEPHONE ENCOUNTER
Requested Prescriptions     Signed Prescriptions Disp Refills    fluticasone furoate-vilanterol (BREO ELLIPTA) 100-25 MCG/ACT AEROSOL POWDER, BREATH ACTIVATED 60 Each 0     Sig: INHALE 1 PUFF EVERY DAY. NEEDS APT. FOR REFILLS     Authorizing Provider: SHANNON ROJAS A.P.R.N.

## 2024-09-19 NOTE — TELEPHONE ENCOUNTER
Received request via: Pharmacy    Was the patient seen in the last year in this department? Yes    Does the patient have an active prescription (recently filled or refills available) for medication(s) requested? No    Pharmacy Name: Prescription faxed to:    [id number] - [place] - [address] AT [facility] [address]  [address] [facility] [place] [address]  Phone: [phone] Fax: [phone]    [id number] - [place] - [address] AT [facility]  [address]  [place] [address]  Phone: [phone] Fax: [phone]  .    Does the patient have care home Plus and need 100-day supply? (This applies to ALL medications) Patient does not have SCP

## 2024-09-26 ENCOUNTER — TELEPHONE (OUTPATIENT)
Dept: MEDICAL GROUP | Facility: PHYSICIAN GROUP | Age: 84
End: 2024-09-26
Payer: MEDICARE

## 2024-09-27 DIAGNOSIS — J96.11 CHRONIC RESPIRATORY FAILURE WITH HYPOXIA (HCC): ICD-10-CM

## 2024-09-27 DIAGNOSIS — I10 ESSENTIAL HYPERTENSION: ICD-10-CM

## 2024-09-27 RX ORDER — FLUTICASONE FUROATE AND VILANTEROL 100; 25 UG/1; UG/1
1 POWDER RESPIRATORY (INHALATION) DAILY
Qty: 60 EACH | Refills: 0 | Status: SHIPPED | OUTPATIENT
Start: 2024-09-27

## 2024-09-27 RX ORDER — LISINOPRIL 5 MG/1
5 TABLET ORAL DAILY
Qty: 90 TABLET | Refills: 1 | Status: SHIPPED | OUTPATIENT
Start: 2024-09-27

## 2024-09-27 RX ORDER — FUROSEMIDE 20 MG
20 TABLET ORAL DAILY
Qty: 90 TABLET | Refills: 3 | Status: SHIPPED | OUTPATIENT
Start: 2024-09-27

## 2024-09-27 NOTE — TELEPHONE ENCOUNTER
Is the patient due for a refill? Yes    Was the patient seen the past year? Yes    Date of last office visit: 04/01/2024    Does the patient have an upcoming appointment?  No   If yes, When?     Provider to refill:VR    Does the patient have snf Plus and need 100-day supply? (This applies to ALL medications) Patient does not have SCP

## 2024-09-30 SDOH — HEALTH STABILITY: PHYSICAL HEALTH: ON AVERAGE, HOW MANY DAYS PER WEEK DO YOU ENGAGE IN MODERATE TO STRENUOUS EXERCISE (LIKE A BRISK WALK)?: 0 DAYS

## 2024-09-30 SDOH — ECONOMIC STABILITY: INCOME INSECURITY: HOW HARD IS IT FOR YOU TO PAY FOR THE VERY BASICS LIKE FOOD, HOUSING, MEDICAL CARE, AND HEATING?: NOT VERY HARD

## 2024-09-30 SDOH — HEALTH STABILITY: PHYSICAL HEALTH: ON AVERAGE, HOW MANY MINUTES DO YOU ENGAGE IN EXERCISE AT THIS LEVEL?: 0 MIN

## 2024-09-30 SDOH — ECONOMIC STABILITY: INCOME INSECURITY: IN THE LAST 12 MONTHS, WAS THERE A TIME WHEN YOU WERE NOT ABLE TO PAY THE MORTGAGE OR RENT ON TIME?: NO

## 2024-09-30 SDOH — ECONOMIC STABILITY: FOOD INSECURITY: WITHIN THE PAST 12 MONTHS, THE FOOD YOU BOUGHT JUST DIDN'T LAST AND YOU DIDN'T HAVE MONEY TO GET MORE.: NEVER TRUE

## 2024-09-30 SDOH — ECONOMIC STABILITY: FOOD INSECURITY: WITHIN THE PAST 12 MONTHS, YOU WORRIED THAT YOUR FOOD WOULD RUN OUT BEFORE YOU GOT MONEY TO BUY MORE.: NEVER TRUE

## 2024-09-30 ASSESSMENT — SOCIAL DETERMINANTS OF HEALTH (SDOH)
WITHIN THE PAST 12 MONTHS, YOU WORRIED THAT YOUR FOOD WOULD RUN OUT BEFORE YOU GOT THE MONEY TO BUY MORE: NEVER TRUE
IN THE PAST 12 MONTHS, HAS THE ELECTRIC, GAS, OIL, OR WATER COMPANY THREATENED TO SHUT OFF SERVICE IN YOUR HOME?: NO
IN A TYPICAL WEEK, HOW MANY TIMES DO YOU TALK ON THE PHONE WITH FAMILY, FRIENDS, OR NEIGHBORS?: MORE THAN THREE TIMES A WEEK
IN A TYPICAL WEEK, HOW MANY TIMES DO YOU TALK ON THE PHONE WITH FAMILY, FRIENDS, OR NEIGHBORS?: MORE THAN THREE TIMES A WEEK
HOW MANY DRINKS CONTAINING ALCOHOL DO YOU HAVE ON A TYPICAL DAY WHEN YOU ARE DRINKING: PATIENT DOES NOT DRINK
HOW OFTEN DO YOU ATTEND CHURCH OR RELIGIOUS SERVICES?: NEVER
HOW OFTEN DO YOU GET TOGETHER WITH FRIENDS OR RELATIVES?: MORE THAN THREE TIMES A WEEK
HOW OFTEN DO YOU GET TOGETHER WITH FRIENDS OR RELATIVES?: MORE THAN THREE TIMES A WEEK
HOW OFTEN DO YOU ATTENT MEETINGS OF THE CLUB OR ORGANIZATION YOU BELONG TO?: NEVER
HOW OFTEN DO YOU ATTEND CHURCH OR RELIGIOUS SERVICES?: NEVER
HOW OFTEN DO YOU ATTENT MEETINGS OF THE CLUB OR ORGANIZATION YOU BELONG TO?: NEVER
HOW OFTEN DO YOU HAVE SIX OR MORE DRINKS ON ONE OCCASION: NEVER
HOW OFTEN DO YOU HAVE A DRINK CONTAINING ALCOHOL: NEVER
HOW HARD IS IT FOR YOU TO PAY FOR THE VERY BASICS LIKE FOOD, HOUSING, MEDICAL CARE, AND HEATING?: NOT VERY HARD
DO YOU BELONG TO ANY CLUBS OR ORGANIZATIONS SUCH AS CHURCH GROUPS UNIONS, FRATERNAL OR ATHLETIC GROUPS, OR SCHOOL GROUPS?: NO
DO YOU BELONG TO ANY CLUBS OR ORGANIZATIONS SUCH AS CHURCH GROUPS UNIONS, FRATERNAL OR ATHLETIC GROUPS, OR SCHOOL GROUPS?: NO

## 2024-09-30 ASSESSMENT — LIFESTYLE VARIABLES
SKIP TO QUESTIONS 9-10: 1
HOW OFTEN DO YOU HAVE SIX OR MORE DRINKS ON ONE OCCASION: NEVER
AUDIT-C TOTAL SCORE: 0
HOW MANY STANDARD DRINKS CONTAINING ALCOHOL DO YOU HAVE ON A TYPICAL DAY: PATIENT DOES NOT DRINK
HOW OFTEN DO YOU HAVE A DRINK CONTAINING ALCOHOL: NEVER

## 2024-10-01 ENCOUNTER — APPOINTMENT (OUTPATIENT)
Dept: MEDICAL GROUP | Facility: PHYSICIAN GROUP | Age: 84
End: 2024-10-01
Payer: MEDICARE

## 2024-10-01 VITALS
TEMPERATURE: 97.8 F | SYSTOLIC BLOOD PRESSURE: 122 MMHG | DIASTOLIC BLOOD PRESSURE: 48 MMHG | OXYGEN SATURATION: 97 % | HEART RATE: 78 BPM | RESPIRATION RATE: 16 BRPM | WEIGHT: 187 LBS | HEIGHT: 62 IN | BODY MASS INDEX: 34.41 KG/M2

## 2024-10-01 DIAGNOSIS — K21.9 GASTRO-ESOPHAGEAL REFLUX DISEASE WITHOUT ESOPHAGITIS: Chronic | ICD-10-CM

## 2024-10-01 DIAGNOSIS — I10 ESSENTIAL HYPERTENSION: Chronic | ICD-10-CM

## 2024-10-01 DIAGNOSIS — I35.0 NONRHEUMATIC AORTIC VALVE STENOSIS: ICD-10-CM

## 2024-10-01 DIAGNOSIS — Z78.0 POSTMENOPAUSAL ESTROGEN DEFICIENCY: ICD-10-CM

## 2024-10-01 DIAGNOSIS — D68.69 SECONDARY HYPERCOAGULABLE STATE (HCC): Chronic | ICD-10-CM

## 2024-10-01 DIAGNOSIS — K55.9 ISCHEMIC BOWEL DISEASE (HCC): Chronic | ICD-10-CM

## 2024-10-01 DIAGNOSIS — E03.9 HYPOTHYROIDISM, UNSPECIFIED TYPE: Chronic | ICD-10-CM

## 2024-10-01 DIAGNOSIS — I48.0 PAROXYSMAL ATRIAL FIBRILLATION (HCC): Chronic | ICD-10-CM

## 2024-10-01 DIAGNOSIS — F03.A0 MILD DEMENTIA WITHOUT BEHAVIORAL DISTURBANCE, PSYCHOTIC DISTURBANCE, MOOD DISTURBANCE, OR ANXIETY, UNSPECIFIED DEMENTIA TYPE (HCC): Chronic | ICD-10-CM

## 2024-10-01 DIAGNOSIS — N39.41 URGE INCONTINENCE OF URINE: Chronic | ICD-10-CM

## 2024-10-01 DIAGNOSIS — J43.8 OTHER EMPHYSEMA (HCC): Chronic | ICD-10-CM

## 2024-10-01 DIAGNOSIS — I35.1 NONRHEUMATIC AORTIC VALVE INSUFFICIENCY: ICD-10-CM

## 2024-10-01 DIAGNOSIS — Z76.89 ENCOUNTER TO ESTABLISH CARE: ICD-10-CM

## 2024-10-01 DIAGNOSIS — M85.89 OSTEOPENIA OF MULTIPLE SITES: ICD-10-CM

## 2024-10-01 DIAGNOSIS — K30 DELAYED GASTRIC EMPTYING: Chronic | ICD-10-CM

## 2024-10-01 DIAGNOSIS — E78.2 MIXED HYPERLIPIDEMIA: Chronic | ICD-10-CM

## 2024-10-01 DIAGNOSIS — I70.0 AORTIC ATHEROSCLEROSIS (HCC): ICD-10-CM

## 2024-10-01 DIAGNOSIS — I34.0 NONRHEUMATIC MITRAL VALVE REGURGITATION: ICD-10-CM

## 2024-10-01 DIAGNOSIS — Z23 NEED FOR VACCINATION: ICD-10-CM

## 2024-10-01 DIAGNOSIS — N18.32 STAGE 3B CHRONIC KIDNEY DISEASE: Chronic | ICD-10-CM

## 2024-10-01 DIAGNOSIS — G89.29 CHRONIC MIDLINE LOW BACK PAIN WITHOUT SCIATICA: Chronic | ICD-10-CM

## 2024-10-01 DIAGNOSIS — M54.50 CHRONIC MIDLINE LOW BACK PAIN WITHOUT SCIATICA: Chronic | ICD-10-CM

## 2024-10-01 PROBLEM — N18.9 ACUTE KIDNEY INJURY SUPERIMPOSED ON CHRONIC KIDNEY DISEASE (HCC): Status: RESOLVED | Noted: 2019-05-13 | Resolved: 2024-10-01

## 2024-10-01 PROBLEM — N17.9 ACUTE KIDNEY INJURY SUPERIMPOSED ON CHRONIC KIDNEY DISEASE (HCC): Status: RESOLVED | Noted: 2019-05-13 | Resolved: 2024-10-01

## 2024-10-01 PROCEDURE — 99215 OFFICE O/P EST HI 40 MIN: CPT | Mod: 25 | Performed by: STUDENT IN AN ORGANIZED HEALTH CARE EDUCATION/TRAINING PROGRAM

## 2024-10-01 PROCEDURE — 3078F DIAST BP <80 MM HG: CPT | Performed by: STUDENT IN AN ORGANIZED HEALTH CARE EDUCATION/TRAINING PROGRAM

## 2024-10-01 PROCEDURE — 3074F SYST BP LT 130 MM HG: CPT | Performed by: STUDENT IN AN ORGANIZED HEALTH CARE EDUCATION/TRAINING PROGRAM

## 2024-10-01 PROCEDURE — 90662 IIV NO PRSV INCREASED AG IM: CPT | Performed by: STUDENT IN AN ORGANIZED HEALTH CARE EDUCATION/TRAINING PROGRAM

## 2024-10-01 PROCEDURE — G0008 ADMIN INFLUENZA VIRUS VAC: HCPCS | Performed by: STUDENT IN AN ORGANIZED HEALTH CARE EDUCATION/TRAINING PROGRAM

## 2024-10-01 ASSESSMENT — FIBROSIS 4 INDEX: FIB4 SCORE: 1.06

## 2024-10-04 ENCOUNTER — TELEPHONE (OUTPATIENT)
Dept: MEDICAL GROUP | Facility: PHYSICIAN GROUP | Age: 84
End: 2024-10-04
Payer: MEDICARE

## 2024-10-04 DIAGNOSIS — L03.818 CELLULITIS OF OTHER SPECIFIED SITE: ICD-10-CM

## 2024-10-04 RX ORDER — CLINDAMYCIN HYDROCHLORIDE 300 MG/1
300 CAPSULE ORAL 3 TIMES DAILY
Qty: 21 CAPSULE | Refills: 0 | Status: SHIPPED | OUTPATIENT
Start: 2024-10-04 | End: 2024-10-11

## 2024-10-24 ENCOUNTER — OFFICE VISIT (OUTPATIENT)
Dept: WOUND CARE | Facility: MEDICAL CENTER | Age: 84
End: 2024-10-24
Attending: EMERGENCY MEDICINE
Payer: MEDICARE

## 2024-10-24 VITALS
RESPIRATION RATE: 16 BRPM | HEART RATE: 75 BPM | TEMPERATURE: 96.9 F | SYSTOLIC BLOOD PRESSURE: 112 MMHG | DIASTOLIC BLOOD PRESSURE: 60 MMHG

## 2024-10-24 DIAGNOSIS — L25.8 DERMATITIS ASSOCIATED WITH MOISTURE FROM STOOL INCONTINENCE: ICD-10-CM

## 2024-10-24 DIAGNOSIS — L30.4 INTERTRIGO: ICD-10-CM

## 2024-10-24 DIAGNOSIS — F17.210 CIGARETTE NICOTINE DEPENDENCE WITHOUT COMPLICATION: ICD-10-CM

## 2024-10-24 DIAGNOSIS — S31.109A WOUND OF ABDOMEN: Primary | ICD-10-CM

## 2024-10-24 DIAGNOSIS — R15.9 DERMATITIS ASSOCIATED WITH MOISTURE FROM STOOL INCONTINENCE: ICD-10-CM

## 2024-10-24 DIAGNOSIS — E66.9 OBESITY (BMI 30-39.9): Chronic | ICD-10-CM

## 2024-10-24 DIAGNOSIS — F03.A0 MILD DEMENTIA WITHOUT BEHAVIORAL DISTURBANCE, PSYCHOTIC DISTURBANCE, MOOD DISTURBANCE, OR ANXIETY, UNSPECIFIED DEMENTIA TYPE (HCC): Chronic | ICD-10-CM

## 2024-10-24 PROCEDURE — 99214 OFFICE O/P EST MOD 30 MIN: CPT

## 2024-10-24 PROCEDURE — 11042 DBRDMT SUBQ TIS 1ST 20SQCM/<: CPT

## 2024-10-24 PROCEDURE — 99406 BEHAV CHNG SMOKING 3-10 MIN: CPT | Performed by: STUDENT IN AN ORGANIZED HEALTH CARE EDUCATION/TRAINING PROGRAM

## 2024-10-24 PROCEDURE — 11042 DBRDMT SUBQ TIS 1ST 20SQCM/<: CPT | Performed by: STUDENT IN AN ORGANIZED HEALTH CARE EDUCATION/TRAINING PROGRAM

## 2024-10-24 PROCEDURE — 3078F DIAST BP <80 MM HG: CPT | Performed by: STUDENT IN AN ORGANIZED HEALTH CARE EDUCATION/TRAINING PROGRAM

## 2024-10-24 PROCEDURE — 3074F SYST BP LT 130 MM HG: CPT | Performed by: STUDENT IN AN ORGANIZED HEALTH CARE EDUCATION/TRAINING PROGRAM

## 2024-10-24 PROCEDURE — 99214 OFFICE O/P EST MOD 30 MIN: CPT | Mod: 25 | Performed by: STUDENT IN AN ORGANIZED HEALTH CARE EDUCATION/TRAINING PROGRAM

## 2024-10-24 PROCEDURE — 99213 OFFICE O/P EST LOW 20 MIN: CPT

## 2024-10-24 ASSESSMENT — ENCOUNTER SYMPTOMS
ROS GI COMMENTS: OCCASIONAL INCONTINENCE
FEVER: 0
CHILLS: 0

## 2024-10-25 ENCOUNTER — TELEPHONE (OUTPATIENT)
Dept: HEALTH INFORMATION MANAGEMENT | Facility: OTHER | Age: 84
End: 2024-10-25
Payer: MEDICARE

## 2024-10-27 DIAGNOSIS — I48.0 PAROXYSMAL ATRIAL FIBRILLATION (HCC): ICD-10-CM

## 2024-10-28 ENCOUNTER — TELEPHONE (OUTPATIENT)
Dept: CARDIOLOGY | Facility: MEDICAL CENTER | Age: 84
End: 2024-10-28
Payer: MEDICARE

## 2024-10-28 RX ORDER — APIXABAN 5 MG/1
5 TABLET, FILM COATED ORAL 2 TIMES DAILY
Qty: 180 TABLET | Refills: 1 | Status: SHIPPED | OUTPATIENT
Start: 2024-10-28

## 2024-10-29 ENCOUNTER — HOSPITAL ENCOUNTER (OUTPATIENT)
Dept: RADIOLOGY | Facility: MEDICAL CENTER | Age: 84
End: 2024-10-29
Attending: STUDENT IN AN ORGANIZED HEALTH CARE EDUCATION/TRAINING PROGRAM
Payer: MEDICARE

## 2024-10-29 DIAGNOSIS — M85.89 OSTEOPENIA OF MULTIPLE SITES: ICD-10-CM

## 2024-10-29 DIAGNOSIS — Z78.0 POSTMENOPAUSAL ESTROGEN DEFICIENCY: ICD-10-CM

## 2024-10-29 PROCEDURE — 77080 DXA BONE DENSITY AXIAL: CPT

## 2024-10-31 ENCOUNTER — OFFICE VISIT (OUTPATIENT)
Dept: WOUND CARE | Facility: MEDICAL CENTER | Age: 84
End: 2024-10-31
Attending: EMERGENCY MEDICINE
Payer: MEDICARE

## 2024-10-31 VITALS
SYSTOLIC BLOOD PRESSURE: 124 MMHG | OXYGEN SATURATION: 98 % | TEMPERATURE: 97.1 F | HEART RATE: 86 BPM | RESPIRATION RATE: 20 BRPM | DIASTOLIC BLOOD PRESSURE: 58 MMHG

## 2024-10-31 DIAGNOSIS — E66.9 OBESITY (BMI 30-39.9): ICD-10-CM

## 2024-10-31 DIAGNOSIS — F03.A0 MILD DEMENTIA WITHOUT BEHAVIORAL DISTURBANCE, PSYCHOTIC DISTURBANCE, MOOD DISTURBANCE, OR ANXIETY, UNSPECIFIED DEMENTIA TYPE (HCC): ICD-10-CM

## 2024-10-31 DIAGNOSIS — L25.8 DERMATITIS ASSOCIATED WITH MOISTURE FROM STOOL INCONTINENCE: ICD-10-CM

## 2024-10-31 DIAGNOSIS — S31.109A WOUND OF ABDOMEN: ICD-10-CM

## 2024-10-31 DIAGNOSIS — F17.210 CIGARETTE NICOTINE DEPENDENCE WITHOUT COMPLICATION: ICD-10-CM

## 2024-10-31 DIAGNOSIS — L30.4 INTERTRIGO: ICD-10-CM

## 2024-10-31 DIAGNOSIS — R15.9 DERMATITIS ASSOCIATED WITH MOISTURE FROM STOOL INCONTINENCE: ICD-10-CM

## 2024-10-31 PROCEDURE — 99214 OFFICE O/P EST MOD 30 MIN: CPT | Performed by: STUDENT IN AN ORGANIZED HEALTH CARE EDUCATION/TRAINING PROGRAM

## 2024-10-31 PROCEDURE — 99213 OFFICE O/P EST LOW 20 MIN: CPT

## 2024-10-31 PROCEDURE — 99214 OFFICE O/P EST MOD 30 MIN: CPT

## 2024-10-31 PROCEDURE — 3078F DIAST BP <80 MM HG: CPT | Performed by: STUDENT IN AN ORGANIZED HEALTH CARE EDUCATION/TRAINING PROGRAM

## 2024-10-31 PROCEDURE — 3074F SYST BP LT 130 MM HG: CPT | Performed by: STUDENT IN AN ORGANIZED HEALTH CARE EDUCATION/TRAINING PROGRAM

## 2024-10-31 NOTE — PATIENT INSTRUCTIONS
-Keep your wound dressing clean, dry, and intact.     -Apply Triad Hydrophilic Wound Cream/Dressing to Perianal area and lower abdomen daily and as needed.     -Should you experience any significant changes in your wound(s), such as infection (redness, swelling, localized heat, increased pain, fever > 101 F, chills) or have any questions regarding your home care instructions, please contact the wound center at [phone]. If after hours, contact your primary care physician or go to the hospital emergency room.     If you are admitted to any hospital, you will need a new referral to come back to the wound clinic from your primary care or other provider. Any scheduled appointments that you already have, may be cancelled and will need to be updated.

## 2024-10-31 NOTE — PROGRESS NOTES
Provider Encounter- Full Thickness wound    HISTORY OF PRESENT ILLNESS  Wound History:    START OF CARE IN CLINIC: 10/24/2024    REFERRING PROVIDER: Daisy Kenyon      WOUND- Full Thickness Wound   LOCATION: Right lower abdomen     Perianal - MASD   HISTORY:  84F with PMHx of obesity, nicotine dependence, falls, PAD on anticoagulation, incontinence. Patient had GLF 9/13/2024 and was evaluated in ED. Fortunately trauma workup negative except for skin tear right hand. Patient was also noted to have significant intertrigo under pannus. She was discharged home with nystatin powder and was referred to [facility] and home health. She was receiving care by home health nurses and daughter reports that dermatitis had improved significantly however she continues to get blisters that rupture under pannus. Wounds continue to occur so they pursued being seen at wound care clinic. Daughter reports that home health has only been treating with Interdry and without dressings to abdomen.    Pertinent Medical History: See above     TOBACCO USE:  Current every day smoker    Patient's problem list, allergies, and current medications reviewed and updated in Epic    Interval History:  10/24/2024: Clinic visit with Jaden Zamora MD. Patient is accompanied by daughter. Reports that her rash under pannus has improved dramatically, however she has had new blisters forming under pannus that rupture and drain. Once they are close to healing, new wounds start spontaneously. They are no longer using Nystatin, but continue to use Interdry. Question if reaction or friction from Interdry is causing blisters and lesions.   Patient also reported to have perianal skin breakdown over the past few weeks. She is sometimes incontinent of urine and feces and wears briefs. They have not been applying any dressings to these wounds.    10/31/2024: Clinic visit with Jaden Zamora MD. Patient is accompanied by granddaughter. Perianal / buttocks wounds are worse.  Granddaughter reports that patient is not making effort to clean after bowel movements and though family is seeing her twice daily and applying triad, she is sitting in stool for unknown periods of time. Wounds under pannus did not benefit from dressings, will use triad for pannus wounds as well.   I had a direct and eric discussion with patient and granddaughter. Skin breakdown unlikely to improve without better skin hygiene and I am concerned that patient requires higher level of care. I messaged PCP regarding my concerns.    REVIEW OF SYSTEMS:   Unchanged from previous wound clinic assessment on 10/24/2024, except as noted in interval history above    PHYSICAL EXAMINATION:   /58   Pulse 86   Temp 36.2 °C (97.1 °F) (Temporal)   Resp 20   SpO2 98%     Physical Exam  Constitutional:       General: She is not in acute distress.     Appearance: She is obese.   Pulmonary:      Effort: Pulmonary effort is normal. No respiratory distress.   Skin:     Comments: Right lower abdomen / pannus wounds: Open wound and new unruptured bullae. Thin layer of slough. Other areas of scar tissue from previous wounds. No evidence of infection    Skin breakdown / MASD perianal skin: Skin breakdown and sloughing of epithelium perianal skin.   Neurological:      Mental Status: She is alert.   ***    WOUND ASSESSMENT  Wound 10/24/24 Other (comment) Perianal (Active)   Wound Image   10/24/24 0830   Site Assessment Red;Pink;Purple;Fragile 10/24/24 0830   Periwound Assessment Maceration;Blistered 10/24/24 0830   Margins Attached edges 10/24/24 0830   Drainage Amount Small 10/24/24 0830   Drainage Description Serosanguineous 10/24/24 0830   Treatments Cleansed;Site care 10/24/24 0830   Wound Cleansing Hypochlorus Acid 10/24/24 0830   Periwound Protectant Not Applicable 10/24/24 0830   Dressing Changed New 10/24/24 0830   Dressing Cleansing/Solutions Not Applicable 10/24/24 0830   Dressing Options [facility] [place] 10/24/24 0830   Dressing  Change/Treatment Frequency Daily, and As Needed 10/24/24 0830   Wound Team Following Weekly 10/24/24 0830   Non-staged Wound Description Partial thickness 10/24/24 0830   Wound Length (cm) 4 cm 10/24/24 0830   Wound Width (cm) 4 cm 10/24/24 0830   Wound Depth (cm) 0.1 cm 10/24/24 0830   Wound Surface Area (cm^2) 16 cm^2 10/24/24 0830   Wound Volume (cm^3) 1.6 cm^3 10/24/24 0830   Tunneling (cm) 0 cm 10/24/24 0830   Undermining (cm) 0 cm 10/24/24 0830   Wound Odor None 10/24/24 0830   Exposed Structures None 10/24/24 0830   Number of days: 7       Wound 10/24/24 Other (comment) Abdomen Lower (Active)   Wound Image    10/24/24 0830   Site Assessment Red;Fragile;Yellow;Slough 10/24/24 0830   Periwound Assessment Blistered;Fragile;Scar tissue 10/24/24 0830   Margins Attached edges 10/24/24 0830   Drainage Amount ALLEN 10/24/24 0830   Treatments Cleansed;Topical Lidocaine;Provider debridement;Site care 10/24/24 0830   Wound Cleansing Hypochlorus Acid 10/24/24 0830   Periwound Protectant No-sting Skin Prep 10/24/24 0830   Dressing Changed New 10/24/24 0830   Dressing Cleansing/Solutions Not Applicable 10/24/24 0830   Dressing Options Other (Comments) 10/24/24 0830   Dressing Change/Treatment Frequency Every 72 hrs, and As Needed 10/24/24 0830   Wound Team Following Weekly 10/24/24 0830   Non-staged Wound Description Partial thickness 10/24/24 0830   Wound Length (cm) 1.5 cm 10/24/24 0830   Wound Width (cm) 4.5 cm 10/24/24 0830   Wound Depth (cm) 0.1 cm 10/24/24 0830   Wound Surface Area (cm^2) 6.75 cm^2 10/24/24 0830   Wound Volume (cm^3) 0.675 cm^3 10/24/24 0830   Post-Procedure Length (cm) 1.7 cm 10/24/24 0830   Post-Procedure Width (cm) 4.5 cm 10/24/24 0830   Post-Procedure Depth (cm) 0.1 cm 10/24/24 0830   Post-Procedure Surface Area (cm^2) 7.65 cm^2 10/24/24 0830   Post-Procedure Volume (cm^3) 0.765 cm^3 10/24/24 0830   Tunneling (cm) 0 cm 10/24/24 0830   Undermining (cm) 0 cm 10/24/24 0830   Wound Odor None 10/24/24  0830   Exposed Structures None 10/24/24 0830   Number of days: 7       PROCEDURE: Excisional debridement of lower abdomen / pannus wounds  -2% viscous lidocaine applied topically to wound bed for approximately 5 minutes prior to debridement  -Curette used to debride wound bed.  Excisional debridement was performed to remove devitalized tissue until healthy, bleeding tissue was visualized.   Entire surface of wound, 7.65 cm2 debrided.  Tissue debrided into the subcutaneous layer.    -Bleeding controlled with manual pressure.    -Wound care completed by wound RN, refer to flowsheet  -Patient tolerated the procedure well, without c/o pain or discomfort.       Pertinent Labs and Diagnostics:    Labs:     A1c:   Lab Results   Component Value Date/Time    HBA1C 5.5 04/05/2024 08:42 AM          IMAGING: None    VASCULAR STUDIES: None    LAST  WOUND CULTURE:  DATE :   Lab Results   Component Value Date/Time    CULTRSULT Usual urogenital estelle ,000 cfu/mL 09/13/2024 12:20 PM         ASSESSMENT AND PLAN:     1. Wound of abdomen    10/24/2024  - Patient with right sided pannus wounds and bullae in various states of healing  - Excisional debridement was performed in clinic, medically necessary to promote wound healing.  - Unclear etiology as moisture and intertrigo appears well controlled and resolved. Suspect that interdry is causing some friction. Recommend holding use and evaluating for skin response  - Home health to change dressings between clinic appointment. Will see patient next week, if improving consider spacing out patient's appointments.   Wound Care: Mepilex lite    2. Intertrigo    10/24/2024  - Patient diagnosed on 9/13 during ED visit for GLF and was prescribed nystatin  - Appears resolved  - Recommend nystatin use as needed    3. Dermatitis associated with moisture from stool incontinence    10/24/2024  - Daughter reports that patient wears briefs and is occasionally incontinent of urine and feces. Skin  breakdown due to incontinence  - Will apply triad cream due to proximity of anus  - Recommend applying after every BM and can be applied multiple times per day  - Recommend patient and family monitor closely and change briefs frequently as needed   Wound Care: Triad    4. Mild dementia without behavioral disturbance, psychotic disturbance, mood disturbance, or anxiety, unspecified dementia type (HCC)  - Seems to be affecting self care. Good support system at home and has home health    5. Obesity (BMI 30-39.9)  Risk factor for intertrigo and poor wound healing    6. Cigarette nicotine dependence without complication  Tobacco cessation education provided for more than 4 minutes, discussed options of nicotine patch, medical treatment with wellbutrin and chantix. Discussed the risks of smoking including increased risk of heart disease, stroke, cancer and COPD. Discussed the benefits of quitting smoking on wound healing and circulation system. Patient is not interested in quitting at this time.    PATIENT EDUCATION  - Importance of adequate nutrition for wound healing  -Advised to go to ER for any increased redness, swelling, drainage, or odor, or if patient develops fever, chills, nausea or vomiting.     My total time spent caring for the patient on the day of the encounter was 30 minutes, reviewing history, assessment, counseling and education, and coordination of care.  This does not include time spent on separately billable procedures/tests.    Please note that this note may have been created using voice recognition software. I have worked with technical experts from [employer] to optimize the interface.  I have made every reasonable attempt to correct obvious errors, but there may be errors of grammar and possibly content that I did not discover before finalizing the note.    N     of care and contacting patient's PCP  This does not include time spent on separately billable procedures/tests.      Please note that this note may have been created using voice recognition software. I have worked with technical experts from [facility] to optimize the interface.  I have made every reasonable attempt to correct obvious errors, but there may be errors of grammar and possibly content that I did not discover before finalizing the note.    N

## 2024-10-31 NOTE — PROGRESS NOTES
Wound care order routed to [facility]. Crusting technique demonstrated to grand daughter at bedside. Home Health following patient twice a week. Grand daughter states she does wound care every morning for patient.

## 2024-11-05 DIAGNOSIS — Z78.9 UNABLE TO CARE FOR SELF: ICD-10-CM

## 2024-11-06 ENCOUNTER — PATIENT OUTREACH (OUTPATIENT)
Dept: HEALTH INFORMATION MANAGEMENT | Facility: OTHER | Age: 84
End: 2024-11-06
Payer: MEDICARE

## 2024-11-06 NOTE — PROGRESS NOTES
SW received call from pt Ella's daughter Kaitlin. SW explained referral from Wound Care indicating that pt needs additional care. Kaitlin said she was confused by the referral, as Kaitlin and pt Ella's granddaughter have been providing Ella care without issue. Kaitlin said Home Health has been visiting Ella three times per week, and Kaitlin is not in need of additional care for Ella. Kaitlin noted that Ella's wound was not getting better at first but is now that the correct medication was prescribed. Kaitlin declined services. SW encouraged Kaitlin to reach back out if the situation changes.

## 2024-11-06 NOTE — PROGRESS NOTES
SW received referral from Wound Care stating that pt Ella is unable to care for self and will need placement. SW outreached to pt Ella's daughter Kaitlin. SW left voice mail indicating that referral was received for pt and requested call back to complete assessment.    Plan: SW will outreach in 3 days if no response.    Next follow up: 11/12/24

## 2024-11-07 ENCOUNTER — OFFICE VISIT (OUTPATIENT)
Dept: WOUND CARE | Facility: MEDICAL CENTER | Age: 84
End: 2024-11-07
Attending: EMERGENCY MEDICINE
Payer: MEDICARE

## 2024-11-07 VITALS
HEART RATE: 83 BPM | TEMPERATURE: 96.6 F | SYSTOLIC BLOOD PRESSURE: 126 MMHG | DIASTOLIC BLOOD PRESSURE: 76 MMHG | OXYGEN SATURATION: 98 % | RESPIRATION RATE: 18 BRPM

## 2024-11-07 DIAGNOSIS — F17.210 CIGARETTE NICOTINE DEPENDENCE WITHOUT COMPLICATION: ICD-10-CM

## 2024-11-07 DIAGNOSIS — S31.109A WOUND OF ABDOMEN: Primary | ICD-10-CM

## 2024-11-07 DIAGNOSIS — R15.9 DERMATITIS ASSOCIATED WITH MOISTURE FROM STOOL INCONTINENCE: ICD-10-CM

## 2024-11-07 DIAGNOSIS — L25.8 DERMATITIS ASSOCIATED WITH MOISTURE FROM STOOL INCONTINENCE: ICD-10-CM

## 2024-11-07 DIAGNOSIS — E66.9 OBESITY (BMI 30-39.9): ICD-10-CM

## 2024-11-07 DIAGNOSIS — L30.4 INTERTRIGO: ICD-10-CM

## 2024-11-07 DIAGNOSIS — T14.8XXA WOUND INFECTION: ICD-10-CM

## 2024-11-07 DIAGNOSIS — F03.A0 MILD DEMENTIA WITHOUT BEHAVIORAL DISTURBANCE, PSYCHOTIC DISTURBANCE, MOOD DISTURBANCE, OR ANXIETY, UNSPECIFIED DEMENTIA TYPE (HCC): ICD-10-CM

## 2024-11-07 DIAGNOSIS — L08.9 WOUND INFECTION: ICD-10-CM

## 2024-11-07 LAB
FUNGUS SPEC FUNGUS STN: NORMAL
GRAM STN SPEC: NORMAL
SIGNIFICANT IND 70042: NORMAL
SIGNIFICANT IND 70042: NORMAL
SITE SITE: NORMAL
SITE SITE: NORMAL
SOURCE SOURCE: NORMAL
SOURCE SOURCE: NORMAL

## 2024-11-07 PROCEDURE — 87102 FUNGUS ISOLATION CULTURE: CPT

## 2024-11-07 PROCEDURE — 3078F DIAST BP <80 MM HG: CPT | Performed by: NURSE PRACTITIONER

## 2024-11-07 PROCEDURE — 99213 OFFICE O/P EST LOW 20 MIN: CPT | Performed by: NURSE PRACTITIONER

## 2024-11-07 PROCEDURE — 3074F SYST BP LT 130 MM HG: CPT | Performed by: NURSE PRACTITIONER

## 2024-11-07 PROCEDURE — 87070 CULTURE OTHR SPECIMN AEROBIC: CPT

## 2024-11-07 PROCEDURE — 99214 OFFICE O/P EST MOD 30 MIN: CPT

## 2024-11-07 PROCEDURE — 87205 SMEAR GRAM STAIN: CPT | Mod: 91

## 2024-11-07 NOTE — PROGRESS NOTES
Wound culture and fungal culture sample collected and tubed to lab.    Updated home health order routed to [facility] via [employer].

## 2024-11-07 NOTE — PATIENT INSTRUCTIONS
Apply triad to wound bed as directed by provider.     Should you experience any significant changes in your wound(s), such as infection (redness, swelling, localized heat, increased pain, fever > 101 F, chills) or have any questions regarding your home care instructions, please contact the wound center at [phone]. If after hours, contact your primary care physician or go to the hospital emergency room.

## 2024-11-07 NOTE — PROGRESS NOTES
Provider Encounter- Full Thickness wound    HISTORY OF PRESENT ILLNESS  Wound History:    START OF CARE IN CLINIC: 10/24/2024    REFERRING PROVIDER: Daisy Kenyon      WOUND- Full Thickness Wound   LOCATION: Right lower abdomen     Perianal - MASD   HISTORY:  84F with PMHx of obesity, nicotine dependence, falls, PAD on anticoagulation, incontinence. Patient had GLF 9/13/2024 and was evaluated in ED. Fortunately trauma workup negative except for skin tear right hand. Patient was also noted to have significant intertrigo under pannus. She was discharged home with nystatin powder and was referred to [facility] and home health. She was receiving care by home health nurses and daughter reports that dermatitis had improved significantly however she continues to get blisters that rupture under pannus. Wounds continue to occur so they pursued being seen at wound care clinic. Daughter reports that home health has only been treating with Interdry and without dressings to abdomen.    Pertinent Medical History: See above     TOBACCO USE:  Current every day smoker    Patient's problem list, allergies, and current medications reviewed and updated in Epic    Interval History:  10/24/2024: Clinic visit with Jaden Zamora MD. Patient is accompanied by daughter. Reports that her rash under pannus has improved dramatically, however she has had new blisters forming under pannus that rupture and drain. Once they are close to healing, new wounds start spontaneously. They are no longer using Nystatin, but continue to use Interdry. Question if reaction or friction from Interdry is causing blisters and lesions.   Patient also reported to have perianal skin breakdown over the past few weeks. She is sometimes incontinent of urine and feces and wears briefs. They have not been applying any dressings to these wounds.    10/31/2024: Clinic visit with Jaden Zamora MD. Patient is accompanied by granddaughter. Perianal / buttocks wounds are worse.  Granddaughter reports that patient is not making effort to clean after bowel movements and though family is seeing her twice daily and applying triad, she is sitting in stool for unknown periods of time. Wounds under pannus did not benefit from dressings, will use triad for pannus wounds as well.   I had a direct and eric discussion with patient and granddaughter. Skin breakdown unlikely to improve without better skin hygiene and I am concerned that patient requires higher level of care. I messaged PCP regarding my concerns.    11/7/2024:Clinic visit with Justine GARCIA, MARCI-BC, CWON, CFCN.  Pt denies fevers, chills, nausea, vomiting.  Previous provider discussed with PCP regarding this.  Daughter reports that she received a call from  about placement however she does not feel patient needs this at this time.  Patient reports she is incontinent to stool but does know when she has to urinate.  This was new information to daughter.  Patient found with Mepilex light to pannus.  Bullae have ruptured, skin worse.  Wound culture collected.  Follow results.  Continue Triad to perianal ulcer and underneath Pannus.      REVIEW OF SYSTEMS:   Unchanged from previous wound clinic assessment on 10/31/2024, except as noted in interval history above    PHYSICAL EXAMINATION:   /76   Pulse 83   Temp 35.9 °C (96.6 °F) (Temporal)   Resp 18   SpO2 98%     Physical Exam  Constitutional:       General: She is not in acute distress.     Appearance: She is obese.   Pulmonary:      Effort: Pulmonary effort is normal. No respiratory distress.   Skin:     Comments: Right lower abdomen / pannus wounds: Not improved, now with wounds on both sides of inferior pannus from ruptured bullae.  Increased tenderness.  Mild erythema, no satellite lesions.    Tenderness to perianal and to pannus   Skin breakdown / MASD perianal skin: Improved .  Skin less macerated.  Superficial breakdown immediately to perianus, thin  layer of slough, scant sanguinous drainage.     Neurological:      Mental Status: She is alert.       WOUND ASSESSMENT  Wound 10/24/24 Full Thickness Wound Perianal (Active)   Wound Image   11/07/24 1030   Site Assessment Pink;Red 11/07/24 1030   Periwound Assessment Scar tissue;Maceration;Fragile 11/07/24 1030   Margins Attached edges 11/07/24 1030   Drainage Amount Small 11/07/24 1030   Drainage Description Serosanguineous 11/07/24 1030   Treatments Cleansed;Site care 11/07/24 1030   Wound Cleansing Hypochlorus Acid 11/07/24 1030   Periwound Protectant Stoma Powder;No-sting Skin Prep 11/07/24 1030   Dressing Changed Changed 11/07/24 1030   Dressing Cleansing/Solutions Not Applicable 11/07/24 1030   Dressing Options Triad [place] 11/07/24 1030   Dressing Change/Treatment Frequency Daily, and As Needed 11/07/24 1030   Wound Team Following Weekly 11/07/24 1030   Non-staged Wound Description Partial thickness 11/07/24 1030   Wound Length (cm) 5 cm 11/07/24 1030   Wound Width (cm) 2 cm 11/07/24 1030   Wound Depth (cm) 0 cm 11/07/24 1030   Wound Surface Area (cm^2) 10 cm^2 11/07/24 1030   Wound Volume (cm^3) 0 cm^3 11/07/24 1030   Wound Healing % 100 11/07/24 1030   Tunneling (cm) 0 cm 11/07/24 1030   Undermining (cm) 0 cm 11/07/24 1030   Wound Odor None 11/07/24 1030   Exposed Structures None 11/07/24 1030   Number of days: 14       Wound 10/24/24 Other (comment) Abdomen Lower Right (Active)   Wound Image   11/07/24 1030   Site Assessment Pink;Red 11/07/24 1030   Periwound Assessment Scar tissue 11/07/24 1030   Margins Attached edges 11/07/24 1030   Drainage Amount Small 11/07/24 1030   Drainage Description Serosanguineous 11/07/24 1030   Treatments Cleansed;Wound culture;Site care 11/07/24 1030   Wound Cleansing Normal Saline Irrigation 11/07/24 1030   Periwound Protectant Not Applicable 11/07/24 1030   Dressing Changed Changed 11/07/24 1030   Dressing Cleansing/Solutions Not Applicable 11/07/24 1030   Dressing Options  Triad [place] 11/07/24 1030   Dressing Change/Treatment Frequency Daily, and As Needed 11/07/24 1030   Wound Team Following Weekly 11/07/24 1030   Non-staged Wound Description Partial thickness 11/07/24 1030   Wound Length (cm) 4.3 cm 11/07/24 1030   Wound Width (cm) 8.5 cm 11/07/24 1030   Wound Depth (cm) 0.1 cm 11/07/24 1030   Wound Surface Area (cm^2) 36.55 cm^2 11/07/24 1030   Wound Volume (cm^3) 3.655 cm^3 11/07/24 1030   Post-Procedure Length (cm) 1.7 cm 10/24/24 0830   Post-Procedure Width (cm) 4.5 cm 10/24/24 0830   Post-Procedure Depth (cm) 0.1 cm 10/24/24 0830   Post-Procedure Surface Area (cm^2) 7.65 cm^2 10/24/24 0830   Post-Procedure Volume (cm^3) 0.765 cm^3 10/24/24 0830   Wound Healing % -441 11/07/24 1030   Tunneling (cm) 0 cm 11/07/24 1030   Undermining (cm) 0 cm 11/07/24 1030   Wound Odor None 11/07/24 1030   Exposed Structures None 11/07/24 1030   Number of days: 14       Wound 10/31/24 Partial Thickness Wound Abdomen Lower Left (Active)   Wound Image   11/07/24 1030   Site Assessment Pink;Red 11/07/24 1030   Periwound Assessment Scar tissue 11/07/24 1030   Margins Attached edges 11/07/24 1030   Drainage Amount Small 11/07/24 1030   Drainage Description Serosanguineous 11/07/24 1030   Treatments Cleansed;Site care 11/07/24 1030   Wound Cleansing Hypochlorus Acid 11/07/24 1030   Periwound Protectant Not Applicable 11/07/24 1030   Dressing Changed Changed 11/07/24 1030   Dressing Cleansing/Solutions Not Applicable 11/07/24 1030   Dressing Options Triad [place] 11/07/24 1030   Dressing Change/Treatment Frequency Daily, and As Needed 11/07/24 1030   Non-staged Wound Description Partial thickness 11/07/24 1030   Wound Length (cm) 2 cm 11/07/24 1030   Wound Width (cm) 2 cm 11/07/24 1030   Wound Depth (cm) 0.1 cm 11/07/24 1030   Wound Surface Area (cm^2) 4 cm^2 11/07/24 1030   Wound Volume (cm^3) 0.4 cm^3 11/07/24 1030   Wound Healing % -317 11/07/24 1030   Tunneling (cm) 0 cm 11/07/24 1030   Undermining  (cm) 0 cm 11/07/24 1030   Wound Odor None 11/07/24 1030   Exposed Structures None 11/07/24 1030   Number of days: 7       PROCEDURE:   - No excisional debridement required today      Pertinent Labs and Diagnostics:    Labs:     A1c:   Lab Results   Component Value Date/Time    HBA1C 5.5 04/05/2024 08:42 AM          IMAGING: None    VASCULAR STUDIES: None    LAST  WOUND CULTURE:  DATE :   Lab Results   Component Value Date/Time    CULTRSULT - 11/07/2024 11:00 AM    CULTRSULT - 11/07/2024 11:00 AM         ASSESSMENT AND PLAN:     1. Wound of abdomen    11/7/2024: Area tender, worse  - Patient now with superficial open wounds to previous areas that were blistered to inferior pannus  - Excisional debridement was not performed in clinic,  - Unclear etiology as moisture and intertrigo appears well controlled and resolved.  - Did not tolerate mepilex lite.  Mepilex light was noted to pannus.  Further clarification to home health orders were provided   - Home health to change dressings between clinic appointment.   Will see patient next week, if improving consider spacing out patient's appointments.  -Continue with Triad      2. Intertrigo    11/7/2024  - Patient diagnosed on 9/13 during ED visit for GLF and was prescribed nystatin  - Appears resolved  - Recommend nystatin use as needed    3. Dermatitis associated with moisture from stool incontinence    11/7/2024  - daughter present in clinic today.   -Patient using bidet.  Reports she does take diuretic and is frequently up to the bathroom in the morning time but this decreases towards the afternoon.  Patient reports she goes to the bathroom approximately every 2 hours  - Encourage patient to go to the bathroom every hour in the morning, by afternoon recommend at least every 2 hours unless she notices incontinence pad is wet.  Encourage patient to set timer  -Family stopping by at least daily, mostly twice daily  - Skin breakdown is unlikely to improve without better  hygiene.  - Patient appears to have difficulty in self care and may need higher level of care  - Will apply triad cream due to proximity of anus  - Recommend applying after every BM and can be applied multiple times per day  - Recommend patient and family monitor closely and change briefs frequently as needed   Wound Care: Triad    4. Mild dementia without behavioral disturbance, psychotic disturbance, mood disturbance, or anxiety, unspecified dementia type (HCC)  - Seems to be affecting self care, may need higher level of care. I have messaged PCP with my concerns.  - Family comes to house 2x daily and has home health    5. Obesity (BMI 30-39.9)  Risk factor for intertrigo and poor wound healing    6. Cigarette nicotine dependence without complication  - Previously counseled  - Unwilling to consider quitting.    PATIENT EDUCATION  - Importance of adequate nutrition for wound healing  -Advised to go to ER for any increased redness, swelling, drainage, or odor, or if patient develops fever, chills, nausea or vomiting.     My total time spent caring for the patient on the day of the encounter was 20 minutes, reviewing history, assessment, counseling and education, and coordination of care including discussion about higher level of care and contacting patient's PCP  This does not include time spent on separately billable procedures/tests.      Please note that this note may have been created using voice recognition software. I have worked with technical experts from [employer] to optimize the interface.  I have made every reasonable attempt to correct obvious errors, but there may be errors of grammar and possibly content that I did not discover before finalizing the note.    N

## 2024-11-09 PROBLEM — M85.89 OSTEOPENIA OF MULTIPLE SITES: Status: ACTIVE | Noted: 2024-11-09

## 2024-11-09 LAB
BACTERIA WND AEROBE CULT: NORMAL
GRAM STN SPEC: NORMAL
SIGNIFICANT IND 70042: NORMAL
SITE SITE: NORMAL
SOURCE SOURCE: NORMAL

## 2024-11-11 ENCOUNTER — DOCUMENTATION (OUTPATIENT)
Dept: WOUND CARE | Facility: MEDICAL CENTER | Age: 84
End: 2024-11-11
Payer: MEDICARE

## 2024-11-12 NOTE — PROGRESS NOTES
Wound culture results negative.  Fungal cultures negative thus far.  Contacted patient and granddaughter with numbers listed in chart.  Message left for patient to call back to discuss results.

## 2024-11-14 ENCOUNTER — APPOINTMENT (OUTPATIENT)
Dept: WOUND CARE | Facility: MEDICAL CENTER | Age: 84
End: 2024-11-14
Attending: EMERGENCY MEDICINE
Payer: MEDICARE

## 2024-11-19 LAB
FUNGUS SPEC CULT: NORMAL
FUNGUS SPEC FUNGUS STN: NORMAL
SIGNIFICANT IND 70042: NORMAL
SITE SITE: NORMAL
SOURCE SOURCE: NORMAL

## 2024-11-21 ENCOUNTER — APPOINTMENT (OUTPATIENT)
Dept: WOUND CARE | Facility: MEDICAL CENTER | Age: 84
End: 2024-11-21
Attending: EMERGENCY MEDICINE
Payer: MEDICARE

## 2024-11-27 ENCOUNTER — OFFICE VISIT (OUTPATIENT)
Dept: URGENT CARE | Facility: PHYSICIAN GROUP | Age: 84
End: 2024-11-27
Payer: MEDICARE

## 2024-11-27 ENCOUNTER — HOSPITAL ENCOUNTER (OUTPATIENT)
Dept: RADIOLOGY | Facility: MEDICAL CENTER | Age: 84
End: 2024-11-27
Attending: PHYSICIAN ASSISTANT
Payer: MEDICARE

## 2024-11-27 VITALS
TEMPERATURE: 98 F | OXYGEN SATURATION: 98 % | HEART RATE: 78 BPM | SYSTOLIC BLOOD PRESSURE: 128 MMHG | RESPIRATION RATE: 18 BRPM | DIASTOLIC BLOOD PRESSURE: 72 MMHG | WEIGHT: 184 LBS | BODY MASS INDEX: 33.86 KG/M2 | HEIGHT: 62 IN

## 2024-11-27 DIAGNOSIS — S91.101A OPEN WOUND OF RIGHT GREAT TOE, INITIAL ENCOUNTER: ICD-10-CM

## 2024-11-27 PROCEDURE — 3078F DIAST BP <80 MM HG: CPT | Performed by: PHYSICIAN ASSISTANT

## 2024-11-27 PROCEDURE — 99214 OFFICE O/P EST MOD 30 MIN: CPT | Performed by: PHYSICIAN ASSISTANT

## 2024-11-27 PROCEDURE — 73660 X-RAY EXAM OF TOE(S): CPT | Mod: RT

## 2024-11-27 PROCEDURE — 3074F SYST BP LT 130 MM HG: CPT | Performed by: PHYSICIAN ASSISTANT

## 2024-11-27 RX ORDER — DOXYCYCLINE HYCLATE 100 MG
100 TABLET ORAL 2 TIMES DAILY
Qty: 14 TABLET | Refills: 0 | Status: SHIPPED | OUTPATIENT
Start: 2024-11-27 | End: 2024-12-04

## 2024-11-27 ASSESSMENT — ENCOUNTER SYMPTOMS
VOMITING: 0
FEVER: 0
NAUSEA: 0
CHILLS: 0

## 2024-11-27 ASSESSMENT — FIBROSIS 4 INDEX: FIB4 SCORE: 1.07

## 2024-11-27 NOTE — PROGRESS NOTES
Subjective:     CHIEF COMPLAINT  Chief Complaint   Patient presents with    Toe Injury     Stubbed toe 10 days ago now has a black spot were the toe nail use to be and is weeping        HPI  Margaret Garcia is a very pleasant 84 y.o. female who presents to the clinic with concerns for a potential infected great toe x 10 days.  Patient believes she stubbed her toe 10 days ago on her bed.  She has since developed an open wound to the distal aspect of the digit.  Has noted occasional weeping from the wound.  Has noted progressively worsening swelling, redness and pain to the great toe.  No history of diabetes however does have a history of peripheral vascular disease due to long-term tobacco use.  She has minimal to no sensation in her feet secondary to peripheral neuropathy.    REVIEW OF SYSTEMS  Review of Systems   Constitutional:  Negative for chills, fever and malaise/fatigue.   Gastrointestinal:  Negative for nausea and vomiting.   Musculoskeletal:  Negative for joint pain.   Skin:         Open wound to right great toe       PAST MEDICAL HISTORY  Patient Active Problem List    Diagnosis Date Noted    Osteopenia of multiple sites 11/09/2024    Mitral regurgitation 10/01/2024    Aortic atherosclerosis (HCC) 10/01/2024    Delayed gastric emptying 04/10/2024    Gastro-esophageal reflux disease without esophagitis 04/10/2024    Aortic valve regurgitation 09/21/2023    Venous insufficiency 09/21/2023    Atrophic vaginitis 05/08/2023    Secondary hypercoagulable state (HCC) 09/20/2022    Other emphysema (HCC) 08/02/2021    Paroxysmal atrial fibrillation (HCC) 07/30/2021    Stage 3b chronic kidney disease 07/23/2021    Renal insufficiency syndrome 02/18/2021    Urge incontinence of urine 12/13/2020    Nonrheumatic aortic valve stenosis 09/16/2020    Chronic midline low back pain without sciatica 09/02/2020    Ischemic bowel disease (HCC) 09/09/2019    Tobacco use 04/29/2019    Generalized edema 04/27/2018     Hyperlipidemia 11/24/2015    Essential hypertension 11/24/2015    Obesity (BMI 30-39.9) 07/20/2015    Sleep apnea 07/20/2015    Dementia (HCC) 05/29/2011    Hypothyroidism 05/29/2011    Peripheral neuropathy 05/29/2011       SURGICAL HISTORY   has a past surgical history that includes gyn surgery; lumbar decompression (6/29/2009); hemorrhoidectomy; abdominal hysterectomy total; jodi by laparoscopy (9/2/2011); lumbar laminectomy diskectomy (6/29/2009); gastroscopy (12/13/2019); and colonoscopy (12/13/2019).    ALLERGIES  Allergies   Allergen Reactions    Food Hives and Nausea     Oranges, Hives    Penicillins Unspecified     .    Neomycin-Polymyxin B Rash     Rash       CURRENT MEDICATIONS  Home Medications       Reviewed by Naga Casey P.A.-C. (Physician Assistant) on 11/27/24 at 1049  Med List Status: <None>     Medication Last Dose Status   acetaminophen (TYLENOL) 500 MG TABS Taking Active   atorvastatin (LIPITOR) 10 MG Tab Taking Active   ELIQUIS 5 MG Tab Taking Active   estradiol (ESTRACE) 0.1 MG/GM vaginal cream Not Taking Active   fenofibrate (TRIGLIDE) 160 MG tablet Taking Active   fluticasone furoate-vilanterol (BREO ELLIPTA) 100-25 MCG/ACT AEROSOL POWDER, BREATH ACTIVATED Taking Active   furosemide (LASIX) 20 MG Tab Taking Active   HYDROcodone-acetaminophen (NORCO) 7.5-325 MG per tablet Taking Active   hydrOXYzine HCl (ATARAX) 25 MG Tab Taking Active   latanoprost (XALATAN) 0.005 % Solution Taking Active   levothyroxine (SYNTHROID) 100 MCG Tab Taking Active   lisinopril (PRINIVIL) 5 MG Tab Taking Active   Mirabegron ER (MYRBETRIQ) 50 MG TABLET SR 24 HR Taking Active   Naloxone (NARCAN) 4 MG/0.1ML Liquid PRN Active   nystatin (MYCOSTATIN) 937897 UNIT/GM Cream topical cream Taking Active   nystatin (MYCOSTATIN) powder Taking Active   pantoprazole (PROTONIX) 40 MG Tablet Delayed Response Taking Active   potassium chloride SA (K-DUR) 10 MEQ Tab CR Taking Active   promethazine (PHENERGAN) 25 MG Tab Taking  "Active                    SOCIAL HISTORY  Social History     Tobacco Use    Smoking status: Every Day     Current packs/day: 1.00     Average packs/day: 1 pack/day for 56.0 years (56.0 ttl pk-yrs)     Types: Cigarettes    Smokeless tobacco: Never    Tobacco comments:     started smoking at age 12, little mor than half a pack   Vaping Use    Vaping status: Never Used   Substance and Sexual Activity    Alcohol use: No    Drug use: No    Sexual activity: Never     Partners: Male     Birth control/protection: Post-Menopausal       FAMILY HISTORY  Family History   Problem Relation Age of Onset    Stroke Mother     Hyperlipidemia Mother     Hypertension Mother     Arthritis Mother     Diabetes Father     Lung Disease Father         pneumonia    Arthritis Sister     Rheumatologic Disease Sister     Hypertension Sister     Hyperlipidemia Sister     Other Sister         Anusha/Fibermyalgia    Hyperlipidemia Brother     Hypertension Brother     Arthritis Brother     Lung Disease Maternal Grandmother         pneumonia    Stroke Maternal Grandfather     Cancer Maternal Grandfather         skin     No Known Problems Paternal Grandmother     No Known Problems Paternal Grandfather     Hyperlipidemia Daughter     Diabetes Daughter     No Known Problems Son           Objective:     VITAL SIGNS: /72 (BP Location: Right arm, Patient Position: Sitting, BP Cuff Size: Adult)   Pulse 78   Temp 36.7 °C (98 °F) (Temporal)   Resp 18   Ht 1.575 m (5' 2\")   Wt 83.5 kg (184 lb)   SpO2 98%   BMI 33.65 kg/m²     PHYSICAL EXAM  Physical Exam  Constitutional:       General: She is not in acute distress.     Appearance: Normal appearance. She is not ill-appearing, toxic-appearing or diaphoretic.   HENT:      Head: Normocephalic and atraumatic.   Eyes:      Conjunctiva/sclera: Conjunctivae normal.   Pulmonary:      Effort: Pulmonary effort is normal.   Musculoskeletal:      Cervical back: Normal range of motion.      Comments: Right great " toe: Swelling and mild erythema diffusely over the great toe.  There is an open wound over the distal aspect of the digit there is approximately 1 x 1 cm in size weeping small amounts of purulent discharge.  Wound does not extend to the muscular layer.  All nails have onychomycosis present.   Neurological:      General: No focal deficit present.      Mental Status: She is alert and oriented to person, place, and time. Mental status is at baseline.       RADIOLOGY RESULTS   DX-TOE(S) 2+ RIGHT    Result Date: 11/27/2024 11/27/2024 11:17 AM HISTORY/REASON FOR EXAM:  Patient has an open wound to the great toe.  States she stubbed her toe 10 days ago.  Osteomyelitis rule out. hx of right 1st toe/digit pain,open wound on bottom of toe. Stubbed toe x 10 days ago. Concern for osteomyelitis TECHNIQUE/EXAM DESCRIPTION AND NUMBER OF VIEWS:  3 views of the RIGHT toes. COMPARISON: None FINDINGS: Soft tissue swelling about the first digit. No definite osseous destruction. No acute fracture or dislocation. Moderate osteoarthritis.     No specific radiographic findings for osteomyelitis. However, plain film is very insensitive to detect early osteomyelitis. If there is clinical concern, further evaluation with MRI is recommended.          Assessment/Plan:     1. Open wound of right great toe, initial encounter  DX-TOE(S) 2+ RIGHT    doxycycline (VIBRAMYCIN) 100 MG Tab          MDM/Comments:    Pleasant and well-appearing 84-year-old female presents to the clinic with right great toe pain x 10 days.  Originally stubbed her toe she believes on her bed frame.  X-rays performed in clinic without any fracture or bony abnormality.  She does have an open wound at the distal aspect of the digit.  Currently oozing small amounts of serosanguineous/purulent drainage.  No muscle involvement.  X-ray did not show any early findings of osteomyelitis.  Will start with a course of doxycycline.  Patient has follow-up with podiatry as well as wound  care next week for reevaluation.    Differential diagnosis, natural history, supportive care, and indications for immediate follow-up discussed. All questions answered. Patient agrees with the plan of care.    Follow-up as needed if symptoms worsen or fail to improve to PCP, Urgent care or Emergency Room.    I have personally reviewed prior external notes and test results pertinent to today's visit.  I have independently reviewed and interpreted all diagnostics ordered during this urgent care acute visit.   Discussed management options (risks,benefits, and alternatives to treatment). Pt expresses understanding and the treatment plan was agreed upon. Questions were encouraged and answered to pt's satisfaction.    Please note that this dictation was created using voice recognition software. I have made a reasonable attempt to correct obvious errors, but I expect that there are errors of grammar and possibly content that I did not discover before finalizing the note.

## 2024-12-02 ENCOUNTER — PATIENT MESSAGE (OUTPATIENT)
Dept: CARDIOLOGY | Facility: MEDICAL CENTER | Age: 84
End: 2024-12-02
Payer: MEDICARE

## 2024-12-02 DIAGNOSIS — I87.2 VENOUS INSUFFICIENCY: ICD-10-CM

## 2024-12-03 ENCOUNTER — HOSPITAL ENCOUNTER (OUTPATIENT)
Dept: CARDIOLOGY | Facility: MEDICAL CENTER | Age: 84
End: 2024-12-03
Attending: INTERNAL MEDICINE
Payer: MEDICARE

## 2024-12-03 DIAGNOSIS — I35.1 AORTIC VALVE INSUFFICIENCY, ETIOLOGY OF CARDIAC VALVE DISEASE UNSPECIFIED: ICD-10-CM

## 2024-12-03 PROCEDURE — 93306 TTE W/DOPPLER COMPLETE: CPT

## 2024-12-05 ENCOUNTER — APPOINTMENT (OUTPATIENT)
Dept: WOUND CARE | Facility: MEDICAL CENTER | Age: 84
End: 2024-12-05
Attending: EMERGENCY MEDICINE
Payer: MEDICARE

## 2024-12-05 LAB — LV EJECT FRACT  99904: 60

## 2024-12-05 PROCEDURE — 93306 TTE W/DOPPLER COMPLETE: CPT | Mod: 26 | Performed by: INTERNAL MEDICINE

## 2024-12-08 DIAGNOSIS — S31.109A WOUND OF ABDOMEN: ICD-10-CM

## 2024-12-12 ENCOUNTER — APPOINTMENT (OUTPATIENT)
Dept: WOUND CARE | Facility: MEDICAL CENTER | Age: 84
End: 2024-12-12
Attending: EMERGENCY MEDICINE
Payer: MEDICARE

## 2024-12-19 ENCOUNTER — APPOINTMENT (OUTPATIENT)
Dept: WOUND CARE | Facility: MEDICAL CENTER | Age: 84
End: 2024-12-19
Attending: EMERGENCY MEDICINE
Payer: MEDICARE

## 2024-12-23 DIAGNOSIS — R60.0 EDEMA LEG: ICD-10-CM

## 2024-12-24 NOTE — TELEPHONE ENCOUNTER
Received request via: Pharmacy    Was the patient seen in the last year in this department? Yes    Does the patient have an active prescription (recently filled or refills available) for medication(s) requested? No    [facility]    Does the patient have [facility] and need 100-day supply? (This applies to ALL medications) Patient does not have SCP

## 2024-12-26 ENCOUNTER — APPOINTMENT (OUTPATIENT)
Dept: WOUND CARE | Facility: MEDICAL CENTER | Age: 84
End: 2024-12-26
Attending: EMERGENCY MEDICINE
Payer: MEDICARE

## 2024-12-26 RX ORDER — POTASSIUM CHLORIDE 750 MG/1
10 TABLET, EXTENDED RELEASE ORAL
Qty: 90 TABLET | Refills: 0 | Status: SHIPPED | OUTPATIENT
Start: 2024-12-26

## 2024-12-26 NOTE — TELEPHONE ENCOUNTER
Requested Prescriptions     Pending Prescriptions Disp Refills    potassium chloride SA (K-DUR) 10 MEQ Tab CR 90 Tablet 0     Sig: Take 1 Tablet by mouth every day.       CALI Rodriguez.

## 2025-01-03 DIAGNOSIS — S32.519D: ICD-10-CM

## 2025-01-06 ENCOUNTER — PATIENT MESSAGE (OUTPATIENT)
Dept: CARDIOLOGY | Facility: MEDICAL CENTER | Age: 85
End: 2025-01-06
Payer: MEDICARE

## 2025-01-06 NOTE — TELEPHONE ENCOUNTER
To [facility]: Please advise. Patient echo from December does not show any diastolic dysfunction and EF WNL. Only mild valve disease at that time. Will attempt to obtain records from [facility]

## 2025-01-07 ENCOUNTER — OFFICE VISIT (OUTPATIENT)
Dept: MEDICAL GROUP | Facility: PHYSICIAN GROUP | Age: 85
End: 2025-01-07
Payer: MEDICARE

## 2025-01-07 VITALS
OXYGEN SATURATION: 96 % | WEIGHT: 181.44 LBS | DIASTOLIC BLOOD PRESSURE: 42 MMHG | SYSTOLIC BLOOD PRESSURE: 104 MMHG | TEMPERATURE: 98.7 F | BODY MASS INDEX: 33.39 KG/M2 | HEIGHT: 62 IN | HEART RATE: 94 BPM

## 2025-01-07 DIAGNOSIS — S32.519S: ICD-10-CM

## 2025-01-07 DIAGNOSIS — M85.89 OSTEOPENIA OF MULTIPLE SITES: Chronic | ICD-10-CM

## 2025-01-07 DIAGNOSIS — M79.89 LEG SWELLING: ICD-10-CM

## 2025-01-07 DIAGNOSIS — I10 ESSENTIAL HYPERTENSION: Chronic | ICD-10-CM

## 2025-01-07 PROBLEM — S32.519A: Status: ACTIVE | Noted: 2025-01-07

## 2025-01-07 PROCEDURE — 3074F SYST BP LT 130 MM HG: CPT | Performed by: STUDENT IN AN ORGANIZED HEALTH CARE EDUCATION/TRAINING PROGRAM

## 2025-01-07 PROCEDURE — 3078F DIAST BP <80 MM HG: CPT | Performed by: STUDENT IN AN ORGANIZED HEALTH CARE EDUCATION/TRAINING PROGRAM

## 2025-01-07 PROCEDURE — 99214 OFFICE O/P EST MOD 30 MIN: CPT | Performed by: STUDENT IN AN ORGANIZED HEALTH CARE EDUCATION/TRAINING PROGRAM

## 2025-01-07 RX ORDER — ACETAMINOPHEN 325 MG/1
650 TABLET ORAL ONCE
OUTPATIENT
Start: 2025-01-07 | End: 2025-01-07

## 2025-01-07 RX ORDER — SODIUM CHLORIDE 9 MG/ML
INJECTION, SOLUTION INTRAVENOUS CONTINUOUS
OUTPATIENT
Start: 2025-01-07

## 2025-01-07 RX ORDER — METHYLPREDNISOLONE SODIUM SUCCINATE 125 MG/2ML
125 INJECTION, POWDER, LYOPHILIZED, FOR SOLUTION INTRAMUSCULAR; INTRAVENOUS PRN
OUTPATIENT
Start: 2025-01-07

## 2025-01-07 RX ORDER — EPINEPHRINE 1 MG/ML(1)
0.5 AMPUL (ML) INJECTION PRN
OUTPATIENT
Start: 2025-01-07

## 2025-01-07 RX ORDER — DIPHENHYDRAMINE HYDROCHLORIDE 50 MG/ML
50 INJECTION INTRAMUSCULAR; INTRAVENOUS PRN
OUTPATIENT
Start: 2025-01-07

## 2025-01-07 RX ORDER — 0.9 % SODIUM CHLORIDE 0.9 %
3 VIAL (ML) INJECTION PRN
OUTPATIENT
Start: 2025-01-07

## 2025-01-07 RX ORDER — 0.9 % SODIUM CHLORIDE 0.9 %
10 VIAL (ML) INJECTION PRN
OUTPATIENT
Start: 2025-01-07

## 2025-01-07 RX ORDER — ZOLEDRONIC ACID 0.05 MG/ML
5 INJECTION, SOLUTION INTRAVENOUS ONCE
OUTPATIENT
Start: 2025-01-07 | End: 2025-01-07

## 2025-01-07 RX ORDER — 0.9 % SODIUM CHLORIDE 0.9 %
VIAL (ML) INJECTION PRN
OUTPATIENT
Start: 2025-01-07

## 2025-01-07 ASSESSMENT — PATIENT HEALTH QUESTIONNAIRE - PHQ9: CLINICAL INTERPRETATION OF PHQ2 SCORE: 0

## 2025-01-07 ASSESSMENT — FIBROSIS 4 INDEX: FIB4 SCORE: 1.07

## 2025-01-07 NOTE — PROGRESS NOTES
"Subjective:     Chief Complaint   Patient presents with    Follow-Up     Has been in ER 3 times in last month.          History of Present Illness  The patient presents for ER follow-up. She is accompanied by her granddaughter.    She was seen in the ER on 12/22 after a fall. She returned to the ER on 12/30 and was found to have pelvic fracture. She experienced leg swelling, chest pain, and difficulty breathing, which prompted her to return to the ER on 1/4. She has an upcoming appointment with cardiology tomorrow. Dec 2024 Echo showed EF 60-65%, mild MR, mild to moderate AS, moderate AR. She is currently on a regimen of Lasix 60 mg daily. /42 today.    She has a referral to [facility] for the fracture and was given contact information today. She has not received treatment for osteopenia in the past. She will be starting physical therapy at home.    She has an appointment with neurology on 01/20/2025 for memory issues.    She is seeing wound care for a wound near her anus, which is getting better. She is on a steroid cream that is working well. She was told that she should see a dermatologist to see if it is an autoimmune skin condition since the steroid cream is working well. She had to reschedule that appointment.        Health Maintenance: Completed    ROS:  Negative except as stated above.      Objective:     Exam:  /42   Pulse 94   Temp 37.1 °C (98.7 °F) (Temporal)   Ht 1.575 m (5' 2\")   Wt 82.3 kg (181 lb 7 oz)   SpO2 96%   BMI 33.19 kg/m²  Body mass index is 33.19 kg/m².    Physical Exam    Gen: Alert and oriented, no acute distress.  Lungs: Normal effort, CTAB, faint crackles at base.  CV: RRR, normal S1 and S2, no murmurs.      Assessment & Plan:     84 y.o. female with the following -     1. Osteopenia of multiple sites  Chronic, uncontrolled.  Oct 2024 DEXA showed osteopenia in the forearm and hip with increased fracture risk.  Recent fall resulted in pelvic fracture.  Reclast ordered.  Side " effects of medication were discussed.    2. Closed fracture of superior ramus of pubis, unspecified laterality, sequela  Acute.  Due to fall in December.  She was given contact information for [facility] and already had referral to [facility].  She will be starting home PT.  She ambulates with walker.    3. Essential hypertension  Chronic, uncontrolled.  /42 today.  Hypotension most likely due to lasix and she was advised to decrease the dose to 20 mg daily.    4. Leg swelling  Chronic, stable.  Echo showed EF 60-65%.  GFR 44.  She follows up with Cardiology and Nephrology.  Lasix decreased to 20 mg daily.        I spent a total of 30 minutes with record review, exam, communication with the patient, communication with other providers, and documentation of this encounter.      Return in about 3 months (around 4/7/2025) for Follow-up of chronic conditions.    Verbal consent was acquired by the patient to use [employer] ambient listening note generation during this visit: Yes.    Please note that this dictation was created using voice recognition software. I have made every reasonable attempt to correct obvious errors, but I expect that there are errors of grammar and possibly content that I did not discover before finalizing the note.

## 2025-01-08 ENCOUNTER — OFFICE VISIT (OUTPATIENT)
Dept: CARDIOLOGY | Facility: MEDICAL CENTER | Age: 85
End: 2025-01-08
Payer: MEDICARE

## 2025-01-08 VITALS
WEIGHT: 181 LBS | SYSTOLIC BLOOD PRESSURE: 116 MMHG | RESPIRATION RATE: 16 BRPM | BODY MASS INDEX: 33.31 KG/M2 | DIASTOLIC BLOOD PRESSURE: 52 MMHG | HEIGHT: 62 IN | OXYGEN SATURATION: 95 % | HEART RATE: 95 BPM

## 2025-01-08 DIAGNOSIS — I35.0 NONRHEUMATIC AORTIC VALVE STENOSIS: ICD-10-CM

## 2025-01-08 DIAGNOSIS — D68.69 SECONDARY HYPERCOAGULABLE STATE (HCC): ICD-10-CM

## 2025-01-08 DIAGNOSIS — I48.0 PAROXYSMAL ATRIAL FIBRILLATION (HCC): ICD-10-CM

## 2025-01-08 DIAGNOSIS — R06.09 DOE (DYSPNEA ON EXERTION): ICD-10-CM

## 2025-01-08 DIAGNOSIS — I10 ESSENTIAL HYPERTENSION: ICD-10-CM

## 2025-01-08 DIAGNOSIS — I35.1 AORTIC VALVE INSUFFICIENCY, ETIOLOGY OF CARDIAC VALVE DISEASE UNSPECIFIED: ICD-10-CM

## 2025-01-08 DIAGNOSIS — E78.2 MIXED HYPERLIPIDEMIA: ICD-10-CM

## 2025-01-08 PROCEDURE — 3074F SYST BP LT 130 MM HG: CPT

## 2025-01-08 PROCEDURE — 3078F DIAST BP <80 MM HG: CPT

## 2025-01-08 PROCEDURE — 99213 OFFICE O/P EST LOW 20 MIN: CPT

## 2025-01-08 PROCEDURE — 99212 OFFICE O/P EST SF 10 MIN: CPT

## 2025-01-08 PROCEDURE — 99214 OFFICE O/P EST MOD 30 MIN: CPT

## 2025-01-08 RX ORDER — FUROSEMIDE 40 MG/1
40 TABLET ORAL DAILY
Qty: 90 TABLET | Refills: 3 | Status: SHIPPED | OUTPATIENT
Start: 2025-01-08

## 2025-01-08 ASSESSMENT — ENCOUNTER SYMPTOMS
DIZZINESS: 1
PALPITATIONS: 0
SYNCOPE: 0
NEAR-SYNCOPE: 0
SHORTNESS OF BREATH: 0
DYSPNEA ON EXERTION: 1
LIGHT-HEADEDNESS: 1
ORTHOPNEA: 0
PND: 0
HEADACHES: 0

## 2025-01-08 ASSESSMENT — FIBROSIS 4 INDEX: FIB4 SCORE: 1.07

## 2025-01-08 NOTE — PROGRESS NOTES
Chief Complaint   Patient presents with    Hyperlipidemia     Fv Dx   Hyperlipidemia    Hypertension     Fv Dx   Essential hypertension    Atrial Fibrillation     Fv Dx Paroxysmal atrial fibrillation (HCC)          Subjective:   Margaret Garcia is a 84 y.o. female who presents today for follow-up.     Patient of Dr. Babb.  Current medical problems include COPD, SHABANA, paroxysmal AF, HTN, mild to moderate aortic stenosis, mild mitral regurgitation. Their last clinic visit was 4/1/2024 with Dr. Babb.    Patient has most recently been seen in the ED at [facility] due to increased swelling in her BLE and hip fracture. She was found to have an elevated NT proBNP of 2100. She was treated with diuretics and discharged home to follow up outpatient with PCP and cardiology.     Today's visit:  Patient reports since her last ED visit she has continued to have shortness of breath with exertion. She is only able to walk short distances without getting short of breath. She also continues to have bilateral lower leg edema. She says when she exerts herself and gets short of breath she does get a chest pain but it resolves quickly at rest. She denies palpitations, orthopnea, PND, or syncope. She does report some lightheadedness/dizziness associated with moving from sitting to standing. She is not currently weighing herself at home. She has an appointment with [facility] for the pelvic fx but is currently walking with a walker. She says she has tried her inhaler for her shortness of breath and it has not helped. She is still currently smoking.     Cardiovascular Risk Factors:  1. Smoking status: Current smoker  2. Type II Diabetes Mellitus: No   Lab Results   Component Value Date/Time    HBA1C 5.5 04/05/2024 08:42 AM    HBA1C 5.7 (H) 05/28/2011 10:34 PM     3. Hypertension: Yes  4. Dyslipidemia: Yes   Cholesterol,Tot   Date Value Ref Range Status   04/05/2024 113 100 - 199 mg/dL Final     LDL   Date Value Ref Range Status   04/05/2024  53 <100 mg/dL Final     HDL   Date Value Ref Range Status   04/05/2024 39 (A) >=40 mg/dL Final     Triglycerides   Date Value Ref Range Status   04/05/2024 105 0 - 149 mg/dL Final     Past Medical History:   Diagnosis Date    Arthritis     knees, and hips-osteo    ASTHMA     Body mass index 40.0-44.9, adult ([facility]) 4/27/2018    Chronic renal impairment, stage 3 (moderate) 5/13/2019    COPD (chronic obstructive pulmonary disease) with chronic bronchitis ([facility]) 4/20/2021    Dental disorder     upper    Dysuria 10/18/2016    Fall     Generalized edema 4/27/2018    Glaucoma     bilateral    Hay fever 4/19/2016    Heart burn     High cholesterol     Hx: UTI (urinary tract infection)     Hyperglycemia 11/24/2015    Hypertension     Hypothyroidism     Indigestion     Ischemic bowel disease ([facility]) 9/9/2019    Migraines     pt has similar symptoms with migraines not for a long time though    Nausea 10/14/2019    Neuropathy ([facility])     Obesity (BMI 30-39.9) 7/20/2015    Pneumonia     Routine general medical examination at a health care facility 7/20/2015    7/20/15    Sleep apnea 12/2019    Had sleep study, didn't tolerate cpap    Snoring     Syncope 6/5/2020    Tobacco use 4/29/2019    URI (upper respiratory infection) 11/4/2015    Urinary bladder disorder     hx of uti's         Family History   Problem Relation Age of Onset    Stroke Mother     Hyperlipidemia Mother     Hypertension Mother     Arthritis Mother     Diabetes Father     Lung Disease Father         pneumonia    Arthritis Sister     Rheumatologic Disease Sister     Hypertension Sister     Hyperlipidemia Sister     Other Sister         Anusha/Fibermyalgia    Hyperlipidemia Brother     Hypertension Brother     Arthritis Brother     Lung Disease Maternal Grandmother         pneumonia    Stroke Maternal Grandfather     Cancer Maternal Grandfather         skin     No Known Problems Paternal Grandmother     No Known Problems Paternal Grandfather     Hyperlipidemia Daughter      Diabetes Daughter     No Known Problems Son          Social History     Tobacco Use    Smoking status: Every Day     Current packs/day: 1.00     Average packs/day: 1 pack/day for 56.0 years (56.0 ttl pk-yrs)     Types: Cigarettes    Smokeless tobacco: Never    Tobacco comments:     started smoking at age 12, little mor than half a pack   Vaping Use    Vaping status: Never Used   Substance Use Topics    Alcohol use: No    Drug use: No         Allergies   Allergen Reactions    Food Hives and Nausea     Oranges, Hives    Penicillins Unspecified     .    Neomycin-Polymyxin B Rash     Rash         Current Outpatient Medications   Medication Sig    furosemide (LASIX) 40 MG Tab Take 1 Tablet by mouth every day.    potassium chloride SA (K-DUR) 10 MEQ Tab CR Take 1 Tablet by mouth every day.    ELIQUIS 5 MG Tab TAKE 1 TABLET BY MOUTH TWICE A DAY    lisinopril (PRINIVIL) 5 MG Tab TAKE 1 TABLET BY MOUTH EVERY DAY    fluticasone furoate-vilanterol (BREO ELLIPTA) 100-25 MCG/ACT AEROSOL POWDER, BREATH ACTIVATED Inhale 1 Puff every day.    nystatin (MYCOSTATIN) powder Apply 1 g topically 3 times a day.    nystatin (MYCOSTATIN) 889552 UNIT/GM Cream topical cream Apply 1 g topically 2 times a day.    fenofibrate (TRIGLIDE) 160 MG tablet TAKE 1 TABLET BY MOUTH EVERY DAY    atorvastatin (LIPITOR) 10 MG Tab TAKE 1 TABLET BY MOUTH EVERY DAY    Mirabegron ER (MYRBETRIQ) 50 MG TABLET SR 24 HR Take 50 mg by mouth every day.    levothyroxine (SYNTHROID) 100 MCG Tab Take 1 Tablet by mouth every morning on an empty stomach.    estradiol (ESTRACE) 0.1 MG/GM vaginal cream     Naloxone (NARCAN) 4 MG/0.1ML Liquid USE AS DIRECTED    promethazine (PHENERGAN) 25 MG Tab Take 1 Tablet by mouth every 6 hours as needed for Nausea/Vomiting.    pantoprazole (PROTONIX) 40 MG Tablet Delayed Response TAKE 1 TABLET BY MOUTH TWICE A DAY    HYDROcodone-acetaminophen (NORCO) 7.5-325 MG per tablet Take 1 Tablet by mouth every 8 hours as needed for Moderate  "Pain.    hydrOXYzine HCl (ATARAX) 25 MG Tab Take 25 mg by mouth at bedtime.    latanoprost (XALATAN) 0.005 % Solution Administer 1 Drop into both eyes every evening.    acetaminophen (TYLENOL) 500 MG TABS Take 500 mg by mouth 3 times a day as needed for Mild Pain. Indications: Pain         Review of Systems   Constitutional: Negative for malaise/fatigue.   Cardiovascular:  Positive for chest pain, dyspnea on exertion and leg swelling. Negative for near-syncope, orthopnea, palpitations, paroxysmal nocturnal dyspnea and syncope.   Respiratory:  Negative for shortness of breath.    Neurological:  Positive for dizziness and light-headedness. Negative for headaches.           Objective:   /52 (BP Location: Left arm, Patient Position: Sitting, BP Cuff Size: Adult)   Pulse 95   Resp 16   Ht 1.575 m (5' 2\")   Wt 82.1 kg (181 lb)   SpO2 95%  Body mass index is 33.11 kg/m².         Physical Exam  Constitutional:       General: She is not in acute distress.  HENT:      Head: Normocephalic and atraumatic.   Cardiovascular:      Rate and Rhythm: Normal rate and regular rhythm.      Pulses: Normal pulses.      Heart sounds: Murmur heard.   Pulmonary:      Effort: Pulmonary effort is normal. No respiratory distress.      Breath sounds: Normal breath sounds.   Musculoskeletal:      Right lower le+ Edema present.      Left lower le+ Edema present.   Neurological:      Mental Status: She is alert and oriented to person, place, and time.      Gait: Gait normal.   Psychiatric:         Behavior: Behavior normal.             Lab Results   Component Value Date/Time    SODIUM 140 2024 12:53 PM    POTASSIUM 4.3 2024 12:53 PM    CHLORIDE 105 2024 12:53 PM    CO2 21 2024 12:53 PM    GLUCOSE 84 2024 12:53 PM    BUN 27 (H) 2024 12:53 PM    CREATININE 1.20 2024 12:53 PM      Lab Results   Component Value Date/Time    WBC 8.9 2024 12:53 PM    RBC 4.26 2024 12:53 PM    " "HEMOGLOBIN 12.9 09/13/2024 12:53 PM    HEMATOCRIT 39.4 09/13/2024 12:53 PM    MCV 92.5 09/13/2024 12:53 PM    MCH 30.3 09/13/2024 12:53 PM    MCHC 32.7 09/13/2024 12:53 PM    MPV 9.1 09/13/2024 12:53 PM    NEUTSPOLYS 76.60 (H) 09/13/2024 12:53 PM    LYMPHOCYTES 14.40 (L) 09/13/2024 12:53 PM    MONOCYTES 6.20 09/13/2024 12:53 PM    EOSINOPHILS 1.10 09/13/2024 12:53 PM    BASOPHILS 1.00 09/13/2024 12:53 PM    HYPOCHROMIA 1+ 03/05/2012 07:30 PM      Lab Results   Component Value Date/Time    CHOLSTRLTOT 113 04/05/2024 08:42 AM    LDL 53 04/05/2024 08:42 AM    HDL 39 (A) 04/05/2024 08:42 AM    TRIGLYCERIDE 105 04/05/2024 08:42 AM       Lab Results   Component Value Date/Time    TROPONINT 34 (H) 09/22/2022 1349     No results found for: \"NTPROBNP\"  Assessment:   1. Aortic valve insufficiency, etiology of cardiac valve disease unspecified  - furosemide (LASIX) 40 MG Tab; Take 1 Tablet by mouth every day.  Dispense: 90 Tablet; Refill: 3  - Comp Metabolic Panel; Future  - proBrain Natriuretic Peptide, NT; Future    2. Nonrheumatic aortic valve stenosis    3. Paroxysmal atrial fibrillation (HCC)    4. Secondary hypercoagulable state (HCC)    5. Essential hypertension    6. Mixed hyperlipidemia    7. SCHERER (dyspnea on exertion)        Medical Decision Making:  Today's Assessment / Plan:   Moderate aortic valve stenosis  Moderate aortic insufficiency  Dyspnea on exertion  -Patient with recent increased leg swelling and shortness of breath. Elevated BNP 2100 at [facility] ED. Recent echocardiogram reviewed with patient which showed normal EF 60% and no diastolic dysfunction noted but did show increase in aortic stenosis from mild to moderate.     -Fluid overload on exam with 2+ edema bilateral lower extremities  -Increase lasix 40 mg twice a day with potassium for 3 days to see if improvement in swelling and shortness of breath  -Repeat CMP and BNP in 1 week to monitor kidney function and electrolytes  -Patient to start weighing self " daily and keep a log    Atrial fibrillation   Hypercoagulable state due to atrial fibrillation  -Patient does not feel when she goes in and out of atrial fibrillation but denies known reoccurrence   -Continue on eliquis 5 mg twice a day for stroke prevention  -Family asking about eliquis due to risk of bleeding with falls. Discussed option to repeat cardiac event monitor to see if having atrial fibrillation as well as risk of stroke not on blood thinner. Patient and family wanting to stay on anticoagulation at this time and not wanting to repeat cardiac event monitor. Discussed can always do monitor in the future.    Hypertension  - Good control  - continue lisinopril 5 mg daily, discussed ok to hold lisinopril while taking increased lasix if blood pressure becomes low and patient symptomatic but resume once back to 40 mg dose lasix  - goal < 130/80  -Continue to monitor blood pressure at home and keep a log    Hyperlipidemia  -Most recent LDL 53  -Continue atorvastatin 10 mg daily  -Goal of less than 70  -Check lipid panel in 12 months      No follow-ups on file.  Sooner if problems.    CALI Galo.

## 2025-01-09 DIAGNOSIS — J96.11 CHRONIC RESPIRATORY FAILURE WITH HYPOXIA (HCC): ICD-10-CM

## 2025-01-09 RX ORDER — FLUTICASONE FUROATE AND VILANTEROL 100; 25 UG/1; UG/1
1 POWDER RESPIRATORY (INHALATION)
Qty: 60 EACH | Refills: 0 | Status: CANCELLED | OUTPATIENT
Start: 2025-01-09

## 2025-01-15 ENCOUNTER — HOSPITAL ENCOUNTER (OUTPATIENT)
Dept: LAB | Facility: MEDICAL CENTER | Age: 85
End: 2025-01-15
Attending: INTERNAL MEDICINE
Payer: MEDICARE

## 2025-01-15 ENCOUNTER — PATIENT MESSAGE (OUTPATIENT)
Dept: CARDIOLOGY | Facility: MEDICAL CENTER | Age: 85
End: 2025-01-15
Payer: MEDICARE

## 2025-01-15 DIAGNOSIS — I10 ESSENTIAL HYPERTENSION: ICD-10-CM

## 2025-01-15 DIAGNOSIS — I35.1 AORTIC VALVE INSUFFICIENCY, ETIOLOGY OF CARDIAC VALVE DISEASE UNSPECIFIED: ICD-10-CM

## 2025-01-15 DIAGNOSIS — E03.9 HYPOTHYROIDISM, UNSPECIFIED TYPE: ICD-10-CM

## 2025-01-15 DIAGNOSIS — N18.32 STAGE 3B CHRONIC KIDNEY DISEASE: ICD-10-CM

## 2025-01-15 LAB
ALBUMIN SERPL BCP-MCNC: 3.2 G/DL (ref 3.2–4.9)
ALBUMIN/GLOB SERPL: 1 G/DL
ALP SERPL-CCNC: 65 U/L (ref 30–99)
ALT SERPL-CCNC: 12 U/L (ref 2–50)
ANION GAP SERPL CALC-SCNC: 13 MMOL/L (ref 7–16)
ANION GAP SERPL CALC-SCNC: 14 MMOL/L (ref 7–16)
AST SERPL-CCNC: 15 U/L (ref 12–45)
BILIRUB SERPL-MCNC: 0.4 MG/DL (ref 0.1–1.5)
BUN SERPL-MCNC: 19 MG/DL (ref 8–22)
BUN SERPL-MCNC: 20 MG/DL (ref 8–22)
CALCIUM ALBUM COR SERPL-MCNC: 8.8 MG/DL (ref 8.5–10.5)
CALCIUM SERPL-MCNC: 8.1 MG/DL (ref 8.5–10.5)
CALCIUM SERPL-MCNC: 8.2 MG/DL (ref 8.5–10.5)
CHLORIDE SERPL-SCNC: 107 MMOL/L (ref 96–112)
CHLORIDE SERPL-SCNC: 108 MMOL/L (ref 96–112)
CO2 SERPL-SCNC: 16 MMOL/L (ref 20–33)
CO2 SERPL-SCNC: 16 MMOL/L (ref 20–33)
CREAT SERPL-MCNC: 1.18 MG/DL (ref 0.5–1.4)
CREAT SERPL-MCNC: 1.21 MG/DL (ref 0.5–1.4)
CREAT UR-MCNC: 133.1 MG/DL
ERYTHROCYTE [DISTWIDTH] IN BLOOD BY AUTOMATED COUNT: 54.9 FL (ref 35.9–50)
GFR SERPLBLD CREATININE-BSD FMLA CKD-EPI: 44 ML/MIN/1.73 M 2
GFR SERPLBLD CREATININE-BSD FMLA CKD-EPI: 45 ML/MIN/1.73 M 2
GLOBULIN SER CALC-MCNC: 3.1 G/DL (ref 1.9–3.5)
GLUCOSE SERPL-MCNC: 103 MG/DL (ref 65–99)
GLUCOSE SERPL-MCNC: 104 MG/DL (ref 65–99)
HCT VFR BLD AUTO: 34.6 % (ref 37–47)
HGB BLD-MCNC: 11.1 G/DL (ref 12–16)
MCH RBC QN AUTO: 29.8 PG (ref 27–33)
MCHC RBC AUTO-ENTMCNC: 32.1 G/DL (ref 32.2–35.5)
MCV RBC AUTO: 92.8 FL (ref 81.4–97.8)
MICROALBUMIN UR-MCNC: 2.3 MG/DL
MICROALBUMIN/CREAT UR: 17 MG/G (ref 0–30)
NT-PROBNP SERPL IA-MCNC: 1950 PG/ML (ref 0–125)
PLATELET # BLD AUTO: 467 K/UL (ref 164–446)
PMV BLD AUTO: 8.8 FL (ref 9–12.9)
POTASSIUM SERPL-SCNC: 3.7 MMOL/L (ref 3.6–5.5)
POTASSIUM SERPL-SCNC: 3.9 MMOL/L (ref 3.6–5.5)
PROT SERPL-MCNC: 6.3 G/DL (ref 6–8.2)
RBC # BLD AUTO: 3.73 M/UL (ref 4.2–5.4)
SODIUM SERPL-SCNC: 136 MMOL/L (ref 135–145)
SODIUM SERPL-SCNC: 138 MMOL/L (ref 135–145)
T4 FREE SERPL-MCNC: 1.86 NG/DL (ref 0.93–1.7)
TSH SERPL-ACNC: 4.13 UIU/ML (ref 0.35–5.5)
WBC # BLD AUTO: 10.2 K/UL (ref 4.8–10.8)

## 2025-01-15 PROCEDURE — 36415 COLL VENOUS BLD VENIPUNCTURE: CPT

## 2025-01-15 PROCEDURE — 85027 COMPLETE CBC AUTOMATED: CPT

## 2025-01-15 PROCEDURE — 84439 ASSAY OF FREE THYROXINE: CPT

## 2025-01-15 PROCEDURE — 80053 COMPREHEN METABOLIC PANEL: CPT

## 2025-01-15 PROCEDURE — 83880 ASSAY OF NATRIURETIC PEPTIDE: CPT | Mod: GA

## 2025-01-15 PROCEDURE — 80048 BASIC METABOLIC PNL TOTAL CA: CPT

## 2025-01-15 PROCEDURE — 82043 UR ALBUMIN QUANTITATIVE: CPT

## 2025-01-15 PROCEDURE — 84443 ASSAY THYROID STIM HORMONE: CPT

## 2025-01-15 PROCEDURE — 82570 ASSAY OF URINE CREATININE: CPT

## 2025-01-17 RX ORDER — FLUTICASONE FUROATE AND VILANTEROL 200; 25 UG/1; UG/1
1 POWDER RESPIRATORY (INHALATION) DAILY
Qty: 90 EACH | Refills: 3 | Status: SHIPPED | OUTPATIENT
Start: 2025-01-17

## 2025-01-19 NOTE — ASSESSMENT & PLAN NOTE
Chronic urge incontinence of urine for several years. States symptoms are managed well on current dosing of Myrbetriq  - continue myrbetriq 50mg QD   2027

## 2025-01-21 ASSESSMENT — ENCOUNTER SYMPTOMS
SYNCOPE: 0
PND: 0
NEAR-SYNCOPE: 0
ORTHOPNEA: 0
LIGHT-HEADEDNESS: 1
PALPITATIONS: 0
DIZZINESS: 1
HEADACHES: 0
SHORTNESS OF BREATH: 0
DYSPNEA ON EXERTION: 1

## 2025-01-22 ENCOUNTER — OFFICE VISIT (OUTPATIENT)
Dept: CARDIOLOGY | Facility: MEDICAL CENTER | Age: 85
End: 2025-01-22
Payer: MEDICARE

## 2025-01-22 VITALS
BODY MASS INDEX: 33.11 KG/M2 | SYSTOLIC BLOOD PRESSURE: 98 MMHG | DIASTOLIC BLOOD PRESSURE: 50 MMHG | RESPIRATION RATE: 16 BRPM | OXYGEN SATURATION: 98 % | HEART RATE: 78 BPM | HEIGHT: 62 IN

## 2025-01-22 DIAGNOSIS — R60.0 EDEMA LEG: ICD-10-CM

## 2025-01-22 DIAGNOSIS — I10 ESSENTIAL HYPERTENSION: ICD-10-CM

## 2025-01-22 DIAGNOSIS — I35.1 AORTIC VALVE INSUFFICIENCY, ETIOLOGY OF CARDIAC VALVE DISEASE UNSPECIFIED: ICD-10-CM

## 2025-01-22 PROCEDURE — 99213 OFFICE O/P EST LOW 20 MIN: CPT

## 2025-01-22 PROCEDURE — 3078F DIAST BP <80 MM HG: CPT

## 2025-01-22 PROCEDURE — 3074F SYST BP LT 130 MM HG: CPT

## 2025-01-22 PROCEDURE — 99214 OFFICE O/P EST MOD 30 MIN: CPT

## 2025-01-22 RX ORDER — POTASSIUM CHLORIDE 750 MG/1
10 TABLET, EXTENDED RELEASE ORAL 2 TIMES DAILY
Qty: 180 TABLET | Refills: 3 | Status: SHIPPED | OUTPATIENT
Start: 2025-01-22

## 2025-01-22 RX ORDER — LISINOPRIL 2.5 MG/1
2.5 TABLET ORAL DAILY
Qty: 90 TABLET | Refills: 3 | Status: SHIPPED | OUTPATIENT
Start: 2025-01-22

## 2025-01-22 NOTE — PROGRESS NOTES
Chief Complaint   Patient presents with    Follow-Up     F/v Dx:Aortic valve insufficiency, etiology of cardiac valve disease unspecified      Hyperlipidemia     F/v Dx:Mixed hyperlipidemia    Hypertension     F/v Dx:Essential hypertension          Subjective:   Margaret Garcia is a 84 y.o. female who presents today for follow-up.     Patient of Dr. Babb.  Current medical problems include COPD, SHABANA, paroxysmal AF, HTN, mild to moderate aortic stenosis, mild mitral regurgitation. Their last clinic visit was 4/1/2024 with Dr. Babb.    Patient has most recently been seen in the ED at [facility] due to increased swelling in her BLE and hip fracture. She was found to have an elevated NT proBNP of 2100. She was treated with diuretics and discharged home to follow up outpatient with PCP and cardiology.     Prior visit:  Patient reports since her last ED visit she has continued to have shortness of breath with exertion. She is only able to walk short distances without getting short of breath. She also continues to have bilateral lower leg edema. She says when she exerts herself and gets short of breath she does get a chest pain but it resolves quickly at rest. She denies palpitations, orthopnea, PND, or syncope. She does report some lightheadedness/dizziness associated with moving from sitting to standing. She is not currently weighing herself at home. She has an appointment with [facility] for the pelvic fx but is currently walking with a walker. She says she has tried her inhaler for her shortness of breath and it has not helped. She is still currently smoking.     Today's visit:  Patient reports since our last follow up she has seen an improvement in her lower extremity edema. She says that she thinks her shortness of breath has minimally improved. She also reports slight improvement in her lightheadedness/dizziness but is still having symptoms. She denies chest pain, palpitations, orthopnea, PND, or syncope. She has  followed up with [facility] and is doing physical therapy exercises at home. She is doing them daily and sometimes twice a day. She is weighing herself at home but she thinks her prior scale was wrong and just recently her daughter bought her a new scale. She says she has been 174-177 lbs. She still has edema on her bilateral lower extremities. She is taking her medications as prescribed. She has not been taking her blood pressure at home.     Cardiovascular Risk Factors:  1. Smoking status: Current smoker  2. Type II Diabetes Mellitus: No   Lab Results   Component Value Date/Time    HBA1C 5.5 04/05/2024 08:42 AM    HBA1C 5.7 (H) 05/28/2011 10:34 PM     3. Hypertension: Yes  4. Dyslipidemia: Yes   Cholesterol,Tot   Date Value Ref Range Status   04/05/2024 113 100 - 199 mg/dL Final     LDL   Date Value Ref Range Status   04/05/2024 53 <100 mg/dL Final     HDL   Date Value Ref Range Status   04/05/2024 39 (A) >=40 mg/dL Final     Triglycerides   Date Value Ref Range Status   04/05/2024 105 0 - 149 mg/dL Final     Past Medical History:   Diagnosis Date    Arthritis     knees, and hips-osteo    ASTHMA     Body mass index 40.0-44.9, adult ([facility]) 4/27/2018    Chronic renal impairment, stage 3 (moderate) 5/13/2019    COPD (chronic obstructive pulmonary disease) with chronic bronchitis ([facility]) 4/20/2021    Dental disorder     upper    Dysuria 10/18/2016    Fall     Generalized edema 4/27/2018    Glaucoma     bilateral    Hay fever 4/19/2016    Heart burn     High cholesterol     Hx: UTI (urinary tract infection)     Hyperglycemia 11/24/2015    Hypertension     Hypothyroidism     Indigestion     Ischemic bowel disease (HCC) 9/9/2019    Migraines     pt has similar symptoms with migraines not for a long time though    Nausea 10/14/2019    Neuropathy ([facility])     Obesity (BMI 30-39.9) 7/20/2015    Pneumonia     Routine general medical examination at a health care facility 7/20/2015    7/20/15    Sleep apnea 12/2019    Had sleep study,  didn't tolerate cpap    Snoring     Syncope 6/5/2020    Tobacco use 4/29/2019    URI (upper respiratory infection) 11/4/2015    Urinary bladder disorder     hx of uti's         Family History   Problem Relation Age of Onset    Stroke Mother     Hyperlipidemia Mother     Hypertension Mother     Arthritis Mother     Diabetes Father     Lung Disease Father         pneumonia    Arthritis Sister     Rheumatologic Disease Sister     Hypertension Sister     Hyperlipidemia Sister     Other Sister         Anusha/Fibermyalgia    Hyperlipidemia Brother     Hypertension Brother     Arthritis Brother     Lung Disease Maternal Grandmother         pneumonia    Stroke Maternal Grandfather     Cancer Maternal Grandfather         skin     No Known Problems Paternal Grandmother     No Known Problems Paternal Grandfather     Hyperlipidemia Daughter     Diabetes Daughter     No Known Problems Son          Social History     Tobacco Use    Smoking status: Every Day     Current packs/day: 1.00     Average packs/day: 1 pack/day for 56.0 years (56.0 ttl pk-yrs)     Types: Cigarettes    Smokeless tobacco: Never    Tobacco comments:     started smoking at age 12, little mor than half a pack   Vaping Use    Vaping status: Never Used   Substance Use Topics    Alcohol use: No    Drug use: No         Allergies   Allergen Reactions    Food Hives and Nausea     Oranges, Hives    Penicillins Unspecified     .    Neomycin-Polymyxin B Rash     Rash         Current Outpatient Medications   Medication Sig    potassium chloride SA (K-DUR) 10 MEQ Tab CR Take 1 Tablet by mouth 2 times a day.    lisinopril (PRINIVIL) 2.5 MG Tab Take 1 Tablet by mouth every day.    fluticasone furoate-vilanterol (BREO ELLIPTA) 200-25 MCG/ACT AEROSOL POWDER, BREATH ACTIVATED Inhale 1 Puff every day.    furosemide (LASIX) 40 MG Tab Take 1 Tablet by mouth every day.    ELIQUIS 5 MG Tab TAKE 1 TABLET BY MOUTH TWICE A DAY    nystatin (MYCOSTATIN) powder Apply 1 g topically 3 times  "a day.    nystatin (MYCOSTATIN) 212506 UNIT/GM Cream topical cream Apply 1 g topically 2 times a day.    fenofibrate (TRIGLIDE) 160 MG tablet TAKE 1 TABLET BY MOUTH EVERY DAY    atorvastatin (LIPITOR) 10 MG Tab TAKE 1 TABLET BY MOUTH EVERY DAY    Mirabegron ER (MYRBETRIQ) 50 MG TABLET SR 24 HR Take 50 mg by mouth every day.    levothyroxine (SYNTHROID) 100 MCG Tab Take 1 Tablet by mouth every morning on an empty stomach.    estradiol (ESTRACE) 0.1 MG/GM vaginal cream     Naloxone (NARCAN) 4 MG/0.1ML Liquid USE AS DIRECTED    promethazine (PHENERGAN) 25 MG Tab Take 1 Tablet by mouth every 6 hours as needed for Nausea/Vomiting.    pantoprazole (PROTONIX) 40 MG Tablet Delayed Response TAKE 1 TABLET BY MOUTH TWICE A DAY    HYDROcodone-acetaminophen (NORCO) 7.5-325 MG per tablet Take 1 Tablet by mouth every 8 hours as needed for Moderate Pain.    hydrOXYzine HCl (ATARAX) 25 MG Tab Take 25 mg by mouth at bedtime.    latanoprost (XALATAN) 0.005 % Solution Administer 1 Drop into both eyes every evening.    acetaminophen (TYLENOL) 500 MG TABS Take 500 mg by mouth 3 times a day as needed for Mild Pain. Indications: Pain         Review of Systems   Constitutional: Negative for malaise/fatigue.   Cardiovascular:  Positive for dyspnea on exertion and leg swelling. Negative for chest pain, near-syncope, orthopnea, palpitations, paroxysmal nocturnal dyspnea and syncope.   Respiratory:  Negative for shortness of breath.    Neurological:  Positive for dizziness and light-headedness. Negative for headaches.           Objective:   BP 98/50 (BP Location: Left arm, Patient Position: Sitting, BP Cuff Size: Adult)   Pulse 78   Resp 16   Ht 1.575 m (5' 2\")   SpO2 98%  Body mass index is 33.11 kg/m².         Physical Exam  Constitutional:       General: She is not in acute distress.  HENT:      Head: Normocephalic and atraumatic.   Cardiovascular:      Rate and Rhythm: Normal rate and regular rhythm.      Pulses: Normal pulses.      " "Heart sounds: Murmur heard.   Pulmonary:      Effort: Pulmonary effort is normal. No respiratory distress.      Breath sounds: Normal breath sounds.   Musculoskeletal:      Right lower le+ Edema present.      Left lower le+ Edema present.   Neurological:      Mental Status: She is alert and oriented to person, place, and time.      Gait: Gait normal.   Psychiatric:         Behavior: Behavior normal.             Lab Results   Component Value Date/Time    SODIUM 136 01/15/2025 11:09 AM    POTASSIUM 3.7 01/15/2025 11:09 AM    CHLORIDE 107 01/15/2025 11:09 AM    CO2 16 (L) 01/15/2025 11:09 AM    GLUCOSE 104 (H) 01/15/2025 11:09 AM    BUN 20 01/15/2025 11:09 AM    CREATININE 1.18 01/15/2025 11:09 AM      Lab Results   Component Value Date/Time    WBC 10.2 01/15/2025 11:06 AM    RBC 3.73 (L) 01/15/2025 11:06 AM    HEMOGLOBIN 11.1 (L) 01/15/2025 11:06 AM    HEMATOCRIT 34.6 (L) 01/15/2025 11:06 AM    MCV 92.8 01/15/2025 11:06 AM    MCH 29.8 01/15/2025 11:06 AM    MCHC 32.1 (L) 01/15/2025 11:06 AM    MPV 8.8 (L) 01/15/2025 11:06 AM    NEUTSPOLYS 76.60 (H) 2024 12:53 PM    LYMPHOCYTES 14.40 (L) 2024 12:53 PM    MONOCYTES 6.20 2024 12:53 PM    EOSINOPHILS 1.10 2024 12:53 PM    BASOPHILS 1.00 2024 12:53 PM    HYPOCHROMIA 1+ 2012 07:30 PM      Lab Results   Component Value Date/Time    CHOLSTRLTOT 113 2024 08:42 AM    LDL 53 2024 08:42 AM    HDL 39 (A) 2024 08:42 AM    TRIGLYCERIDE 105 2024 08:42 AM       Lab Results   Component Value Date/Time    TROPONINT 34 (H) 2022 1349     No results found for: \"NTPROBNP\"  Assessment:   1. Edema leg  - potassium chloride SA (K-DUR) 10 MEQ Tab CR; Take 1 Tablet by mouth 2 times a day.  Dispense: 180 Tablet; Refill: 3  - proBrain Natriuretic Peptide, NT; Future  - Comp Metabolic Panel; Future    2. Essential hypertension  - lisinopril (PRINIVIL) 2.5 MG Tab; Take 1 Tablet by mouth every day.  Dispense: 90 Tablet; Refill: " 3    3. Aortic valve insufficiency, etiology of cardiac valve disease unspecified  - proBrain Natriuretic Peptide, NT; Future  - Comp Metabolic Panel; Future          Medical Decision Making:  Today's Assessment / Plan:   Moderate aortic valve stenosis  Moderate aortic insufficiency  Dyspnea on exertion  -Patient with recent increased leg swelling and shortness of breath.  Recent echocardiogram reviewed with patient which showed normal EF 60% and no diastolic dysfunction noted but did show increase in aortic stenosis from mild to moderate.     -Fluid overload on exam with 2+ edema bilateral lower extremities  -Patient saw improvement in swelling with increased dose of lasix but then went back to 20 mg daily. Increase lasix to 40 mg daily with potassium.  -Repeat BNP and CMP prior to next follow up  -Patient to start weighing self daily and keep a log. IF patient sees weight gain > 3 lbs in a day or 5 lbs in a week to follow up for additional medication adjustments.  -Patient blood pressure low. Can consider spironolactone at next follow up if patient can tolerate.    Atrial fibrillation   Hypercoagulable state due to atrial fibrillation  -Patient does not feel when she goes in and out of atrial fibrillation but denies known reoccurrence   -Continue on eliquis 5 mg twice a day for stroke prevention  -Family asking about eliquis due to risk of bleeding with falls. Discussed option to repeat cardiac event monitor to see if having atrial fibrillation as well as risk of stroke not on blood thinner. Patient and family wanting to stay on anticoagulation at this time and not wanting to repeat cardiac event monitor. Discussed can always do monitor in the future.    Hypertension  - Good control but possible over controlled  - decrease lisinopril 2.5 mg daily, if blood pressure continues to be low can consider stopping medication  - goal < 130/80  -Continue to monitor blood pressure at home and keep a  log    Hyperlipidemia  -Most recent LDL 53  -Continue atorvastatin 10 mg daily  -Goal of less than 70  -Check lipid panel in 12 months      Return in about 2 months (around 3/22/2025) for Dr. Babb.  Sooner if problems.    CALI Galo.

## 2025-01-28 ENCOUNTER — OFFICE VISIT (OUTPATIENT)
Dept: NEUROLOGY | Facility: MEDICAL CENTER | Age: 85
End: 2025-01-28
Attending: PSYCHIATRY & NEUROLOGY
Payer: MEDICARE

## 2025-01-28 VITALS
HEIGHT: 62 IN | BODY MASS INDEX: 32.2 KG/M2 | HEART RATE: 80 BPM | TEMPERATURE: 96.7 F | DIASTOLIC BLOOD PRESSURE: 72 MMHG | WEIGHT: 175 LBS | RESPIRATION RATE: 14 BRPM | SYSTOLIC BLOOD PRESSURE: 116 MMHG | OXYGEN SATURATION: 98 %

## 2025-01-28 DIAGNOSIS — H91.93 SUBJECTIVE HEARING CHANGE OF BOTH EARS: ICD-10-CM

## 2025-01-28 DIAGNOSIS — F03.A0 MILD DEMENTIA WITHOUT BEHAVIORAL DISTURBANCE, PSYCHOTIC DISTURBANCE, MOOD DISTURBANCE, OR ANXIETY, UNSPECIFIED DEMENTIA TYPE (HCC): Primary | ICD-10-CM

## 2025-01-28 DIAGNOSIS — Z79.01 CHRONIC ANTICOAGULATION: ICD-10-CM

## 2025-01-28 DIAGNOSIS — R26.89 NEED FOR ASSISTANCE DUE TO UNSTEADY GAIT: ICD-10-CM

## 2025-01-28 DIAGNOSIS — F17.200 TOBACCO DEPENDENCY: ICD-10-CM

## 2025-01-28 PROBLEM — Z86.0109 PERSONAL HISTORY OF OTHER COLON POLYPS: Status: ACTIVE | Noted: 2025-01-28

## 2025-01-28 PROCEDURE — 3078F DIAST BP <80 MM HG: CPT | Performed by: PSYCHIATRY & NEUROLOGY

## 2025-01-28 PROCEDURE — 3074F SYST BP LT 130 MM HG: CPT | Performed by: PSYCHIATRY & NEUROLOGY

## 2025-01-28 PROCEDURE — 99215 OFFICE O/P EST HI 40 MIN: CPT | Performed by: PSYCHIATRY & NEUROLOGY

## 2025-01-28 PROCEDURE — 99212 OFFICE O/P EST SF 10 MIN: CPT | Performed by: PSYCHIATRY & NEUROLOGY

## 2025-01-28 RX ORDER — LISINOPRIL 5 MG/1
1 TABLET ORAL
COMMUNITY

## 2025-01-28 RX ORDER — FUROSEMIDE 20 MG/1
1 TABLET ORAL
COMMUNITY

## 2025-01-28 RX ORDER — PANTOPRAZOLE SODIUM 40 MG/1
40 TABLET, DELAYED RELEASE ORAL DAILY
COMMUNITY

## 2025-01-28 RX ORDER — LEVOTHYROXINE SODIUM 100 UG/1
1 TABLET ORAL
COMMUNITY

## 2025-01-28 RX ORDER — MIRABEGRON 50 MG/1
1 TABLET, FILM COATED, EXTENDED RELEASE ORAL
COMMUNITY

## 2025-01-28 ASSESSMENT — MONTREAL COGNITIVE ASSESSMENT (MOCA)
11. FOR EACH PAIR OF WORDS, WHAT CATEGORY DO THEY BELONG TO (OUT OF 2): 2/2
8. SERIAL SUBTRACTION OF 7S: 2 OR 3/5
WHAT IS THE TOTAL SCORE (OUT OF 30): 19
ORIENTATION SUBSCORE: 6/6
CATEGORY CUE (IF APPLICABLE): 2
3. DRAW A CLOCK: CONTOUR, NUMBERS, HANDS: 2/3
4. NAME EACH OF THE THREE ANIMALS SHOWN: 2/3
WHAT IS THE VERSION OF MOCA ADMINISTERED: 7.1
10. [FLUENCY] NAME WORDS STARTING WITH DESIGNATED LETTER: 1/1
2. COPY DRAWING: 0/1
5. MEMORY TRIALS: SECOND TRIAL
DELAYED RECALL SUBSCORE: 2/5
1. ALTERNATING TRAIL MAKING: 0/1
6. READ LIST OF DIGITS [FORWARD/BACKWARD]: 1/2
9. REPEAT EACH SENTENCE: 1/2

## 2025-01-28 ASSESSMENT — FIBROSIS 4 INDEX: FIB4 SCORE: 0.8

## 2025-01-28 ASSESSMENT — PATIENT HEALTH QUESTIONNAIRE - PHQ9: CLINICAL INTERPRETATION OF PHQ2 SCORE: 0

## 2025-01-28 NOTE — PROGRESS NOTES
"Neuro follow up:    Last seen by me in 2022    Reason: Mild Dementia issue    Margaret Garcia 81 y.o. right handed woman female who is originally from [facility] and  for a [employer] x 4-5 years.     She lives alone in [place] ([facility]).    Here with daughter and grand daughter.     She is clearly aware of problems with her short term memory dating back for over 1 year.  She often will get up to get something and have retrace her steps but usually recalling what intended to get.     Janis (grand daughter) adds that about  4 to 5  years she will repeats same stories over and over within minutes (5-10 minutes). This will tell the same story about 2 times in a 2 hour visit with her daughter which has been going for 1 year.    The daughter mentioned that Ella can repeat the same stories maybe 2 to 3 times within a few months.  The daughter has also noticed that Ella has difficulty tracking and can easily distracted and often get back on track.    Janis has been doing all of her medication(s) for the last 2  years and they use a \"HERO\" machine> Janis is pretty sure that Ella could easily forget her medications or take the wrong amount of medication(s)  Janis must keep track of her doctor's appointments consistently for well over 1  year.    She can forget information within 5-10 minutes atleast in the last and this can be at times within \" a few minutes\" of being told something (ie anything) - occurring for well over 3 years now.     Communication ability has gradually changed with very infrequent word retrieval issue.     She walks with a cane- lower back pain (degenerative disc dz)- had surgery 7-10 years ago.  She uses pain kills (narcotics- 7.5/325) PO TID- which really reduces the pain down to a tolerable level and on average 2/10 level.     No history to suggest seizure(s), ischemic stroke(s) or concussions.     She denies depression or being " anxious,nervous,hallucinations,paranoia or sundowning events in the last 6 months.     She stopped driving over  3 months as she got lost for a moment 4-5  years ago.     No involuntary movements of the limbs or head-neck-body in the last 3 months.        No evolving gait-balance decline in last 12 months.     Smoked 1 pack per day x 60 years > still smoking (1 pack per day).  No significant alcohol use in adult life.     Mother's side- 5-6 siblings (dementia)- late onset  Father: institutionalized when young  2 Siblings: without cognitive/Dementia issues.          Patient Active Problem List    Diagnosis Date Noted    Closed fracture of superior ramus of pubis ([facility]) 01/07/2025    Osteopenia of multiple sites 11/09/2024    Mitral regurgitation 10/01/2024    Aortic atherosclerosis ([facility]) 10/01/2024    Delayed gastric emptying 04/10/2024    Gastro-esophageal reflux disease without esophagitis 04/10/2024    Aortic valve regurgitation 09/21/2023    Venous insufficiency 09/21/2023    Atrophic vaginitis 05/08/2023    Secondary hypercoagulable state ([facility]) 09/20/2022    Other emphysema ([facility]) 08/02/2021    Paroxysmal atrial fibrillation ([facility]) 07/30/2021    Stage 3b chronic kidney disease 07/23/2021    Renal insufficiency syndrome 02/18/2021    Urge incontinence of urine 12/13/2020    Nonrheumatic aortic valve stenosis 09/16/2020    Chronic midline low back pain without sciatica 09/02/2020    Ischemic bowel disease ([facility]) 09/09/2019    Tobacco use 04/29/2019    Generalized edema 04/27/2018    Hyperlipidemia 11/24/2015    Essential hypertension 11/24/2015    Obesity (BMI 30-39.9) 07/20/2015    Sleep apnea 07/20/2015    Dementia ([facility]) 05/29/2011    Hypothyroidism 05/29/2011    Peripheral neuropathy 05/29/2011       Past medical history:   Past Medical History:   Diagnosis Date    Arthritis     knees, and hips-osteo    ASTHMA     Body mass index 40.0-44.9, adult ([facility]) 4/27/2018    Chronic renal impairment, stage 3 (moderate)  5/13/2019    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) 4/20/2021    Dental disorder     upper    Dysuria 10/18/2016    Fall     Generalized edema 4/27/2018    Glaucoma     bilateral    Hay fever 4/19/2016    Heart burn     High cholesterol     Hx: UTI (urinary tract infection)     Hyperglycemia 11/24/2015    Hypertension     Hypothyroidism     Indigestion     Ischemic bowel disease ([facility]) 9/9/2019    Migraines     pt has similar symptoms with migraines not for a long time though    Nausea 10/14/2019    Neuropathy ([facility])     Obesity (BMI 30-39.9) 7/20/2015    Pneumonia     Routine general medical examination at a health care facility 7/20/2015    7/20/15    Sleep apnea 12/2019    Had sleep study, didn't tolerate cpap    Snoring     Syncope 6/5/2020    Tobacco use 4/29/2019    URI (upper respiratory infection) 11/4/2015    Urinary bladder disorder     hx of uti's       Past surgical history:   Past Surgical History:   Procedure Laterality Date    GASTROSCOPY  12/13/2019    Procedure: GASTROSCOPY;  Surgeon: Ang Angel M.D.;  Location: SURGERY [facility];  Service: Gastroenterology    COLONOSCOPY  12/13/2019    Procedure: COLONOSCOPY;  Surgeon: Ang Angel M.D.;  Location: SURGERY [facility];  Service: Gastroenterology    NIRU BY LAPAROSCOPY  9/2/2011    Performed by KAYLEIGH PORRAS at SURGERY SAME DAY [facility] ORS    LUMBAR DECOMPRESSION  6/29/2009    Performed by ANG YAN at SURGERY [facility] ORS    LUMBAR LAMINECTOMY DISKECTOMY  6/29/2009    Performed by ANG YAN at SURGERY [facility] ORS    ABDOMINAL HYSTERECTOMY TOTAL      GYN SURGERY      hysterectomy    HEMORRHOIDECTOMY           Social history:   Social History     Socioeconomic History    Marital status: Single     Spouse name: Not on file    Number of children: 3    Years of education: Not on file    Highest education level: 12th grade   Occupational History     Employer: Retired   Tobacco Use     Smoking status: Every Day     Current packs/day: 1.00     Average packs/day: 1 pack/day for 56.0 years (56.0 ttl pk-yrs)     Types: Cigarettes    Smokeless tobacco: Never    Tobacco comments:     started smoking at age 12, little mor than half a pack   Vaping Use    Vaping status: Never Used   Substance and Sexual Activity    Alcohol use: No    Drug use: No    Sexual activity: Never     Partners: Male     Birth control/protection: Post-Menopausal   Other Topics Concern    Not on file   Social History Narrative    Lives alone.     Social Drivers of Health     Financial Resource Strain: Low Risk  (9/30/2024)    Overall Financial Resource Strain (CARDIA)     Difficulty of Paying Living Expenses: Not very hard   Food Insecurity: No Food Insecurity (9/30/2024)    Hunger Vital Sign     Worried About Running Out of Food in the Last Year: Never true     Ran Out of Food in the Last Year: Never true   Transportation Needs: No Transportation Needs (9/30/2024)    PRAPARE - Transportation     Lack of Transportation (Medical): No     Lack of Transportation (Non-Medical): No   Physical Activity: Inactive (9/30/2024)    Exercise Vital Sign     Days of Exercise per Week: 0 days     Minutes of Exercise per Session: 0 min   Stress: No Stress Concern Present (9/30/2024)    [employer] - Occupational Stress Questionnaire     Feeling of Stress : Only a little   Social Connections: Socially Isolated (9/30/2024)    Social Connection and Isolation Panel [NHANES]     Frequency of Communication with Friends and Family: More than three times a week     Frequency of Social Gatherings with Friends and Family: More than three times a week     Attends Shinto Services: Never     Active Member of Clubs or Organizations: No     Attends Club or Organization Meetings: Never     Marital Status:    Intimate Partner Violence: Not on file   Housing Stability: Low Risk  (9/30/2024)    Housing Stability Vital Sign      Unable to Pay for Housing in the Last Year: No     Number of Times Moved in the Last Year: 0     Homeless in the Last Year: No       Family history:   Family History   Problem Relation Age of Onset    Stroke Mother     Hyperlipidemia Mother     Hypertension Mother     Arthritis Mother     Diabetes Father     Lung Disease Father         pneumonia    Arthritis Sister     Rheumatologic Disease Sister     Hypertension Sister     Hyperlipidemia Sister     Other Sister         Anusha/Fibermyalgia    Hyperlipidemia Brother     Hypertension Brother     Arthritis Brother     Lung Disease Maternal Grandmother         pneumonia    Stroke Maternal Grandfather     Cancer Maternal Grandfather         skin     No Known Problems Paternal Grandmother     No Known Problems Paternal Grandfather     Hyperlipidemia Daughter     Diabetes Daughter     No Known Problems Son          Current medications:   Current Outpatient Medications   Medication    potassium chloride SA (K-DUR) 10 MEQ Tab CR    lisinopril (PRINIVIL) 2.5 MG Tab    fluticasone furoate-vilanterol (BREO ELLIPTA) 200-25 MCG/ACT AEROSOL POWDER, BREATH ACTIVATED    furosemide (LASIX) 40 MG Tab    ELIQUIS 5 MG Tab    nystatin (MYCOSTATIN) powder    nystatin (MYCOSTATIN) 480904 UNIT/GM Cream topical cream    fenofibrate (TRIGLIDE) 160 MG tablet    atorvastatin (LIPITOR) 10 MG Tab    Mirabegron ER (MYRBETRIQ) 50 MG TABLET SR 24 HR    levothyroxine (SYNTHROID) 100 MCG Tab    estradiol (ESTRACE) 0.1 MG/GM vaginal cream    Naloxone (NARCAN) 4 MG/0.1ML Liquid    promethazine (PHENERGAN) 25 MG Tab    pantoprazole (PROTONIX) 40 MG Tablet Delayed Response    HYDROcodone-acetaminophen (NORCO) 7.5-325 MG per tablet    hydrOXYzine HCl (ATARAX) 25 MG Tab    latanoprost (XALATAN) 0.005 % Solution    acetaminophen (TYLENOL) 500 MG TABS     No current facility-administered medications for this visit.       Medication Allergy:  Allergies   Allergen Reactions    Food Hives and Nausea      "Oranges, Hives    Penicillins Unspecified     .    Neomycin-Polymyxin B Rash     Rash           Physical examination:   Vitals:    25 1414   BP: 116/72   BP Location: Left arm   Patient Position: Sitting   BP Cuff Size: Adult   Pulse: 80   Resp: 14   Temp: 35.9 °C (96.7 °F)   TempSrc: Temporal   SpO2: 98%   Weight: 79.4 kg (175 lb)   Height: 1.575 m (5' 2\")       Normal cephalic atraumatic.  There is full range of movement around the neck in all directions without restrictions or discrete pain evoked triggers.  No lower extremity edema.      Neurological  Exam:      Michele Cognitive Assessment (MOCA) Version 7.1    Years of Education: High School Graduate    TOTAL SCORE: /30  (to be scanned into the MEDIA section in the E.M.R.)    Michele Cognitive Assessment (MOCA) Version Number: 7.1   VISUOSPACIAL / EXECUTIVE   Clock Drawin/3 (see media section for specifics)  Spatial Drawin/1 (see media section for specifics)  Cube Drawin/1 (see media section for specifics)    NAMING  Namin/3 (stated Hippo and not Rhino)    MEMORY  Memory: Second trial    ATTENTION  Digits: 1/2 (see media section for specifics)  Letters:    Subtraction: 2 or 3/5 (see media section for specifics)    LANGUAGE  Repeat Phrases: 1/2 (see media section for specifics)  Fluency: 1/1    ABSTRACTION  Abstraction: 2/2    DELAYED RECALL  Recall words: 2/5  Category Cue (if applicable):    Multiple Choice Cue (if applicable):2 (could not state \"red\" (instead said yellow) and got yesy only with multiple choice cues.)    ORIENTATION  Orientation:     Add 1 point if less than or equal to 12 yr education level:     MOCA TOTAL SCORE:   /30  Biomechanical/Visual Limitations (if applicable):          Mental status: Awake, alert and fully oriented to person, place, time, and situation. Normal attention and concentration.  Did not appear/act combative,irritable,anxious,paranoid/delusional or aggressive to or with me.    Speech and " language: Speech is fluent without errors, clear, intact to repetition, and intact to naming.     Follows 3 step motor commands in sequence without significant delay and correctly.    Cranial nerve exam:  II: Pupils are equally round and reactive to light. Visual fields are intact by confrontation.  III, IV, VI: EOMI, no diplopia, no ptosis.  V: Sensation to light touch is normal over V1-3 distributions bilaterally.  .  VII: Facial movements are symmetrical. There is no facial droop. .  VIII: Hearing intact to soft speech and finger rub bilaterally  IX: Palate elevates symmetrically, uvula is midline. Dysarthria is not present.  XI: Shoulder shrug are symmetrical and strong.   XII: Tongue protrudes midline.      Motor exam:  Muscle tone is normal in all 4 limbs. and No abnormal movements appreciated.    Muscle strength:    Neck Flexors/Extensors: 5/5       Right  Left  Deltoid   5/5  5/5      Biceps   5/5  5/5  Triceps              5/5  5/5   Wrist extensors 5/5  5/5  Wrist flexors  5/5  5/5     5/5  5/5  Interossei  5/5  5/5  Thenar (APB)  5/5  5/5   Hip flexors  5/5  5/5  Quadriceps  5/5  5/5    Hamstrings  5/5  5/5  Dorsiflexors  5/5  5/5  Plantarflexors  5/5  5/5  Toe extension  5/5  5/5      Sensory exam:    Vibratory:  6-8 seconds at the great toes, 8-10 seconds at the ankles and 10-12 seconds at the knees and symmetrical.  Proprioception: normal at the great toes.    Reflexes:       Right  Left  Biceps   2/4  2/4  Triceps             2/4  2/4  Brachioradialis 2/4  2/4  Knee jerk  2/4  2/4  Ankle jerk  2/4  2/4     Frontal release signs are absent    bilaterally toes are downgoing to plantar stimulation..    Coordination (finger-to-nose, heel/knee/shin, rapid alternating movements) was normal.     There was no ataxia, no tremors, and no dysmetria.     Station and gait : Easily stands up from exam chair without retropulsion,veering,leaning,swaying (to either side).     No rombergism.  Negative Pull  Test.    Labs and Tests:    Comp Metabolic Panel  Order: [id number]   Status: Final result       Visible to patient: Yes (seen)       Next appt: 02/05/2025 at 02:00 PM in Nephrology (Fadi Najjar, M.D.)       Dx: Aortic valve insufficiency, etiology ...    2 Result Notes       1 Patient Communication            Component  Ref Range & Units 13 d ago  (1/15/25) 13 d ago  (1/15/25) 4 mo ago  (9/13/24) 1 yr ago  (1/23/24) 1 yr ago  (10/17/23) 1 yr ago  (8/15/23) 1 yr ago  (3/27/23)   Sodium  135 - 145 mmol/L 136 138 140 144 140 141 142   Potassium  3.6 - 5.5 mmol/L 3.7 3.9 4.3 3.8 3.9 4.1 4.5   Chloride  96 - 112 mmol/L 107 108 105 109 106 108 110   Co2  20 - 33 mmol/L 16 Low  16 Low  21 19 Low  20 20 20   Anion Gap  7.0 - 16.0 13.0 14.0 14.0 16.0 14.0 13.0 12.0   Glucose  65 - 99 mg/dL 104 High  103 High  84 88 85 76 123 High    Bun  8 - 22 mg/dL 20 19 27 High  17 20 27 High  26 High    Creatinine  0.50 - 1.40 mg/dL 1.18 1.21 1.20 1.27 1.42 High  1.41 High  1.37   Calcium  8.5 - 10.5 mg/dL 8.2 Low  8.1 Low  9.3 R 8.6 9.0 9.0 9.5   Correct Calcium  8.5 - 10.5 mg/dL 8.8  9.5       AST(SGOT)  12 - 45 U/L 15  13       ALT(SGPT)  2 - 50 U/L 12  9       Alkaline Phosphatase  30 - 99 U/L 65  61       Total Bilirubin  0.1 - 1.5 mg/dL 0.4  0.5       Albumin  3.2 - 4.9 g/dL 3.2  3.8       Total Protein  6.0 - 8.2 g/dL 6.3  7.0       Globulin  1.9 - 3.5 g/dL 3.1  3.2       A-G Ratio  g/dL 1.0  1.2       Resulting Agency [facility]            Status: Final result       Visible to patient: Yes (seen)       Next appt: 02/05/2025 at 02:00 PM in Nephrology (Fadi Najjar, M.D.)       Dx: Hypothyroidism, unspecified type    0 Result Notes              Component  Ref Range & Units 13 d ago  (1/15/25) 9 mo ago  (4/5/24) 2 yr ago  (8/11/22) 2 yr ago  (5/13/22) 3 yr ago  (11/9/21) 4 yr ago  (12/18/20) 5 yr ago  (5/9/19)   TSH  0.350 - 5.500 uIU/mL 4.130 1.570 R, CM 2.740 R, CM 1.310 R, CM 0.110 Low  R, CM 0.034 Low  R, CM 1.620 R, CM    Resulting Agency [facility]             NEUROIMAGING:     Reading Physician Reading Date Result Priority   Dimas Meade M.D.  [phone] 9/13/2024      Narrative & Impression     9/13/2024 10:47 AM     HISTORY/REASON FOR EXAM:  Ground level fall + anticoagulants or bleeding disorder; Ground level fall + anticoagulants or bleeding disorder.        TECHNIQUE/EXAM DESCRIPTION AND NUMBER OF VIEWS:  CT of the head without contrast.     The study was performed on a helical multidetector CT scanner. Contiguous axial sections were obtained from the skull base through the vertex.     Up to date radiation dose reduction adjustments have been utilized to meet ALARA standards for radiation dose reduction.     COMPARISON:  9/22/2022     FINDINGS:  There is no fracture or abnormal extra-axial fluid collection. There is diffuse cortical atrophy. Periventricular and patchy subcortical white matter low attenuation is nonspecific but likely represents chronic small vessel ischemic disease. There is no   evidence for intracranial hemorrhage or acute infarction. Ventricles are within normal limits and the basilar cisterns are patent. There is no mass effect or midline shift.     There is mild posterior ethmoid disease.     Mastoids in the field of view are unremarkable.        IMPRESSION:     There is no definite acute intracranial abnormality.                 Exam Ended: 09/13/24 11:11 AM Last Resulted: 09/13/24 11:18 AM       Impression/Plans/Recommendations:      Dementia - mild  in severity- likely Neurodegenerative given time line of over 4 years. Probalby Alz type Dementia based on duration of symptoms and short term memory predominant decline.    No parkinsonism at this time.  No notable psychotic features in the recent months.     Janis (grand daughter) is assisting to make decisions at this time.  Janis has been assisting with finances for over 1 year now.     MOCA score of 20/30 today - see media section for  "specifics.    FAQ score of 16 range per grand daughter.     Global Deterioration Score of solid 4 (to 5) range per daughter and grand daughter.        2. Obesity- reviewed importance of weight loss through diet changes; goal BMI under 28.     3. Chronic Anticoagulation (on Eliquis)- for stroke prevention.     4. Unsteady Gait- uses simple cane or walker  for distance walking. She has had 2 falls in the last 6 months- [place] and [place] getting her garbage can.  She also fell around [place] and tripped on carpet at home> pelvic fracture not requiring surgery.    There are no present features of myopathy,myelopathy, polyneuropathy or parkinsonism.    5. Tobacco Dependency- 1/2 to 1 pack per day for over 50 years. She continues to smoke        Today, I reviewed the clinical criteria and reviewed several  scenarios of the differences being using the medical terms of normal brain aging (age associated memory impairment),  Mild Cognitive Impairment (MCI) and Dementia.    Dementia  is a syndrome but statistically and for the majority of patients  occurs due  to a more rapid aging of the brain tissue or potentially from injury to certain parts of one's brain ( often from such contributing factors as  the cumulative effects of alcohol, from one or more ischemic or hemmorhagic stroke(s), from neurodegeneration or long standing with/without suboptimally controlled Hypertension). It is for some of these potential factors as to why I recommend a brain imaging test (Head CT or Brain MRI) be done for the 1st time or in certain circumstances repeated for comparison purposes  as such imaging can suggest one or more factors as to the reason(s) for the person's cognitive/memory changes. In fact, a normal or \"age related\" finding on a brain imaging test in and of itself is useful clinical and objective information to have in the evaluation of a person who has either an acute, evolving or even chronic (months " to years) long cognitive/memory complaint.     Additional factors or contributors to Brain Health issues can be summarized in several books/references which I discussed as well today.      Goals going forwards include:     A. Paying attention to one's risk factors and reducing their prevalence or potential impact on one's changing memory/thinking> an excellent example would be to stop smoking, reduce or eliminate alcohol use (depending on the amount and frequency of usage), maintain good blood pressure control by buying a digital arm blood pressure cuff such as an OMRON Series 3 or 5 and checking one's blood pressure atleast 3 days per week (in the am and early afternoon) that the numbers are under 140/90 and working as needed with the primary care doctor  to optimize blood pressure control).     B. Encouraging proper sleep hygiene which for most adults is 7 to 8 hours of uninterrupted sleep per night.       C. Encouraging some form of exercise preferable aerobic forms to be done (4 to 5 days per week- 30 minutes minimum per day)> 150 minutes per week as a goal. Example activities could include fast walking (up a slight incline), jogging, cycling (road or stationary), swimming,tennis. Essentially, even basic walking on a flat surface or a treadmill would be better than doing nothing.     D. Maintaining or forming new social contacts with family and friends  and a positive attitude despite the concerns and/or ongoing issues with thinking and/or memory.     E. Eating well which means a diet low in salt  (under 2 grams per day), sugar and saturated fat.     F. Maintaining one's BMI (Body Mass Index) under 30.     G. Consideration of the use of cognitive enhancers (acetylcholinerase inhibitors such as Aricept vs an NMDA Receptor agent like Namenda). Pros and cons of such compounds in terms of predicted efficacy and side effects profiles reviewed.At this time will hold on such medications after review today.     AGUSTIN CASPER  vitamin levels to be checked (B1,B9,B12 and Vitamin D)     I .Will continue  Eliquis for long term stroke prevention measures - risks of bleeding risks reviewed today and Ella/daughter agreed to continue Eliquis.     J. Will hold off on Brain PET after reviewed this test with Ella and daughter today.     K.  Anti amyloid medication(s) reviewed today- pros and cons including Leqembi and Kinsunla. She firmly stated she does not want to take such a risk if she had or could have Alz Disease and she does not want further testing done such as a formal Neuropsychological testing.    L. Home Health- P.T. for 3 weeks now (once a week).     M. Caregiver issues reviewed today.    N. Strongly encouraged stopping smoking for general health risk reduction.    O. Audiogram to be done to check for hearing impairment(s)- I have ordered this test today for her.    Time spent today was 40 minutes to review above issues.

## 2025-02-05 ENCOUNTER — APPOINTMENT (OUTPATIENT)
Dept: NEPHROLOGY | Facility: MEDICAL CENTER | Age: 85
End: 2025-02-05
Payer: MEDICARE

## 2025-02-05 VITALS
SYSTOLIC BLOOD PRESSURE: 112 MMHG | RESPIRATION RATE: 24 BRPM | TEMPERATURE: 97.8 F | BODY MASS INDEX: 32.31 KG/M2 | DIASTOLIC BLOOD PRESSURE: 54 MMHG | WEIGHT: 175.6 LBS | OXYGEN SATURATION: 99 % | HEART RATE: 78 BPM | HEIGHT: 62 IN

## 2025-02-05 DIAGNOSIS — N18.32 STAGE 3B CHRONIC KIDNEY DISEASE: ICD-10-CM

## 2025-02-05 DIAGNOSIS — N30.00 ACUTE CYSTITIS WITHOUT HEMATURIA: ICD-10-CM

## 2025-02-05 DIAGNOSIS — I10 ESSENTIAL HYPERTENSION: ICD-10-CM

## 2025-02-05 DIAGNOSIS — R60.9 EDEMA, UNSPECIFIED TYPE: ICD-10-CM

## 2025-02-05 DIAGNOSIS — Z72.0 TOBACCO ABUSE: ICD-10-CM

## 2025-02-05 DIAGNOSIS — Z71.6 TOBACCO ABUSE COUNSELING: ICD-10-CM

## 2025-02-05 PROCEDURE — 99406 BEHAV CHNG SMOKING 3-10 MIN: CPT | Performed by: INTERNAL MEDICINE

## 2025-02-05 PROCEDURE — 3078F DIAST BP <80 MM HG: CPT | Performed by: INTERNAL MEDICINE

## 2025-02-05 PROCEDURE — 3074F SYST BP LT 130 MM HG: CPT | Performed by: INTERNAL MEDICINE

## 2025-02-05 PROCEDURE — 99214 OFFICE O/P EST MOD 30 MIN: CPT | Mod: 25 | Performed by: INTERNAL MEDICINE

## 2025-02-05 RX ORDER — SULFAMETHOXAZOLE AND TRIMETHOPRIM 800; 160 MG/1; MG/1
1 TABLET ORAL 2 TIMES DAILY
Qty: 10 TABLET | Refills: 0 | Status: SHIPPED | OUTPATIENT
Start: 2025-02-05 | End: 2025-02-10

## 2025-02-05 ASSESSMENT — ENCOUNTER SYMPTOMS
FEVER: 0
COUGH: 0
HYPERTENSION: 1
VOMITING: 0
NAUSEA: 0
CHILLS: 0
SHORTNESS OF BREATH: 0

## 2025-02-05 ASSESSMENT — FIBROSIS 4 INDEX: FIB4 SCORE: 0.8

## 2025-02-05 NOTE — PROGRESS NOTES
"Ayesha Garcia is a 84 y.o. female who presents with Chronic Kidney Disease and Hypertension            Chronic Kidney Disease  This is a chronic problem. The current episode started more than 1 year ago. The problem occurs constantly. The problem has been unchanged. Pertinent negatives include no chest pain, chills, coughing, fever, nausea, urinary symptoms or vomiting.   Hypertension  This is a chronic problem. The current episode started more than 1 year ago. The problem is unchanged. The problem is controlled. Pertinent negatives include no chest pain, malaise/fatigue, peripheral edema or shortness of breath. Risk factors for coronary artery disease include post-menopausal state and smoking/tobacco exposure. Past treatments include diuretics and ACE inhibitors. The current treatment provides significant improvement. There are no compliance problems.  Hypertensive end-organ damage includes kidney disease. Identifiable causes of hypertension include chronic renal disease.       Review of Systems   Constitutional:  Negative for chills, fever and malaise/fatigue.   Respiratory:  Negative for cough and shortness of breath.    Cardiovascular:  Negative for chest pain and leg swelling.   Gastrointestinal:  Negative for nausea and vomiting.   Genitourinary:  Negative for dysuria, frequency and urgency.              Objective     /54 (BP Location: Right arm, Patient Position: Sitting)   Pulse 78   Temp 36.6 °C (97.8 °F) (Temporal)   Resp (!) 24   Ht 1.575 m (5' 2\")   Wt 79.7 kg (175 lb 9.6 oz)   SpO2 99%   BMI 32.12 kg/m²      Physical Exam  Vitals and nursing note reviewed.   Constitutional:       General: She is not in acute distress.     Appearance: Normal appearance. She is well-developed. She is not diaphoretic.   HENT:      Head: Normocephalic and atraumatic.      Right Ear: External ear normal.      Left Ear: External ear normal.      Nose: Nose normal.   Eyes:      General: No " scleral icterus.        Right eye: No discharge.         Left eye: No discharge.      Conjunctiva/sclera: Conjunctivae normal.   Cardiovascular:      Rate and Rhythm: Normal rate and regular rhythm.      Heart sounds: No murmur heard.  Pulmonary:      Effort: Pulmonary effort is normal. No respiratory distress.      Breath sounds: Wheezing present.   Musculoskeletal:         General: No tenderness.      Right lower leg: No edema.      Left lower leg: No edema.   Skin:     General: Skin is warm and dry.      Findings: No erythema.   Neurological:      General: No focal deficit present.      Mental Status: She is alert and oriented to person, place, and time.      Cranial Nerves: No cranial nerve deficit.   Psychiatric:         Mood and Affect: Mood normal.         Behavior: Behavior normal.     Past Medical History:   Diagnosis Date    Arthritis     knees, and hips-osteo    ASTHMA     Body mass index 40.0-44.9, adult ([facility]) 4/27/2018    Chronic renal impairment, stage 3 (moderate) 5/13/2019    COPD (chronic obstructive pulmonary disease) with chronic bronchitis ([facility]) 4/20/2021    Dental disorder     upper    Dysuria 10/18/2016    Fall     Generalized edema 4/27/2018    Glaucoma     bilateral    Hay fever 4/19/2016    Heart burn     High cholesterol     Hx: UTI (urinary tract infection)     Hyperglycemia 11/24/2015    Hypertension     Hypothyroidism     Indigestion     Ischemic bowel disease ([facility]) 9/9/2019    Migraines     pt has similar symptoms with migraines not for a long time though    Nausea 10/14/2019    Neuropathy ([facility])     Obesity (BMI 30-39.9) 7/20/2015    Pneumonia     Routine general medical examination at a health care facility 7/20/2015    7/20/15    Sleep apnea 12/2019    Had sleep study, didn't tolerate cpap    Snoring     Syncope 6/5/2020    Tobacco use 4/29/2019    URI (upper respiratory infection) 11/4/2015    Urinary bladder disorder     hx of uti's     Social History     Socioeconomic History     Marital status: Single     Spouse name: Not on file    Number of children: 3    Years of education: Not on file    Highest education level: 12th grade   Occupational History     Employer: Retired   Tobacco Use    Smoking status: Every Day     Current packs/day: 1.00     Average packs/day: 1 pack/day for 56.0 years (56.0 ttl pk-yrs)     Types: Cigarettes    Smokeless tobacco: Never    Tobacco comments:     started smoking at age 12, little mor than half a pack   Vaping Use    Vaping status: Never Used   Substance and Sexual Activity    Alcohol use: No    Drug use: No    Sexual activity: Never     Partners: Male     Birth control/protection: Post-Menopausal   Other Topics Concern    Not on file   Social History Narrative    Lives alone.     Social Drivers of Health     Financial Resource Strain: Low Risk  (9/30/2024)    Overall Financial Resource Strain (CARDIA)     Difficulty of Paying Living Expenses: Not very hard   Food Insecurity: No Food Insecurity (9/30/2024)    Hunger Vital Sign     Worried About Running Out of Food in the Last Year: Never true     Ran Out of Food in the Last Year: Never true   Transportation Needs: No Transportation Needs (9/30/2024)    PRAPARE - Transportation     Lack of Transportation (Medical): No     Lack of Transportation (Non-Medical): No   Physical Activity: Inactive (9/30/2024)    Exercise Vital Sign     Days of Exercise per Week: 0 days     Minutes of Exercise per Session: 0 min   Stress: No Stress Concern Present (9/30/2024)    [place] [employer] - Occupational Stress Questionnaire     Feeling of Stress : Only a little   Social Connections: Socially Isolated (9/30/2024)    Social Connection and Isolation Panel [NHANES]     Frequency of Communication with Friends and Family: More than three times a week     Frequency of Social Gatherings with Friends and Family: More than three times a week     Attends Hindu Services: Never     Active Member of Clubs or  Organizations: No     Attends Club or Organization Meetings: Never     Marital Status:    Intimate Partner Violence: Not on file   Housing Stability: Low Risk  (9/30/2024)    Housing Stability Vital Sign     Unable to Pay for Housing in the Last Year: No     Number of Times Moved in the Last Year: 0     Homeless in the Last Year: No     Family History   Problem Relation Age of Onset    Stroke Mother     Hyperlipidemia Mother     Hypertension Mother     Arthritis Mother     Diabetes Father     Lung Disease Father         pneumonia    Arthritis Sister     Rheumatologic Disease Sister     Hypertension Sister     Hyperlipidemia Sister     Other Sister         Anusha/Fibermyalgia    Hyperlipidemia Brother     Hypertension Brother     Arthritis Brother     Lung Disease Maternal Grandmother         pneumonia    Stroke Maternal Grandfather     Cancer Maternal Grandfather         skin     No Known Problems Paternal Grandmother     No Known Problems Paternal Grandfather     Hyperlipidemia Daughter     Diabetes Daughter     No Known Problems Son      Recent Labs     04/05/24  0842 09/13/24  1253 01/15/25  1106 01/15/25  1109   ALBUMIN  --  3.8  --  3.2   HDL 39*  --   --   --    TRIGLYCERIDE 105  --   --   --    SODIUM  --  140 138 136   POTASSIUM  --  4.3 3.9 3.7   CHLORIDE  --  105 108 107   CO2  --  21 16* 16*   BUN  --  27* 19 20   CREATININE  --  1.20 1.21 1.18                           Assessment & Plan        Assessment & Plan  Stage 3b chronic kidney disease  Stable  No uremic symptoms  Renal dose of medication  Avoid nephrotoxins  Continue same medication regimen  Patient was advised to call us if symptoms worsen   Orders:    Basic Metabolic Panel; Future    CBC WITHOUT DIFFERENTIAL; Future    MICROALB/CREAT RATIO RAND. UR    Essential hypertension  Controlled  Continue same medication regimen  Continue low-sodium diet   Orders:    Basic Metabolic Panel; Future    CBC WITHOUT DIFFERENTIAL; Future     MICROALB/CREAT RATIO RAND. UR    Tobacco abuse         Tobacco abuse counseling  I spent 3 minutes discussing the need for smoking/tobacco cessation. We discussed measures for quitting including replacements such as nicotine gum/nicotine patch         Edema, unspecified type  Improved  Continue low-sodium diet  Continue furosemide                      same day admission

## 2025-02-05 NOTE — ASSESSMENT & PLAN NOTE
Controlled  Continue same medication regimen  Continue low-sodium diet   Orders:    Basic Metabolic Panel; Future    CBC WITHOUT DIFFERENTIAL; Future    MICROALB/CREAT RATIO RAND. UR

## 2025-02-05 NOTE — ASSESSMENT & PLAN NOTE
Stable  No uremic symptoms  Renal dose of medication  Avoid nephrotoxins  Continue same medication regimen  Patient was advised to call us if symptoms worsen   Orders:    Basic Metabolic Panel; Future    CBC WITHOUT DIFFERENTIAL; Future    MICROALB/CREAT RATIO RAND. UR

## 2025-02-21 ENCOUNTER — OUTPATIENT INFUSION SERVICES (OUTPATIENT)
Dept: ONCOLOGY | Facility: MEDICAL CENTER | Age: 85
End: 2025-02-21
Attending: STUDENT IN AN ORGANIZED HEALTH CARE EDUCATION/TRAINING PROGRAM
Payer: MEDICARE

## 2025-02-21 VITALS
DIASTOLIC BLOOD PRESSURE: 46 MMHG | WEIGHT: 178.35 LBS | TEMPERATURE: 96.8 F | OXYGEN SATURATION: 97 % | SYSTOLIC BLOOD PRESSURE: 113 MMHG | RESPIRATION RATE: 18 BRPM | BODY MASS INDEX: 35.02 KG/M2 | HEART RATE: 79 BPM | HEIGHT: 60 IN

## 2025-02-21 DIAGNOSIS — S32.519S: ICD-10-CM

## 2025-02-21 DIAGNOSIS — M85.89 OSTEOPENIA OF MULTIPLE SITES: ICD-10-CM

## 2025-02-21 LAB
CA-I BLD ISE-SCNC: 1.12 MMOL/L (ref 1.1–1.3)
CREAT BLD-MCNC: 1.6 MG/DL (ref 0.5–1.4)

## 2025-02-21 PROCEDURE — 36415 COLL VENOUS BLD VENIPUNCTURE: CPT

## 2025-02-21 PROCEDURE — 82330 ASSAY OF CALCIUM: CPT

## 2025-02-21 PROCEDURE — 82565 ASSAY OF CREATININE: CPT

## 2025-02-21 PROCEDURE — 36000 PLACE NEEDLE IN VEIN: CPT

## 2025-02-21 RX ORDER — 0.9 % SODIUM CHLORIDE 0.9 %
VIAL (ML) INJECTION PRN
OUTPATIENT
Start: 2026-02-22

## 2025-02-21 RX ORDER — METHYLPREDNISOLONE SODIUM SUCCINATE 125 MG/2ML
125 INJECTION, POWDER, LYOPHILIZED, FOR SOLUTION INTRAMUSCULAR; INTRAVENOUS PRN
OUTPATIENT
Start: 2026-02-22

## 2025-02-21 RX ORDER — ZOLEDRONIC ACID 0.05 MG/ML
5 INJECTION, SOLUTION INTRAVENOUS ONCE
OUTPATIENT
Start: 2026-02-22 | End: 2026-02-22

## 2025-02-21 RX ORDER — 0.9 % SODIUM CHLORIDE 0.9 %
3 VIAL (ML) INJECTION PRN
OUTPATIENT
Start: 2026-02-22

## 2025-02-21 RX ORDER — 0.9 % SODIUM CHLORIDE 0.9 %
10 VIAL (ML) INJECTION PRN
OUTPATIENT
Start: 2026-02-22

## 2025-02-21 RX ORDER — DIPHENHYDRAMINE HYDROCHLORIDE 50 MG/ML
50 INJECTION INTRAMUSCULAR; INTRAVENOUS PRN
OUTPATIENT
Start: 2026-02-22

## 2025-02-21 RX ORDER — ACETAMINOPHEN 325 MG/1
650 TABLET ORAL ONCE
OUTPATIENT
Start: 2026-02-22 | End: 2026-02-22

## 2025-02-21 RX ORDER — SODIUM CHLORIDE 9 MG/ML
INJECTION, SOLUTION INTRAVENOUS CONTINUOUS
OUTPATIENT
Start: 2026-02-22

## 2025-02-21 RX ORDER — EPINEPHRINE 1 MG/ML(1)
0.5 AMPUL (ML) INJECTION PRN
OUTPATIENT
Start: 2026-02-22

## 2025-02-21 ASSESSMENT — FIBROSIS 4 INDEX: FIB4 SCORE: 0.8

## 2025-02-22 NOTE — PROGRESS NOTES
Ella presents to [facility] ambulatory, accompanied by daughter Mihaela. VSS. Pt denies any invasive dental work in past 4 weeks or any planned in the future. Pt is not yet taking calcium and vitamin D supplements.    Reclast handout provided and reviewed. Instructed pt to purchase 1000 mg calcium and 400 iu Vitamin D and begin taking daily.    PIV established in R AC with + blood return and flushes well. Labs drawn off IV start and reviewed.    Crt 1.6 and creatinine clearance 32 - outside parameters for treatment with Recl[facility]. Explained to patient and daughter who verbalized understanding. Epic message sent to Dr. Moreno. Instructed pt to follow-up with Dr. Moreno to discuss the plan of care going forward. Provided card with infusion scheduling phone number and instructed pt to call scheduling for appointment once plan of care is decided. No further infusion appointments for patient at this time.     PIV flushed and removed with catheter intact, site unremarkable, dressed with gauze and coban. D/C pt to home, self-care, with daughter, in NAD.

## 2025-03-06 ENCOUNTER — PATIENT MESSAGE (OUTPATIENT)
Dept: CARDIOLOGY | Facility: MEDICAL CENTER | Age: 85
End: 2025-03-06
Payer: MEDICARE

## 2025-03-07 ENCOUNTER — PATIENT MESSAGE (OUTPATIENT)
Dept: CARDIOLOGY | Facility: MEDICAL CENTER | Age: 85
End: 2025-03-07
Payer: MEDICARE

## 2025-03-07 NOTE — PATIENT COMMUNICATION
To [facility], Please see [employer] message and advise. Pt's weight has increased over the week. At 4.6lbs from dry weight. Pt has been taking lasix 40 mg BID. Would you like pt to continue? [facility] is closed. ~thank you

## 2025-03-07 NOTE — PATIENT COMMUNICATION
CALI Galo. to [facility]    3/7/25 12:26 PM  IF patient is taking 40 mg of lasix twice a day with potassium and having a good response ok to continue and get labs done next week to monitor kidney and electrolytes. If taking increased dose with no response can schedule sooner follow up early next week to adjust medication. Continue to weigh herself daily. ER precautions for increased or worsening shortness of breath.

## 2025-03-11 ENCOUNTER — OFFICE VISIT (OUTPATIENT)
Dept: CARDIOLOGY | Facility: MEDICAL CENTER | Age: 85
End: 2025-03-11
Payer: MEDICARE

## 2025-03-11 ENCOUNTER — TELEPHONE (OUTPATIENT)
Dept: CARDIOLOGY | Facility: MEDICAL CENTER | Age: 85
End: 2025-03-11

## 2025-03-11 ENCOUNTER — PATIENT MESSAGE (OUTPATIENT)
Dept: CARDIOLOGY | Facility: MEDICAL CENTER | Age: 85
End: 2025-03-11

## 2025-03-11 ENCOUNTER — TELEPHONE (OUTPATIENT)
Dept: CARDIOLOGY | Facility: MEDICAL CENTER | Age: 85
End: 2025-03-11
Payer: MEDICARE

## 2025-03-11 VITALS
SYSTOLIC BLOOD PRESSURE: 112 MMHG | BODY MASS INDEX: 33.13 KG/M2 | HEIGHT: 62 IN | OXYGEN SATURATION: 96 % | WEIGHT: 180 LBS | DIASTOLIC BLOOD PRESSURE: 42 MMHG | RESPIRATION RATE: 16 BRPM | HEART RATE: 78 BPM

## 2025-03-11 DIAGNOSIS — R60.0 EDEMA LEG: ICD-10-CM

## 2025-03-11 DIAGNOSIS — I35.1 AORTIC VALVE INSUFFICIENCY, ETIOLOGY OF CARDIAC VALVE DISEASE UNSPECIFIED: ICD-10-CM

## 2025-03-11 DIAGNOSIS — R06.09 DOE (DYSPNEA ON EXERTION): ICD-10-CM

## 2025-03-11 DIAGNOSIS — I10 ESSENTIAL HYPERTENSION: Chronic | ICD-10-CM

## 2025-03-11 DIAGNOSIS — N39.41 URGE INCONTINENCE OF URINE: Chronic | ICD-10-CM

## 2025-03-11 DIAGNOSIS — D68.69 HYPERCOAGULABLE STATE DUE TO PAROXYSMAL ATRIAL FIBRILLATION (HCC): ICD-10-CM

## 2025-03-11 DIAGNOSIS — E78.00 PURE HYPERCHOLESTEROLEMIA: Chronic | ICD-10-CM

## 2025-03-11 DIAGNOSIS — I48.0 HYPERCOAGULABLE STATE DUE TO PAROXYSMAL ATRIAL FIBRILLATION (HCC): ICD-10-CM

## 2025-03-11 DIAGNOSIS — I35.0 NONRHEUMATIC AORTIC VALVE STENOSIS: Chronic | ICD-10-CM

## 2025-03-11 DIAGNOSIS — I48.0 PAROXYSMAL ATRIAL FIBRILLATION (HCC): Chronic | ICD-10-CM

## 2025-03-11 PROCEDURE — 99214 OFFICE O/P EST MOD 30 MIN: CPT

## 2025-03-11 PROCEDURE — 3078F DIAST BP <80 MM HG: CPT

## 2025-03-11 PROCEDURE — 3074F SYST BP LT 130 MM HG: CPT

## 2025-03-11 PROCEDURE — 99213 OFFICE O/P EST LOW 20 MIN: CPT

## 2025-03-11 RX ORDER — TORSEMIDE 20 MG/1
40 TABLET ORAL 2 TIMES DAILY
Qty: 360 TABLET | Refills: 3 | Status: SHIPPED | OUTPATIENT
Start: 2025-03-11 | End: 2025-03-28 | Stop reason: SDUPTHER

## 2025-03-11 RX ORDER — CEFPODOXIME PROXETIL 100 MG/1
100 TABLET, FILM COATED ORAL 2 TIMES DAILY
COMMUNITY
Start: 2025-02-04 | End: 2025-03-11

## 2025-03-11 RX ORDER — POTASSIUM CHLORIDE 750 MG/1
10 TABLET, EXTENDED RELEASE ORAL 2 TIMES DAILY
Qty: 180 TABLET | Refills: 3 | Status: SHIPPED | OUTPATIENT
Start: 2025-03-11 | End: 2025-03-28 | Stop reason: SDUPTHER

## 2025-03-11 RX ORDER — PANTOPRAZOLE SODIUM 20 MG/1
20 TABLET, DELAYED RELEASE ORAL 2 TIMES DAILY
COMMUNITY
Start: 2025-03-03

## 2025-03-11 ASSESSMENT — ENCOUNTER SYMPTOMS
NEAR-SYNCOPE: 0
SYNCOPE: 0
PALPITATIONS: 0
PND: 0
SHORTNESS OF BREATH: 0
LIGHT-HEADEDNESS: 1
HEADACHES: 0
DYSPNEA ON EXERTION: 1
ORTHOPNEA: 0
DIZZINESS: 1

## 2025-03-11 ASSESSMENT — FIBROSIS 4 INDEX: FIB4 SCORE: 0.8

## 2025-03-11 NOTE — PATIENT COMMUNICATION
To [facility], torsemide 40 mg rx not covered by insurance but 20 mg tablets are. Updated prescription to 20 mg tablets.

## 2025-03-11 NOTE — TELEPHONE ENCOUNTER
Spoke with pt's granddaughter, Mihaela, in regards to getting their labs done prior to their next appt with [facility]. Mihaela said they will get the labs done prior to their next appt.

## 2025-03-11 NOTE — PROGRESS NOTES
Chief Complaint   Patient presents with    Hypertension     Follow up           Subjective:   Margaret Garcia is a 84 y.o. female who presents today for follow-up.     Patient of Dr. Babb.  Current medical problems include COPD, SHABANA, paroxysmal AF, HTN, mild to moderate aortic stenosis, mild mitral regurgitation. Their last clinic visit was 4/1/2024 with Dr. Babb.    Patient has most recently been seen in the ED at [facility] due to increased swelling in her BLE and hip fracture. She was found to have an elevated NT proBNP of 2100. She was treated with diuretics and discharged home to follow up outpatient with PCP and cardiology.     1/8/2025 visit:  Patient reports since her last ED visit she has continued to have shortness of breath with exertion. She is only able to walk short distances without getting short of breath. She also continues to have bilateral lower leg edema. She says when she exerts herself and gets short of breath she does get a chest pain but it resolves quickly at rest. She denies palpitations, orthopnea, PND, or syncope. She does report some lightheadedness/dizziness associated with moving from sitting to standing. She is not currently weighing herself at home. She has an appointment with [facility] for the pelvic fx but is currently walking with a walker. She says she has tried her inhaler for her shortness of breath and it has not helped. She is still currently smoking.     1/22/2025 visit:  Patient reports since our last follow up she has seen an improvement in her lower extremity edema. She says that she thinks her shortness of breath has minimally improved. She also reports slight improvement in her lightheadedness/dizziness but is still having symptoms. She denies chest pain, palpitations, orthopnea, PND, or syncope. She has followed up with [facility] and is doing physical therapy exercises at home. She is doing them daily and sometimes twice a day. She is weighing herself at home but she  thinks her prior scale was wrong and just recently her daughter bought her a new scale. She says she has been 174-177 lbs. She still has edema on her bilateral lower extremities. She is taking her medications as prescribed. She has not been taking her blood pressure at home.     Today's visit:  Patient reports since last follow-up the 40 mg twice a day of Lasix had improved leg edema and was able to go back to once a day dosing.  However the last 2 weeks she is having increased bilateral lower extremity edema and has been taking 40 mg twice a day of Lasix without any response.  Her blood pressures continue to be low at home but denies any worsening lightheaded/dizziness and or syncope.  She denies chest pain, palpitations, orthopnea, PND.     Cardiovascular Risk Factors:  1. Smoking status: Current smoker  2. Type II Diabetes Mellitus: No   Lab Results   Component Value Date/Time    HBA1C 5.5 04/05/2024 08:42 AM    HBA1C 5.7 (H) 05/28/2011 10:34 PM     3. Hypertension: Yes  4. Dyslipidemia: Yes   Cholesterol,Tot   Date Value Ref Range Status   04/05/2024 113 100 - 199 mg/dL Final     LDL   Date Value Ref Range Status   04/05/2024 53 <100 mg/dL Final     HDL   Date Value Ref Range Status   04/05/2024 39 (A) >=40 mg/dL Final     Triglycerides   Date Value Ref Range Status   04/05/2024 105 0 - 149 mg/dL Final     Past Medical History:   Diagnosis Date    Arthritis     knees, and hips-osteo    ASTHMA     Body mass index 40.0-44.9, adult ([facility]) 4/27/2018    Chronic renal impairment, stage 3 (moderate) 5/13/2019    COPD (chronic obstructive pulmonary disease) with chronic bronchitis ([facility]) 4/20/2021    Dental disorder     upper    Dysuria 10/18/2016    Fall     Generalized edema 4/27/2018    Glaucoma     bilateral    Hay fever 4/19/2016    Heart burn     High cholesterol     Hx: UTI (urinary tract infection)     Hyperglycemia 11/24/2015    Hypertension     Hypothyroidism     Indigestion     Ischemic bowel disease ([facility])  9/9/2019    Migraines     pt has similar symptoms with migraines not for a long time though    Nausea 10/14/2019    Neuropathy (HCC)     Obesity (BMI 30-39.9) 7/20/2015    Pneumonia     Routine general medical examination at a health care facility 7/20/2015    7/20/15    Sleep apnea 12/2019    Had sleep study, didn't tolerate cpap    Snoring     Syncope 6/5/2020    Tobacco use 4/29/2019    URI (upper respiratory infection) 11/4/2015    Urinary bladder disorder     hx of uti's         Family History   Problem Relation Age of Onset    Stroke Mother     Hyperlipidemia Mother     Hypertension Mother     Arthritis Mother     Diabetes Father     Lung Disease Father         pneumonia    Arthritis Sister     Rheumatologic Disease Sister     Hypertension Sister     Hyperlipidemia Sister     Other Sister         Ansuha/Fibermyalgia    Hyperlipidemia Brother     Hypertension Brother     Arthritis Brother     Lung Disease Maternal Grandmother         pneumonia    Stroke Maternal Grandfather     Cancer Maternal Grandfather         skin     No Known Problems Paternal Grandmother     No Known Problems Paternal Grandfather     Hyperlipidemia Daughter     Diabetes Daughter     No Known Problems Son          Social History     Tobacco Use    Smoking status: Every Day     Current packs/day: 1.00     Average packs/day: 1 pack/day for 56.0 years (56.0 ttl pk-yrs)     Types: Cigarettes    Smokeless tobacco: Never    Tobacco comments:     started smoking at age 12, little mor than half a pack   Vaping Use    Vaping status: Never Used   Substance Use Topics    Alcohol use: No    Drug use: No         Allergies   Allergen Reactions    Food Hives and Nausea     Oranges, Hives         Current Outpatient Medications   Medication Sig    pantoprazole (PROTONIX) 20 MG tablet Take 20 mg by mouth 2 times a day.    torsemide 40 MG Tab Take 40 mg by mouth 2 times a day.    potassium chloride SA (K-DUR) 10 MEQ Tab CR Take 1 Tablet by mouth 2 times a  "day.    lisinopril (PRINIVIL) 2.5 MG Tab Take 1 Tablet by mouth every day.    fluticasone furoate-vilanterol (BREO ELLIPTA) 200-25 MCG/ACT AEROSOL POWDER, BREATH ACTIVATED Inhale 1 Puff every day.    ELIQUIS 5 MG Tab TAKE 1 TABLET BY MOUTH TWICE A DAY    nystatin (MYCOSTATIN) powder Apply 1 g topically 3 times a day.    nystatin (MYCOSTATIN) 535490 UNIT/GM Cream topical cream Apply 1 g topically 2 times a day.    fenofibrate (TRIGLIDE) 160 MG tablet TAKE 1 TABLET BY MOUTH EVERY DAY    atorvastatin (LIPITOR) 10 MG Tab TAKE 1 TABLET BY MOUTH EVERY DAY    Mirabegron ER (MYRBETRIQ) 50 MG TABLET SR 24 HR Take 50 mg by mouth every day.    levothyroxine (SYNTHROID) 100 MCG Tab Take 1 Tablet by mouth every morning on an empty stomach.    Naloxone (NARCAN) 4 MG/0.1ML Liquid USE AS DIRECTED    promethazine (PHENERGAN) 25 MG Tab Take 1 Tablet by mouth every 6 hours as needed for Nausea/Vomiting.    HYDROcodone-acetaminophen (NORCO) 7.5-325 MG per tablet Take 1 Tablet by mouth every 8 hours as needed for Moderate Pain.    hydrOXYzine HCl (ATARAX) 25 MG Tab Take 25 mg by mouth at bedtime.    latanoprost (XALATAN) 0.005 % Solution Administer 1 Drop into both eyes every evening.    acetaminophen (TYLENOL) 500 MG TABS Take 500 mg by mouth 3 times a day as needed for Mild Pain. Indications: Pain         Review of Systems   Constitutional: Negative for malaise/fatigue.   Cardiovascular:  Positive for dyspnea on exertion and leg swelling. Negative for chest pain, near-syncope, orthopnea, palpitations, paroxysmal nocturnal dyspnea and syncope.   Respiratory:  Negative for shortness of breath.    Neurological:  Positive for dizziness and light-headedness. Negative for headaches.           Objective:   /42 (BP Location: Left arm, Patient Position: Sitting)   Pulse 78   Resp 16   Ht 1.575 m (5' 2\")   Wt 81.6 kg (180 lb)   SpO2 96%  Body mass index is 32.92 kg/m².         Physical Exam  Constitutional:       General: She is not " "in acute distress.  HENT:      Head: Normocephalic and atraumatic.   Cardiovascular:      Rate and Rhythm: Normal rate and regular rhythm.      Pulses: Normal pulses.      Heart sounds: Murmur heard.   Pulmonary:      Effort: Pulmonary effort is normal. No respiratory distress.      Breath sounds: Normal breath sounds.   Musculoskeletal:      Right lower le+ Edema present.      Left lower le+ Edema present.   Neurological:      Mental Status: She is alert and oriented to person, place, and time.      Gait: Gait normal.   Psychiatric:         Behavior: Behavior normal.             Lab Results   Component Value Date/Time    SODIUM 136 01/15/2025 11:09 AM    POTASSIUM 3.7 01/15/2025 11:09 AM    CHLORIDE 107 01/15/2025 11:09 AM    CO2 16 (L) 01/15/2025 11:09 AM    GLUCOSE 104 (H) 01/15/2025 11:09 AM    BUN 20 01/15/2025 11:09 AM    CREATININE 1.18 01/15/2025 11:09 AM      Lab Results   Component Value Date/Time    WBC 10.2 01/15/2025 11:06 AM    RBC 3.73 (L) 01/15/2025 11:06 AM    HEMOGLOBIN 11.1 (L) 01/15/2025 11:06 AM    HEMATOCRIT 34.6 (L) 01/15/2025 11:06 AM    MCV 92.8 01/15/2025 11:06 AM    MCH 29.8 01/15/2025 11:06 AM    MCHC 32.1 (L) 01/15/2025 11:06 AM    MPV 8.8 (L) 01/15/2025 11:06 AM    NEUTSPOLYS 76.60 (H) 2024 12:53 PM    LYMPHOCYTES 14.40 (L) 2024 12:53 PM    MONOCYTES 6.20 2024 12:53 PM    EOSINOPHILS 1.10 2024 12:53 PM    BASOPHILS 1.00 2024 12:53 PM    HYPOCHROMIA 1+ 2012 07:30 PM      Lab Results   Component Value Date/Time    CHOLSTRLTOT 113 2024 08:42 AM    LDL 53 2024 08:42 AM    HDL 39 (A) 2024 08:42 AM    TRIGLYCERIDE 105 2024 08:42 AM       Lab Results   Component Value Date/Time    TROPONINT 34 (H) 2022 1349     No results found for: \"NTPROBNP\"  Assessment:   1. Aortic valve insufficiency, etiology of cardiac valve disease unspecified  - torsemide 40 MG Tab; Take 40 mg by mouth 2 times a day.  Dispense: 180 Tablet; " Refill: 3  - REFERRAL TO CARDIOLOGY    2. Nonrheumatic aortic valve stenosis  - REFERRAL TO CARDIOLOGY    3. Edema leg  - torsemide 40 MG Tab; Take 40 mg by mouth 2 times a day.  Dispense: 180 Tablet; Refill: 3  - potassium chloride SA (K-DUR) 10 MEQ Tab CR; Take 1 Tablet by mouth 2 times a day.  Dispense: 180 Tablet; Refill: 3    4. SCHERER (dyspnea on exertion)    5. Paroxysmal atrial fibrillation (HCC)    6. Hypercoagulable state due to paroxysmal atrial fibrillation (HCC)    7. Essential hypertension    8. Pure hypercholesterolemia    9. Urge incontinence of urine  - URINALYSIS (Routine/Complete); Future    Other orders  - pantoprazole (PROTONIX) 20 MG tablet; Take 20 mg by mouth 2 times a day.            Medical Decision Making:  Today's Assessment / Plan:   Moderate aortic valve stenosis  Moderate aortic insufficiency  Dyspnea on exertion  -Patient with recent increased leg swelling and shortness of breath.  Recent echocardiogram reviewed with patient which showed normal EF 60% and no diastolic dysfunction noted but did show increase in aortic stenosis from mild to moderate.     -Fluid overload on exam with 2+ edema bilateral lower extremities  -Patient saw improvement in swelling with increased dose of lasix but then went back to 20 mg daily. Swelling returned and with increased dose of lasix but not seeing any response  -Stop lasix, start torsemide 40 mg twice a day  -Repeat BNP and CMP in one week  -Continue to monitor weight and symptoms of shortness of breath and swelling  -Patient blood pressure low. Can consider spironolactone at next follow up if patient can tolerate.  -Referral to structural heart for evaluate of valvular heart disease with progression of symptoms    Atrial fibrillation   Hypercoagulable state due to atrial fibrillation  -Patient does not feel when she goes in and out of atrial fibrillation but denies known reoccurrence   -Continue on eliquis 5 mg twice a day for stroke  prevention  -Family asking about eliquis due to risk of bleeding with falls. Discussed option to repeat cardiac event monitor to see if having atrial fibrillation as well as risk of stroke not on blood thinner. Patient and family wanting to stay on anticoagulation at this time and not wanting to repeat cardiac event monitor. Discussed can always do monitor in the future.    Hypertension  - Good control but possible over controlled  -Continue lisinopril 2.5 mg daily, if blood pressure continues to be low can consider stopping medication  - goal < 130/80  -Continue to monitor blood pressure at home and keep a log    Hyperlipidemia  -Most recent LDL 53  -Continue atorvastatin 10 mg daily  -Goal of less than 70  -Check lipid panel in 12 months      Return in about 2 weeks (around 3/25/2025) for Dr. Babb.  Sooner if problems.    CALI Galo.

## 2025-03-11 NOTE — TELEPHONE ENCOUNTER
Referral from: Sonja GARCIA for mild-moderate AS, moderate AI    Patient called on 3/11/2025.    Left message requesting call back to discuss.      First attempt

## 2025-03-12 RX ORDER — FUROSEMIDE 20 MG/1
TABLET ORAL
Refills: 0 | OUTPATIENT
Start: 2025-03-12

## 2025-03-12 NOTE — TELEPHONE ENCOUNTER
Received message from patient's granddaughter Mihaela requesting call back.    Called and spoke to Mihaela. Discussed reviewing echo results and referral with Dr. Babb at upcoming appointment on 3/28. Patient scheduled to see Page GARCIA on 4/11/2025.     All questions answered.

## 2025-03-14 ENCOUNTER — TELEPHONE (OUTPATIENT)
Dept: CARDIOLOGY | Facility: MEDICAL CENTER | Age: 85
End: 2025-03-14
Payer: MEDICARE

## 2025-03-14 NOTE — TELEPHONE ENCOUNTER
Phone Number Called: [phone]    Call outcome: Did not leave a detailed message. Requested patient to call back.

## 2025-03-14 NOTE — TELEPHONE ENCOUNTER
----- Message from Nurse Practitioner MERCEDES Calzada sent at 3/11/2025  3:19 PM PDT -----  Hi,    Please call patient and see how her swelling is doing with changing diuretic and how she is tolerating the medication?    Thank you!

## 2025-03-17 NOTE — TELEPHONE ENCOUNTER
Phone Number Called: [phone]    Call outcome: Spoke to patient regarding message below.    Message: Called to discuss follow up after visit with HL.   S/w patients daughter granddaughter Mihaela. Pts granddaughter stated the swelling is the same as before. Reports pt is urinating fine.   No recent vitals.     Discussed I will reach out to HL for further recs, pts granddaughter verbalized understanding.

## 2025-03-20 ENCOUNTER — HOSPITAL ENCOUNTER (OUTPATIENT)
Dept: LAB | Facility: MEDICAL CENTER | Age: 85
End: 2025-03-20
Payer: MEDICARE

## 2025-03-20 DIAGNOSIS — R60.0 EDEMA LEG: ICD-10-CM

## 2025-03-20 DIAGNOSIS — I35.1 AORTIC VALVE INSUFFICIENCY, ETIOLOGY OF CARDIAC VALVE DISEASE UNSPECIFIED: ICD-10-CM

## 2025-03-20 LAB
ALBUMIN SERPL BCP-MCNC: 3.7 G/DL (ref 3.2–4.9)
ALBUMIN/GLOB SERPL: 1.1 G/DL
ALP SERPL-CCNC: 58 U/L (ref 30–99)
ALT SERPL-CCNC: 6 U/L (ref 2–50)
ANION GAP SERPL CALC-SCNC: 13 MMOL/L (ref 7–16)
AST SERPL-CCNC: 20 U/L (ref 12–45)
BILIRUB SERPL-MCNC: 0.4 MG/DL (ref 0.1–1.5)
BUN SERPL-MCNC: 26 MG/DL (ref 8–22)
CALCIUM ALBUM COR SERPL-MCNC: 9.8 MG/DL (ref 8.5–10.5)
CALCIUM SERPL-MCNC: 9.6 MG/DL (ref 8.5–10.5)
CHLORIDE SERPL-SCNC: 103 MMOL/L (ref 96–112)
CO2 SERPL-SCNC: 23 MMOL/L (ref 20–33)
CREAT SERPL-MCNC: 1.47 MG/DL (ref 0.5–1.4)
FASTING STATUS PATIENT QL REPORTED: NORMAL
GFR SERPLBLD CREATININE-BSD FMLA CKD-EPI: 34 ML/MIN/1.73 M 2
GLOBULIN SER CALC-MCNC: 3.5 G/DL (ref 1.9–3.5)
GLUCOSE SERPL-MCNC: 102 MG/DL (ref 65–99)
NT-PROBNP SERPL IA-MCNC: 1608 PG/ML (ref 0–125)
POTASSIUM SERPL-SCNC: 3.9 MMOL/L (ref 3.6–5.5)
PROT SERPL-MCNC: 7.2 G/DL (ref 6–8.2)
SODIUM SERPL-SCNC: 139 MMOL/L (ref 135–145)

## 2025-03-20 PROCEDURE — 80053 COMPREHEN METABOLIC PANEL: CPT

## 2025-03-20 PROCEDURE — 83880 ASSAY OF NATRIURETIC PEPTIDE: CPT | Mod: GA

## 2025-03-20 PROCEDURE — 36415 COLL VENOUS BLD VENIPUNCTURE: CPT | Mod: GA

## 2025-03-21 ENCOUNTER — RESULTS FOLLOW-UP (OUTPATIENT)
Dept: CARDIOLOGY | Facility: MEDICAL CENTER | Age: 85
End: 2025-03-21
Payer: MEDICARE

## 2025-03-23 ENCOUNTER — PATIENT MESSAGE (OUTPATIENT)
Dept: MEDICAL GROUP | Facility: PHYSICIAN GROUP | Age: 85
End: 2025-03-23
Payer: MEDICARE

## 2025-03-23 DIAGNOSIS — E03.9 HYPOTHYROIDISM, UNSPECIFIED TYPE: ICD-10-CM

## 2025-03-24 NOTE — PATIENT COMMUNICATION
Received request via: Patient    Was the patient seen in the last year in this department? Yes    Does the patient have an active prescription (recently filled or refills available) for medication(s) requested? No    [facility]    Does the patient have snf Plus and need 100-day supply? (This applies to ALL medications) Patient does not have SCP

## 2025-03-26 RX ORDER — LEVOTHYROXINE SODIUM 100 UG/1
100 TABLET ORAL
Qty: 90 TABLET | Refills: 3 | Status: SHIPPED | OUTPATIENT
Start: 2025-03-26

## 2025-03-28 ENCOUNTER — TELEPHONE (OUTPATIENT)
Dept: CARDIOLOGY | Facility: MEDICAL CENTER | Age: 85
End: 2025-03-28

## 2025-03-28 ENCOUNTER — TELEPHONE (OUTPATIENT)
Dept: CARDIOLOGY | Facility: MEDICAL CENTER | Age: 85
End: 2025-03-28
Payer: MEDICARE

## 2025-03-28 ENCOUNTER — OFFICE VISIT (OUTPATIENT)
Dept: CARDIOLOGY | Facility: MEDICAL CENTER | Age: 85
End: 2025-03-28
Attending: INTERNAL MEDICINE
Payer: MEDICARE

## 2025-03-28 VITALS
WEIGHT: 179 LBS | BODY MASS INDEX: 32.94 KG/M2 | HEART RATE: 90 BPM | DIASTOLIC BLOOD PRESSURE: 50 MMHG | SYSTOLIC BLOOD PRESSURE: 108 MMHG | OXYGEN SATURATION: 96 % | HEIGHT: 62 IN

## 2025-03-28 DIAGNOSIS — R60.0 EDEMA LEG: ICD-10-CM

## 2025-03-28 DIAGNOSIS — N18.32 STAGE 3B CHRONIC KIDNEY DISEASE: Chronic | ICD-10-CM

## 2025-03-28 DIAGNOSIS — I35.1 NONRHEUMATIC AORTIC VALVE INSUFFICIENCY: Chronic | ICD-10-CM

## 2025-03-28 DIAGNOSIS — I48.0 PAROXYSMAL ATRIAL FIBRILLATION (HCC): Chronic | ICD-10-CM

## 2025-03-28 DIAGNOSIS — D68.69 SECONDARY HYPERCOAGULABLE STATE (HCC): Chronic | ICD-10-CM

## 2025-03-28 DIAGNOSIS — I35.1 AORTIC VALVE INSUFFICIENCY, ETIOLOGY OF CARDIAC VALVE DISEASE UNSPECIFIED: ICD-10-CM

## 2025-03-28 DIAGNOSIS — I10 ESSENTIAL HYPERTENSION: Chronic | ICD-10-CM

## 2025-03-28 DIAGNOSIS — E78.2 MIXED HYPERLIPIDEMIA: Chronic | ICD-10-CM

## 2025-03-28 PROCEDURE — RXMED WILLOW AMBULATORY MEDICATION CHARGE: Performed by: INTERNAL MEDICINE

## 2025-03-28 PROCEDURE — 99213 OFFICE O/P EST LOW 20 MIN: CPT | Performed by: INTERNAL MEDICINE

## 2025-03-28 RX ORDER — POTASSIUM CHLORIDE 750 MG/1
10 TABLET, EXTENDED RELEASE ORAL DAILY
Qty: 90 TABLET | Refills: 3 | Status: SHIPPED | OUTPATIENT
Start: 2025-03-28

## 2025-03-28 ASSESSMENT — ENCOUNTER SYMPTOMS
CLAUDICATION: 0
FLANK PAIN: 0
BLURRED VISION: 0
ORTHOPNEA: 0
DECREASED APPETITE: 0
SYNCOPE: 0
HEARTBURN: 0
COUGH: 0
SHORTNESS OF BREATH: 0
ABDOMINAL PAIN: 0
DIZZINESS: 0
CONSTIPATION: 0
VOMITING: 0
DEPRESSION: 0
NAUSEA: 0
WEIGHT LOSS: 0
DYSPNEA ON EXERTION: 0
BACK PAIN: 0
DIARRHEA: 0
ALTERED MENTAL STATUS: 0
WEIGHT GAIN: 0
IRREGULAR HEARTBEAT: 0
PALPITATIONS: 0
FEVER: 0
PND: 0
NEAR-SYNCOPE: 0

## 2025-03-28 ASSESSMENT — FIBROSIS 4 INDEX: FIB4 SCORE: 1.5

## 2025-03-28 NOTE — TELEPHONE ENCOUNTER
Received [employer] [facility] request via [facility]  for Finerenone 10 MG Tab . (Quantity:90, Day Supply:90)     Insurance: [employer]  Member ID:  [id number]  BIN: [id number]  PCN: [id number]  Group: [id number]     Ran Test claim via [employer] [facility] medication Pays for a 12.15/90ds copay. Will outreach to patient to offer specialty pharmacy services and or release to preferred pharmacy    Called pt and spoke to Jemma(grand daughter) to offer our services. She would like to onboard due to filling at a specialty pharmacy and the convenience of our delivery services. Will schedule delivery via  on 3/31.

## 2025-03-28 NOTE — PROGRESS NOTES
Cardiology Note    Chief Complaint   Patient presents with    Hyperlipidemia    Hypertension    Atrial Fibrillation     Fv dx: Paroxysmal atrial fibrillation (HCC)       History of Present Illness: Margaret Garcia is a 84 y.o. female PMH COPD, SHABANA, paroxysmal AF, HTN, aortic stenosis who presents for follow up.    Continues to have leg swelling. Denies orthopnea however sleeps incline due to back pain. No pnd. Required change in diuretic to torsemide. Pending ultrasound and follow up with vascular surgery.       Review of Systems   Constitutional: Negative for decreased appetite, fever, malaise/fatigue, weight gain and weight loss.   HENT:  Negative for congestion and nosebleeds.    Eyes:  Negative for blurred vision.   Cardiovascular:  Negative for chest pain, claudication, dyspnea on exertion, irregular heartbeat, leg swelling, near-syncope, orthopnea, palpitations, paroxysmal nocturnal dyspnea and syncope.   Respiratory:  Negative for cough and shortness of breath.    Endocrine: Negative for cold intolerance and heat intolerance.   Skin:  Negative for rash.   Musculoskeletal:  Negative for back pain.   Gastrointestinal:  Negative for abdominal pain, constipation, diarrhea, heartburn, melena, nausea and vomiting.   Genitourinary:  Negative for dysuria, flank pain and hematuria.   Neurological:  Negative for dizziness.   Psychiatric/Behavioral:  Negative for altered mental status and depression.          Past Medical History:   Diagnosis Date    Arthritis     knees, and hips-osteo    ASTHMA     Body mass index 40.0-44.9, adult (HCC) 4/27/2018    Chronic renal impairment, stage 3 (moderate) 5/13/2019    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) 4/20/2021    Dental disorder     upper    Dysuria 10/18/2016    Fall     Generalized edema 4/27/2018    Glaucoma     bilateral    Hay fever 4/19/2016    Heart burn     High cholesterol     Hx: UTI (urinary tract infection)     Hyperglycemia  11/24/2015    Hypertension     Hypothyroidism     Indigestion     Ischemic bowel disease (HCC) 9/9/2019    Migraines     pt has similar symptoms with migraines not for a long time though    Nausea 10/14/2019    Neuropathy ([facility])     Obesity (BMI 30-39.9) 7/20/2015    Pneumonia     Routine general medical examination at a health care facility 7/20/2015    7/20/15    Sleep apnea 12/2019    Had sleep study, didn't tolerate cpap    Snoring     Syncope 6/5/2020    Tobacco use 4/29/2019    URI (upper respiratory infection) 11/4/2015    Urinary bladder disorder     hx of uti's         Past Surgical History:   Procedure Laterality Date    GASTROSCOPY  12/13/2019    Procedure: GASTROSCOPY;  Surgeon: Ang Angel M.D.;  Location: SURGERY [facility];  Service: Gastroenterology    COLONOSCOPY  12/13/2019    Procedure: COLONOSCOPY;  Surgeon: Ang Angel M.D.;  Location: SURGERY [facility];  Service: Gastroenterology    NIRU BY LAPAROSCOPY  9/2/2011    Performed by KAYLEIGH PORRAS at SURGERY SAME DAY [facility] ORS    LUMBAR DECOMPRESSION  6/29/2009    Performed by ANG YAN at SURGERY [facility] ORS    LUMBAR LAMINECTOMY DISKECTOMY  6/29/2009    Performed by ANG YAN at SURGERY [facility] ORS    ABDOMINAL HYSTERECTOMY TOTAL      GYN SURGERY      hysterectomy    HEMORRHOIDECTOMY           Current Outpatient Medications   Medication Sig Dispense Refill    Finerenone 10 MG Tab Take 1 Tablet by mouth every day. 90 Tablet 3    torsemide 40 MG Tab Take 40 mg by mouth every day. 90 Tablet 3    levothyroxine (SYNTHROID) 100 MCG Tab Take 1 Tablet by mouth every morning on an empty stomach. 90 Tablet 3    pantoprazole (PROTONIX) 20 MG tablet Take 20 mg by mouth 2 times a day.      potassium chloride SA (K-DUR) 10 MEQ Tab CR Take 1 Tablet by mouth 2 times a day. 180 Tablet 3    lisinopril (PRINIVIL) 2.5 MG Tab Take 1 Tablet by mouth every day. 90 Tablet 3    fluticasone furoate-vilanterol (BREO ELLIPTA) 200-25  MCG/ACT AEROSOL POWDER, BREATH ACTIVATED Inhale 1 Puff every day. 90 Each 3    ELIQUIS 5 MG Tab TAKE 1 TABLET BY MOUTH TWICE A  Tablet 1    nystatin (MYCOSTATIN) powder Apply 1 g topically 3 times a day. 30 g 0    nystatin (MYCOSTATIN) 562806 UNIT/GM Cream topical cream Apply 1 g topically 2 times a day. 30 g 0    fenofibrate (TRIGLIDE) 160 MG tablet TAKE 1 TABLET BY MOUTH EVERY DAY 90 Tablet 2    atorvastatin (LIPITOR) 10 MG Tab TAKE 1 TABLET BY MOUTH EVERY DAY 90 Tablet 3    Mirabegron ER (MYRBETRIQ) 50 MG TABLET SR 24 HR Take 50 mg by mouth every day. 90 Tablet 3    Naloxone (NARCAN) 4 MG/0.1ML Liquid USE AS DIRECTED      promethazine (PHENERGAN) 25 MG Tab Take 1 Tablet by mouth every 6 hours as needed for Nausea/Vomiting. 90 Tablet 3    HYDROcodone-acetaminophen (NORCO) 7.5-325 MG per tablet Take 1 Tablet by mouth every 8 hours as needed for Moderate Pain.      hydrOXYzine HCl (ATARAX) 25 MG Tab Take 25 mg by mouth at bedtime.      latanoprost (XALATAN) 0.005 % Solution Administer 1 Drop into both eyes every evening.      acetaminophen (TYLENOL) 500 MG TABS Take 500 mg by mouth 3 times a day as needed for Mild Pain. Indications: Pain       No current facility-administered medications for this visit.         Allergies   Allergen Reactions    Food Hives and Nausea     Oranges, Hives         Family History   Problem Relation Age of Onset    Stroke Mother     Hyperlipidemia Mother     Hypertension Mother     Arthritis Mother     Diabetes Father     Lung Disease Father         pneumonia    Arthritis Sister     Rheumatologic Disease Sister     Hypertension Sister     Hyperlipidemia Sister     Other Sister         Anusha/Fibermyalgia    Hyperlipidemia Brother     Hypertension Brother     Arthritis Brother     Lung Disease Maternal Grandmother         pneumonia    Stroke Maternal Grandfather     Cancer Maternal Grandfather         skin     No Known Problems Paternal Grandmother     No Known Problems Paternal  Grandfather     Hyperlipidemia Daughter     Diabetes Daughter     No Known Problems Son          Social History     Socioeconomic History    Marital status: Single     Spouse name: Not on file    Number of children: 3    Years of education: Not on file    Highest education level: 12th grade   Occupational History     Employer: Retired   Tobacco Use    Smoking status: Every Day     Current packs/day: 1.00     Average packs/day: 1 pack/day for 56.0 years (56.0 ttl pk-yrs)     Types: Cigarettes    Smokeless tobacco: Never    Tobacco comments:     started smoking at age 12, little mor than half a pack   Vaping Use    Vaping status: Never Used   Substance and Sexual Activity    Alcohol use: No    Drug use: No    Sexual activity: Never     Partners: Male     Birth control/protection: Post-Menopausal   Other Topics Concern    Not on file   Social History Narrative    Lives alone.     Social Drivers of Health     Financial Resource Strain: Low Risk  (9/30/2024)    Overall Financial Resource Strain (CARDIA)     Difficulty of Paying Living Expenses: Not very hard   Food Insecurity: No Food Insecurity (9/30/2024)    Hunger Vital Sign     Worried About Running Out of Food in the Last Year: Never true     Ran Out of Food in the Last Year: Never true   Transportation Needs: No Transportation Needs (9/30/2024)    PRAPARE - Transportation     Lack of Transportation (Medical): No     Lack of Transportation (Non-Medical): No   Physical Activity: Inactive (9/30/2024)    Exercise Vital Sign     Days of Exercise per Week: 0 days     Minutes of Exercise per Session: 0 min   Stress: No Stress Concern Present (9/30/2024)    [employer] [employer] - Occupational Stress Questionnaire     Feeling of Stress : Only a little   Social Connections: Socially Isolated (9/30/2024)    Social Connection and Isolation Panel [NHANES]     Frequency of Communication with Friends and Family: More than three times a week     Frequency of  "Social Gatherings with Friends and Family: More than three times a week     Attends Zoroastrianism Services: Never     Active Member of Clubs or Organizations: No     Attends Club or Organization Meetings: Never     Marital Status:    Intimate Partner Violence: Not on file   Housing Stability: Low Risk  (9/30/2024)    Housing Stability Vital Sign     Unable to Pay for Housing in the Last Year: No     Number of Times Moved in the Last Year: 0     Homeless in the Last Year: No         Physical Exam:  Ambulatory Vitals  /50 (BP Location: Left arm, Patient Position: Sitting, BP Cuff Size: Adult)   Pulse 90   Ht 1.575 m (5' 2\")   Wt 81.2 kg (179 lb)   SpO2 96%    BP Readings from Last 4 Encounters:   03/28/25 108/50   03/11/25 112/42   02/21/25 113/46   02/05/25 112/54     Weight/BMI:   Vitals:    03/28/25 1046   BP: 108/50   Weight: 81.2 kg (179 lb)   Height: 1.575 m (5' 2\")    Body mass index is 32.74 kg/m².  Wt Readings from Last 4 Encounters:   03/28/25 81.2 kg (179 lb)   03/11/25 81.6 kg (180 lb)   02/21/25 80.9 kg (178 lb 5.6 oz)   02/05/25 79.7 kg (175 lb 9.6 oz)       Physical Exam  Constitutional:       General: She is not in acute distress.  HENT:      Head: Normocephalic and atraumatic.   Eyes:      Conjunctiva/sclera: Conjunctivae normal.      Pupils: Pupils are equal, round, and reactive to light.   Neck:      Vascular: No JVD.   Cardiovascular:      Rate and Rhythm: Normal rate and regular rhythm.      Heart sounds: Normal heart sounds. No murmur heard.     No friction rub. No gallop.   Pulmonary:      Effort: Pulmonary effort is normal. No respiratory distress.      Breath sounds: Normal breath sounds. No wheezing or rales.   Chest:      Chest wall: No tenderness.   Abdominal:      General: Bowel sounds are normal. There is no distension.      Palpations: Abdomen is soft.   Musculoskeletal:      Cervical back: Normal range of motion and neck supple.   Skin:     General: Skin is warm and dry. " "  Neurological:      Mental Status: She is alert and oriented to person, place, and time.   Psychiatric:         Mood and Affect: Affect normal.         Judgment: Judgment normal.         Lab Data Review:  Lab Results   Component Value Date/Time    CHOLSTRLTOT 113 04/05/2024 08:42 AM    LDL 53 04/05/2024 08:42 AM    HDL 39 (A) 04/05/2024 08:42 AM    TRIGLYCERIDE 105 04/05/2024 08:42 AM       Lab Results   Component Value Date/Time    SODIUM 139 03/20/2025 10:30 AM    POTASSIUM 3.9 03/20/2025 10:30 AM    CHLORIDE 103 03/20/2025 10:30 AM    CO2 23 03/20/2025 10:30 AM    GLUCOSE 102 (H) 03/20/2025 10:30 AM    BUN 26 (H) 03/20/2025 10:30 AM    CREATININE 1.47 (H) 03/20/2025 10:30 AM     CrCl cannot be calculated (Patient's most recent lab result is older than the maximum 7 days allowed.).  Lab Results   Component Value Date/Time    ALKPHOSPHAT 58 03/20/2025 10:30 AM    ASTSGOT 20 03/20/2025 10:30 AM    ALTSGPT 6 03/20/2025 10:30 AM    TBILIRUBIN 0.4 03/20/2025 10:30 AM      Lab Results   Component Value Date/Time    WBC 10.2 01/15/2025 11:06 AM     Lab Results   Component Value Date/Time    HBA1C 5.5 04/05/2024 08:42 AM     No components found for: \"TROP\"      Cardiac Imaging and Procedures Review:      TTE 1/2023  CONCLUSIONS  Compared to the images of the prior study 10-6-2020, there is now   moderate aortic insufficiency.  Normal left ventricular systolic function.  The left ventricular ejection fraction is visually estimated to be 65%.  Normal diastolic function.  The right ventricle is normal in size and systolic function.  Mild aortic valve stenosis.  Moderate aortic insufficiency.    TTE 12/2023  CONCLUSIONS  Normal left ventricular size, thickness, and systolic function. Normal   diastolic function.  Normal right ventricular size and systolic function.  Mild mitral regurgitation.  Mild aortic valve stenosis.  Moderate aortic insufficiency.  No pericardial effusion.  Compared to the prior study on 01/20/2023, " similar findings.    TTE 12/2024  CONCLUSIONS  Normal left ventricular systolic function. The left ventricular   ejection fraction is visually estimated to be 60-65%.  The right ventricle is normal in size and systolic function.  Mildly dilated left atrium.  Mild mitral regurgitation.  Mild to moderate aortic valve stenosis. Transvalvular gradients may be   underestimated due to doppler angle. Consider HENRIETTA if clinically   warranted.  Moderate aortic insufficiency.  No pericardial effusion.  No prior study is available for comparison.       Medical Decision Making:  Problem List Items Addressed This Visit       Hyperlipidemia (Chronic)    Relevant Medications    torsemide 40 MG Tab    Essential hypertension (Chronic)    Relevant Medications    torsemide 40 MG Tab    Stage 3b chronic kidney disease (Chronic)    Relevant Medications    Finerenone 10 MG Tab    Paroxysmal atrial fibrillation (HCC) (Chronic)    Relevant Medications    torsemide 40 MG Tab    Secondary hypercoagulable state (HCC) (Chronic)    Aortic valve regurgitation (Chronic)    Relevant Medications    torsemide 40 MG Tab    Other Relevant Orders    EC-HENRIETTA W/O CONT    Basic Metabolic Panel    proBrain Natriuretic Peptide, NT    Edema leg    Relevant Medications    torsemide 40 MG Tab         ?HFpEF / CKD / Venous insufficiency - add finerenone. Reduce torsemide to daily. Continue follow up with vascular surgery.     AR / AS - check HENRIETTA for better assessment.    Paroxysmal AF - chadsvasc 4; doac for cva prevention. Rate control if evidence of tachycardia.     HTN - goal <130/80. Controlled. Not clear that will be able to tolerate lisinopril due hypotension. Consider d/c.     HLD - continue statin.     It was my pleasure to meet with Ms. Garcia.    Today's visit is associated with medical care services that serve as the continuing focal point for all necessary health care services related to the patient's single, serious condition or multiple chronic  complex conditions.  This includes providing services to the patient on an ongoing basis that results in care that is collaborative and personalized to the patient.  The services result in a comprehensive, longitudinal, and continuous relationship with the patient and involve delivery of team-based care that is accessible, coordinated with other practitioners and providers, and integrated with the broader health care landscape.

## 2025-03-29 NOTE — TELEPHONE ENCOUNTER
----- Message from Physician Jethro Serrano M.D. sent at 3/28/2025  6:02 AM PDT -----  Creatinine is up slightly. May be due to higher dose of diuretics. Can decrease Torsemide from 40mg BID to once daily due to concern for over diuresis. Diuretics were increased when she saw HL in office.

## 2025-03-31 ENCOUNTER — PHARMACY VISIT (OUTPATIENT)
Dept: PHARMACY | Facility: MEDICAL CENTER | Age: 85
End: 2025-03-31
Payer: COMMERCIAL

## 2025-03-31 NOTE — TELEPHONE ENCOUNTER
Phone Number Called: [phone]    Call outcome: Did not leave a detailed message. Requested patient to call back.    Message: Called to discuss MK recommendations for patient to decrease Torsemide to  40mg daily  Awaiting phone call back

## 2025-04-01 DIAGNOSIS — R60.0 EDEMA LEG: ICD-10-CM

## 2025-04-01 DIAGNOSIS — I35.1 AORTIC VALVE INSUFFICIENCY, ETIOLOGY OF CARDIAC VALVE DISEASE UNSPECIFIED: ICD-10-CM

## 2025-04-01 RX ORDER — FUROSEMIDE 20 MG/1
TABLET ORAL
Refills: 0 | OUTPATIENT
Start: 2025-04-01

## 2025-04-01 RX ORDER — TORSEMIDE 20 MG/1
40 TABLET ORAL DAILY
Qty: 180 TABLET | Refills: 0 | Status: SHIPPED | OUTPATIENT
Start: 2025-04-01

## 2025-04-01 NOTE — TELEPHONE ENCOUNTER
Tracy Kaur P.A.-C. to Me Regarding result: furosemide (LASIX) 20 MG Tab [Pharmacy Med Name: FUROSEMIDE 20 MG TABLET]       4/1/25  4:29 PM  Yes, ok to change to 2 20mg pills of torsemide

## 2025-04-01 NOTE — TELEPHONE ENCOUNTER
JA- Please advise as ADA as VR, HL and care team OOO. See RX coordinator message below. Okay to switch Torsemide RX to 20mg tablets, 2 tablets daily to equal the 40mg? Thank you!

## 2025-04-01 NOTE — TELEPHONE ENCOUNTER
Hello,     Torsemide 40mg (generic) does not exist, so [facility] received it as Soaanza 40mg (Brand), which is not included on the insurance formulary. However, Torsemide 20mg is covered

## 2025-04-07 ENCOUNTER — APPOINTMENT (OUTPATIENT)
Dept: ADMISSIONS | Facility: MEDICAL CENTER | Age: 85
End: 2025-04-07
Attending: INTERNAL MEDICINE
Payer: MEDICARE

## 2025-04-09 ENCOUNTER — HOSPITAL ENCOUNTER (OUTPATIENT)
Dept: LAB | Facility: MEDICAL CENTER | Age: 85
End: 2025-04-09
Attending: INTERNAL MEDICINE
Payer: MEDICARE

## 2025-04-09 ENCOUNTER — OFFICE VISIT (OUTPATIENT)
Dept: MEDICAL GROUP | Facility: PHYSICIAN GROUP | Age: 85
End: 2025-04-09
Payer: MEDICARE

## 2025-04-09 ENCOUNTER — PATIENT MESSAGE (OUTPATIENT)
Dept: CARDIOLOGY | Facility: MEDICAL CENTER | Age: 85
End: 2025-04-09

## 2025-04-09 VITALS
TEMPERATURE: 97.7 F | SYSTOLIC BLOOD PRESSURE: 110 MMHG | WEIGHT: 177 LBS | HEART RATE: 82 BPM | OXYGEN SATURATION: 96 % | DIASTOLIC BLOOD PRESSURE: 64 MMHG | HEIGHT: 62 IN | BODY MASS INDEX: 32.57 KG/M2

## 2025-04-09 DIAGNOSIS — I35.1 NONRHEUMATIC AORTIC VALVE INSUFFICIENCY: Chronic | ICD-10-CM

## 2025-04-09 DIAGNOSIS — I35.0 NONRHEUMATIC AORTIC VALVE STENOSIS: Chronic | ICD-10-CM

## 2025-04-09 DIAGNOSIS — R06.09 DOE (DYSPNEA ON EXERTION): ICD-10-CM

## 2025-04-09 DIAGNOSIS — I10 ESSENTIAL HYPERTENSION: Chronic | ICD-10-CM

## 2025-04-09 DIAGNOSIS — N18.32 STAGE 3B CHRONIC KIDNEY DISEASE: Chronic | ICD-10-CM

## 2025-04-09 DIAGNOSIS — F03.A0 MILD DEMENTIA WITHOUT BEHAVIORAL DISTURBANCE, PSYCHOTIC DISTURBANCE, MOOD DISTURBANCE, OR ANXIETY, UNSPECIFIED DEMENTIA TYPE (HCC): Chronic | ICD-10-CM

## 2025-04-09 DIAGNOSIS — I10 ESSENTIAL HYPERTENSION: ICD-10-CM

## 2025-04-09 DIAGNOSIS — R60.0 LEG EDEMA: ICD-10-CM

## 2025-04-09 LAB
ANION GAP SERPL CALC-SCNC: 13 MMOL/L (ref 7–16)
BUN SERPL-MCNC: 29 MG/DL (ref 8–22)
CALCIUM SERPL-MCNC: 9.3 MG/DL (ref 8.5–10.5)
CHLORIDE SERPL-SCNC: 104 MMOL/L (ref 96–112)
CO2 SERPL-SCNC: 22 MMOL/L (ref 20–33)
CREAT SERPL-MCNC: 1.57 MG/DL (ref 0.5–1.4)
GFR SERPLBLD CREATININE-BSD FMLA CKD-EPI: 32 ML/MIN/1.73 M 2
GLUCOSE SERPL-MCNC: 109 MG/DL (ref 65–99)
POTASSIUM SERPL-SCNC: 3.5 MMOL/L (ref 3.6–5.5)
SODIUM SERPL-SCNC: 139 MMOL/L (ref 135–145)

## 2025-04-09 PROCEDURE — 80048 BASIC METABOLIC PNL TOTAL CA: CPT

## 2025-04-09 PROCEDURE — 3074F SYST BP LT 130 MM HG: CPT | Performed by: STUDENT IN AN ORGANIZED HEALTH CARE EDUCATION/TRAINING PROGRAM

## 2025-04-09 PROCEDURE — 99214 OFFICE O/P EST MOD 30 MIN: CPT | Performed by: STUDENT IN AN ORGANIZED HEALTH CARE EDUCATION/TRAINING PROGRAM

## 2025-04-09 PROCEDURE — 36415 COLL VENOUS BLD VENIPUNCTURE: CPT

## 2025-04-09 PROCEDURE — 3078F DIAST BP <80 MM HG: CPT | Performed by: STUDENT IN AN ORGANIZED HEALTH CARE EDUCATION/TRAINING PROGRAM

## 2025-04-09 ASSESSMENT — FIBROSIS 4 INDEX: FIB4 SCORE: 1.5

## 2025-04-09 NOTE — TELEPHONE ENCOUNTER
VR- Please advise on below, the BMP and BNP were discontinued by the lab as the diagnosis codes were stated to be wrong and the patient was going to have to pay out of pocket. Okay to re-order? Thank you.

## 2025-04-09 NOTE — PROGRESS NOTES
"Subjective:     Chief Complaint   Patient presents with    Follow-Up     Chronic Conditions       History of Present Illness  The patient is an 84-year-old female who presents for a follow-up visit. She is accompanied by her granddaughter.    She has been diagnosed with mild Alzheimer's type dementia by her neurologist, who has not deemed it necessary to initiate medication at this time.    She has a history of pubic fracture, which was initially misinterpreted as lower back discomfort. However, she reports no current issues related to this condition.    She has been experiencing significant leg swelling, which has led to an increase in her diuretic dosage. Her lisinopril was discontinued due to its hypotensive effects. She was previously on Lasix, but the medication was changed to torsemide, 40 mg daily.    She has been prescribed finerenone 10 mg daily by her cardiologist for CKD. She had blood work done today to check her kidney function before the HENRIETTA.     IMMUNIZATIONS  She is up to date with all her vaccines.        Health Maintenance: Completed    ROS:  Negative except as stated above.      Objective:     Exam:  /64 (BP Location: Left arm, Patient Position: Sitting, BP Cuff Size: Adult)   Pulse 82   Temp 36.5 °C (97.7 °F) (Temporal)   Ht 1.575 m (5' 2\")   Wt 80.3 kg (177 lb) Comment: Pt reported  SpO2 96%   BMI 32.37 kg/m²  Body mass index is 32.37 kg/m².    Physical Exam    Gen: Alert and oriented, no acute distress.  Lungs: Normal effort, CTAB, no wheezing / rhonchi / rales.  CV: RRR, normal S1 and S2, systolic murmur, 2+ pitting edema.      Assessment & Plan:     84 y.o. female with the following -     1. Essential hypertension  Chronic, controlled.  /64 today.  Continue torsemide 40 mg daily.  - Basic Metabolic Panel; Future    2. Stage 3b chronic kidney disease  Chronic, uncontrolled.  March 2025 GFR 34, down from 45.  Possibly due to torsemide.  She follows up with nephrology.  " Continue finerenone 10 mg daily.  - Basic Metabolic Panel; Future    3. Mild dementia without behavioral disturbance, psychotic disturbance, mood disturbance, or anxiety, unspecified dementia type (HCC)  Chronic, stable.  She saw Neurology and was diagnosed with mild Alzheimer's type dementia.  No medication at this time.    4. Nonrheumatic aortic valve insufficiency  5. Nonrheumatic aortic valve stenosis  Chronic, uncontrolled.  She follows up with cardiology.  December 2024 Echo showed mild mitral regurgitation, mild to moderate aortic stenosis, moderate aortic insufficiency.  Continue torsemide 40 mg daily.  She is scheduled for HENRIETTA on 4/22.          Return in about 3 months (around 7/9/2025) for Follow-up of chronic conditions.    Verbal consent was acquired by the patient to use [employer] ambient listening note generation during this visit: Yes.    Please note that this dictation was created using voice recognition software. I have made every reasonable attempt to correct obvious errors, but I expect that there are errors of grammar and possibly content that I did not discover before finalizing the note.

## 2025-04-10 ENCOUNTER — RESULTS FOLLOW-UP (OUTPATIENT)
Dept: CARDIOLOGY | Facility: MEDICAL CENTER | Age: 85
End: 2025-04-10
Payer: MEDICARE

## 2025-04-11 ENCOUNTER — HOSPITAL ENCOUNTER (OUTPATIENT)
Dept: LAB | Facility: MEDICAL CENTER | Age: 85
End: 2025-04-11
Attending: PSYCHIATRY & NEUROLOGY
Payer: MEDICARE

## 2025-04-11 DIAGNOSIS — F03.A0 MILD DEMENTIA WITHOUT BEHAVIORAL DISTURBANCE, PSYCHOTIC DISTURBANCE, MOOD DISTURBANCE, OR ANXIETY, UNSPECIFIED DEMENTIA TYPE (HCC): ICD-10-CM

## 2025-04-11 DIAGNOSIS — I10 ESSENTIAL HYPERTENSION: ICD-10-CM

## 2025-04-11 DIAGNOSIS — R60.0 LEG EDEMA: ICD-10-CM

## 2025-04-11 DIAGNOSIS — R06.09 DOE (DYSPNEA ON EXERTION): ICD-10-CM

## 2025-04-11 LAB
FOLATE SERPL-MCNC: 6.3 NG/ML
VIT B12 SERPL-MCNC: 799 PG/ML (ref 211–911)

## 2025-04-11 PROCEDURE — 80048 BASIC METABOLIC PNL TOTAL CA: CPT

## 2025-04-11 PROCEDURE — 84425 ASSAY OF VITAMIN B-1: CPT

## 2025-04-11 PROCEDURE — 82746 ASSAY OF FOLIC ACID SERUM: CPT

## 2025-04-11 PROCEDURE — 36415 COLL VENOUS BLD VENIPUNCTURE: CPT

## 2025-04-11 PROCEDURE — 83880 ASSAY OF NATRIURETIC PEPTIDE: CPT

## 2025-04-11 PROCEDURE — 82607 VITAMIN B-12: CPT

## 2025-04-12 LAB
ANION GAP SERPL CALC-SCNC: 17 MMOL/L (ref 7–16)
BUN SERPL-MCNC: 29 MG/DL (ref 8–22)
CALCIUM SERPL-MCNC: 9.5 MG/DL (ref 8.5–10.5)
CHLORIDE SERPL-SCNC: 107 MMOL/L (ref 96–112)
CO2 SERPL-SCNC: 15 MMOL/L (ref 20–33)
CREAT SERPL-MCNC: 1.58 MG/DL (ref 0.5–1.4)
GFR SERPLBLD CREATININE-BSD FMLA CKD-EPI: 32 ML/MIN/1.73 M 2
GLUCOSE SERPL-MCNC: 88 MG/DL (ref 65–99)
NT-PROBNP SERPL IA-MCNC: 1548 PG/ML (ref 0–125)
POTASSIUM SERPL-SCNC: 4.2 MMOL/L (ref 3.6–5.5)
SODIUM SERPL-SCNC: 139 MMOL/L (ref 135–145)

## 2025-04-15 ENCOUNTER — PRE-ADMISSION TESTING (OUTPATIENT)
Dept: ADMISSIONS | Facility: MEDICAL CENTER | Age: 85
End: 2025-04-15
Attending: INTERNAL MEDICINE
Payer: MEDICARE

## 2025-04-15 NOTE — PREADMIT AVS NOTE
Current Medications   Medication Instructions    Thiamine HCl (VITAMIN B1 PO) Stop 7 days before surgery    Cyanocobalamin (B-12 PO) Stop 7 days before surgery    torsemide (DEMADEX) 20 MG Tab Hold medication day of procedure    Finerenone 10 MG Tab Hold medication day of procedure    potassium chloride SA (K-DUR) 10 MEQ Tab CR Hold medication day of procedure    levothyroxine (SYNTHROID) 100 MCG Tab Continue taking medication as prescribed, including morning of procedure     pantoprazole (PROTONIX) 20 MG tablet Continue taking medication as prescribed, including morning of procedure     fluticasone furoate-vilanterol (BREO ELLIPTA) 200-25 MCG/ACT AEROSOL POWDER, BREATH ACTIVATED Continue taking medication as prescribed, including morning of procedure     ELIQUIS 5 MG Tab: Per MD orders for this procedure; continue Eliquis Follow instructions from surgeon or specialist.    nystatin (MYCOSTATIN) powder Hold medication day of procedure    nystatin (MYCOSTATIN) 481363 UNIT/GM Cream topical cream Hold medication day of procedure    fenofibrate (TRIGLIDE) 160 MG tablet Stop 24 hours before surgery    atorvastatin (LIPITOR) 10 MG Tab Continue taking medication as prescribed, including morning of procedure     Mirabegron ER (MYRBETRIQ) 50 MG TABLET SR 24 HR Continue taking medication as prescribed, including morning of procedure     Naloxone (NARCAN) 4 MG/0.1ML Liquid As needed medication, may take if needed, including morning of procedure     promethazine (PHENERGAN) 25 MG Tab Hold medication day of procedure    HYDROcodone-acetaminophen (NORCO) 7.5-325 MG per tablet As needed medication, may take if needed, including morning of procedure     hydrOXYzine HCl (ATARAX) 25 MG Tab Continue taking medication as prescribed, including morning of procedure     latanoprost (XALATAN) 0.005 % Solution Continue taking medication as prescribed, including morning of procedure     acetaminophen (TYLENOL) 500 MG TABS As needed  medication, may take if needed, including morning of procedure

## 2025-04-17 ENCOUNTER — APPOINTMENT (OUTPATIENT)
Dept: ADMISSIONS | Facility: MEDICAL CENTER | Age: 85
End: 2025-04-17
Attending: INTERNAL MEDICINE
Payer: MEDICARE

## 2025-04-17 DIAGNOSIS — Z01.812 PRE-OPERATIVE LABORATORY EXAMINATION: ICD-10-CM

## 2025-04-17 DIAGNOSIS — Z01.810 PRE-OPERATIVE CARDIOVASCULAR EXAMINATION: ICD-10-CM

## 2025-04-17 DIAGNOSIS — E78.5 HYPERLIPIDEMIA, UNSPECIFIED HYPERLIPIDEMIA TYPE: ICD-10-CM

## 2025-04-17 LAB
EKG IMPRESSION: NORMAL
ERYTHROCYTE [DISTWIDTH] IN BLOOD BY AUTOMATED COUNT: 47 FL (ref 35.9–50)
HCT VFR BLD AUTO: 37.7 % (ref 37–47)
HGB BLD-MCNC: 12.1 G/DL (ref 12–16)
MCH RBC QN AUTO: 29.2 PG (ref 27–33)
MCHC RBC AUTO-ENTMCNC: 32.1 G/DL (ref 32.2–35.5)
MCV RBC AUTO: 91.1 FL (ref 81.4–97.8)
PLATELET # BLD AUTO: 366 K/UL (ref 164–446)
PMV BLD AUTO: 9 FL (ref 9–12.9)
RBC # BLD AUTO: 4.14 M/UL (ref 4.2–5.4)
WBC # BLD AUTO: 6.8 K/UL (ref 4.8–10.8)

## 2025-04-17 PROCEDURE — 93005 ELECTROCARDIOGRAM TRACING: CPT | Mod: TC

## 2025-04-17 PROCEDURE — 36415 COLL VENOUS BLD VENIPUNCTURE: CPT

## 2025-04-17 PROCEDURE — 85027 COMPLETE CBC AUTOMATED: CPT

## 2025-04-17 PROCEDURE — 93010 ELECTROCARDIOGRAM REPORT: CPT | Performed by: INTERNAL MEDICINE

## 2025-04-17 RX ORDER — ATORVASTATIN CALCIUM 10 MG/1
10 TABLET, FILM COATED ORAL
Qty: 90 TABLET | Refills: 3 | Status: SHIPPED | OUTPATIENT
Start: 2025-04-17

## 2025-04-17 NOTE — TELEPHONE ENCOUNTER
Is the patient due for a refill? Yes    Was the patient seen the last 15 months? Yes    Date of last office visit: 3/28/2025    Does the patient have an upcoming appointment?  Yes   If yes, When? 6/19/2025    Provider to refill:VR    Does the patient have group home Plus and need 100-day supply? (This applies to ALL medications) Patient does not have SCP

## 2025-04-18 LAB — VIT B1 BLD-MCNC: 120 NMOL/L (ref 70–180)

## 2025-04-19 ENCOUNTER — RESULTS FOLLOW-UP (OUTPATIENT)
Dept: NEUROLOGY | Facility: MEDICAL CENTER | Age: 85
End: 2025-04-19

## 2025-04-21 ENCOUNTER — ANESTHESIA EVENT (OUTPATIENT)
Dept: CARDIOLOGY | Facility: MEDICAL CENTER | Age: 85
End: 2025-04-21
Payer: MEDICARE

## 2025-04-22 ENCOUNTER — APPOINTMENT (OUTPATIENT)
Dept: CARDIOLOGY | Facility: MEDICAL CENTER | Age: 85
End: 2025-04-22
Attending: INTERNAL MEDICINE
Payer: MEDICARE

## 2025-04-22 ENCOUNTER — HOSPITAL ENCOUNTER (OUTPATIENT)
Facility: MEDICAL CENTER | Age: 85
End: 2025-04-22
Attending: STUDENT IN AN ORGANIZED HEALTH CARE EDUCATION/TRAINING PROGRAM | Admitting: STUDENT IN AN ORGANIZED HEALTH CARE EDUCATION/TRAINING PROGRAM
Payer: MEDICARE

## 2025-04-22 ENCOUNTER — ANESTHESIA (OUTPATIENT)
Dept: CARDIOLOGY | Facility: MEDICAL CENTER | Age: 85
End: 2025-04-22
Payer: MEDICARE

## 2025-04-22 VITALS
SYSTOLIC BLOOD PRESSURE: 139 MMHG | OXYGEN SATURATION: 96 % | HEART RATE: 77 BPM | HEIGHT: 62 IN | DIASTOLIC BLOOD PRESSURE: 63 MMHG | BODY MASS INDEX: 33.27 KG/M2 | WEIGHT: 180.78 LBS | RESPIRATION RATE: 17 BRPM | TEMPERATURE: 97 F

## 2025-04-22 DIAGNOSIS — I35.1 NONRHEUMATIC AORTIC VALVE INSUFFICIENCY: Chronic | ICD-10-CM

## 2025-04-22 PROCEDURE — 700105 HCHG RX REV CODE 258: Performed by: STUDENT IN AN ORGANIZED HEALTH CARE EDUCATION/TRAINING PROGRAM

## 2025-04-22 PROCEDURE — 93325 DOPPLER ECHO COLOR FLOW MAPG: CPT

## 2025-04-22 PROCEDURE — 160015 HCHG STAT PREOP MINUTES

## 2025-04-22 PROCEDURE — 160002 HCHG RECOVERY MINUTES (STAT)

## 2025-04-22 PROCEDURE — 93312 ECHO TRANSESOPHAGEAL: CPT | Mod: 26 | Performed by: INTERNAL MEDICINE

## 2025-04-22 PROCEDURE — 160035 HCHG PACU - 1ST 60 MINS PHASE I

## 2025-04-22 PROCEDURE — 700111 HCHG RX REV CODE 636 W/ 250 OVERRIDE (IP): Performed by: STUDENT IN AN ORGANIZED HEALTH CARE EDUCATION/TRAINING PROGRAM

## 2025-04-22 RX ORDER — SODIUM CHLORIDE, SODIUM LACTATE, POTASSIUM CHLORIDE, CALCIUM CHLORIDE 600; 310; 30; 20 MG/100ML; MG/100ML; MG/100ML; MG/100ML
INJECTION, SOLUTION INTRAVENOUS
Status: DISCONTINUED | OUTPATIENT
Start: 2025-04-22 | End: 2025-04-22 | Stop reason: SURG

## 2025-04-22 RX ORDER — ALBUTEROL SULFATE 5 MG/ML
2.5 SOLUTION RESPIRATORY (INHALATION)
Status: DISCONTINUED | OUTPATIENT
Start: 2025-04-22 | End: 2025-04-22 | Stop reason: HOSPADM

## 2025-04-22 RX ORDER — SODIUM CHLORIDE, SODIUM LACTATE, POTASSIUM CHLORIDE, CALCIUM CHLORIDE 600; 310; 30; 20 MG/100ML; MG/100ML; MG/100ML; MG/100ML
INJECTION, SOLUTION INTRAVENOUS CONTINUOUS
Status: DISCONTINUED | OUTPATIENT
Start: 2025-04-22 | End: 2025-04-22 | Stop reason: HOSPADM

## 2025-04-22 RX ORDER — ONDANSETRON 2 MG/ML
4 INJECTION INTRAMUSCULAR; INTRAVENOUS
Status: DISCONTINUED | OUTPATIENT
Start: 2025-04-22 | End: 2025-04-22 | Stop reason: HOSPADM

## 2025-04-22 RX ORDER — EPHEDRINE SULFATE 50 MG/ML
5 INJECTION, SOLUTION INTRAVENOUS
Status: DISCONTINUED | OUTPATIENT
Start: 2025-04-22 | End: 2025-04-22 | Stop reason: HOSPADM

## 2025-04-22 RX ORDER — LABETALOL HYDROCHLORIDE 5 MG/ML
5 INJECTION, SOLUTION INTRAVENOUS
Status: DISCONTINUED | OUTPATIENT
Start: 2025-04-22 | End: 2025-04-22 | Stop reason: HOSPADM

## 2025-04-22 RX ORDER — HALOPERIDOL 5 MG/ML
1 INJECTION INTRAMUSCULAR
Status: DISCONTINUED | OUTPATIENT
Start: 2025-04-22 | End: 2025-04-22 | Stop reason: HOSPADM

## 2025-04-22 RX ORDER — DIPHENHYDRAMINE HYDROCHLORIDE 50 MG/ML
12.5 INJECTION, SOLUTION INTRAMUSCULAR; INTRAVENOUS
Status: DISCONTINUED | OUTPATIENT
Start: 2025-04-22 | End: 2025-04-22 | Stop reason: HOSPADM

## 2025-04-22 RX ORDER — HYDRALAZINE HYDROCHLORIDE 20 MG/ML
5 INJECTION INTRAMUSCULAR; INTRAVENOUS
Status: DISCONTINUED | OUTPATIENT
Start: 2025-04-22 | End: 2025-04-22 | Stop reason: HOSPADM

## 2025-04-22 RX ADMIN — PROPOFOL 30 MG: 10 INJECTION, EMULSION INTRAVENOUS at 11:31

## 2025-04-22 RX ADMIN — PROPOFOL 100 MG: 10 INJECTION, EMULSION INTRAVENOUS at 11:28

## 2025-04-22 RX ADMIN — PROPOFOL 30 MG: 10 INJECTION, EMULSION INTRAVENOUS at 11:35

## 2025-04-22 RX ADMIN — PROPOFOL 50 MG: 10 INJECTION, EMULSION INTRAVENOUS at 11:29

## 2025-04-22 RX ADMIN — PROPOFOL 50 MG: 10 INJECTION, EMULSION INTRAVENOUS at 11:30

## 2025-04-22 RX ADMIN — SODIUM CHLORIDE, POTASSIUM CHLORIDE, SODIUM LACTATE AND CALCIUM CHLORIDE: 600; 310; 30; 20 INJECTION, SOLUTION INTRAVENOUS at 11:22

## 2025-04-22 ASSESSMENT — FIBROSIS 4 INDEX: FIB4 SCORE: 1.92

## 2025-04-22 ASSESSMENT — PAIN SCALES - GENERAL: PAIN_LEVEL: 2

## 2025-04-22 ASSESSMENT — PAIN DESCRIPTION - PAIN TYPE
TYPE: ACUTE PAIN
TYPE: SURGICAL PAIN

## 2025-04-22 NOTE — ANESTHESIA POSTPROCEDURE EVALUATION
Patient: Margaret Garcia    Procedure Summary       Date: 04/22/25 Room / Location: [facility] - Echocardiology [facility]    Anesthesia Start: 1122 Anesthesia Stop: 1151    Procedure: EC-HENRIETTA W/O CONT Diagnosis:       Nonrheumatic aortic valve insufficiency      (Aortic Regurgitation)    Scheduled Providers: James Babb M.D.; Moshe Maloney M.D. Responsible Provider: Moshe Maloney M.D.    Anesthesia Type: MAC ASA Status: 3            Final Anesthesia Type: MAC  Last vitals  BP   Blood Pressure : 139/63    Temp   36.1 °C (97 °F)    Pulse   77   Resp   17    SpO2   96 %      Anesthesia Post Evaluation    Patient location during evaluation: PACU  Patient participation: complete - patient participated  Level of consciousness: awake and alert  Pain score: 2    Airway patency: patent  Anesthetic complications: no  Cardiovascular status: hemodynamically stable  Respiratory status: acceptable  Hydration status: euvolemic    PONV: none          No notable events documented.     Nurse Pain Score: 2 (NPRS)

## 2025-04-22 NOTE — ANESTHESIA PREPROCEDURE EVALUATION
Date/Time: 04/22/25 1100    Scheduled providers: James Babb M.D.; Moshe Maloney M.D.    Procedure: EC-HENRIETTA W/O CONT    Diagnosis: Nonrheumatic aortic valve insufficiency [I35.1]    Indications: Aortic Regurgitation    Location: [facility] - Echocardiology [facility]            Relevant Problems   ANESTHESIA   (positive) Sleep apnea      PULMONARY   (positive) Other emphysema (HCC)      CARDIAC   (positive) Aortic atherosclerosis (HCC)   (positive) Aortic valve regurgitation   (positive) Essential hypertension   (positive) Ischemic bowel disease (HCC)   (positive) Mitral regurgitation   (positive) Nonrheumatic aortic valve stenosis   (positive) Paroxysmal atrial fibrillation (HCC)   (positive) Venous insufficiency      GI   (positive) Gastro-esophageal reflux disease without esophagitis         (positive) Renal insufficiency syndrome   (positive) Stage 3b chronic kidney disease      ENDO   (positive) Hypothyroidism     Lab Results   Component Value Date/Time    WBC 6.8 04/17/2025 10:42 AM    RBC 4.14 (L) 04/17/2025 10:42 AM    HEMOGLOBIN 12.1 04/17/2025 10:42 AM    HEMATOCRIT 37.7 04/17/2025 10:42 AM    MCV 91.1 04/17/2025 10:42 AM    MCH 29.2 04/17/2025 10:42 AM    MCHC 32.1 (L) 04/17/2025 10:42 AM    RDW 47.0 04/17/2025 10:42 AM    PLATELETCT 366 04/17/2025 10:42 AM    MPV 9.0 04/17/2025 10:42 AM    NEUTSPOLYS 76.60 (H) 09/13/2024 12:53 PM    LYMPHOCYTES 14.40 (L) 09/13/2024 12:53 PM    MONOCYTES 6.20 09/13/2024 12:53 PM    EOSINOPHILS 1.10 09/13/2024 12:53 PM    BASOPHILS 1.00 09/13/2024 12:53 PM    HYPOCHROMIA 1+ 03/05/2012 07:30 PM        Lab Results   Component Value Date/Time    SODIUM 139 04/11/2025 10:38 AM    POTASSIUM 4.2 04/11/2025 10:38 AM    CHLORIDE 107 04/11/2025 10:38 AM    CO2 15 (L) 04/11/2025 10:38 AM    GLUCOSE 88 04/11/2025 10:38 AM    BUN 29 (H) 04/11/2025 10:38 AM    CREATININE 1.58 (H) 04/11/2025 10:38 AM    eGFR 30s    EKG  Results for orders placed or performed in  visit on 25   ECG   Result Value Ref Range    Report       [facility]    Test Date:  2025  Pt Name:    [facility]                 Department: [facility]  MRN:        [id number]                      Room:  Gender:     Female                       Technician: YADI  :        [id number]                   Requested By:MEHRDAD CISNEROS  Order #:    [id number]                    Reading MD: Mehrdad Cisneros MD    Measurements  Intervals                                Axis  Rate:       85                           P:          69  NV:         233                          QRS:        35  QRSD:       78                           T:          19  QT:         381  QTc:        453    Interpretive Statements  Sinus rhythm  Prolonged NV interval  Electronically Signed On 2025 16:15:51 PDT by Mehrdad Cisneros MD       *Note: Due to a large number of results and/or encounters for the requested time period, some results have not been displayed. A complete set of results can be found in Results Review.      Echo 2024  Normal left ventricular systolic function. The left ventricular   ejection fraction is visually estimated to be 60-65%.  The right ventricle is normal in size and systolic function.  Mildly dilated left atrium.  Mild mitral regurgitation.  Mild to moderate aortic valve stenosis. Transvalvular gradients may be   underestimated due to doppler angle. Consider HENRIETTA if clinically   warranted.  Moderate aortic insufficiency.  No pericardial effusion.  No prior study is available for comparison.     Physical Exam    Airway   Mallampati: III  TM distance: >3 FB  Neck ROM: limited       Cardiovascular - normal exam  Rhythm: regular  Rate: normal     Dental     Comments: Missing tooth, see diagram, pt states nothing currently loose         Pulmonary - normal exam  Breath sounds clear to auscultation     Abdominal   (+) obese     Neurological - normal exam                   Anesthesia Plan    ASA 3 (multiple  comorbiditieis including CKD, emphysema and heart valve disease, METS <4)       Plan - MAC               Induction: intravenous    Postoperative Plan: Postoperative administration of opioids is intended.    Pertinent diagnostic labs and testing reviewed    Informed Consent:    Anesthetic plan and risks discussed with patient.    Use of blood products discussed with: patient whom consented to blood products.       Risks of MAC including conversion to general anesthesia discussed including sore throat, damage to lips/teeth, anaphylaxis/drug reaction, aspiration, awareness, post-op nausea/vomiting, post-op delirium, myocardial infarction, cerebral vascular accident up to and including death.

## 2025-04-22 NOTE — ANESTHESIA TIME REPORT
Anesthesia Start and Stop Event Times       Date Time Event    4/22/2025 1117 Ready for Procedure     1122 Anesthesia Start     1151 Anesthesia Stop          Responsible Staff  04/22/25      Name Role Begin End    Moshe Maloney M.D. Anesth 1122 1151          Overtime Reason:  no overtime (within assigned shift)    Comments:

## 2025-04-22 NOTE — OR NURSING
1240- Patient arrived to Phase II recovery. States she has mild discomfort in her throat.     1245- Ice chips and sips of water given. Patient tolerates well.    1300- Discharge instructions reviewed and signed with patient and daughter, Mei. All questions answered.     1314- PIV removed with tip intact. Discharged home with all belongings.

## 2025-04-23 ENCOUNTER — RESULTS FOLLOW-UP (OUTPATIENT)
Dept: CARDIOLOGY | Facility: MEDICAL CENTER | Age: 85
End: 2025-04-23
Payer: MEDICARE

## 2025-04-29 ENCOUNTER — HOSPITAL ENCOUNTER (OUTPATIENT)
Facility: MEDICAL CENTER | Age: 85
End: 2025-04-29
Attending: PHYSICIAN ASSISTANT
Payer: MEDICARE

## 2025-04-29 ENCOUNTER — OFFICE VISIT (OUTPATIENT)
Dept: URGENT CARE | Facility: PHYSICIAN GROUP | Age: 85
End: 2025-04-29
Payer: MEDICARE

## 2025-04-29 VITALS
WEIGHT: 177 LBS | BODY MASS INDEX: 32.57 KG/M2 | RESPIRATION RATE: 16 BRPM | HEIGHT: 62 IN | SYSTOLIC BLOOD PRESSURE: 98 MMHG | TEMPERATURE: 97.8 F | OXYGEN SATURATION: 97 % | HEART RATE: 75 BPM | DIASTOLIC BLOOD PRESSURE: 56 MMHG

## 2025-04-29 DIAGNOSIS — N30.00 ACUTE CYSTITIS WITHOUT HEMATURIA: ICD-10-CM

## 2025-04-29 DIAGNOSIS — R35.0 URINARY FREQUENCY: ICD-10-CM

## 2025-04-29 LAB
APPEARANCE UR: NORMAL
BILIRUB UR STRIP-MCNC: NEGATIVE MG/DL
COLOR UR AUTO: NORMAL
GLUCOSE UR STRIP.AUTO-MCNC: NEGATIVE MG/DL
KETONES UR STRIP.AUTO-MCNC: NEGATIVE MG/DL
LEUKOCYTE ESTERASE UR QL STRIP.AUTO: NORMAL
NITRITE UR QL STRIP.AUTO: NEGATIVE
PH UR STRIP.AUTO: 5.5 [PH] (ref 5–8)
PROT UR QL STRIP: 30 MG/DL
RBC UR QL AUTO: NORMAL
SP GR UR STRIP.AUTO: 1.02
UROBILINOGEN UR STRIP-MCNC: 0.2 MG/DL

## 2025-04-29 RX ORDER — CEFDINIR 300 MG/1
300 CAPSULE ORAL 2 TIMES DAILY
Qty: 10 CAPSULE | Refills: 0 | Status: SHIPPED | OUTPATIENT
Start: 2025-04-29 | End: 2025-05-04

## 2025-04-29 ASSESSMENT — FIBROSIS 4 INDEX: FIB4 SCORE: 1.92

## 2025-04-29 ASSESSMENT — ENCOUNTER SYMPTOMS
VOMITING: 0
FEVER: 0
EYE DISCHARGE: 0
FLANK PAIN: 0
ABDOMINAL PAIN: 1
EYE REDNESS: 0

## 2025-04-29 NOTE — PROGRESS NOTES
Subjective     Ella Garcia is a 84 y.o. female who presents with UTI (Frequent urination x 4 days )            UTI  This is a new problem. Episode onset: x 3 days ago, worse yesterday. Associated symptoms include abdominal pain (The patient reports intermittent significant abdominal cramping with urination.) and urinary symptoms (The patient reports associated dysuria with urinary frequency/urgency.  She reports no hematuria.  She also reports no flank pain.). Pertinent negatives include no fever or vomiting. She has tried nothing for the symptoms.     The patient reports a history of a previous history of kidney infection. She reports no history of kidney stones.    PMH:  has a past medical history of Anginal syndrome ([facility]), Arrhythmia, Arthritis, ASTHMA, Body mass index 40.0-44.9, adult ([facility]) (04/27/2018), Breath shortness, Chronic renal impairment, stage 3 (moderate) (05/13/2019), COPD (chronic obstructive pulmonary disease) with chronic bronchitis ([facility]) (04/20/2021), Dental disorder, Dysuria (10/18/2016), Fall, Generalized edema (04/27/2018), Glaucoma, Hay fever (04/19/2016), Heart burn, High cholesterol, UTI (urinary tract infection), Hyperglycemia (11/24/2015), Hypertension, Hypothyroidism, Indigestion, Ischemic bowel disease ([facility]) (09/09/2019), Migraines, Nausea (10/14/2019), Neuropathy ([facility]), Obesity (BMI 30-39.9) (07/20/2015), Pneumonia, Routine general medical examination at a health care facility (07/20/2015), Sleep apnea (12/2019), Snoring, Syncope (06/05/2020), Tobacco use (04/29/2019), URI (upper respiratory infection) (11/04/2015), and Urinary bladder disorder.  MEDS:   Current Outpatient Medications:     cefdinir (OMNICEF) 300 MG Cap, Take 1 Capsule by mouth 2 times a day for 5 days., Disp: 10 Capsule, Rfl: 0    atorvastatin (LIPITOR) 10 MG Tab, TAKE 1 TABLET BY MOUTH EVERY DAY, Disp: 90 Tablet, Rfl: 3    Thiamine HCl (VITAMIN B1 PO), Take 1 Tablet by mouth every day., Disp: , Rfl:      Cyanocobalamin (B-12 PO), Take 1 Tablet by mouth every day., Disp: , Rfl:     torsemide (DEMADEX) 20 MG Tab, Take 2 Tablets by mouth every day., Disp: 180 Tablet, Rfl: 0    Finerenone 10 MG Tab, Take 1 Tablet by mouth every day., Disp: 90 Tablet, Rfl: 3    potassium chloride SA (K-DUR) 10 MEQ Tab CR, Take 1 Tablet by mouth every day., Disp: 90 Tablet, Rfl: 3    levothyroxine (SYNTHROID) 100 MCG Tab, Take 1 Tablet by mouth every morning on an empty stomach., Disp: 90 Tablet, Rfl: 3    pantoprazole (PROTONIX) 20 MG tablet, Take 20 mg by mouth 2 times a day., Disp: , Rfl:     fluticasone furoate-vilanterol (BREO ELLIPTA) 200-25 MCG/ACT AEROSOL POWDER, BREATH ACTIVATED, Inhale 1 Puff every day., Disp: 90 Each, Rfl: 3    ELIQUIS 5 MG Tab, TAKE 1 TABLET BY MOUTH TWICE A DAY (Patient taking differently: Take 6 mg by mouth 2 times a day.), Disp: 180 Tablet, Rfl: 1    nystatin (MYCOSTATIN) powder, Apply 1 g topically 3 times a day., Disp: 30 g, Rfl: 0    nystatin (MYCOSTATIN) 226549 UNIT/GM Cream topical cream, Apply 1 g topically 2 times a day., Disp: 30 g, Rfl: 0    fenofibrate (TRIGLIDE) 160 MG tablet, TAKE 1 TABLET BY MOUTH EVERY DAY, Disp: 90 Tablet, Rfl: 2    Mirabegron ER (MYRBETRIQ) 50 MG TABLET SR 24 HR, Take 50 mg by mouth every day., Disp: 90 Tablet, Rfl: 3    Naloxone (NARCAN) 4 MG/0.1ML Liquid, USE AS DIRECTED, Disp: , Rfl:     promethazine (PHENERGAN) 25 MG Tab, Take 1 Tablet by mouth every 6 hours as needed for Nausea/Vomiting., Disp: 90 Tablet, Rfl: 3    HYDROcodone-acetaminophen (NORCO) 7.5-325 MG per tablet, Take 1 Tablet by mouth every 8 hours as needed for Moderate Pain., Disp: , Rfl:     hydrOXYzine HCl (ATARAX) 25 MG Tab, Take 25 mg by mouth at bedtime., Disp: , Rfl:     latanoprost (XALATAN) 0.005 % Solution, Administer 1 Drop into both eyes every evening., Disp: , Rfl:     acetaminophen (TYLENOL) 500 MG TABS, Take 500 mg by mouth 3 times a day as needed for Mild Pain. Indications: Pain, Disp: , Rfl:  "  ALLERGIES:   Allergies   Allergen Reactions    Food Hives and Nausea     Oranges, Hives     SURGHX:   Past Surgical History:   Procedure Laterality Date    GASTROSCOPY  12/13/2019    Procedure: GASTROSCOPY;  Surgeon: Ang Angel M.D.;  Location: [facility];  Service: Gastroenterology    COLONOSCOPY  12/13/2019    Procedure: COLONOSCOPY;  Surgeon: Ang Angel M.D.;  Location: SURGERY [facility];  Service: Gastroenterology    NIRU BY LAPAROSCOPY  9/2/2011    Performed by KAYLEIGH PORRAS at [facility]    LUMBAR DECOMPRESSION  6/29/2009    Performed by ANG YAN at SURGERY [facility]    LUMBAR LAMINECTOMY DISKECTOMY  6/29/2009    Performed by ANG YAN at SURGERY [facility]    ABDOMINAL HYSTERECTOMY TOTAL      GYN SURGERY      hysterectomy    HEMORRHOIDECTOMY       SOCHX:  reports that she has been smoking cigarettes. She has a 56 pack-year smoking history. She has never used smokeless tobacco. She reports that she does not drink alcohol and does not use drugs.  FH: Family history was reviewed, no pertinent findings to report      Review of Systems   Constitutional:  Negative for fever.   Eyes:  Negative for discharge and redness.   Gastrointestinal:  Positive for abdominal pain (The patient reports intermittent significant abdominal cramping with urination.). Negative for vomiting.   Genitourinary:  Positive for dysuria and frequency. Negative for flank pain and hematuria.              Objective     BP 98/56   Pulse 75   Temp 36.6 °C (97.8 °F) (Temporal)   Resp 16   Ht 1.575 m (5' 2\")   Wt 80.3 kg (177 lb)   SpO2 97%   BMI 32.37 kg/m²      Physical Exam  Constitutional:       General: She is not in acute distress.     Appearance: Normal appearance. She is well-developed. She is not ill-appearing.   HENT:      Head: Normocephalic and atraumatic.      Right Ear: External ear normal.      Left Ear: External ear normal.   Eyes:      Extraocular " Movements: Extraocular movements intact.      Conjunctiva/sclera: Conjunctivae normal.   Cardiovascular:      Rate and Rhythm: Normal rate and regular rhythm.      Heart sounds: Normal heart sounds.   Pulmonary:      Effort: Pulmonary effort is normal. No respiratory distress.      Breath sounds: Normal breath sounds. No wheezing.   Abdominal:      Palpations: Abdomen is soft.      Tenderness: There is no abdominal tenderness. There is no right CVA tenderness or left CVA tenderness.   Musculoskeletal:         General: Normal range of motion.      Cervical back: Normal range of motion and neck supple.   Skin:     General: Skin is warm and dry.   Neurological:      Mental Status: She is alert and oriented to person, place, and time.               Progress:  Results for orders placed or performed in visit on 04/29/25   POCT Urinalysis    Collection Time: 04/29/25 12:27 PM   Result Value Ref Range    POC Color dark yellow Negative    POC Appearance cloudy Negative    POC Glucose negative Negative mg/dL    POC Bilirubin negative Negative mg/dL    POC Ketones negative Negative mg/dL    POC Specific Gravity 1.020 <1.005 - >1.030    POC Blood moderate Negative    POC Urine PH 5.5 5.0 - 8.0    POC Protein 30 Negative mg/dL    POC Urobiligen 0.2 Negative (0.2) mg/dL    POC Nitrites negative Negative    POC Leukocyte Esterase small Negative     *Note: Due to a large number of results and/or encounters for the requested time period, some results have not been displayed. A complete set of results can be found in Results Review.         Urine Culture - pending                   Assessment & Plan  Urinary frequency    Orders:    POCT Urinalysis    URINE CULTURE(NEW); Future    Acute cystitis without hematuria    Orders:    cefdinir (OMNICEF) 300 MG Cap; Take 1 Capsule by mouth 2 times a day for 5 days.           The patient's presenting symptoms and physical exam findings are consistent with urinary frequency likely secondary to  acute cystitis.  The patient's physical exam today in clinic was normal.  No CVA tenderness was appreciated.  The patient is nontoxic and appears in no acute distress.  The patient's vital signs are stable and within normal limits, with the exception of a borderline low blood pressure.  The patient's blood pressure was found to be slightly low today in clinic at 98/56.  Patient's granddaughter states this is usually the patient's baseline.  She is afebrile today in clinic.  The patient's POCT urinalysis today in clinic showed small leukocyte esterase and moderate blood with negative nitrates.  Will culture the patient's urine to identify likely bacterial source.  Will prescribe the patient cefdinir for presumed urinary tract infection.  Advised the patient to monitor for worsening signs and or symptoms.  Recommend OTC medications and supportive care for symptomatic management.  Recommend patient follow with primary care as needed.  Discussed return precautions with the patient, and she verbalized understanding.    Differential diagnoses, supportive care, and indications for immediate follow-up discussed with patient.   Instructed to return to clinic or nearest emergency department for any change in condition, further concerns, or worsening of symptoms.    OTC Tylenol or Motrin for fever/discomfort.  Drink plenty of fluids  Follow-up with PCP  Return to clinic or go to the ED if symptoms worsen or fail to improve, or if the patient should develop worsening/increasing urinary symptoms, hematuria, flank pain, abdominal pain, nausea/vomiting, fever/chills, and/or any concerning symptoms.    Discussed plan with the patient, and she agrees to the above.     I personally reviewed prior external notes and test results pertinent to today's visit.  I have independently reviewed and interpreted all diagnostics ordered during this urgent care visit.     Please note that this dictation was created using voice recognition  software. I have made every reasonable attempt to correct obvious errors, but I expect that there may be errors of grammar and possibly content that I did not discover before finalizing the note.     This note was electronically signed by Jessica Abdi PA-C

## 2025-04-30 DIAGNOSIS — R35.0 URINARY FREQUENCY: ICD-10-CM

## 2025-05-01 DIAGNOSIS — I48.0 PAROXYSMAL ATRIAL FIBRILLATION (HCC): ICD-10-CM

## 2025-05-02 ENCOUNTER — OFFICE VISIT (OUTPATIENT)
Dept: URGENT CARE | Facility: CLINIC | Age: 85
End: 2025-05-02
Payer: MEDICARE

## 2025-05-02 ENCOUNTER — HOSPITAL ENCOUNTER (INPATIENT)
Facility: MEDICAL CENTER | Age: 85
LOS: 2 days | DRG: 193 | End: 2025-05-04
Attending: STUDENT IN AN ORGANIZED HEALTH CARE EDUCATION/TRAINING PROGRAM | Admitting: HOSPITALIST
Payer: MEDICARE

## 2025-05-02 ENCOUNTER — APPOINTMENT (OUTPATIENT)
Dept: RADIOLOGY | Facility: MEDICAL CENTER | Age: 85
DRG: 193 | End: 2025-05-02
Attending: STUDENT IN AN ORGANIZED HEALTH CARE EDUCATION/TRAINING PROGRAM
Payer: MEDICARE

## 2025-05-02 VITALS
TEMPERATURE: 98.8 F | SYSTOLIC BLOOD PRESSURE: 90 MMHG | HEART RATE: 94 BPM | OXYGEN SATURATION: 91 % | WEIGHT: 178.57 LBS | DIASTOLIC BLOOD PRESSURE: 60 MMHG | RESPIRATION RATE: 40 BRPM | BODY MASS INDEX: 32.86 KG/M2 | HEIGHT: 62 IN

## 2025-05-02 DIAGNOSIS — R06.02 SHORTNESS OF BREATH: ICD-10-CM

## 2025-05-02 DIAGNOSIS — I48.0 PAROXYSMAL ATRIAL FIBRILLATION (HCC): ICD-10-CM

## 2025-05-02 DIAGNOSIS — R09.02 HYPOXIA: ICD-10-CM

## 2025-05-02 DIAGNOSIS — R06.2 WHEEZING: ICD-10-CM

## 2025-05-02 DIAGNOSIS — R11.0 NAUSEA: ICD-10-CM

## 2025-05-02 DIAGNOSIS — R06.82 TACHYPNEA: ICD-10-CM

## 2025-05-02 DIAGNOSIS — J18.9 PNEUMONIA OF LEFT LOWER LOBE DUE TO INFECTIOUS ORGANISM: ICD-10-CM

## 2025-05-02 DIAGNOSIS — I50.9 ACUTE ON CHRONIC CONGESTIVE HEART FAILURE, UNSPECIFIED HEART FAILURE TYPE (HCC): ICD-10-CM

## 2025-05-02 DIAGNOSIS — R06.02 SOB (SHORTNESS OF BREATH): ICD-10-CM

## 2025-05-02 DIAGNOSIS — R06.03 RESPIRATORY DISTRESS: ICD-10-CM

## 2025-05-02 PROBLEM — B37.89 CANDIDA RASH OF GROIN: Status: ACTIVE | Noted: 2025-05-02

## 2025-05-02 PROBLEM — R65.10 SIRS (SYSTEMIC INFLAMMATORY RESPONSE SYNDROME) (HCC): Status: ACTIVE | Noted: 2025-05-02

## 2025-05-02 PROBLEM — R21 RASH: Status: ACTIVE | Noted: 2025-05-02

## 2025-05-02 LAB
ALBUMIN SERPL BCP-MCNC: 3.5 G/DL (ref 3.2–4.9)
ALBUMIN/GLOB SERPL: 0.9 G/DL
ALP SERPL-CCNC: 62 U/L (ref 30–99)
ALT SERPL-CCNC: 11 U/L (ref 2–50)
ANION GAP SERPL CALC-SCNC: 15 MMOL/L (ref 7–16)
AST SERPL-CCNC: 28 U/L (ref 12–45)
BACTERIA UR CULT: ABNORMAL
BASOPHILS # BLD AUTO: 0.3 % (ref 0–1.8)
BASOPHILS # BLD: 0.04 K/UL (ref 0–0.12)
BILIRUB SERPL-MCNC: 0.5 MG/DL (ref 0.1–1.5)
BUN SERPL-MCNC: 41 MG/DL (ref 8–22)
CALCIUM ALBUM COR SERPL-MCNC: 9.5 MG/DL (ref 8.5–10.5)
CALCIUM SERPL-MCNC: 9.1 MG/DL (ref 8.5–10.5)
CHLORIDE SERPL-SCNC: 101 MMOL/L (ref 96–112)
CO2 SERPL-SCNC: 19 MMOL/L (ref 20–33)
CREAT SERPL-MCNC: 2.21 MG/DL (ref 0.5–1.4)
EKG IMPRESSION: NORMAL
EOSINOPHIL # BLD AUTO: 0.08 K/UL (ref 0–0.51)
EOSINOPHIL NFR BLD: 0.6 % (ref 0–6.9)
ERYTHROCYTE [DISTWIDTH] IN BLOOD BY AUTOMATED COUNT: 46.9 FL (ref 35.9–50)
GFR SERPLBLD CREATININE-BSD FMLA CKD-EPI: 21 ML/MIN/1.73 M 2
GLOBULIN SER CALC-MCNC: 3.8 G/DL (ref 1.9–3.5)
GLUCOSE SERPL-MCNC: 123 MG/DL (ref 65–99)
HCT VFR BLD AUTO: 37.9 % (ref 37–47)
HGB BLD-MCNC: 12.2 G/DL (ref 12–16)
IMM GRANULOCYTES # BLD AUTO: 0.07 K/UL (ref 0–0.11)
IMM GRANULOCYTES NFR BLD AUTO: 0.5 % (ref 0–0.9)
LYMPHOCYTES # BLD AUTO: 0.33 K/UL (ref 1–4.8)
LYMPHOCYTES NFR BLD: 2.5 % (ref 22–41)
MAGNESIUM SERPL-MCNC: 1.3 MG/DL (ref 1.5–2.5)
MCH RBC QN AUTO: 28.8 PG (ref 27–33)
MCHC RBC AUTO-ENTMCNC: 32.2 G/DL (ref 32.2–35.5)
MCV RBC AUTO: 89.4 FL (ref 81.4–97.8)
MONOCYTES # BLD AUTO: 0.14 K/UL (ref 0–0.85)
MONOCYTES NFR BLD AUTO: 1.1 % (ref 0–13.4)
NEUTROPHILS # BLD AUTO: 12.66 K/UL (ref 1.82–7.42)
NEUTROPHILS NFR BLD: 95 % (ref 44–72)
NRBC # BLD AUTO: 0 K/UL
NRBC BLD-RTO: 0 /100 WBC (ref 0–0.2)
NT-PROBNP SERPL IA-MCNC: 2713 PG/ML (ref 0–125)
PLATELET # BLD AUTO: 304 K/UL (ref 164–446)
PMV BLD AUTO: 9.3 FL (ref 9–12.9)
POTASSIUM SERPL-SCNC: 3.6 MMOL/L (ref 3.6–5.5)
PROCALCITONIN SERPL-MCNC: 0.74 NG/ML
PROT SERPL-MCNC: 7.3 G/DL (ref 6–8.2)
RBC # BLD AUTO: 4.24 M/UL (ref 4.2–5.4)
SCCMEC + MECA PNL NOSE NAA+PROBE: NEGATIVE
SIGNIFICANT IND 70042: ABNORMAL
SITE SITE: ABNORMAL
SODIUM SERPL-SCNC: 135 MMOL/L (ref 135–145)
SOURCE SOURCE: ABNORMAL
TROPONIN T SERPL-MCNC: 18 NG/L (ref 6–19)
WBC # BLD AUTO: 13.3 K/UL (ref 4.8–10.8)

## 2025-05-02 PROCEDURE — 83880 ASSAY OF NATRIURETIC PEPTIDE: CPT

## 2025-05-02 PROCEDURE — 94640 AIRWAY INHALATION TREATMENT: CPT

## 2025-05-02 PROCEDURE — 99215 OFFICE O/P EST HI 40 MIN: CPT | Mod: 25

## 2025-05-02 PROCEDURE — 3078F DIAST BP <80 MM HG: CPT

## 2025-05-02 PROCEDURE — 85025 COMPLETE CBC W/AUTO DIFF WBC: CPT

## 2025-05-02 PROCEDURE — 84145 PROCALCITONIN (PCT): CPT

## 2025-05-02 PROCEDURE — 93005 ELECTROCARDIOGRAM TRACING: CPT | Mod: TC | Performed by: STUDENT IN AN ORGANIZED HEALTH CARE EDUCATION/TRAINING PROGRAM

## 2025-05-02 PROCEDURE — 700102 HCHG RX REV CODE 250 W/ 637 OVERRIDE(OP): Performed by: STUDENT IN AN ORGANIZED HEALTH CARE EDUCATION/TRAINING PROGRAM

## 2025-05-02 PROCEDURE — 99285 EMERGENCY DEPT VISIT HI MDM: CPT

## 2025-05-02 PROCEDURE — 36415 COLL VENOUS BLD VENIPUNCTURE: CPT

## 2025-05-02 PROCEDURE — 700111 HCHG RX REV CODE 636 W/ 250 OVERRIDE (IP): Performed by: HOSPITALIST

## 2025-05-02 PROCEDURE — 3074F SYST BP LT 130 MM HG: CPT

## 2025-05-02 PROCEDURE — 96365 THER/PROPH/DIAG IV INF INIT: CPT

## 2025-05-02 PROCEDURE — A9270 NON-COVERED ITEM OR SERVICE: HCPCS | Performed by: STUDENT IN AN ORGANIZED HEALTH CARE EDUCATION/TRAINING PROGRAM

## 2025-05-02 PROCEDURE — 71045 X-RAY EXAM CHEST 1 VIEW: CPT

## 2025-05-02 PROCEDURE — 80053 COMPREHEN METABOLIC PANEL: CPT

## 2025-05-02 PROCEDURE — 96375 TX/PRO/DX INJ NEW DRUG ADDON: CPT

## 2025-05-02 PROCEDURE — 700105 HCHG RX REV CODE 258: Performed by: HOSPITALIST

## 2025-05-02 PROCEDURE — A9270 NON-COVERED ITEM OR SERVICE: HCPCS | Performed by: HOSPITALIST

## 2025-05-02 PROCEDURE — 700105 HCHG RX REV CODE 258: Performed by: STUDENT IN AN ORGANIZED HEALTH CARE EDUCATION/TRAINING PROGRAM

## 2025-05-02 PROCEDURE — 770006 HCHG ROOM/CARE - MED/SURG/GYN SEMI*

## 2025-05-02 PROCEDURE — 700101 HCHG RX REV CODE 250: Performed by: HOSPITALIST

## 2025-05-02 PROCEDURE — 700102 HCHG RX REV CODE 250 W/ 637 OVERRIDE(OP): Performed by: HOSPITALIST

## 2025-05-02 PROCEDURE — 93005 ELECTROCARDIOGRAM TRACING: CPT | Mod: TC

## 2025-05-02 PROCEDURE — 84484 ASSAY OF TROPONIN QUANT: CPT

## 2025-05-02 PROCEDURE — 94669 MECHANICAL CHEST WALL OSCILL: CPT

## 2025-05-02 PROCEDURE — 87641 MR-STAPH DNA AMP PROBE: CPT

## 2025-05-02 PROCEDURE — 83735 ASSAY OF MAGNESIUM: CPT

## 2025-05-02 PROCEDURE — 700111 HCHG RX REV CODE 636 W/ 250 OVERRIDE (IP): Mod: JZ | Performed by: STUDENT IN AN ORGANIZED HEALTH CARE EDUCATION/TRAINING PROGRAM

## 2025-05-02 PROCEDURE — 99223 1ST HOSP IP/OBS HIGH 75: CPT | Mod: AI | Performed by: HOSPITALIST

## 2025-05-02 RX ORDER — LATANOPROST 50 UG/ML
1 SOLUTION/ DROPS OPHTHALMIC NIGHTLY
Status: DISCONTINUED | OUTPATIENT
Start: 2025-05-02 | End: 2025-05-04 | Stop reason: HOSPADM

## 2025-05-02 RX ORDER — HYDROCODONE BITARTRATE AND ACETAMINOPHEN 5; 325 MG/1; MG/1
1.5 TABLET ORAL EVERY 8 HOURS PRN
Refills: 0 | Status: DISCONTINUED | OUTPATIENT
Start: 2025-05-02 | End: 2025-05-04 | Stop reason: HOSPADM

## 2025-05-02 RX ORDER — SODIUM CHLORIDE, SODIUM LACTATE, POTASSIUM CHLORIDE, CALCIUM CHLORIDE 600; 310; 30; 20 MG/100ML; MG/100ML; MG/100ML; MG/100ML
INJECTION, SOLUTION INTRAVENOUS CONTINUOUS
Status: DISCONTINUED | OUTPATIENT
Start: 2025-05-02 | End: 2025-05-03

## 2025-05-02 RX ORDER — NYSTATIN 100000 [USP'U]/G
1 POWDER TOPICAL 3 TIMES DAILY PRN
Status: DISCONTINUED | OUTPATIENT
Start: 2025-05-02 | End: 2025-05-04 | Stop reason: HOSPADM

## 2025-05-02 RX ORDER — AZITHROMYCIN 250 MG/1
500 TABLET, FILM COATED ORAL ONCE
Status: COMPLETED | OUTPATIENT
Start: 2025-05-02 | End: 2025-05-02

## 2025-05-02 RX ORDER — LABETALOL HYDROCHLORIDE 5 MG/ML
10 INJECTION, SOLUTION INTRAVENOUS EVERY 4 HOURS PRN
Status: DISCONTINUED | OUTPATIENT
Start: 2025-05-02 | End: 2025-05-04 | Stop reason: HOSPADM

## 2025-05-02 RX ORDER — ONDANSETRON 2 MG/ML
4 INJECTION INTRAMUSCULAR; INTRAVENOUS EVERY 4 HOURS PRN
Status: DISCONTINUED | OUTPATIENT
Start: 2025-05-02 | End: 2025-05-04 | Stop reason: HOSPADM

## 2025-05-02 RX ORDER — POLYETHYLENE GLYCOL 3350 17 G/17G
1 POWDER, FOR SOLUTION ORAL
Status: DISCONTINUED | OUTPATIENT
Start: 2025-05-02 | End: 2025-05-04 | Stop reason: HOSPADM

## 2025-05-02 RX ORDER — FENOFIBRATE 160 MG/1
160 TABLET ORAL
Status: DISCONTINUED | OUTPATIENT
Start: 2025-05-02 | End: 2025-05-02

## 2025-05-02 RX ORDER — IPRATROPIUM BROMIDE AND ALBUTEROL SULFATE 2.5; .5 MG/3ML; MG/3ML
3 SOLUTION RESPIRATORY (INHALATION)
Status: DISCONTINUED | OUTPATIENT
Start: 2025-05-02 | End: 2025-05-04 | Stop reason: HOSPADM

## 2025-05-02 RX ORDER — HYDROXYZINE HYDROCHLORIDE 50 MG/1
25 TABLET, FILM COATED ORAL
Status: DISCONTINUED | OUTPATIENT
Start: 2025-05-02 | End: 2025-05-04 | Stop reason: HOSPADM

## 2025-05-02 RX ORDER — MAGNESIUM SULFATE HEPTAHYDRATE 40 MG/ML
4 INJECTION, SOLUTION INTRAVENOUS ONCE
Status: COMPLETED | OUTPATIENT
Start: 2025-05-02 | End: 2025-05-03

## 2025-05-02 RX ORDER — ACETAMINOPHEN 325 MG/1
650 TABLET ORAL EVERY 6 HOURS PRN
Status: DISCONTINUED | OUTPATIENT
Start: 2025-05-02 | End: 2025-05-04 | Stop reason: HOSPADM

## 2025-05-02 RX ORDER — IPRATROPIUM BROMIDE AND ALBUTEROL SULFATE 2.5; .5 MG/3ML; MG/3ML
3 SOLUTION RESPIRATORY (INHALATION) ONCE
Status: COMPLETED | OUTPATIENT
Start: 2025-05-02 | End: 2025-05-02

## 2025-05-02 RX ORDER — FAMOTIDINE 20 MG/1
20 TABLET, FILM COATED ORAL ONCE
Status: DISCONTINUED | OUTPATIENT
Start: 2025-05-02 | End: 2025-05-02

## 2025-05-02 RX ORDER — AMOXICILLIN 250 MG
2 CAPSULE ORAL EVERY EVENING
Status: DISCONTINUED | OUTPATIENT
Start: 2025-05-02 | End: 2025-05-04 | Stop reason: HOSPADM

## 2025-05-02 RX ORDER — NICOTINE 21 MG/24HR
14 PATCH, TRANSDERMAL 24 HOURS TRANSDERMAL
Status: DISCONTINUED | OUTPATIENT
Start: 2025-05-03 | End: 2025-05-04 | Stop reason: HOSPADM

## 2025-05-02 RX ORDER — LEVOTHYROXINE SODIUM 100 UG/1
100 TABLET ORAL
Status: DISCONTINUED | OUTPATIENT
Start: 2025-05-03 | End: 2025-05-04 | Stop reason: HOSPADM

## 2025-05-02 RX ORDER — APIXABAN 5 MG/1
5 TABLET, FILM COATED ORAL 2 TIMES DAILY
Qty: 180 TABLET | Refills: 1 | OUTPATIENT
Start: 2025-05-02

## 2025-05-02 RX ORDER — NITROFURANTOIN 25; 75 MG/1; MG/1
100 CAPSULE ORAL 2 TIMES DAILY
Status: ON HOLD | COMMUNITY
Start: 2025-05-01 | End: 2025-05-04

## 2025-05-02 RX ORDER — ONDANSETRON 4 MG/1
4 TABLET, ORALLY DISINTEGRATING ORAL EVERY 4 HOURS PRN
Status: DISCONTINUED | OUTPATIENT
Start: 2025-05-02 | End: 2025-05-04 | Stop reason: HOSPADM

## 2025-05-02 RX ORDER — ATORVASTATIN CALCIUM 10 MG/1
10 TABLET, FILM COATED ORAL DAILY
Status: DISCONTINUED | OUTPATIENT
Start: 2025-05-02 | End: 2025-05-04 | Stop reason: HOSPADM

## 2025-05-02 RX ADMIN — AMPICILLIN SODIUM, SULBACTAM SODIUM 3 G: 2; 1 INJECTION, POWDER, FOR SOLUTION INTRAMUSCULAR; INTRAVENOUS at 18:54

## 2025-05-02 RX ADMIN — ATORVASTATIN CALCIUM 10 MG: 10 TABLET, FILM COATED ORAL at 18:54

## 2025-05-02 RX ADMIN — AMPICILLIN AND SULBACTAM 3 G: 1; 2 INJECTION, POWDER, FOR SOLUTION INTRAMUSCULAR; INTRAVENOUS at 16:33

## 2025-05-02 RX ADMIN — HYDROXYZINE HYDROCHLORIDE 25 MG: 50 TABLET ORAL at 21:14

## 2025-05-02 RX ADMIN — AZITHROMYCIN DIHYDRATE 500 MG: 250 TABLET ORAL at 15:52

## 2025-05-02 RX ADMIN — APIXABAN 2.5 MG: 2.5 TABLET, FILM COATED ORAL at 18:54

## 2025-05-02 RX ADMIN — IPRATROPIUM BROMIDE AND ALBUTEROL SULFATE 3 ML: .5; 2.5 SOLUTION RESPIRATORY (INHALATION) at 19:59

## 2025-05-02 RX ADMIN — MAGNESIUM SULFATE HEPTAHYDRATE 4 G: 4 INJECTION, SOLUTION INTRAVENOUS at 21:21

## 2025-05-02 RX ADMIN — SENNOSIDES AND DOCUSATE SODIUM 2 TABLET: 50; 8.6 TABLET ORAL at 18:54

## 2025-05-02 RX ADMIN — HYDROCODONE BITARTRATE AND ACETAMINOPHEN 1.5 TABLET: 5; 325 TABLET ORAL at 16:59

## 2025-05-02 RX ADMIN — IPRATROPIUM BROMIDE AND ALBUTEROL SULFATE 3 ML: .5; 2.5 SOLUTION RESPIRATORY (INHALATION) at 16:53

## 2025-05-02 RX ADMIN — MOMETASONE FUROATE AND FORMOTEROL FUMARATE DIHYDRATE 2 PUFF: 200; 5 AEROSOL RESPIRATORY (INHALATION) at 21:15

## 2025-05-02 RX ADMIN — IPRATROPIUM BROMIDE AND ALBUTEROL SULFATE 3 ML: 2.5; .5 SOLUTION RESPIRATORY (INHALATION) at 12:35

## 2025-05-02 RX ADMIN — LATANOPROST 1 DROP: 50 SOLUTION OPHTHALMIC at 21:14

## 2025-05-02 RX ADMIN — SODIUM CHLORIDE, POTASSIUM CHLORIDE, SODIUM LACTATE AND CALCIUM CHLORIDE: 600; 310; 30; 20 INJECTION, SOLUTION INTRAVENOUS at 19:31

## 2025-05-02 RX ADMIN — LIDOCAINE HYDROCHLORIDE 30 ML: 20 SOLUTION ORAL; TOPICAL at 15:53

## 2025-05-02 RX ADMIN — FAMOTIDINE 20 MG: 10 INJECTION, SOLUTION INTRAVENOUS at 15:55

## 2025-05-02 RX ADMIN — NYSTATIN 100000 UNITS: 100000 POWDER TOPICAL at 21:14

## 2025-05-02 ASSESSMENT — CHA2DS2 SCORE
SEX: FEMALE
DIABETES: NO
CHF OR LEFT VENTRICULAR DYSFUNCTION: NO
CHA2DS2 VASC SCORE: 4
VASCULAR DISEASE: NO
PRIOR STROKE OR TIA OR THROMBOEMBOLISM: NO
HYPERTENSION: YES
AGE 75 OR GREATER: YES
AGE 65 TO 74: NO

## 2025-05-02 ASSESSMENT — COGNITIVE AND FUNCTIONAL STATUS - GENERAL
DAILY ACTIVITIY SCORE: 24
SUGGESTED CMS G CODE MODIFIER DAILY ACTIVITY: CH
MOBILITY SCORE: 24
SUGGESTED CMS G CODE MODIFIER MOBILITY: CH

## 2025-05-02 ASSESSMENT — ENCOUNTER SYMPTOMS
NAUSEA: 1
BACK PAIN: 0
VOMITING: 0
FEVER: 0
PALPITATIONS: 0
COUGH: 0
LOSS OF CONSCIOUSNESS: 0
HEMOPTYSIS: 0
ABDOMINAL PAIN: 0
MYALGIAS: 0
SPUTUM PRODUCTION: 0
CLAUDICATION: 0
HEADACHES: 0
NECK PAIN: 0
DIARRHEA: 0
FOCAL WEAKNESS: 0
DIZZINESS: 0
ORTHOPNEA: 0
CHILLS: 0
PND: 0
DIAPHORESIS: 0
WHEEZING: 1
WEAKNESS: 0
SEIZURES: 0
SHORTNESS OF BREATH: 1
SENSORY CHANGE: 0

## 2025-05-02 ASSESSMENT — SOCIAL DETERMINANTS OF HEALTH (SDOH)
WITHIN THE PAST 12 MONTHS, YOU WORRIED THAT YOUR FOOD WOULD RUN OUT BEFORE YOU GOT THE MONEY TO BUY MORE: NEVER TRUE
WITHIN THE PAST 12 MONTHS, THE FOOD YOU BOUGHT JUST DIDN'T LAST AND YOU DIDN'T HAVE MONEY TO GET MORE: NEVER TRUE
WITHIN THE LAST YEAR, HAVE TO BEEN RAPED OR FORCED TO HAVE ANY KIND OF SEXUAL ACTIVITY BY YOUR PARTNER OR EX-PARTNER?: PATIENT DECLINED
WITHIN THE PAST 12 MONTHS, YOU WORRIED THAT YOUR FOOD WOULD RUN OUT BEFORE YOU GOT THE MONEY TO BUY MORE: NEVER TRUE
WITHIN THE LAST YEAR, HAVE YOU BEEN AFRAID OF YOUR PARTNER OR EX-PARTNER?: PATIENT DECLINED
WITHIN THE PAST 12 MONTHS, THE FOOD YOU BOUGHT JUST DIDN'T LAST AND YOU DIDN'T HAVE MONEY TO GET MORE: NEVER TRUE
IN THE PAST 12 MONTHS, HAS THE ELECTRIC, GAS, OIL, OR WATER COMPANY THREATENED TO SHUT OFF SERVICE IN YOUR HOME?: NO
WITHIN THE LAST YEAR, HAVE YOU BEEN KICKED, HIT, SLAPPED, OR OTHERWISE PHYSICALLY HURT BY YOUR PARTNER OR EX-PARTNER?: PATIENT DECLINED
WITHIN THE LAST YEAR, HAVE YOU BEEN HUMILIATED OR EMOTIONALLY ABUSED IN OTHER WAYS BY YOUR PARTNER OR EX-PARTNER?: PATIENT DECLINED
IN THE PAST 12 MONTHS, HAS THE ELECTRIC, GAS, OIL, OR WATER COMPANY THREATENED TO SHUT OFF SERVICE IN YOUR HOME?: NO

## 2025-05-02 ASSESSMENT — COPD QUESTIONNAIRES
COPD SCREENING SCORE: 8
DO YOU EVER COUGH UP ANY MUCUS OR PHLEGM?: YES, EVERY DAY
HAVE YOU SMOKED AT LEAST 100 CIGARETTES IN YOUR ENTIRE LIFE: YES
DURING THE PAST 4 WEEKS HOW MUCH DID YOU FEEL SHORT OF BREATH: SOME OF THE TIME

## 2025-05-02 ASSESSMENT — FIBROSIS 4 INDEX
FIB4 SCORE: 2.39
FIB4 SCORE: 1.92
FIB4 SCORE: 1.92

## 2025-05-02 ASSESSMENT — PAIN DESCRIPTION - PAIN TYPE: TYPE: ACUTE PAIN

## 2025-05-02 ASSESSMENT — LIFESTYLE VARIABLES: EVER_SMOKED: YES

## 2025-05-02 NOTE — ASSESSMENT & PLAN NOTE
Clean the area with soap and water blot dry and then will apply nystatin 3 times a day  Skin care  Keep patient clean and dry and frequently turned

## 2025-05-02 NOTE — ED NOTES
Med Rec completed per patient and family   Allergies reviewed    Patient was started on a 5 day course of Cefdinir on 4/9/2025, but this medications was discontinued yesterday due to a reaction the patient was having     Patient started a 7 day course of Macrobid yesterday 5/1/2025    Dispense history available in [facility]? Yes    Patient takes Eliquis 5 mg twice a day   Last dose 5/2/2025 AM

## 2025-05-02 NOTE — ASSESSMENT & PLAN NOTE
Acute on chronic component.  Appears to have ongoing infection with SIRS  Follows up outpatient with Dr. Simmons, nephrologist  Avoid nephrotoxins for now  Monitor I's and O's, labs, vitals    5/3:   - baseline 1-1.5  - on admission 2.2, improving now 1.7  - DC IVF, a litle fluid overloaded now, give dose of lasix  - repeat BMP in am

## 2025-05-02 NOTE — ED PROVIDER NOTES
ED Provider Note    CHIEF COMPLAINT  Chief Complaint   Patient presents with    Shortness of Breath     Pt reports that 3 days ago she had allergic reaction to medication she started and to treat her UTI, at the time she became SOB that has not subsided since. Pt denies cough or chest pain.        EXTERNAL RECORDS REVIEWED  Outpatient Notes office visit today for shortness of breath, nausea, hypoxia, wheezing    HPI/ROS  LIMITATION TO HISTORY   Select: : None  OUTSIDE HISTORIAN(S):    Margaret Garcia is a 84 y.o. female who presents with shortness of breath, lower extremity swelling, lower extremity redness, 80% on room air, increased work of breathing, confusion, decreased oral intake, chest pain, nausea.  Patient has a history of CHF.  Patient was started on antibiotics, cefdinir and then developed some redness in her legs and then was transition to Macrobid yesterday.  Patient endorses a cough.    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) (04/20/2021), Dental disorder, Dysuria (10/18/2016), Fall, Generalized edema (04/27/2018), Glaucoma, Hay fever (04/19/2016), Heart burn, High cholesterol, UTI (urinary tract infection), Hyperglycemia (11/24/2015), Hypertension, Hypothyroidism, Indigestion, Ischemic bowel disease (HCC) (09/09/2019), Migraines, Nausea (10/14/2019), Neuropathy ([facility]), Obesity (BMI 30-39.9) (07/20/2015), Pneumonia, Routine general medical examination at a health care facility (07/20/2015), Sleep apnea (12/2019), Snoring, Syncope (06/05/2020), Tobacco use (04/29/2019), URI (upper respiratory infection) (11/04/2015), and Urinary bladder disorder.    SURGICAL HISTORY   has a past surgical history that includes gyn surgery; lumbar  decompression (6/29/2009); hemorrhoidectomy; abdominal hysterectomy total; jodi by laparoscopy (9/2/2011); lumbar laminectomy diskectomy (6/29/2009); gastroscopy (12/13/2019); and colonoscopy (12/13/2019).    FAMILY HISTORY  Family History   Problem Relation Age of Onset    Stroke Mother     Hyperlipidemia Mother     Hypertension Mother     Arthritis Mother     Diabetes Father     Lung Disease Father         pneumonia    Arthritis Sister     Rheumatologic Disease Sister     Hypertension Sister     Hyperlipidemia Sister     Other Sister         Anusha/Fibermyalgia    Hyperlipidemia Brother     Hypertension Brother     Arthritis Brother     Lung Disease Maternal Grandmother         pneumonia    Stroke Maternal Grandfather     Cancer Maternal Grandfather         skin     No Known Problems Paternal Grandmother     No Known Problems Paternal Grandfather     Hyperlipidemia Daughter     Diabetes Daughter     No Known Problems Son        SOCIAL HISTORY  Social History     Tobacco Use    Smoking status: Every Day     Current packs/day: 1.00     Average packs/day: 1 pack/day for 56.0 years (56.0 ttl pk-yrs)     Types: Cigarettes    Smokeless tobacco: Never    Tobacco comments:     started smoking at age 12, little mor than half a pack   Vaping Use    Vaping status: Never Used   Substance and Sexual Activity    Alcohol use: No    Drug use: No    Sexual activity: Never     Partners: Male     Birth control/protection: Post-Menopausal       CURRENT MEDICATIONS  Home Medications       Reviewed by Alison Garcia R.N. (Registered Nurse) on 05/02/25 at 1356  Med List Status: Not Addressed     Medication Last Dose Status   acetaminophen (TYLENOL) 500 MG TABS  Active   apixaban (ELIQUIS) 2.5mg Tab  Active   atorvastatin (LIPITOR) 10 MG Tab  Active   Cyanocobalamin (B-12 PO)  Active   fenofibrate (TRIGLIDE) 160 MG tablet  Active   Finerenone 10 MG Tab  Active   fluticasone furoate-vilanterol (BREO ELLIPTA) 200-25 MCG/ACT AEROSOL  "POWDER, BREATH ACTIVATED  Active   HYDROcodone-acetaminophen (NORCO) 7.5-325 MG per tablet  Active   hydrOXYzine HCl (ATARAX) 25 MG Tab  Active   latanoprost (XALATAN) 0.005 % Solution  Active   levothyroxine (SYNTHROID) 100 MCG Tab  Active   Mirabegron ER (MYRBETRIQ) 50 MG TABLET SR 24 HR  Active   Naloxone (NARCAN) 4 MG/0.1ML Liquid  Active   nitrofurantoin (MACROBID) 100 MG Cap  Active   nystatin (MYCOSTATIN) 628933 UNIT/GM Cream topical cream  Active   nystatin (MYCOSTATIN) powder  Active   pantoprazole (PROTONIX) 20 MG tablet  Active   potassium chloride SA (K-DUR) 10 MEQ Tab CR  Active   promethazine (PHENERGAN) 25 MG Tab  Active   Thiamine HCl (VITAMIN B1 PO)  Active   torsemide (DEMADEX) 20 MG Tab  Active                  Audit from Redirected Encounters    **Home medications have not yet been reviewed for this encounter**         ALLERGIES  Allergies   Allergen Reactions    Food Hives and Nausea     Oranges, Hives       PHYSICAL EXAM  VITAL SIGNS: /63   Pulse 94   Temp 37.1 °C (98.7 °F) (Temporal)   Resp (!) 28   Ht 1.575 m (5' 2\")   Wt 79.4 kg (175 lb)   SpO2 95%   BMI 32.01 kg/m²    Vitals and nursing note reviewed.   Constitutional:       Comments: Patient is lying in bed supine, pleasant, conversant, speaking in complete sentences   HENT:      Head: Normocephalic and atraumatic.   Eyes:      Extraocular Movements: Extraocular movements intact.      Conjunctiva/sclera: Conjunctivae normal.      Pupils: Pupils are equal, round, and reactive to light.   Cardiovascular:      Pulses: Normal pulses.      Comments: HR 94  Pulmonary:   Increased work of breathing, tachypneic, inspiratory rales at the bases bilaterally  Abdominal:      Comments: Abdomen is soft, non-tender, non-distended, non-rigid, no rebound, guarding, masses, no McBurney's point tenderness, no peritoneal signs, negative Rovsing sign, negative Woodard sign.  No CVA tenderness to palpation. Benign abdomen.   Musculoskeletal:    "   Lower extremity redness, swelling, minimal tenderness     Cervical back: Normal range of motion. No rigidity.   Skin:     General: Skin is warm and dry.      Capillary Refill: Capillary refill takes less than 2 seconds.   Neurological:      Mental Status: Alert.       EKG/LABS  Precordial ST depressions  I have independently interpreted this EKG    RADIOLOGY/PROCEDURES   I have independently interpreted the diagnostic imaging associated with this visit and am waiting the final reading from the radiologist.   My preliminary interpretation is as follows: Left lower lobe infiltrate    Radiologist interpretation:  DX-CHEST-PORTABLE (1 VIEW)   Final Result      Patchy opacity within the left lung base possibly representing pneumonia.          COURSE & MEDICAL DECISION MAKING    ASSESSMENT, COURSE AND PLAN  Care Narrative: Lab work demonstrates significant cytosis, concern for pneumonia given left lower lobe infiltrate, IV antibiotics been ordered.  CMP demonstrates SHARIFA, will defer fluid management inpatient team given that she does appear somewhat fluid overloaded with inspiratory rales.  Elevated BNP.  Troponin negative, EKG with ST depressions, will repeat troponin.  Disposition to the hospital medicine service.    This dictation has been created using voice recognition software. I am continuously working with the software to minimize the number of voice recognition errors and I have made every attempt to manually correct the errors within my dictation. However errors  related to this voice recognition software may still exist and should be interpreted within the appropriate context.     Electronically signed by: Marvin Adorno M.D., 5/2/2025 3:05 PM    HEART SCORE:  History - 1  EKG - 1  Age - 2  Risk Factors - 2  Initial Troponin - 0  Total - 6        FINAL DIAGNOSIS  1. Hypoxia    2. Tachypnea    3. SOB (shortness of breath)    4. Pneumonia of left lower lobe due to infectious organism    5. Acute on  chronic congestive heart failure, unspecified heart failure type (HCC)         Electronically signed by: Marvin Adorno M.D., 5/2/2025 2:56 PM

## 2025-05-02 NOTE — ASSESSMENT & PLAN NOTE
Recently was started on Omnicef but has significant bilateral lower extremity rash.  Continues to be short of breath and quite warm with a WBC of 13.3  Check a procalcitonin  Unasyn ordered  RT per protocol  Titrate supplemental oxygen to keep SpO2 greater 90    5/3: CXR,reviewed, LLL opacity  - PCT elevated, repeat in am  - weaned to RA  - WBC improved 13-->8  - unasyn-->augmentin

## 2025-05-02 NOTE — ASSESSMENT & PLAN NOTE
Continue home use of Norco 7.5 every 8 hours  Try and use other modalities of pain medications if needed

## 2025-05-02 NOTE — PROGRESS NOTES
Subjective:   Margaret Garcia is a 84 y.o. female who presents for Breathing Problem (Very labored, sob was in ER yesterday for an allergic reaction to antibiotics)      HPI  4/29/25 diagnosed with acute cystitis without hematuria, started on cefdinir  5/1/25 was seen in the emergency room for shortness of breath and allergic reaction to cefdinir. Went to the stand alone [facility] ER. She left before receiving her steroid. She did stop her cefdinir and started on macrobid    Presents today for labored breathing and shortness of breath since this morning         Review of Systems   Constitutional:  Negative for chills, diaphoresis, fever and malaise/fatigue.   Respiratory:  Positive for shortness of breath and wheezing. Negative for cough, hemoptysis and sputum production.    Cardiovascular:  Negative for chest pain, palpitations, orthopnea, claudication, leg swelling and PND.   Gastrointestinal:  Positive for nausea. Negative for abdominal pain, diarrhea and vomiting.   Musculoskeletal:  Negative for back pain, myalgias and neck pain.   Skin:  Negative for rash.   Neurological:  Negative for dizziness, sensory change, focal weakness, seizures, loss of consciousness, weakness and headaches.   All other systems reviewed and are negative.      Allergies   Allergen Reactions    Food Hives and Nausea     Oranges, Hives       Patient Active Problem List    Diagnosis Date Noted    Edema leg 03/28/2025    Personal history of other colon polyps 01/28/2025    Closed fracture of superior ramus of pubis (HCC) 01/07/2025    Osteopenia of multiple sites 11/09/2024    Mitral regurgitation 10/01/2024    Aortic atherosclerosis (HCC) 10/01/2024    Delayed gastric emptying 04/10/2024    Gastro-esophageal reflux disease without esophagitis 04/10/2024    Aortic valve regurgitation 09/21/2023    Venous insufficiency 09/21/2023    Atrophic vaginitis 05/08/2023    Secondary hypercoagulable state (HCC) 09/20/2022    Other emphysema  ([facility]) 08/02/2021    Paroxysmal atrial fibrillation ([facility]) 07/30/2021    Stage 3b chronic kidney disease 07/23/2021    Renal insufficiency syndrome 02/18/2021    Urge incontinence of urine 12/13/2020    Nonrheumatic aortic valve stenosis 09/16/2020    Chronic midline low back pain without sciatica 09/02/2020    Ischemic bowel disease ([facility]) 09/09/2019    Tobacco use 04/29/2019    Generalized edema 04/27/2018    Hyperlipidemia 11/24/2015    Essential hypertension 11/24/2015    Obesity (BMI 30-39.9) 07/20/2015    Sleep apnea 07/20/2015    Dementia ([facility]) 05/29/2011    Hypothyroidism 05/29/2011    Peripheral neuropathy 05/29/2011       Current Outpatient Medications Ordered in Epic   Medication Sig Dispense Refill    nitrofurantoin (MACROBID) 100 MG Cap       apixaban (ELIQUIS) 2.5mg Tab Take 1 Tablet by mouth 2 times a day. 180 Tablet 3    atorvastatin (LIPITOR) 10 MG Tab TAKE 1 TABLET BY MOUTH EVERY DAY 90 Tablet 3    Thiamine HCl (VITAMIN B1 PO) Take 1 Tablet by mouth every day.      Cyanocobalamin (B-12 PO) Take 1 Tablet by mouth every day.      torsemide (DEMADEX) 20 MG Tab Take 2 Tablets by mouth every day. 180 Tablet 0    Finerenone 10 MG Tab Take 1 Tablet by mouth every day. 90 Tablet 3    potassium chloride SA (K-DUR) 10 MEQ Tab CR Take 1 Tablet by mouth every day. 90 Tablet 3    levothyroxine (SYNTHROID) 100 MCG Tab Take 1 Tablet by mouth every morning on an empty stomach. 90 Tablet 3    pantoprazole (PROTONIX) 20 MG tablet Take 20 mg by mouth 2 times a day.      fluticasone furoate-vilanterol (BREO ELLIPTA) 200-25 MCG/ACT AEROSOL POWDER, BREATH ACTIVATED Inhale 1 Puff every day. 90 Each 3    nystatin (MYCOSTATIN) powder Apply 1 g topically 3 times a day. 30 g 0    nystatin (MYCOSTATIN) 777868 UNIT/GM Cream topical cream Apply 1 g topically 2 times a day. 30 g 0    fenofibrate (TRIGLIDE) 160 MG tablet TAKE 1 TABLET BY MOUTH EVERY DAY 90 Tablet 2    Mirabegron ER (MYRBETRIQ) 50 MG TABLET SR 24 HR Take 50 mg by  mouth every day. 90 Tablet 3    Naloxone (NARCAN) 4 MG/0.1ML Liquid USE AS DIRECTED      promethazine (PHENERGAN) 25 MG Tab Take 1 Tablet by mouth every 6 hours as needed for Nausea/Vomiting. 90 Tablet 3    HYDROcodone-acetaminophen (NORCO) 7.5-325 MG per tablet Take 1 Tablet by mouth every 8 hours as needed for Moderate Pain.      hydrOXYzine HCl (ATARAX) 25 MG Tab Take 25 mg by mouth at bedtime.      latanoprost (XALATAN) 0.005 % Solution Administer 1 Drop into both eyes every evening.      acetaminophen (TYLENOL) 500 MG TABS Take 500 mg by mouth 3 times a day as needed for Mild Pain. Indications: Pain       No current Epic-ordered facility-administered medications on file.       Past Surgical History:   Procedure Laterality Date    GASTROSCOPY  12/13/2019    Procedure: GASTROSCOPY;  Surgeon: Ang Angel M.D.;  Location: [facility];  Service: Gastroenterology    COLONOSCOPY  12/13/2019    Procedure: COLONOSCOPY;  Surgeon: Ang Angel M.D.;  Location: SURGERY [facility];  Service: Gastroenterology    NIRU BY LAPAROSCOPY  9/2/2011    Performed by KAYLEIGH PORRAS at [facility]    LUMBAR DECOMPRESSION  6/29/2009    Performed by ANG YAN at SURGERY [facility]    LUMBAR LAMINECTOMY DISKECTOMY  6/29/2009    Performed by ANG YAN at SURGERY [facility]    ABDOMINAL HYSTERECTOMY TOTAL      GYN SURGERY      hysterectomy    HEMORRHOIDECTOMY         Social History     Tobacco Use    Smoking status: Every Day     Current packs/day: 1.00     Average packs/day: 1 pack/day for 56.0 years (56.0 ttl pk-yrs)     Types: Cigarettes    Smokeless tobacco: Never    Tobacco comments:     started smoking at age 12, little mor than half a pack   Vaping Use    Vaping status: Never Used   Substance Use Topics    Alcohol use: No    Drug use: No       family history includes Arthritis in her brother, mother, and sister; Cancer in her maternal grandfather; Diabetes in her  "daughter and father; Hyperlipidemia in her brother, daughter, mother, and sister; Hypertension in her brother, mother, and sister; Lung Disease in her father and maternal grandmother; No Known Problems in her paternal grandfather, paternal grandmother, and son; Other in her sister; Rheumatologic Disease in her sister; Stroke in her maternal grandfather and mother.     Problem list, medications, and allergies reviewed by myself today in Epic.     Objective:   BP 90/60 (BP Location: Left arm, Patient Position: Sitting, BP Cuff Size: Large adult)   Pulse 94   Temp 37.1 °C (98.8 °F) (Temporal)   Resp (!) 40   Ht 1.575 m (5' 2\")   Wt 81 kg (178 lb 9.2 oz)   SpO2 91%   BMI 32.66 kg/m²     Physical Exam  Vitals reviewed.   Constitutional:       General: She is in acute distress.      Appearance: Normal appearance. She is not ill-appearing, toxic-appearing or diaphoretic.   HENT:      Head: Normocephalic.      Jaw: There is normal jaw occlusion.      Right Ear: External ear normal.      Left Ear: External ear normal.      Nose:      Right Sinus: No maxillary sinus tenderness or frontal sinus tenderness.      Left Sinus: No maxillary sinus tenderness or frontal sinus tenderness.      Mouth/Throat:      Lips: Pink.      Mouth: Mucous membranes are moist.      Tongue: Tongue does not deviate from midline.      Pharynx: Oropharynx is clear. Uvula midline. No oropharyngeal exudate, posterior oropharyngeal erythema or uvula swelling.   Eyes:      Extraocular Movements: Extraocular movements intact.      Conjunctiva/sclera: Conjunctivae normal.      Pupils: Pupils are equal, round, and reactive to light.   Cardiovascular:      Rate and Rhythm: Normal rate and regular rhythm.      Pulses: Normal pulses.      Heart sounds: Normal heart sounds.   Pulmonary:      Effort: Tachypnea and respiratory distress present.      Breath sounds: Examination of the right-lower field reveals rales. Wheezing and rales present.   Abdominal:    "   General: Abdomen is flat.      Palpations: Abdomen is soft.      Tenderness: There is no abdominal tenderness.   Musculoskeletal:         General: Normal range of motion.      Cervical back: Normal range of motion and neck supple.   Lymphadenopathy:      Cervical: No cervical adenopathy.   Skin:     General: Skin is warm.      Capillary Refill: Capillary refill takes less than 2 seconds.   Neurological:      General: No focal deficit present.      Mental Status: She is alert and oriented to person, place, and time.   Psychiatric:         Mood and Affect: Mood normal.         Behavior: Behavior normal.         Assessment/Plan:     Diagnosis and associated orders:     1. Shortness of breath  - ipratropium-albuterol (DUONEB) nebulizer solution    2. Nausea    3. Hypoxia  - ipratropium-albuterol (DUONEB) nebulizer solution    4. Wheezing    5. Respiratory distress       Comments/MDM:   I personally reviewed prior external notes and test results pertinent to today's visit as well as additional imaging and testing completed in clinic today.    1. Shortness of breath  ipratropium-albuterol (DUONEB) nebulizer solution      2. Nausea        3. Hypoxia  ipratropium-albuterol (DUONEB) nebulizer solution      4. Wheezing        5. Respiratory distress            84 year old female presenting for shortness of breath and wheezing since this morning.  Patient presenting with respiratory distress of 89 to 91% on room air.  No history of COPD or asthma.  Does not wear oxygen at home.  Noted at that last oxygen saturations while in the clinic was 97% 3 days ago.  Wheezes and rales noted on auscultation.  Duoneb started. No EKG or CX as patient cannot tolerate standing to get out of her wheelchair.   ER transfer required. Due to the diagnostic and interventional limitations of this urgent care it is recommended that the patient seek further evaluation in the emergency room.  Patient is agreeable to this plan of care.  I did offer  transfer by EMS.  Patient agreed to transfer by EMS.  I remained in the room while waiting for EMS.  Patient remained stable upon transfer of care to [facility]             Please note that this dictation was created using voice recognition software. I have made every reasonable attempt to correct obvious errors, but I expect that there are errors of grammar and possibly content that I did not discover before finalizing the note.

## 2025-05-02 NOTE — ASSESSMENT & PLAN NOTE
Drug eruption rash likely secondary to Omnicef  Initiated Unasyn which has a low probable cross-reactivity.  If worsens will consider steroid treatment    5/3: rash still present but seems stable, nonpruritic

## 2025-05-02 NOTE — H&P
Hospital Medicine History & Physical Note    Date of Service  5/2/2025    Primary Care Physician  Aster Moreno M.D.    Consultants      Code Status  Prior    Chief Complaint  Chief Complaint   Patient presents with    Shortness of Breath     Pt reports that 3 days ago she had allergic reaction to medication she started and to treat her UTI, at the time she became SOB that has not subsided since. Pt denies cough or chest pain.        History of Presenting Illness  Margaret Garcia is a 84 y.o. female with obesity, hypothyroidism, COPD, active smoker, chronic kidney disease.  She had recently been diagnosed with acute urinary tract infection and was started on cefdinir on 4/29.  She subsequently was having a rash and shortness of breath and was given started on Macrobid.  Today she presents to the [facility] ER with ongoing rash increasing shortness of breath despite recent antibiotics.  WBC of 13 with chest x-ray showing early left lower lung pneumonia.      I discussed the plan of care with patient.    Review of Systems  Review of Systems   Constitutional:  Positive for malaise/fatigue. Negative for chills and fever.   HENT:  Negative for congestion and sore throat.    Respiratory:  Positive for cough and shortness of breath. Negative for sputum production.    Cardiovascular:  Positive for chest pain and leg swelling. Negative for palpitations.   Gastrointestinal:  Positive for diarrhea. Negative for abdominal pain and nausea.   Genitourinary:  Negative for dysuria.   Musculoskeletal:  Negative for myalgias.   Skin:  Positive for rash.   Neurological:  Negative for dizziness and headaches.   Psychiatric/Behavioral:  The patient is not nervous/anxious.        Past Medical History   has a past medical history of Anginal syndrome (HCC), Arrhythmia, Arthritis, ASTHMA, Body mass index 40.0-44.9, adult (HCC) (04/27/2018), Breath shortness, Chronic renal impairment, stage 3 (moderate) (05/13/2019), COPD (chronic  obstructive pulmonary disease) with chronic bronchitis (HCC) (04/20/2021), Dental disorder, Dysuria (10/18/2016), Fall, Generalized edema (04/27/2018), Glaucoma, Hay fever (04/19/2016), Heart burn, High cholesterol, UTI (urinary tract infection), Hyperglycemia (11/24/2015), Hypertension, Hypothyroidism, Indigestion, Ischemic bowel disease (HCC) (09/09/2019), Migraines, Nausea (10/14/2019), Neuropathy ([facility]), Obesity (BMI 30-39.9) (07/20/2015), Pneumonia, Routine general medical examination at a health care facility (07/20/2015), Sleep apnea (12/2019), Snoring, Syncope (06/05/2020), Tobacco use (04/29/2019), URI (upper respiratory infection) (11/04/2015), and Urinary bladder disorder.    Surgical History   has a past surgical history that includes gyn surgery; lumbar decompression (6/29/2009); hemorrhoidectomy; abdominal hysterectomy total; jodi by laparoscopy (9/2/2011); lumbar laminectomy diskectomy (6/29/2009); gastroscopy (12/13/2019); and colonoscopy (12/13/2019).     Family History  family history includes Arthritis in her brother, mother, and sister; Cancer in her maternal grandfather; Diabetes in her daughter and father; Hyperlipidemia in her brother, daughter, mother, and sister; Hypertension in her brother, mother, and sister; Lung Disease in her father and maternal grandmother; No Known Problems in her paternal grandfather, paternal grandmother, and son; Other in her sister; Rheumatologic Disease in her sister; Stroke in her maternal grandfather and mother.   Family history reviewed with patient. There is no family history that is pertinent to the chief complaint.     Social History   reports that she has been smoking cigarettes. She has a 56 pack-year smoking history. She has never used smokeless tobacco. She reports that she does not drink alcohol and does not use drugs.    Allergies  Allergies   Allergen Reactions    Cefdinir Shortness of Breath and Rash     some redness in her legs and SOB     Food  Hives and Nausea     Oranges, Hives       Medications  Prior to Admission Medications   Prescriptions Last Dose Informant Patient Reported? Taking?   Cyanocobalamin (B-12 PO) 5/2/2025 Morning Patient, Family Member Yes Yes   Sig: Take 1 Tablet by mouth every day.   Finerenone 10 MG Tab 5/2/2025 Morning Patient, Family Member No Yes   Sig: Take 1 Tablet by mouth every day.   HYDROcodone-acetaminophen (NORCO) 7.5-325 MG per tablet 5/2/2025 Morning Patient, Family Member Yes Yes   Sig: Take 1 Tablet by mouth every 8 hours as needed for Moderate Pain.   Mirabegron ER (MYRBETRIQ) 50 MG TABLET SR 24 HR 5/2/2025 Morning Patient, Family Member No Yes   Sig: Take 50 mg by mouth every day.   Thiamine HCl (VITAMIN B1 PO) 5/2/2025 Morning Patient, Family Member Yes Yes   Sig: Take 1 Tablet by mouth every day.   acetaminophen (TYLENOL) 500 MG TABS 5/2/2025 at  8:00 AM Patient, Family Member Yes Yes   Sig: Take 500 mg by mouth 3 times a day as needed for Mild Pain. Indications: Pain   apixaban (ELIQUIS) 2.5mg Tab Not Taking Patient, Family Member No No   Sig: Take 1 Tablet by mouth 2 times a day.   Patient not taking: Reported on 5/2/2025   apixaban (ELIQUIS) 5mg Tab 5/2/2025 Morning Patient, Family Member Yes Yes   Sig: Take 5 mg by mouth 2 times a day.   atorvastatin (LIPITOR) 10 MG Tab 5/1/2025 Evening Patient, Family Member No Yes   Sig: TAKE 1 TABLET BY MOUTH EVERY DAY   fenofibrate (TRIGLIDE) 160 MG tablet 5/2/2025 Morning Patient, Family Member No Yes   Sig: TAKE 1 TABLET BY MOUTH EVERY DAY   fluticasone furoate-vilanterol (BREO ELLIPTA) 200-25 MCG/ACT AEROSOL POWDER, BREATH ACTIVATED Not Taking Patient, Family Member No No   Sig: Inhale 1 Puff every day.   Patient not taking: Reported on 5/2/2025   hydrOXYzine HCl (ATARAX) 25 MG Tab 5/1/2025 Evening Patient, Family Member Yes Yes   Sig: Take 25 mg by mouth at bedtime.   latanoprost (XALATAN) 0.005 % Solution 5/1/2025 Evening Patient, Family Member Yes Yes   Sig:  Administer 1 Drop into both eyes every evening.   levothyroxine (SYNTHROID) 100 MCG Tab 5/2/2025 Morning Patient, Family Member No Yes   Sig: Take 1 Tablet by mouth every morning on an empty stomach.   nitrofurantoin (MACROBID) 100 MG Cap 5/2/2025 Morning Patient, Family Member Yes Yes   Sig: Take 100 mg by mouth 2 times a day. 7 day course   nystatin (MYCOSTATIN) 594567 UNIT/GM Cream topical cream Unknown Patient, Family Member No No   Sig: Apply 1 g topically 2 times a day.   Patient taking differently: Apply 1 Application topically 2 times a day as needed.   nystatin (MYCOSTATIN) powder Unknown Patient, Family Member No No   Sig: Apply 1 g topically 3 times a day.   Patient taking differently: Apply 1 Application topically 3 times a day as needed.   pantoprazole (PROTONIX) 20 MG tablet 5/2/2025 Morning Patient, Family Member Yes Yes   Sig: Take 20 mg by mouth 2 times a day.   potassium chloride SA (K-DUR) 10 MEQ Tab CR 5/1/2025 Evening Patient, Family Member No Yes   Sig: Take 1 Tablet by mouth every day.   promethazine (PHENERGAN) 25 MG Tab Unknown Patient, Family Member No No   Sig: Take 1 Tablet by mouth every 6 hours as needed for Nausea/Vomiting.   torsemide (DEMADEX) 20 MG Tab 5/2/2025 Morning Patient, Family Member No Yes   Sig: Take 2 Tablets by mouth every day.      Facility-Administered Medications: None       Physical Exam  Temp:  [37.1 °C (98.7 °F)-37.1 °C (98.8 °F)] 37.1 °C (98.7 °F)  Pulse:  [94] 94  Resp:  [18-40] 28  BP: ()/(60-63) 127/63  SpO2:  [91 %-95 %] 95 %  Blood Pressure : 127/63   Temperature: 37.1 °C (98.7 °F)   Pulse: 94   Respiration: (!) 28   Pulse Oximetry: 95 %       Physical Exam  Constitutional:       Appearance: She is ill-appearing.   HENT:      Head: Normocephalic.      Nose: Nose normal.      Mouth/Throat:      Mouth: Mucous membranes are moist.   Eyes:      General:         Right eye: No discharge.         Left eye: No discharge.      Conjunctiva/sclera: Conjunctivae  normal.   Cardiovascular:      Rate and Rhythm: Regular rhythm. Tachycardia present.      Heart sounds: No murmur heard.  Pulmonary:      Effort: Pulmonary effort is normal. No respiratory distress.      Breath sounds: Examination of the right-lower field reveals decreased breath sounds. Decreased breath sounds present.   Abdominal:      General: Bowel sounds are normal. There is no distension.      Palpations: Abdomen is soft.   Musculoskeletal:      Cervical back: Neck supple.      Right lower leg: Edema present.      Left lower leg: Edema present.   Lymphadenopathy:      Cervical: No cervical adenopathy.   Skin:     Findings: Erythema and rash present.   Neurological:      General: No focal deficit present.      Mental Status: She is alert.      Cranial Nerves: No cranial nerve deficit.   Psychiatric:         Attention and Perception: Attention normal.         Mood and Affect: Mood normal.         Speech: Speech normal.         Behavior: Behavior is cooperative.         Cognition and Memory: Cognition is impaired. Memory is impaired.         Laboratory:  Recent Labs     05/02/25  1400   WBC 13.3*   RBC 4.24   HEMOGLOBIN 12.2   HEMATOCRIT 37.9   MCV 89.4   MCH 28.8   MCHC 32.2   RDW 46.9   PLATELETCT 304   MPV 9.3     Recent Labs     05/02/25  1400   SODIUM 135   POTASSIUM 3.6   CHLORIDE 101   CO2 19*   GLUCOSE 123*   BUN 41*   CREATININE 2.21*   CALCIUM 9.1     Recent Labs     05/02/25  1400   ALTSGPT 11   ASTSGOT 28   ALKPHOSPHAT 62   TBILIRUBIN 0.5   GLUCOSE 123*         Recent Labs     05/02/25  1400   NTPROBNP 2713*         Recent Labs     05/02/25  1400   TROPONINT 18       Imaging:  DX-CHEST-PORTABLE (1 VIEW)   Final Result      Patchy opacity within the left lung base possibly representing pneumonia.            Assessment/Plan:  Justification for Admission Status  I anticipate this patient will require at least two midnights for appropriate medical management, necessitating inpatient admission because  concern of early developing pneumonia despite 2 recent antibiotics as well as a drug reaction rash on her lower extremities that needs to be closely monitored.    Patient will need a medical bed on the medical service.  The need is secondary to early pneumonia development and significant bilateral lower extremity rash after treatment outpatient on antibiotics.    * Pneumonia due to infectious agent- (present on admission)  Assessment & Plan  Recently was started on Omnicef but has significant bilateral lower extremity rash.  Continues to be short of breath and quite warm with a WBC of 13.3  Check a procalcitonin  Unasyn ordered  RT per protocol  Titrate supplemental oxygen to keep SpO2 greater 90    Candida rash of groin  Assessment & Plan  Clean the area with soap and water blot dry and then will apply nystatin 3 times a day  Skin care  Keep patient clean and dry and frequently turned    Rash  Assessment & Plan  Drug eruption rash likely secondary to Omnicef  Initiated Unasyn which has a low probable cross-reactivity.  If worsens will consider steroid treatment      Paroxysmal atrial fibrillation (HCC)- (present on admission)  Assessment & Plan  Continue outpatient apixaban  Monitor vitals for arrhythmias    Stage 3b chronic kidney disease- (present on admission)  Assessment & Plan  Acute on chronic component.  Appears to have ongoing infection with SIRS  Follows up outpatient with Dr. Simmons, nephrologist  Avoid nephrotoxins for now  Monitor I's and O's, labs, vitals    Chronic midline low back pain without sciatica- (present on admission)  Assessment & Plan  Continue home use of Norco 7.5 every 8 hours  Try and use other modalities of pain medications if needed    Tobacco use- (present on admission)  Assessment & Plan  Smoking cessation strongly encouraged.  Nicotine patch as needed    Essential hypertension- (present on admission)  Assessment & Plan  Monitor vitals holding outpatient torsemide  IV fluids being  given  Follows up outpatient with Dr. Simmons    Hyperlipidemia- (present on admission)  Assessment & Plan  Continue fenofibrate and atorvastatin    Obesity (BMI 30-39.9)- (present on admission)  Assessment & Plan  Body mass index is 32.01 kg/m².    Hypothyroidism- (present on admission)  Assessment & Plan  Continue Synthroid for active management  Last TSH 4.13 in the past 3 months    Dementia (HCC)- (present on admission)  Assessment & Plan  Early dementia per family.        VTE prophylaxis: SCDs/TEDs

## 2025-05-02 NOTE — ASSESSMENT & PLAN NOTE
Monitor vitals holding outpatient torsemide  IV fluids being given  Follows up outpatient with Dr. Simmons

## 2025-05-02 NOTE — ED TRIAGE NOTES
"Chief Complaint   Patient presents with    Shortness of Breath     Pt reports that 3 days ago she had allergic reaction to medication she started and to treat her UTI, at the time she became SOB that has not subsided since. Pt denies cough or chest pain.        [facility] for above complaint pt states she had a reaction from   Cefdinir, per EMS pt has wheezing breath sounds. Pt is A/O x 4.     SOB protocols placed, Labs drawn      /63   Pulse 94   Temp 37.1 °C (98.7 °F) (Temporal)   Resp (!) 28   Ht 1.575 m (5' 2\")   Wt 79.4 kg (175 lb)   SpO2 95%   BMI 32.01 kg/m²     "

## 2025-05-03 PROBLEM — I50.31 ACUTE HEART FAILURE WITH PRESERVED EJECTION FRACTION (HFPEF) (HCC): Status: ACTIVE | Noted: 2025-05-03

## 2025-05-03 LAB
ANION GAP SERPL CALC-SCNC: 12 MMOL/L (ref 7–16)
BUN SERPL-MCNC: 37 MG/DL (ref 8–22)
CALCIUM SERPL-MCNC: 8.6 MG/DL (ref 8.5–10.5)
CHLORIDE SERPL-SCNC: 102 MMOL/L (ref 96–112)
CO2 SERPL-SCNC: 20 MMOL/L (ref 20–33)
CREAT SERPL-MCNC: 1.76 MG/DL (ref 0.5–1.4)
ERYTHROCYTE [DISTWIDTH] IN BLOOD BY AUTOMATED COUNT: 46.5 FL (ref 35.9–50)
GFR SERPLBLD CREATININE-BSD FMLA CKD-EPI: 28 ML/MIN/1.73 M 2
GLUCOSE SERPL-MCNC: 106 MG/DL (ref 65–99)
HCT VFR BLD AUTO: 33.6 % (ref 37–47)
HGB BLD-MCNC: 11 G/DL (ref 12–16)
MCH RBC QN AUTO: 28.4 PG (ref 27–33)
MCHC RBC AUTO-ENTMCNC: 32.7 G/DL (ref 32.2–35.5)
MCV RBC AUTO: 86.8 FL (ref 81.4–97.8)
PLATELET # BLD AUTO: 281 K/UL (ref 164–446)
PMV BLD AUTO: 8.8 FL (ref 9–12.9)
POTASSIUM SERPL-SCNC: 3.7 MMOL/L (ref 3.6–5.5)
RBC # BLD AUTO: 3.87 M/UL (ref 4.2–5.4)
SODIUM SERPL-SCNC: 134 MMOL/L (ref 135–145)
WBC # BLD AUTO: 8.9 K/UL (ref 4.8–10.8)

## 2025-05-03 PROCEDURE — 700111 HCHG RX REV CODE 636 W/ 250 OVERRIDE (IP): Mod: JZ | Performed by: HOSPITALIST

## 2025-05-03 PROCEDURE — 700102 HCHG RX REV CODE 250 W/ 637 OVERRIDE(OP): Performed by: HOSPITALIST

## 2025-05-03 PROCEDURE — 700101 HCHG RX REV CODE 250: Performed by: HOSPITALIST

## 2025-05-03 PROCEDURE — 700102 HCHG RX REV CODE 250 W/ 637 OVERRIDE(OP): Performed by: INTERNAL MEDICINE

## 2025-05-03 PROCEDURE — 770006 HCHG ROOM/CARE - MED/SURG/GYN SEMI*

## 2025-05-03 PROCEDURE — A9270 NON-COVERED ITEM OR SERVICE: HCPCS | Performed by: INTERNAL MEDICINE

## 2025-05-03 PROCEDURE — 85027 COMPLETE CBC AUTOMATED: CPT

## 2025-05-03 PROCEDURE — 700111 HCHG RX REV CODE 636 W/ 250 OVERRIDE (IP): Mod: JZ | Performed by: INTERNAL MEDICINE

## 2025-05-03 PROCEDURE — 36415 COLL VENOUS BLD VENIPUNCTURE: CPT

## 2025-05-03 PROCEDURE — 94640 AIRWAY INHALATION TREATMENT: CPT

## 2025-05-03 PROCEDURE — 80048 BASIC METABOLIC PNL TOTAL CA: CPT

## 2025-05-03 PROCEDURE — 99233 SBSQ HOSP IP/OBS HIGH 50: CPT | Performed by: INTERNAL MEDICINE

## 2025-05-03 PROCEDURE — 700105 HCHG RX REV CODE 258: Performed by: HOSPITALIST

## 2025-05-03 PROCEDURE — 97602 WOUND(S) CARE NON-SELECTIVE: CPT

## 2025-05-03 PROCEDURE — 94669 MECHANICAL CHEST WALL OSCILL: CPT

## 2025-05-03 PROCEDURE — A9270 NON-COVERED ITEM OR SERVICE: HCPCS | Performed by: HOSPITALIST

## 2025-05-03 RX ORDER — AMOXICILLIN AND CLAVULANATE POTASSIUM 500; 125 MG/1; MG/1
1 TABLET, FILM COATED ORAL EVERY 12 HOURS
Status: DISCONTINUED | OUTPATIENT
Start: 2025-05-03 | End: 2025-05-04 | Stop reason: HOSPADM

## 2025-05-03 RX ORDER — FUROSEMIDE 10 MG/ML
40 INJECTION INTRAMUSCULAR; INTRAVENOUS ONCE
Status: COMPLETED | OUTPATIENT
Start: 2025-05-03 | End: 2025-05-03

## 2025-05-03 RX ADMIN — NYSTATIN 100000 UNITS: 100000 POWDER TOPICAL at 17:05

## 2025-05-03 RX ADMIN — AMPICILLIN SODIUM, SULBACTAM SODIUM 3 G: 2; 1 INJECTION, POWDER, FOR SOLUTION INTRAMUSCULAR; INTRAVENOUS at 11:05

## 2025-05-03 RX ADMIN — AMPICILLIN SODIUM, SULBACTAM SODIUM 3 G: 2; 1 INJECTION, POWDER, FOR SOLUTION INTRAMUSCULAR; INTRAVENOUS at 01:08

## 2025-05-03 RX ADMIN — ATORVASTATIN CALCIUM 10 MG: 10 TABLET, FILM COATED ORAL at 17:05

## 2025-05-03 RX ADMIN — APIXABAN 2.5 MG: 2.5 TABLET, FILM COATED ORAL at 17:05

## 2025-05-03 RX ADMIN — FUROSEMIDE 40 MG: 10 INJECTION, SOLUTION INTRAVENOUS at 13:37

## 2025-05-03 RX ADMIN — APIXABAN 2.5 MG: 2.5 TABLET, FILM COATED ORAL at 05:35

## 2025-05-03 RX ADMIN — LEVOTHYROXINE SODIUM 100 MCG: 0.1 TABLET ORAL at 05:35

## 2025-05-03 RX ADMIN — LATANOPROST 1 DROP: 50 SOLUTION OPHTHALMIC at 20:50

## 2025-05-03 RX ADMIN — MOMETASONE FUROATE AND FORMOTEROL FUMARATE DIHYDRATE 2 PUFF: 200; 5 AEROSOL RESPIRATORY (INHALATION) at 17:05

## 2025-05-03 RX ADMIN — VIBEGRON 75 MG: 75 TABLET, FILM COATED ORAL at 08:46

## 2025-05-03 RX ADMIN — IPRATROPIUM BROMIDE AND ALBUTEROL SULFATE 3 ML: .5; 2.5 SOLUTION RESPIRATORY (INHALATION) at 06:19

## 2025-05-03 RX ADMIN — HYDROCODONE BITARTRATE AND ACETAMINOPHEN 1.5 TABLET: 5; 325 TABLET ORAL at 05:36

## 2025-05-03 RX ADMIN — AMPICILLIN SODIUM, SULBACTAM SODIUM 3 G: 2; 1 INJECTION, POWDER, FOR SOLUTION INTRAMUSCULAR; INTRAVENOUS at 05:44

## 2025-05-03 RX ADMIN — SENNOSIDES AND DOCUSATE SODIUM 2 TABLET: 50; 8.6 TABLET ORAL at 17:05

## 2025-05-03 RX ADMIN — AMOXICILLIN AND CLAVULANATE POTASSIUM 1 TABLET: 500; 125 TABLET, FILM COATED ORAL at 17:05

## 2025-05-03 RX ADMIN — HYDROXYZINE HYDROCHLORIDE 25 MG: 50 TABLET ORAL at 20:48

## 2025-05-03 RX ADMIN — MOMETASONE FUROATE AND FORMOTEROL FUMARATE DIHYDRATE 2 PUFF: 200; 5 AEROSOL RESPIRATORY (INHALATION) at 05:37

## 2025-05-03 ASSESSMENT — CHA2DS2 SCORE
PRIOR STROKE OR TIA OR THROMBOEMBOLISM: NO
CHA2DS2 VASC SCORE: 4
DIABETES: NO
AGE 75 OR GREATER: YES
AGE 65 TO 74: NO
CHF OR LEFT VENTRICULAR DYSFUNCTION: NO
HYPERTENSION: YES
SEX: FEMALE
VASCULAR DISEASE: NO

## 2025-05-03 ASSESSMENT — LIFESTYLE VARIABLES
AVERAGE NUMBER OF DAYS PER WEEK YOU HAVE A DRINK CONTAINING ALCOHOL: 0
DOES PATIENT WANT TO STOP DRINKING: NO
EVER HAD A DRINK FIRST THING IN THE MORNING TO STEADY YOUR NERVES TO GET RID OF A HANGOVER: NO
TOTAL SCORE: 0
CONSUMPTION TOTAL: NEGATIVE
TOTAL SCORE: 0
TOTAL SCORE: 0
HOW MANY TIMES IN THE PAST YEAR HAVE YOU HAD 5 OR MORE DRINKS IN A DAY: 0
ON A TYPICAL DAY WHEN YOU DRINK ALCOHOL HOW MANY DRINKS DO YOU HAVE: 0
EVER FELT BAD OR GUILTY ABOUT YOUR DRINKING: NO
ALCOHOL_USE: NO
HAVE PEOPLE ANNOYED YOU BY CRITICIZING YOUR DRINKING: NO
HAVE YOU EVER FELT YOU SHOULD CUT DOWN ON YOUR DRINKING: NO

## 2025-05-03 ASSESSMENT — ENCOUNTER SYMPTOMS
NERVOUS/ANXIOUS: 0
COUGH: 1
FEVER: 0
NAUSEA: 0
PALPITATIONS: 0
DIZZINESS: 0
FALLS: 0
MYALGIAS: 0
DIARRHEA: 1
SHORTNESS OF BREATH: 1
COUGH: 0
HEADACHES: 0
VOMITING: 0
SORE THROAT: 0
CHILLS: 0
SPUTUM PRODUCTION: 0
ABDOMINAL PAIN: 0

## 2025-05-03 ASSESSMENT — FIBROSIS 4 INDEX: FIB4 SCORE: 2.58

## 2025-05-03 ASSESSMENT — PATIENT HEALTH QUESTIONNAIRE - PHQ9
1. LITTLE INTEREST OR PLEASURE IN DOING THINGS: NOT AT ALL
SUM OF ALL RESPONSES TO PHQ9 QUESTIONS 1 AND 2: 0
2. FEELING DOWN, DEPRESSED, IRRITABLE, OR HOPELESS: NOT AT ALL

## 2025-05-03 ASSESSMENT — PAIN DESCRIPTION - PAIN TYPE
TYPE: ACUTE PAIN

## 2025-05-03 NOTE — PROGRESS NOTES
.4 Eyes Skin Assessment Completed by KEYONNA Chairez and KEYONNA Elizabeth.    Head Scab  Ears WDL  Nose WDL  Mouth WDL  Neck Redness  Breast/Chest Redness and Excoriation  Shoulder Blades wdl  Spine WDL  (R) Arm/Elbow/Hand Redness  (L) Arm/Elbow/Hand Redness  Abdomen Redness  Groin Redness and Excoriation  Scrotum/Coccyx/Buttocks Redness and Blanching excoriation  (R) Leg Redness  (L) Leg Redness  (R) Heel/Foot/Toe Redness closed blister?   (L) Heel/Foot/Toe Redness    Pt has entire body redness that is slow to xiomy. Per patient and family member it is a chronic vascular issue as well as associated with current reaction to medication.      Devices In Places       Interventions In Place Heel Mepilex, Pillows, Barrier Cream, and Heels Loaded W/Pillows    Possible Skin Injury yes    Pictures Uploaded Into Epic Yes  Wound Consult Placed Yes  RN Wound Prevention Protocol Ordered Yes                                                                 hypothyroid

## 2025-05-03 NOTE — CARE PLAN
The patient is Stable - Low risk of patient condition declining or worsening    Shift Goals  Clinical Goals: wean o2, ABX, ambulate, free from injury or falls, home o2 eval  Patient Goals: comfort, go home  Family Goals: updates    Axo4. VS WNL. Afebrile. Pain managed by medication as per MAR. Able to wean o2 to room air. Ambulates with FWW well. On IV abx. Bed low and locked. Bed alarm on. Hourly rounds done. Due medications given as ordered. Left call light within reach. Needs met at this time.    Progress made toward(s) clinical / shift goals:      Problem: Self Care  Goal: Patient will have the ability to perform ADLs independently or with assistance (bathe, groom, dress, toilet and feed)  Description: Target End Date:  Prior to discharge or change in level of careDocument on ADL flowsheet1.  Assess the capability and level of deficiency to perform ADLs2.  Encourage family/care giver involvement3.  Provide assistive devices4.  Consider PT/OT evaluations5.  Maintain support, give positive feedback, encourage self-care allowing extra time and verbal cuing as needed6.  Avoid doing something for patients they can do themselves, but provide assistance as needed7.  Assist in anticipating/planning individual needs8.  Collaborate with Case Management and  to meet discharge needs  Outcome: Progressing     Problem: Knowledge Deficit - Standard  Goal: Patient and family/care givers will demonstrate understanding of plan of care, disease process/condition, diagnostic tests and medications  Description: Target End Date:  1-3 days or as soon as patient condition allowsDocument in Patient Education1.  Patient and family/caregiver oriented to unit, equipment, visitation policy and means for communicating concern2.  Complete/review Learning Assessment3.  Assess knowledge level of disease process/condition, treatment plan, diagnostic tests and medications4.  Explain disease process/condition, treatment plan,  diagnostic tests and medications  Outcome: Progressing     Problem: Skin Integrity  Goal: Skin integrity is maintained or improved  Description: Target End Date:  Prior to discharge or change in level of careDocument interventions on Skin Risk/Jorge Alberto flowsheet groups and corresponding LDA1.  Assess and monitor skin integrity, appearance and/or temperature2.  Assess risk factors for impaired skin integrity and/or pressures ulcers3.  Implement precautions to protect skin integrity in collaboration with interdisciplinary team4.  Implement pressure ulcer prevention protocol if at risk for skin breakdown5.  Confirm wound care consult if at risk for skin breakdown6.  Ensure patient use of pressure relieving devices  (Low air loss bed, waffle overlay, heel protectors, ROHO cushion, etc)  Outcome: Progressing

## 2025-05-03 NOTE — HOSPITAL COURSE
Margaret Garcia is a 84 y.o. female with obesity, hypothyroidism, COPD, active smoker, chronic kidney disease.  She had recently been diagnosed with acute urinary tract infection and was started on cefdinir on 4/29.  She subsequently was having a rash and shortness of breath and was given started on Macrobid.  Today she presents to the [facility] ER with ongoing rash increasing shortness of breath despite recent antibiotics.  WBC of 13 with chest x-ray showing early left lower lung pneumonia.

## 2025-05-03 NOTE — RESPIRATORY CARE
COPD EDUCATION by COPD CLINICAL EDUCATOR  5/3/2025  at  1:56 PM by Josiane Cobos, RRT     Patient interviewed by education team.  Patient declined or is unable to participate in the full program.  Therefore, a short intervention has been conducted.  A comprehensive packet including information about COPD, types of treatments to manage their disease and safe home Oxygen usage was provided and reviewed with patient at the bedside.     Ms. Garcia endorses breathing well today. She states she does not have a rescue inhaler at home but rarely becomes short of breath. PFT's reveal normal airflowsbut with % of predicted with air trapping.       COPD Screen  COPD Risk Screening  Do you have a history of COPD?: Yes  Do you have a Pulmonologist?: No  COPD Population Screener  During the past 4 weeks, how much did you feel short of breath?: Some of the time  Do you ever cough up any mucus or phlegm?: Yes, every day  In the past 12 months, you do less than you used to because of your breathing problems: Agree  Have you smoked at least 100 cigarettes in your entire life?: Yes (current pack a day smoker)  How old are you?: 60+  COPD Screening Score: 8  COPD Coordinator Recommended: Yes    COPD Assessment  COPD Clinical Specialists ONLY  COPD Education Initiated: Yes--Short Intervention (COPD book, declined smoking cessation)  Is this a COPD exacerbation patient?: No  DME Company: none prior  Physician Follow Up Appointment: 07/14/25  Appt Time: 1040  Physician Name: Aster Moreno M.D.  Pulmonologist Name: none per patient  Palliative Care: Yes  Durable Power of : No  Advance Directive: No  Discussion with others: No  Is POLST on file?: No  Referrals Initiated: Yes  Pulmonary Rehab: N/A  Smoking Cessation: Declined (has no desire to quit smoking)  Hospice: No  Home Health Care: N/A  Mobile Urgent Care Services: N/A  Geriatric Specialty Group: N/A  Private In-Home Care Agency: N/A  (OP) Pulmonary Function Testing:  "Yes (02/2023 FEV1 1.25 72%, FVC 1.59 - > 200 ml increase Post bronchidilator, FEV1/FVC 77- normal , with airtrapping %)  Interdisciplinary Rounds: Attendance at Rounds (30 Min)    PFT Results    02/2023 FEV1 1.25 72%, FVC 1.59 - > 200 ml increase Post bronchidilator, FEV1/FVC 77- normal , with airtrapping %    Meds to Beds  [facility] provides bedside medication delivery for all eligible patients at discharge and you have been automatically enrolled in the Meds to Beds Program. Would you like to opt out of this program for any reason?: No - Stay Opted In     MY COPD ACTION PLAN     It is recommended that patients and physicians/healthcare providers complete this action plan together. This plan should be discussed at each physician visit and updated as needed.    The green, yellow and red zones show groups of symptoms of COPD. This list of symptoms is not comprehensive, and you may experience other symptoms. In the \"Actions\" column, your healthcare provider has recommended actions for you to take based on your symptoms.    Patient Name: Margaret Garcia   YOB: 1940   Last Updated on: 5/3/2025  1:56 PM   Green Zone:  I am doing well today Actions     Usual activitiy and exercise level   Take daily medications     Usual amounts of cough and phlegm/mucus   Use oxygen as prescribed     Sleep well at night   Continue regular exercise/diet plan     Appetite is good   At all times avoid cigarette smoke, inhaled irritants     Daily Medications (these medications are taken every day):   Fluticasone Furoate and Vilanterol trifenatate (Breo) 1 Puff Once daily     Additional Information:  Please rinse mouth out after    Yellow Zone:  I am having a bad day or a COPD flare Actions     More breathless than usual   Continue daily medications     I have less energy for my daily activities   Use quick relief inhaler as ordered     Increased or thicker phlegm/mucus   Use oxygen as prescribed     Using quick " "relief inhaler/nebulizer more often   Get plenty of rest     Swelling of ankles more than usual   Use pursed lip breathing     More coughing than usual   At all times avoid cigarette smoke, inhaled irritants     I feel like I have a \"chest cold\"     Poor sleep and my symptoms woke me up     My appetite is not good     My medicine is not helping      Call provider immediately if symptoms don’t improve     Continue daily medications, add rescue medications:               Medications to be used during a flare up, (as Discussed with Provider):           Additional Information:  No rescue    Red Zone:  I need urgent medical care Actions     Severe shortness of breath even at rest   Call 911 or seek medical care immediately     Not able to do any activity because of breathing      Fever or shaking chills      Feeling confused or very drowsy       Chest pains      Coughing up blood                  "

## 2025-05-03 NOTE — PROGRESS NOTES
Pt arrived to unit at 1800. Skin check and assessment complete. Alert and oriented. On 2L. Bed alarm on. Family at bedside.

## 2025-05-03 NOTE — PROGRESS NOTES
Hospital Medicine Daily Progress Note    Date of Service  5/3/2025    Chief Complaint  Margaret Garcia is a 84 y.o. female admitted 5/2/2025 with shortness of breath    Hospital Course  Margaret Garcia is a 84 y.o. female with obesity, hypothyroidism, COPD, active smoker, chronic kidney disease.  She had recently been diagnosed with acute urinary tract infection and was started on cefdinir on 4/29.  She subsequently was having a rash and shortness of breath and was given started on Macrobid.  Today she presents to the [facility] ER with ongoing rash increasing shortness of breath despite recent antibiotics.  WBC of 13 with chest x-ray showing early left lower lung pneumonia.      Interval Problem Update  -Patient admitted yesterday  - Patient weaned to room air, able to ambulate without dropping oxygen saturation however is more short of breath  - Still has significant rash on lower extremities  - Patient does report that her lower extremities appear more edematous, will start lasix   - Creatinine improving 2-->1.7  - WBC improving 13-->8    I have discussed this patient's plan of care and discharge plan at IDT rounds today with Case Management, Nursing, Nursing leadership, and other members of the IDT team.    Consultants/Specialty  None    Code Status  DNAR/DNI    Disposition  The patient is not medically cleared for discharge to home or a post-acute facility.  Anticipate discharge to: home with close outpatient follow-up    I have placed the appropriate orders for post-discharge needs.    Review of Systems  Review of Systems   Constitutional:  Positive for malaise/fatigue. Negative for chills and fever.   Respiratory:  Positive for shortness of breath. Negative for cough.    Cardiovascular:  Positive for leg swelling.   Gastrointestinal:  Negative for abdominal pain, nausea and vomiting.   Musculoskeletal:  Negative for falls.   Skin:  Positive for rash. Negative for itching.        Physical Exam  Temp:  [36 °C  (96.8 °F)-37.3 °C (99.2 °F)] 36 °C (96.8 °F)  Pulse:  [68-94] 87  Resp:  [16-28] 18  BP: (113-133)/(39-99) 116/39  SpO2:  [93 %-97 %] 97 %    Physical Exam  Constitutional:       General: She is not in acute distress.     Appearance: She is obese. She is not ill-appearing, toxic-appearing or diaphoretic.   HENT:      Mouth/Throat:      Mouth: Mucous membranes are moist.   Eyes:      General: No scleral icterus.     Conjunctiva/sclera: Conjunctivae normal.   Cardiovascular:      Rate and Rhythm: Normal rate and regular rhythm.      Heart sounds: Murmur heard.      No friction rub. No gallop.   Pulmonary:      Effort: Pulmonary effort is normal. No respiratory distress.      Breath sounds: No stridor. Rales present. No rhonchi.   Abdominal:      General: Bowel sounds are normal. There is no distension.      Palpations: Abdomen is soft.      Tenderness: There is no abdominal tenderness.   Musculoskeletal:      Right lower leg: Edema present.      Left lower leg: Edema present.   Skin:     Findings: Rash (diffuse maculopapular rash, worse on LEs/feet) present.   Neurological:      Mental Status: She is alert and oriented to person, place, and time. Mental status is at baseline.      Gait: Gait normal.   Psychiatric:         Mood and Affect: Mood normal.         Behavior: Behavior normal.         Fluids    Intake/Output Summary (Last 24 hours) at 5/3/2025 1240  Last data filed at 5/3/2025 0916  Gross per 24 hour   Intake 240 ml   Output --   Net 240 ml        Laboratory  Recent Labs     05/02/25  1400 05/03/25  0834   WBC 13.3* 8.9   RBC 4.24 3.87*   HEMOGLOBIN 12.2 11.0*   HEMATOCRIT 37.9 33.6*   MCV 89.4 86.8   MCH 28.8 28.4   MCHC 32.2 32.7   RDW 46.9 46.5   PLATELETCT 304 281   MPV 9.3 8.8*     Recent Labs     05/02/25  1400 05/03/25  0834   SODIUM 135 134*   POTASSIUM 3.6 3.7   CHLORIDE 101 102   CO2 19* 20   GLUCOSE 123* 106*   BUN 41* 37*   CREATININE 2.21* 1.76*   CALCIUM 9.1 8.6                    Imaging  DX-CHEST-PORTABLE (1 VIEW)   Final Result      Patchy opacity within the left lung base possibly representing pneumonia.           Assessment/Plan  * Pneumonia due to infectious agent- (present on admission)  Assessment & Plan  Recently was started on Omnicef but has significant bilateral lower extremity rash.  Continues to be short of breath and quite warm with a WBC of 13.3  Check a procalcitonin  Unasyn ordered  RT per protocol  Titrate supplemental oxygen to keep SpO2 greater 90    5/3: CXR,reviewed, LLL opacity  - PCT elevated, repeat in am  - weaned to RA  - WBC improved 13-->8  - unasyn-->augmentin    Acute heart failure with preserved ejection fraction (HFpEF) ([facility])  Assessment & Plan  Here with SOB, edema, Cr bump (although Cr improved with IVF)  Recently started on MRA, holding  Appeared more volume up today, rales, LE edema  DC IVF  Lasix x 1  Repeat BMP in am  Weaned to RA    Candida rash of groin  Assessment & Plan  Clean the area with soap and water blot dry and then will apply nystatin 3 times a day  Skin care  Keep patient clean and dry and frequently turned    Rash  Assessment & Plan  Drug eruption rash likely secondary to Omnicef  Initiated Unasyn which has a low probable cross-reactivity.  If worsens will consider steroid treatment    5/3: rash still present but seems stable, nonpruritic      Paroxysmal atrial fibrillation (HCC)- (present on admission)  Assessment & Plan  Continue outpatient apixaban  Monitor vitals for arrhythmias    Acute renal failure superimposed on stage 3b chronic kidney disease (HCC)- (present on admission)  Assessment & Plan  Acute on chronic component.  Appears to have ongoing infection with SIRS  Follows up outpatient with Dr. Simmons, nephrologist  Avoid nephrotoxins for now  Monitor I's and O's, labs, vitals    5/3:   - baseline 1-1.5  - on admission 2.2, improving now 1.7  - DC IVF, a litle fluid overloaded now, give dose of lasix  - repeat BMP in  am    Chronic midline low back pain without sciatica- (present on admission)  Assessment & Plan  Continue home use of Norco 7.5 every 8 hours  Try and use other modalities of pain medications if needed    Tobacco use- (present on admission)  Assessment & Plan  Smoking cessation strongly encouraged.  Nicotine patch as needed    Essential hypertension- (present on admission)  Assessment & Plan  Monitor vitals holding outpatient torsemide  IV fluids being given  Follows up outpatient with Dr. Simmons    Hyperlipidemia- (present on admission)  Assessment & Plan  Continue fenofibrate and atorvastatin    Obesity (BMI 30-39.9)- (present on admission)  Assessment & Plan  Body mass index is 32.01 kg/m².    Hypothyroidism- (present on admission)  Assessment & Plan  Continue Synthroid for active management  Last TSH 4.13 in the past 3 months    Dementia (HCC)- (present on admission)  Assessment & Plan  Early dementia per family.         VTE prophylaxis:    therapeutic anticoagulation with eliquis 5 mg BID      I have performed a physical exam and reviewed and updated ROS and Plan today (5/3/2025). In review of yesterday's note (5/2/2025), there are no changes except as documented above.    Greater than 53 minutes spent preparing to see patient (e.g. review of tests) obtaining and/or reviewing separately obtained history. Performing a medically appropriate examination and/ evaluation.  Counseling and educating the patient/family/caregiver.  Ordering medications, tests, or procedures.  Referring and communicating with other health care professionals.  Documenting clinical information in EPIC.  Independently interpreting results and communicating results to patient/family/caregiver.  Care coordination.

## 2025-05-03 NOTE — PROGRESS NOTES
4 Eyes Skin Assessment Completed by KEYONNA Palacios and KEYONNA Tuttle.    Head:  Scab on cheeks    Ears:  WDL    Nose:  WDL    Mouth:  WDL    Neck:  Redness    Breast/Chest:  Redness and Excoriation    Shoulder Blades:  Redness and Blanching    Spine:  Redness and Blanching    (R) Arm/Elbow/Hand:  Redness and Bruising    (L) Arm/Elbow/Hand:  Redness and Bruising    Abdomen:  Redness and Blanching    Groin:  Redness and Excoriation    Scrotum/Coccyx/Buttocks:  Redness, Blanching, and Excoriation    (R) Leg:  Redness and Rash    (L) Leg:  Redness and Rash    (R) Heel/Foot/Toe:  Redness    (L) Heel/Foot/Toe:  Redness          Devices In Place:  Nasal Cannula, PIV      Interventions In Place:  NC W/Ear Foams, InterDry, TAP System, Pillows, Q2 Turns, Low Air Loss Mattress, Barrier Cream, and Heels Loaded W/Pillows    Possible Skin Injury:  Yes    Pictures Uploaded Into Epic:  Yes  Wound Consult Placed:  Yes  RN Wound Prevention Protocol Ordered:  Yes

## 2025-05-03 NOTE — CARE PLAN
The patient is Stable - Low risk of patient condition declining or worsening    Shift Goals  Clinical Goals: Pt will remain free from falls and new injuries during this shift.  Patient Goals: Comfort, sleep  Family Goals: Not at bedside    Pt pleasant and cooperative. Pain managed with norco. Nystatin and interdry applied under breasts, under pannus, groin folds. Bed in locked, lowest position with alarm on. All needs met at this time.    Progress made toward(s) clinical / shift goals:    Problem: Care Map:  Optimal Outcome for the Pneumonia Patient  Goal: Collection and monitoring of appropriate tests and labs  Outcome: Progressing     Problem: Respiratory  Goal: Patient will achieve/maintain optimum respiratory ventilation and gas exchange  Outcome: Progressing     Problem: Risk for Aspiration  Goal: Patient's risk for aspiration will be absent or decrease  Outcome: Progressing     Problem: Hemodynamics - Pneumonia  Goal: Patient's hemodynamics, fluid balance and neurologic status will be stable or improve  Outcome: Progressing     Problem: Knowledge Deficit - Standard  Goal: Patient and family/care givers will demonstrate understanding of plan of care, disease process/condition, diagnostic tests and medications  Outcome: Progressing     Problem: Skin Integrity  Goal: Skin integrity is maintained or improved  Outcome: Progressing     Problem: Pain - Standard  Goal: Alleviation of pain or a reduction in pain to the patient’s comfort goal  Outcome: Progressing     Problem: Fall Risk  Goal: Patient will remain free from falls  Outcome: Progressing       Patient is not progressing towards the following goals:

## 2025-05-03 NOTE — ASSESSMENT & PLAN NOTE
Here with SOB, edema, Cr bump (although Cr improved with IVF)  Recently started on MRA, holding  Appeared more volume up today, rales, LE edema  DC IVF  Lasix x 1  Repeat BMP in am  Weaned to RA

## 2025-05-04 ENCOUNTER — PHARMACY VISIT (OUTPATIENT)
Dept: PHARMACY | Facility: MEDICAL CENTER | Age: 85
End: 2025-05-04
Payer: COMMERCIAL

## 2025-05-04 ENCOUNTER — RESULTS FOLLOW-UP (OUTPATIENT)
Dept: URGENT CARE | Facility: CLINIC | Age: 85
End: 2025-05-04
Payer: MEDICARE

## 2025-05-04 VITALS
HEIGHT: 62 IN | HEART RATE: 80 BPM | TEMPERATURE: 97.2 F | SYSTOLIC BLOOD PRESSURE: 133 MMHG | BODY MASS INDEX: 33.02 KG/M2 | RESPIRATION RATE: 19 BRPM | OXYGEN SATURATION: 92 % | WEIGHT: 179.45 LBS | DIASTOLIC BLOOD PRESSURE: 60 MMHG

## 2025-05-04 PROBLEM — R65.10 SIRS (SYSTEMIC INFLAMMATORY RESPONSE SYNDROME) (HCC): Status: RESOLVED | Noted: 2025-05-02 | Resolved: 2025-05-04

## 2025-05-04 LAB
ANION GAP SERPL CALC-SCNC: 11 MMOL/L (ref 7–16)
BUN SERPL-MCNC: 34 MG/DL (ref 8–22)
CALCIUM SERPL-MCNC: 8.7 MG/DL (ref 8.5–10.5)
CHLORIDE SERPL-SCNC: 104 MMOL/L (ref 96–112)
CO2 SERPL-SCNC: 22 MMOL/L (ref 20–33)
CREAT SERPL-MCNC: 1.59 MG/DL (ref 0.5–1.4)
ERYTHROCYTE [DISTWIDTH] IN BLOOD BY AUTOMATED COUNT: 46.7 FL (ref 35.9–50)
GFR SERPLBLD CREATININE-BSD FMLA CKD-EPI: 31 ML/MIN/1.73 M 2
GLUCOSE SERPL-MCNC: 101 MG/DL (ref 65–99)
HCT VFR BLD AUTO: 30.9 % (ref 37–47)
HGB BLD-MCNC: 10 G/DL (ref 12–16)
MCH RBC QN AUTO: 28.5 PG (ref 27–33)
MCHC RBC AUTO-ENTMCNC: 32.4 G/DL (ref 32.2–35.5)
MCV RBC AUTO: 88 FL (ref 81.4–97.8)
PLATELET # BLD AUTO: 273 K/UL (ref 164–446)
PMV BLD AUTO: 9.3 FL (ref 9–12.9)
POTASSIUM SERPL-SCNC: 3.4 MMOL/L (ref 3.6–5.5)
PROCALCITONIN SERPL-MCNC: 0.7 NG/ML
RBC # BLD AUTO: 3.51 M/UL (ref 4.2–5.4)
SODIUM SERPL-SCNC: 137 MMOL/L (ref 135–145)
WBC # BLD AUTO: 5.2 K/UL (ref 4.8–10.8)

## 2025-05-04 PROCEDURE — 85027 COMPLETE CBC AUTOMATED: CPT

## 2025-05-04 PROCEDURE — 700102 HCHG RX REV CODE 250 W/ 637 OVERRIDE(OP): Performed by: HOSPITALIST

## 2025-05-04 PROCEDURE — A9270 NON-COVERED ITEM OR SERVICE: HCPCS | Performed by: INTERNAL MEDICINE

## 2025-05-04 PROCEDURE — RXMED WILLOW AMBULATORY MEDICATION CHARGE: Performed by: INTERNAL MEDICINE

## 2025-05-04 PROCEDURE — 84145 PROCALCITONIN (PCT): CPT

## 2025-05-04 PROCEDURE — 700102 HCHG RX REV CODE 250 W/ 637 OVERRIDE(OP): Performed by: INTERNAL MEDICINE

## 2025-05-04 PROCEDURE — 80048 BASIC METABOLIC PNL TOTAL CA: CPT

## 2025-05-04 PROCEDURE — 36415 COLL VENOUS BLD VENIPUNCTURE: CPT

## 2025-05-04 PROCEDURE — A9270 NON-COVERED ITEM OR SERVICE: HCPCS | Performed by: HOSPITALIST

## 2025-05-04 PROCEDURE — 99239 HOSP IP/OBS DSCHRG MGMT >30: CPT | Performed by: INTERNAL MEDICINE

## 2025-05-04 RX ORDER — AMOXICILLIN AND CLAVULANATE POTASSIUM 500; 125 MG/1; MG/1
1 TABLET, FILM COATED ORAL EVERY 12 HOURS
Qty: 6 TABLET | Refills: 0 | Status: ACTIVE | OUTPATIENT
Start: 2025-05-04 | End: 2025-05-07

## 2025-05-04 RX ADMIN — LEVOTHYROXINE SODIUM 100 MCG: 0.1 TABLET ORAL at 04:38

## 2025-05-04 RX ADMIN — AMOXICILLIN AND CLAVULANATE POTASSIUM 1 TABLET: 500; 125 TABLET, FILM COATED ORAL at 04:38

## 2025-05-04 RX ADMIN — HYDROCODONE BITARTRATE AND ACETAMINOPHEN 1.5 TABLET: 5; 325 TABLET ORAL at 04:38

## 2025-05-04 RX ADMIN — VIBEGRON 75 MG: 75 TABLET, FILM COATED ORAL at 08:27

## 2025-05-04 RX ADMIN — APIXABAN 2.5 MG: 2.5 TABLET, FILM COATED ORAL at 04:38

## 2025-05-04 RX ADMIN — MOMETASONE FUROATE AND FORMOTEROL FUMARATE DIHYDRATE 2 PUFF: 200; 5 AEROSOL RESPIRATORY (INHALATION) at 04:43

## 2025-05-04 ASSESSMENT — PAIN DESCRIPTION - PAIN TYPE
TYPE: ACUTE PAIN
TYPE: ACUTE PAIN

## 2025-05-04 NOTE — DISCHARGE SUMMARY
Discharge Summary    CHIEF COMPLAINT ON ADMISSION  Chief Complaint   Patient presents with    Shortness of Breath     Pt reports that 3 days ago she had allergic reaction to medication she started and to treat her UTI, at the time she became SOB that has not subsided since. Pt denies cough or chest pain.        Reason for Admission  ems     Admission Date  5/2/2025    CODE STATUS  DNAR/DNI    HPI & HOSPITAL COURSE    Margaret Garcia is a 84 y.o. female with obesity, hypothyroidism, COPD, active smoker, chronic kidney disease.  She had recently been diagnosed with acute urinary tract infection and was started on cefdinir on 4/29.  She subsequently was having a rash and shortness of breath and was given started on Macrobid.  Today she presents to the [facility] ER with ongoing rash increasing shortness of breath despite recent antibiotics.  WBC of 13 with chest x-ray showing early left lower lung pneumonia.      Patient was started on Unasyn for her pneumonia as well as her urinary tract infection.  By hospital day #2 she was weaned to room air.  She had developed an SHARIFA which was improving.  She did have some lower extremity edema and dyspnea on exertion so was started on IV Lasix.  On day of discharge her creatinine was continuing to improve her lower extremity edema with and rash were improved and she was tolerating p.o.  Recommended close follow-up with PCP and cardiology.  She was transition to Augmentin to complete a 5-day course for pneumonia.    Therefore, she is discharged in good and stable condition to home with close outpatient follow-up.    The patient met 2-midnight criteria for an inpatient stay at the time of discharge.    Discharge Date  5/4/2025    FOLLOW UP ITEMS POST DISCHARGE  PCP follow-up  Cardiology follow-up    DISCHARGE DIAGNOSES  Principal Problem:    Pneumonia due to infectious agent (POA: Yes)  Active Problems:    Dementia (HCC) (Chronic) (POA: Yes)    Hypothyroidism (Chronic) (POA: Yes)     Peripheral neuropathy (POA: Yes)    Obesity (BMI 30-39.9) (Chronic) (POA: Yes)    Hyperlipidemia (Chronic) (POA: Yes)    Essential hypertension (Chronic) (POA: Yes)    Tobacco use (POA: Yes)    Chronic midline low back pain without sciatica (Chronic) (POA: Yes)    Stage 3b chronic kidney disease (POA: Yes)    Paroxysmal atrial fibrillation (HCC) (Chronic) (POA: Yes)    Rash (POA: Unknown)    Candida rash of groin (POA: Unknown)    Acute heart failure with preserved ejection fraction (HFpEF) (HCC) (POA: Unknown)  Resolved Problems:    SIRS (systemic inflammatory response syndrome) (HCC) (POA: Unknown)      FOLLOW UP  Future Appointments   Date Time Provider Department Center   6/19/2025  4:20 PM James Babb M.D. [facility] None   7/14/2025 10:40 AM Aster Moreno M.D. [facility] ELVIA Moreno M.D.  [address]  [place] [address]  [phone]    Schedule an appointment as soon as possible for a visit on 7/14/2025 Jul 14, 2025 10:40 AM  (Arrive by 10:25 AM)  Established Patient with Aster Moreno M.D.  [facility] ([address]) [address]  [place] [address]  [phone]    James Babb M.D.  [address]  [address]  [place] [address]  [phone]    Schedule an appointment as soon as possible for a visit on 6/19/2025 un 19, 2025 4:20 PM  Established Patient with James Babb M.D.  [facility] - [facility] (--)  Arrive at: Cardiology [facility] - Arrival [address], [address]  [place] [address]  [phone]  Dear Heart Patient,     Your care is very important to us. To better serve the needs of all our Cardiology patients, we are asking everyone to make sure they are on time for their scheduled appointment.  Please plan ahead for potential traffic delays and / or inclement weather. If you are more than 10 minutes late, you will be marked as a No-Show and the front office staff will need to reschedule your  appointment.      Please contact us with questions at [phone]. We value your support to ensure we maintain timely appointments for our patients.      Thank you for choosing [facility].      MEDICATIONS ON DISCHARGE     Medication List        START taking these medications        Instructions   amoxicillin-clavulanate 500-125 MG Tabs  Commonly known as: Augmentin  Next Dose Due: 5/4 at 1700H  Notes to patient: TAKE WITH FOOD   Take 1 Tablet by mouth every 12 hours for 3 days.  Dose: 1 Tablet     apixaban 2.5mg Tabs  Commonly known as: Eliquis   Take 1 Tablet by mouth 2 times a day.  Dose: 2.5 mg     fluticasone furoate-vilanterol 200-25 MCG/ACT Aepb  Commonly known as: Breo Ellipta   Inhale 1 Puff every day.  Dose: 1 Puff            CHANGE how you take these medications        Instructions   * nystatin powder  Commonly known as: Mycostatin   Apply 1 g topically 3 times a day.  Dose: 1 Application     * nystatin [id number] UNIT/GM Crea topical cream  Commonly known as: Mycostatin   Apply 1 g topically 2 times a day.  Dose: 1 Application           * This list has 2 medication(s) that are the same as other medications prescribed for you. Read the directions carefully, and ask your doctor or other care provider to review them with you.                CONTINUE taking these medications        Instructions   acetaminophen 500 MG Tabs  Commonly known as: Tylenol   Take 500 mg by mouth 3 times a day as needed for Mild Pain. Indications: Pain  Dose: 500 mg     atorvastatin 10 MG Tabs  Commonly known as: Lipitor   TAKE 1 TABLET BY MOUTH EVERY DAY  Dose: 10 mg     B-12 PO   Take 1 Tablet by mouth every day.  Dose: 1 Tablet     fenofibrate 160 MG tablet  Commonly known as: Triglide   TAKE 1 TABLET BY MOUTH EVERY DAY  Dose: 160 mg     HYDROcodone-acetaminophen 7.5-325 MG tab  Commonly known as: Norco   Take 1 Tablet by mouth every 8 hours as needed for Moderate Pain.  Dose: 1 Tablet      hydrOXYzine HCl 25 MG Tabs  Commonly known as: Atarax   Take 25 mg by mouth at bedtime.  Dose: 25 mg     Kerendia 10 MG Tabs  Generic drug: Finerenone   Take 1 Tablet by mouth every day.  Dose: 1 Tablet     latanoprost 0.005 % Soln  Commonly known as: Xalatan   Administer 1 Drop into both eyes every evening.  Dose: 1 Drop     levothyroxine 100 MCG Tabs  Commonly known as: Synthroid   Take 1 Tablet by mouth every morning on an empty stomach.  Dose: 100 mcg     Myrbetriq 50 MG Tb24  Generic drug: Mirabegron ER   Take 50 mg by mouth every day.  Dose: 50 mg     pantoprazole 20 MG tablet  Commonly known as: Protonix   Take 20 mg by mouth 2 times a day.  Dose: 20 mg     potassium chloride SA 10 MEQ Tbcr  Commonly known as: K-Dur   Take 1 Tablet by mouth every day.  Dose: 10 mEq     promethazine 25 MG Tabs  Commonly known as: Phenergan   Take 1 Tablet by mouth every 6 hours as needed for Nausea/Vomiting.  Dose: 25 mg     torsemide 20 MG Tabs  Commonly known as: Demadex   Take 2 Tablets by mouth every day.  Dose: 40 mg     VITAMIN B1 PO   Take 1 Tablet by mouth every day.  Dose: 1 Tablet            STOP taking these medications      nitrofurantoin 100 MG Caps  Commonly known as: Macrobid              Allergies  Allergies   Allergen Reactions    Cefdinir Shortness of Breath and Rash     some redness in her legs and SOB     Food Hives and Nausea     Oranges, Hives       DIET  Orders Placed This Encounter   Procedures    Diet Order Diet: Regular     Standing Status:   Standing     Number of Occurrences:   1     Diet::   Regular [1]       ACTIVITY  As tolerated.  Weight bearing as tolerated    CONSULTATIONS  None    PROCEDURES  None    Discharge exam  Physical Exam  Constitutional:       General: She is not in acute distress.     Appearance: She is obese. She is not ill-appearing, toxic-appearing or diaphoretic.   HENT:      Head: Normocephalic and atraumatic.      Nose: Nose normal.      Mouth/Throat:      Mouth: Mucous  membranes are moist.   Eyes:      General: No scleral icterus.     Conjunctiva/sclera: Conjunctivae normal.   Cardiovascular:      Rate and Rhythm: Normal rate and regular rhythm.      Heart sounds: Murmur heard.      No friction rub. No gallop.   Pulmonary:      Effort: Pulmonary effort is normal.      Breath sounds: Normal breath sounds.   Abdominal:      General: Bowel sounds are normal. There is no distension.      Palpations: Abdomen is soft.      Tenderness: There is no abdominal tenderness.   Musculoskeletal:      Right lower leg: Edema (improving) present.      Left lower leg: Edema (improving) present.   Skin:     Findings: Rash (Erythematous maculopapular rash to legs (improving)) present.   Neurological:      Mental Status: She is alert and oriented to person, place, and time. Mental status is at baseline.   Psychiatric:         Mood and Affect: Mood normal.         Behavior: Behavior normal.           LABORATORY  Lab Results   Component Value Date    SODIUM 137 05/04/2025    POTASSIUM 3.4 (L) 05/04/2025    CHLORIDE 104 05/04/2025    CO2 22 05/04/2025    GLUCOSE 101 (H) 05/04/2025    BUN 34 (H) 05/04/2025    CREATININE 1.59 (H) 05/04/2025        Lab Results   Component Value Date    WBC 5.2 05/04/2025    HEMOGLOBIN 10.0 (L) 05/04/2025    HEMATOCRIT 30.9 (L) 05/04/2025    PLATELETCT 273 05/04/2025      DX-CHEST-PORTABLE (1 VIEW)  Narrative: 5/2/2025 2:07 PM    HISTORY/REASON FOR EXAM:  Shortness of breath    TECHNIQUE/EXAM DESCRIPTION AND NUMBER OF VIEWS:  Single portable view of the chest.    COMPARISON: 9/22/2022    FINDINGS:    There is patchy opacity within the left lung base possibly representing pneumonia.  There is no pleural effusion.  The heart is normal in size.  Impression: Patchy opacity within the left lung base possibly representing pneumonia.        Total time of the discharge process exceeds 35 minutes.

## 2025-05-04 NOTE — CARE PLAN
The patient is Stable - Low risk of patient condition declining or worsening    Shift Goals  Clinical Goals: Pt will remain free from falls and new injuries during this shift.  Patient Goals: Comfort, sleep  Family Goals: Not at bedside    Pt pleasant and cooperative. Only mild neck pain reported; resolved with heat pack. Bed in locked, lowest position with alarm on. All needs met at this time.    Progress made toward(s) clinical / shift goals:    Problem: Risk for Aspiration  Goal: Patient's risk for aspiration will be absent or decrease  Outcome: Progressing     Problem: Hemodynamics - Pneumonia  Goal: Patient's hemodynamics, fluid balance and neurologic status will be stable or improve  Outcome: Progressing     Problem: Knowledge Deficit - Standard  Goal: Patient and family/care givers will demonstrate understanding of plan of care, disease process/condition, diagnostic tests and medications  Outcome: Progressing     Problem: Skin Integrity  Goal: Skin integrity is maintained or improved  Outcome: Progressing     Problem: Pain - Standard  Goal: Alleviation of pain or a reduction in pain to the patient’s comfort goal  Outcome: Progressing     Problem: Fall Risk  Goal: Patient will remain free from falls  Outcome: Progressing       Patient is not progressing towards the following goals:

## 2025-05-04 NOTE — WOUND TEAM
[facility]  Inpatient Services  Wound and Skin Care Brief Evaluation    Admission Date: 5/2/2025     Last order of IP CONSULT TO WOUND CARE was found on 5/3/2025 from Hospital Encounter on 5/2/2025     HPI, PMH, SH: Reviewed    Chief Complaint   Patient presents with    Shortness of Breath     Pt reports that 3 days ago she had allergic reaction to medication she started and to treat her UTI, at the time she became SOB that has not subsided since. Pt denies cough or chest pain.      Diagnosis: Pneumonia due to infectious agent [J18.9]    Unit where seen by Wound Team: S604/[id number]     Wound consult placed regarding bilateral breasts, pannus, groin, and right leg. Chart and images reviewed. This discussed with bedside RN Rhys. This clinician in to assess patient. Patient pleasant and agreeable. The right lower leg with some redness, likely bruising. Area is intact, and left open to air. Bilateral breasts, pannus, and groin have moisture-associated skin damage (MASD), and what appears to be a fungal rash. Folds were cleansed, and new interdry cloths placed. Nystatin powder already initiated by bedside nursing. Non-selectively debrided with Moist warm washcloth.     No pressure injuries or advanced wound care needs identified. Wound consult completed. No further follow up unless indicated and consulted.     Wound 05/02/25  Pannus;Groin Bilateral Breasts MASD (Active)   Date First Assessed/Time First Assessed: 05/02/25 2100   Present on Original Admission: Yes  Hand Hygiene Completed: Yes  Primary Wound Type: (c)   Location: Pannus;Groin  Wound Description (Comments): Bilateral Breasts MASD      Assessments 5/3/2025  4:20 PM   Wound Image        Site Assessment Red;Pink   Periwound Assessment Red;Rash   Margins Attached edges   Closure Open to air   Drainage Amount Small   Drainage Description Tan   Treatments Cleansed;Nonselective debridement;Site care   Wound Cleansing Foam Cleanser/Washcloth   Periwound  Protectant Antifungal Therapy;Interdry   Dressing Status Open to Air   Wound Team Following Not following      R heel   L heel     R Ant leg  Sacrum    PREVENTATIVE INTERVENTIONS:    Q shift Jorge Alberto - performed per nursing policy  Q shift pressure point assessments - performed per nursing policy    Surface/Positioning  Standard/trauma mattress - Currently in Place    Containment/Moisture Prevention    Dri-mary pad - Currently in Place  Antifungal treatment - Changed this Visit  Interdry - Changed this Visit    Mobilization      Ambulate

## 2025-05-05 ENCOUNTER — PATIENT OUTREACH (OUTPATIENT)
Dept: MEDICAL GROUP | Facility: PHYSICIAN GROUP | Age: 85
End: 2025-05-05
Payer: MEDICARE

## 2025-05-07 ENCOUNTER — OFFICE VISIT (OUTPATIENT)
Dept: MEDICAL GROUP | Facility: PHYSICIAN GROUP | Age: 85
End: 2025-05-07
Payer: MEDICARE

## 2025-05-07 VITALS
HEIGHT: 62 IN | DIASTOLIC BLOOD PRESSURE: 48 MMHG | OXYGEN SATURATION: 98 % | SYSTOLIC BLOOD PRESSURE: 118 MMHG | WEIGHT: 179 LBS | HEART RATE: 85 BPM | BODY MASS INDEX: 32.94 KG/M2 | TEMPERATURE: 96.8 F

## 2025-05-07 DIAGNOSIS — N17.9 ACUTE RENAL FAILURE SUPERIMPOSED ON STAGE 3B CHRONIC KIDNEY DISEASE, UNSPECIFIED ACUTE RENAL FAILURE TYPE (HCC): ICD-10-CM

## 2025-05-07 DIAGNOSIS — N18.32 ACUTE RENAL FAILURE SUPERIMPOSED ON STAGE 3B CHRONIC KIDNEY DISEASE, UNSPECIFIED ACUTE RENAL FAILURE TYPE (HCC): ICD-10-CM

## 2025-05-07 DIAGNOSIS — Z09 HOSPITAL DISCHARGE FOLLOW-UP: Primary | ICD-10-CM

## 2025-05-07 DIAGNOSIS — M54.2 NECK PAIN: ICD-10-CM

## 2025-05-07 DIAGNOSIS — I10 ESSENTIAL HYPERTENSION: Chronic | ICD-10-CM

## 2025-05-07 ASSESSMENT — FIBROSIS 4 INDEX: FIB4 SCORE: 2.66

## 2025-05-07 NOTE — PROGRESS NOTES
Subjective:   Verbal consent was acquired by the patient to use [employer]JA[employer] ambient listening note generation during this visit Yes     Margaret Garcia is a 84 y.o. female who presents for Hospital Follow-up.    Transitional Care Management  TCM Outreach Date and Time: Filed (5/5/2025  9:50 AM)    Discharge Questions  Actual Discharge Date: 05/04/25  Did you receive any new prescriptions?: Yes  Were you able to get them filled?: Yes  Meds to Bed or Pharmacy filled?: Meds to Bed  Did you have any durable medical equipment ordered?: No  Do you have a follow up appointment scheduled with your PCP?: Yes  Appointment Date: 05/07/25  Appointment Time: 1120  If Home Health was ordered, have they contacted you (Patient): Not Applicable  Does this patient qualify for the CCM program?: No (medicare)    Transitional Care  Number of attempts made to contact patient: 1  Current or previous attempts completed within two business days of discharge? : Yes  Has patient completed an Advanced Directive?: No  Has the Care Manager's phone number provided?: No    Discharge Summary  Chief Complaint: Shortness of Breath        Pt reports that 3 days ago she had allergic reaction to medication she started and to treat her UTI, at the time she became SOB that has not subsided since. Pt denies cough or chest pain.  Admitting Diagnosis: SOB  Discharge Diagnosis: Pneumonia due to infectious agent        History of Present Illness  Ms. Garcia is a pleasant 85 yo established patient of Dr. Moreno who presents for evaluation of neck pain, fatigue, and shortness of breath.    She reports severe neck pain, which she describes as excruciating. The onset of this pain was noted a few days following a HENRIETTA procedure performed approximately 3 weeks ago. The pain is intermittent and appears to be alleviated by the application of heat. The pain is localized at the base of her neck, occasionally radiating to the left side. It is suspected that her elevated  blood pressure may be due to her neck pain. Her blood pressure is typically low at home.    She continues to experience a mild cough and significant fatigue post-hospitalization. She completed a 3-day course of antibiotics this morning.    She also reports dyspnea upon exertion, which has not shown any improvement. She continues to use her inhaler.    She has persistent leg edema, for which she is on diuretic therapy. She is scheduled for a cardiology consultation to discuss potential adjustments to her diuretic regimen in relation to her renal function. She alternates between 20 mg and 40 mg of torsemide, with the higher dose providing relief for her foot swelling but adversely affecting her renal function. This regimen is followed every other day until her cardiology appointment.    Patient Active Problem List    Diagnosis Date Noted    Acute heart failure with preserved ejection fraction (HFpEF) ([facility]) 05/03/2025    Pneumonia due to infectious agent 05/02/2025    Rash 05/02/2025    Candida rash of groin 05/02/2025    Edema leg 03/28/2025    Personal history of other colon polyps 01/28/2025    Closed fracture of superior ramus of pubis ([facility]) 01/07/2025    Osteopenia of multiple sites 11/09/2024    Mitral regurgitation 10/01/2024    Aortic atherosclerosis ([facility]) 10/01/2024    Delayed gastric emptying 04/10/2024    Gastro-esophageal reflux disease without esophagitis 04/10/2024    Aortic valve regurgitation 09/21/2023    Venous insufficiency 09/21/2023    Atrophic vaginitis 05/08/2023    Secondary hypercoagulable state ([facility]) 09/20/2022    Other emphysema ([facility]) 08/02/2021    Paroxysmal atrial fibrillation ([facility]) 07/30/2021    Stage 3b chronic kidney disease 07/23/2021    Renal insufficiency syndrome 02/18/2021    Urge incontinence of urine 12/13/2020    Nonrheumatic aortic valve stenosis 09/16/2020    Chronic midline low back pain without sciatica 09/02/2020    Ischemic bowel disease ([facility]) 09/09/2019    Tobacco use  04/29/2019    Generalized edema 04/27/2018    Hyperlipidemia 11/24/2015    Essential hypertension 11/24/2015    Obesity (BMI 30-39.9) 07/20/2015    Sleep apnea 07/20/2015    Dementia (HCC) 05/29/2011    Hypothyroidism 05/29/2011    Peripheral neuropathy 05/29/2011         Current medicines (including reconciliation performed today)  Current Outpatient Medications   Medication Sig Dispense Refill    apixaban (ELIQUIS) 2.5mg Tab Take 1 Tablet by mouth 2 times a day. 180 Tablet 3    atorvastatin (LIPITOR) 10 MG Tab TAKE 1 TABLET BY MOUTH EVERY DAY 90 Tablet 3    Thiamine HCl (VITAMIN B1 PO) Take 1 Tablet by mouth every day.      Cyanocobalamin (B-12 PO) Take 1 Tablet by mouth every day.      torsemide (DEMADEX) 20 MG Tab Take 2 Tablets by mouth every day. 180 Tablet 0    Finerenone 10 MG Tab Take 1 Tablet by mouth every day. 90 Tablet 3    potassium chloride SA (K-DUR) 10 MEQ Tab CR Take 1 Tablet by mouth every day. 90 Tablet 3    levothyroxine (SYNTHROID) 100 MCG Tab Take 1 Tablet by mouth every morning on an empty stomach. 90 Tablet 3    pantoprazole (PROTONIX) 20 MG tablet Take 20 mg by mouth 2 times a day.      fluticasone furoate-vilanterol (BREO ELLIPTA) 200-25 MCG/ACT AEROSOL POWDER, BREATH ACTIVATED Inhale 1 Puff every day. 90 Each 3    nystatin (MYCOSTATIN) powder Apply 1 g topically 3 times a day. 30 g 0    nystatin (MYCOSTATIN) 712077 UNIT/GM Cream topical cream Apply 1 g topically 2 times a day. 30 g 0    fenofibrate (TRIGLIDE) 160 MG tablet TAKE 1 TABLET BY MOUTH EVERY DAY 90 Tablet 2    Mirabegron ER (MYRBETRIQ) 50 MG TABLET SR 24 HR Take 50 mg by mouth every day. 90 Tablet 3    promethazine (PHENERGAN) 25 MG Tab Take 1 Tablet by mouth every 6 hours as needed for Nausea/Vomiting. 90 Tablet 3    HYDROcodone-acetaminophen (NORCO) 7.5-325 MG per tablet Take 1 Tablet by mouth every 8 hours as needed for Moderate Pain.      hydrOXYzine HCl (ATARAX) 25 MG Tab Take 25 mg by mouth at bedtime.      latanoprost  "(XALATAN) 0.005 % Solution Administer 1 Drop into both eyes every evening.      acetaminophen (TYLENOL) 500 MG TABS Take 500 mg by mouth 3 times a day as needed for Mild Pain. Indications: Pain       No current facility-administered medications for this visit.       Allergies:   Cefdinir and Food    Social History     Tobacco Use    Smoking status: Every Day     Current packs/day: 1.00     Average packs/day: 1 pack/day for 56.0 years (56.0 ttl pk-yrs)     Types: Cigarettes    Smokeless tobacco: Never    Tobacco comments:     started smoking at age 12, little mor than half a pack   Vaping Use    Vaping status: Never Used   Substance Use Topics    Alcohol use: No    Drug use: No       ROS:  Negative, except as noted above    Objective:     Vitals:    05/07/25 1101 05/07/25 1119   BP: (!) 152/58 118/48   BP Location: Right arm    Patient Position: Sitting    BP Cuff Size: Adult    Pulse: 85    Temp: 36 °C (96.8 °F)    TempSrc: Temporal    SpO2: 98%    Weight: 81.2 kg (179 lb)    Height: 1.575 m (5' 2\")      Body mass index is 32.74 kg/m².    Physical Exam:  Physical Exam  Constitutional:       Appearance: Normal appearance.   Cardiovascular:      Rate and Rhythm: Normal rate and regular rhythm.      Heart sounds: Normal heart sounds.   Pulmonary:      Effort: Pulmonary effort is normal. No respiratory distress.      Breath sounds: Normal breath sounds. No stridor. No wheezing, rhonchi or rales.   Musculoskeletal:      Comments: Cervical muscle tightness appreciated   Neurological:      Mental Status: She is alert.           Assessment and Plan:   1. Hospital discharge follow-up (Primary)  Patient was hospitalized from 5/2/2025 to 5/4/2025 for a rash and shortness of breath.  Patient had chest x-ray completed showing early left lower lung pneumonia.  Patient was started on Unasyn for her pneumonia and as well as for a urinary tract infection.  Initially patient was on oxygen but was weaned off on hospital day #2.  " Patient also developed an SHARIFA which was improving.  Patient received IV Lasix for lower extremity edema and shortness of breath with exertion.  Patient reports that she has completed her 3-day prescription of Augmentin however she continues to feel weak.  She is still has shortness of breath with physical exertion.  Patient was advised to rest and take it easy for the next few days and allow her body to recover.  She is to continue to use her inhaler daily.  If her shortness of breath worsens patient was advised to notify us.    2. Acute renal failure superimposed on stage 3b chronic kidney disease, unspecified acute renal failure type (HCC)  Acute on chronic.  Patient has stage IIIb chronic kidney disease with an SHARIFA.  Patient is scheduled to see her cardiologist in 6/2025 to discuss dosing of torsemide.  Patient to repeat BMP on 5/12/2025 to check for improvement in kidney function.  - Basic Metabolic Panel; Future    3. Essential hypertension  Chronic, controlled.  Blood pressure at goal today.    4. Neck pain  Chronic, ongoing.  Patient had a HENRIETTA completed several weeks ago and since then she is having neck pain.  On exam cervical muscle tightness appreciated.  Patient was advised to apply warm compresses and to do light neck stretches.  We did discuss muscle relaxant however due to patient's age and side effects of muscle relaxants we will hold off.      - Chart and discharge summary were reviewed.   - Hospitalization and results reviewed with patient.   - Medications reviewed including instructions regarding high risk medications, dosing and side effects.  - Recommended Services: No services needed at this time  - Advance directive/POLST on file?  Yes    Follow-up:Return if symptoms worsen or fail to improve.    Face-to-face transitional care management services with [facility] (today's visit is within 14 days post discharge & LACE+ score of 28-58) medical decision complexity were provided.

## 2025-05-09 ENCOUNTER — TELEPHONE (OUTPATIENT)
Dept: CARDIOLOGY | Facility: MEDICAL CENTER | Age: 85
End: 2025-05-09
Payer: MEDICARE

## 2025-05-09 ENCOUNTER — PATIENT MESSAGE (OUTPATIENT)
Dept: CARDIOLOGY | Facility: MEDICAL CENTER | Age: 85
End: 2025-05-09
Payer: MEDICARE

## 2025-05-12 ENCOUNTER — HOSPITAL ENCOUNTER (OUTPATIENT)
Dept: LAB | Facility: MEDICAL CENTER | Age: 85
End: 2025-05-12
Attending: STUDENT IN AN ORGANIZED HEALTH CARE EDUCATION/TRAINING PROGRAM
Payer: MEDICARE

## 2025-05-12 DIAGNOSIS — N18.32 ACUTE RENAL FAILURE SUPERIMPOSED ON STAGE 3B CHRONIC KIDNEY DISEASE, UNSPECIFIED ACUTE RENAL FAILURE TYPE (HCC): ICD-10-CM

## 2025-05-12 DIAGNOSIS — N17.9 ACUTE RENAL FAILURE SUPERIMPOSED ON STAGE 3B CHRONIC KIDNEY DISEASE, UNSPECIFIED ACUTE RENAL FAILURE TYPE (HCC): ICD-10-CM

## 2025-05-12 PROCEDURE — 36415 COLL VENOUS BLD VENIPUNCTURE: CPT

## 2025-05-12 PROCEDURE — 80048 BASIC METABOLIC PNL TOTAL CA: CPT

## 2025-05-13 ENCOUNTER — RESULTS FOLLOW-UP (OUTPATIENT)
Dept: MEDICAL GROUP | Facility: PHYSICIAN GROUP | Age: 85
End: 2025-05-13

## 2025-05-13 LAB
ANION GAP SERPL CALC-SCNC: 17 MMOL/L (ref 7–16)
BUN SERPL-MCNC: 30 MG/DL (ref 8–22)
CALCIUM SERPL-MCNC: 9.2 MG/DL (ref 8.5–10.5)
CHLORIDE SERPL-SCNC: 104 MMOL/L (ref 96–112)
CO2 SERPL-SCNC: 17 MMOL/L (ref 20–33)
CREAT SERPL-MCNC: 1.94 MG/DL (ref 0.5–1.4)
GFR SERPLBLD CREATININE-BSD FMLA CKD-EPI: 25 ML/MIN/1.73 M 2
GLUCOSE SERPL-MCNC: 122 MG/DL (ref 65–99)
POTASSIUM SERPL-SCNC: 4.4 MMOL/L (ref 3.6–5.5)
SODIUM SERPL-SCNC: 138 MMOL/L (ref 135–145)

## 2025-05-14 ENCOUNTER — OFFICE VISIT (OUTPATIENT)
Dept: CARDIOLOGY | Facility: MEDICAL CENTER | Age: 85
End: 2025-05-14
Payer: MEDICARE

## 2025-05-14 VITALS
HEIGHT: 62 IN | DIASTOLIC BLOOD PRESSURE: 70 MMHG | RESPIRATION RATE: 16 BRPM | OXYGEN SATURATION: 96 % | BODY MASS INDEX: 31.47 KG/M2 | SYSTOLIC BLOOD PRESSURE: 100 MMHG | WEIGHT: 171 LBS | HEART RATE: 74 BPM

## 2025-05-14 DIAGNOSIS — I48.0 HYPERCOAGULABLE STATE DUE TO PAROXYSMAL ATRIAL FIBRILLATION (HCC): Primary | ICD-10-CM

## 2025-05-14 DIAGNOSIS — I35.1 AORTIC VALVE INSUFFICIENCY, ETIOLOGY OF CARDIAC VALVE DISEASE UNSPECIFIED: ICD-10-CM

## 2025-05-14 DIAGNOSIS — D68.69 HYPERCOAGULABLE STATE DUE TO PAROXYSMAL ATRIAL FIBRILLATION (HCC): Primary | ICD-10-CM

## 2025-05-14 DIAGNOSIS — I35.1 NONRHEUMATIC AORTIC VALVE INSUFFICIENCY: ICD-10-CM

## 2025-05-14 DIAGNOSIS — I48.0 PAROXYSMAL ATRIAL FIBRILLATION (HCC): ICD-10-CM

## 2025-05-14 DIAGNOSIS — R60.0 EDEMA LEG: ICD-10-CM

## 2025-05-14 DIAGNOSIS — E78.2 MIXED HYPERLIPIDEMIA: ICD-10-CM

## 2025-05-14 DIAGNOSIS — I10 ESSENTIAL HYPERTENSION: ICD-10-CM

## 2025-05-14 DIAGNOSIS — I35.0 NONRHEUMATIC AORTIC VALVE STENOSIS: ICD-10-CM

## 2025-05-14 PROCEDURE — 3074F SYST BP LT 130 MM HG: CPT

## 2025-05-14 PROCEDURE — 99212 OFFICE O/P EST SF 10 MIN: CPT

## 2025-05-14 PROCEDURE — 99214 OFFICE O/P EST MOD 30 MIN: CPT

## 2025-05-14 PROCEDURE — 3078F DIAST BP <80 MM HG: CPT

## 2025-05-14 RX ORDER — TORSEMIDE 20 MG/1
20 TABLET ORAL DAILY
Qty: 90 TABLET | Refills: 3 | Status: SHIPPED | OUTPATIENT
Start: 2025-05-14

## 2025-05-14 ASSESSMENT — ENCOUNTER SYMPTOMS
SYNCOPE: 0
PND: 0
HEADACHES: 0
NEAR-SYNCOPE: 0
ORTHOPNEA: 0
PALPITATIONS: 0
SHORTNESS OF BREATH: 0
LIGHT-HEADEDNESS: 0
DIZZINESS: 0

## 2025-05-14 ASSESSMENT — FIBROSIS 4 INDEX: FIB4 SCORE: 2.66

## 2025-05-14 NOTE — PROGRESS NOTES
Chief Complaint   Patient presents with    Hypertension     Follow up           Subjective:   Margaret Garcia is a 84 y.o. female who presents today for follow-up.     Patient of Dr. Babb.  Current medical problems include COPD, SHABANA, paroxysmal AF, HTN, mild to moderate aortic stenosis, mild mitral regurgitation. Their last clinic visit was 3/28/2025 with Dr. Babb    Patient has most recently been seen in the ED at [facility] due to increased swelling in her BLE and hip fracture. She was found to have an elevated NT proBNP of 2100. She was treated with diuretics and discharged home to follow up outpatient with PCP and cardiology.     Patient was recently hospitalized from 5/2/2025-5/4/2025 after presenting ot the ED with shortness of breath. She was started prior on antibiotics for a UTI. During hospitalization she was found to have pneumonia. She was treated with ABX. She was also diuresed which she developed SHARIFA. She was discharged home    1/8/2025 visit:  Patient reports since her last ED visit she has continued to have shortness of breath with exertion. She is only able to walk short distances without getting short of breath. She also continues to have bilateral lower leg edema. She says when she exerts herself and gets short of breath she does get a chest pain but it resolves quickly at rest. She denies palpitations, orthopnea, PND, or syncope. She does report some lightheadedness/dizziness associated with moving from sitting to standing. She is not currently weighing herself at home. She has an appointment with [facility] for the pelvic fx but is currently walking with a walker. She says she has tried her inhaler for her shortness of breath and it has not helped. She is still currently smoking.     1/22/2025 visit:  Patient reports since our last follow up she has seen an improvement in her lower extremity edema. She says that she thinks her shortness of breath has minimally improved. She also reports slight  improvement in her lightheadedness/dizziness but is still having symptoms. She denies chest pain, palpitations, orthopnea, PND, or syncope. She has followed up with [facility] and is doing physical therapy exercises at home. She is doing them daily and sometimes twice a day. She is weighing herself at home but she thinks her prior scale was wrong and just recently her daughter bought her a new scale. She says she has been 174-177 lbs. She still has edema on her bilateral lower extremities. She is taking her medications as prescribed. She has not been taking her blood pressure at home.     3/11/2025 visit:  Patient reports since discharging from the hospital she has been feeling better. She is done with antibiotics for her UTI and PNA. She has been taking 20 mg daily of torsemide and reports swelling is improved and weights have been stable at 172 lbs. She still reports a good response from her diuretic. She has no chest pain, shortness of breath, orthopnea, PND, dizziness/lightheadedness, or palpitations. She had recently followed up with her nephrologist Dr. Najjar and at that time was stable and recommended 6 month follow up.     Today's visit:  She reports she is feeling well and much better from when she was in the hospital. She has been taking her medications as prescribed Her weight at home is 172 lbs. She denies chest pain, shortness of breath, orthopnea, PND, dizziness or palpitations. Her eliquis does has been adjusted due to her age and kidney function.     Cardiovascular Risk Factors:  1. Smoking status: Current smoker  2. Type II Diabetes Mellitus: No   Lab Results   Component Value Date/Time    HBA1C 5.5 04/05/2024 08:42 AM    HBA1C 5.7 (H) 05/28/2011 10:34 PM     3. Hypertension: Yes  4. 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[facility])     Arrhythmia     Arthritis     knees, and hips-osteo    ASTHMA     Body mass index 40.0-44.9, adult (Formerly [facility]) 04/27/2018    Breath shortness     Chronic renal impairment, stage 3 (moderate) 05/13/2019    COPD (chronic obstructive pulmonary disease) with chronic bronchitis (Former[facility]) 04/20/2021    Dental disorder     upper partial    Dysuria 10/18/2016    Fall     Generalized edema 04/27/2018    Glaucoma     bilateral    Hay fever 04/19/2016    Heart burn     High cholesterol     Hx: UTI (urinary tract infection)     Hyperglycemia 11/24/2015    Hypertension     Hypothyroidism     Indigestion     Ischemic bowel disease ([facility]) 09/09/2019    Migraines     pt has similar symptoms with migraines not for a long time though    Nausea 10/14/2019    Neuropathy (Formerly [facility])     Obesity (BMI 30-39.9) 07/20/2015    Pneumonia     Routine general medical examination at a health care facility 07/20/2015    7/20/15    Sleep apnea 12/2019    Had sleep study, didn't tolerate cpap    Snoring     Syncope 06/05/2020    Tobacco use 04/29/2019    URI (upper respiratory infection) 11/04/2015    Urinary bladder disorder     hx of uti's         Family History   Problem Relation Age of Onset    Stroke Mother     Hyperlipidemia Mother     Hypertension Mother     Arthritis Mother     Diabetes Father     Lung Disease Father         pneumonia    Arthritis Sister     Rheumatologic Disease Sister     Hypertension Sister     Hyperlipidemia Sister     Other Sister         Anusha/Fibermyalgia    Hyperlipidemia Brother     Hypertension Brother     Arthritis Brother     Lung Disease Maternal Grandmother         pneumonia    Stroke Maternal Grandfather     Cancer Maternal Grandfather         skin     No Known Problems Paternal Grandmother     No Known Problems Paternal Grandfather      Hyperlipidemia Daughter     Diabetes Daughter     No Known Problems Son          Social History     Tobacco Use    Smoking status: Every Day     Current packs/day: 1.00     Average packs/day: 1 pack/day for 56.0 years (56.0 ttl pk-yrs)     Types: Cigarettes    Smokeless tobacco: Never    Tobacco comments:     started smoking at age 12, little mor than half a pack   Vaping Use    Vaping status: Never Used   Substance Use Topics    Alcohol use: No    Drug use: No         Allergies   Allergen Reactions    Cefdinir Shortness of Breath and Rash     some redness in her legs and SOB     Food Hives and Nausea     Oranges, Hives         Current Outpatient Medications   Medication Sig    torsemide (DEMADEX) 20 MG Tab Take 1 Tablet by mouth every day.    apixaban (ELIQUIS) 2.5mg Tab Take 1 Tablet by mouth 2 times a day.    atorvastatin (LIPITOR) 10 MG Tab TAKE 1 TABLET BY MOUTH EVERY DAY    Thiamine HCl (VITAMIN B1 PO) Take 1 Tablet by mouth every day.    Cyanocobalamin (B-12 PO) Take 1 Tablet by mouth every day.    potassium chloride SA (K-DUR) 10 MEQ Tab CR Take 1 Tablet by mouth every day.    levothyroxine (SYNTHROID) 100 MCG Tab Take 1 Tablet by mouth every morning on an empty stomach.    pantoprazole (PROTONIX) 20 MG tablet Take 20 mg by mouth 2 times a day.    fluticasone furoate-vilanterol (BREO ELLIPTA) 200-25 MCG/ACT AEROSOL POWDER, BREATH ACTIVATED Inhale 1 Puff every day.    nystatin (MYCOSTATIN) powder Apply 1 g topically 3 times a day.    nystatin (MYCOSTATIN) 596764 UNIT/GM Cream topical cream Apply 1 g topically 2 times a day.    fenofibrate (TRIGLIDE) 160 MG tablet TAKE 1 TABLET BY MOUTH EVERY DAY    Mirabegron ER (MYRBETRIQ) 50 MG TABLET SR 24 HR Take 50 mg by mouth every day.    promethazine (PHENERGAN) 25 MG Tab Take 1 Tablet by mouth every 6 hours as needed for Nausea/Vomiting.    HYDROcodone-acetaminophen (NORCO) 7.5-325 MG per tablet Take 1 Tablet by mouth every 8 hours as needed for Moderate Pain.     "hydrOXYzine HCl (ATARAX) 25 MG Tab Take 25 mg by mouth at bedtime.    latanoprost (XALATAN) 0.005 % Solution Administer 1 Drop into both eyes every evening.    acetaminophen (TYLENOL) 500 MG TABS Take 500 mg by mouth 3 times a day as needed for Mild Pain. Indications: Pain    cephALEXin (KEFLEX) 500 MG Cap Take 1 Capsule by mouth every day for 7 days.    Finerenone 10 MG Tab Take 1 Tablet by mouth every day.         Review of Systems   Constitutional: Negative for malaise/fatigue.   Cardiovascular:  Positive for leg swelling. Negative for chest pain, dyspnea on exertion, near-syncope, orthopnea, palpitations, paroxysmal nocturnal dyspnea and syncope.   Respiratory:  Negative for shortness of breath.    Neurological:  Negative for dizziness, headaches and light-headedness.           Objective:   /70 (BP Location: Left arm, Patient Position: Sitting)   Pulse 74   Resp 16   Ht 1.575 m (5' 2\")   Wt 77.6 kg (171 lb)   SpO2 96%  Body mass index is 31.28 kg/m².         Physical Exam  Constitutional:       General: She is not in acute distress.  HENT:      Head: Normocephalic and atraumatic.   Cardiovascular:      Rate and Rhythm: Normal rate and regular rhythm.      Pulses: Normal pulses.      Heart sounds: Murmur heard.   Pulmonary:      Effort: Pulmonary effort is normal. No respiratory distress.      Breath sounds: Normal breath sounds.   Musculoskeletal:      Right lower le+ Edema present.      Left lower le+ Edema present.   Neurological:      Mental Status: She is alert and oriented to person, place, and time.      Gait: Gait normal.   Psychiatric:         Behavior: Behavior normal.             Lab Results   Component Value Date/Time    SODIUM 138 2025 10:52 AM    POTASSIUM 4.4 2025 10:52 AM    CHLORIDE 104 2025 10:52 AM    CO2 17 (L) 2025 10:52 AM    GLUCOSE 122 (H) 2025 10:52 AM    BUN 30 (H) 2025 10:52 AM    CREATININE 1.94 (H) 2025 10:52 AM      Lab " "Results   Component Value Date/Time    WBC 5.2 05/04/2025 12:58 AM    RBC 3.51 (L) 05/04/2025 12:58 AM    HEMOGLOBIN 10.0 (L) 05/04/2025 12:58 AM    HEMATOCRIT 30.9 (L) 05/04/2025 12:58 AM    MCV 88.0 05/04/2025 12:58 AM    MCH 28.5 05/04/2025 12:58 AM    MCHC 32.4 05/04/2025 12:58 AM    MPV 9.3 05/04/2025 12:58 AM    NEUTSPOLYS 95.00 (H) 05/02/2025 02:00 PM    LYMPHOCYTES 2.50 (L) 05/02/2025 02:00 PM    MONOCYTES 1.10 05/02/2025 02:00 PM    EOSINOPHILS 0.60 05/02/2025 02:00 PM    BASOPHILS 0.30 05/02/2025 02:00 PM    HYPOCHROMIA 1+ 03/05/2012 07:30 PM      Lab Results   Component Value Date/Time    CHOLSTRLTOT 113 04/05/2024 08:42 AM    LDL 53 04/05/2024 08:42 AM    HDL 39 (A) 04/05/2024 08:42 AM    TRIGLYCERIDE 105 04/05/2024 08:42 AM       Lab Results   Component Value Date/Time    TROPONINT 34 (H) 09/22/2022 1349     No results found for: \"NTPROBNP\"  Assessment:   1. Edema leg  - torsemide (DEMADEX) 20 MG Tab; Take 1 Tablet by mouth every day.  Dispense: 90 Tablet; Refill: 3    2. Aortic valve insufficiency, etiology of cardiac valve disease unspecified  - torsemide (DEMADEX) 20 MG Tab; Take 1 Tablet by mouth every day.  Dispense: 90 Tablet; Refill: 3    3. Hypercoagulable state due to paroxysmal atrial fibrillation (HCC)    4. Paroxysmal atrial fibrillation (HCC)    5. Nonrheumatic aortic valve stenosis    6. Nonrheumatic aortic valve insufficiency    7. Essential hypertension    8. Mixed hyperlipidemia              Medical Decision Making:  Today's Assessment / Plan:   Mild aortic valve stenosis  Moderate aortic insufficiency  Dyspnea on exertion  -stable since recent hospitalization  -continue torsemide 20 mg daily with potassium  -Continue to monitor weight and symptoms of shortness of breath and swelling  -Patient blood pressure low. Can consider spironolactone at next follow up if patient can tolerate.  -recent HENRIETTA showed moderate AI and Mild AS    Atrial fibrillation   Hypercoagulable state due to atrial " fibrillation  -Patient does not feel when she goes in and out of atrial fibrillation but denies known reoccurrence   -Continue on eliquis 5 mg twice a day for stroke prevention  -Family asking about eliquis due to risk of bleeding with falls. Discussed option to repeat cardiac event monitor to see if having atrial fibrillation as well as risk of stroke not on blood thinner. Patient and family wanting to stay on anticoagulation at this time and not wanting to repeat cardiac event monitor. Discussed can always do monitor in the future.    Hypertension  - Good control   -continue torsemide 20 mg daily with potatssium  - goal < 130/80  -Continue to monitor blood pressure at home and keep a log    Hyperlipidemia  -Most recent LDL 53  -Continue atorvastatin 10 mg daily  -Goal of less than 70  -Check lipid panel in 12 months      Return in about 3 months (around 8/14/2025) for Sonja GARCIA.  Sooner if problems.    CALI Galo.

## 2025-05-19 ENCOUNTER — HOSPITAL ENCOUNTER (OUTPATIENT)
Facility: MEDICAL CENTER | Age: 85
End: 2025-05-19
Attending: STUDENT IN AN ORGANIZED HEALTH CARE EDUCATION/TRAINING PROGRAM
Payer: MEDICARE

## 2025-05-19 ENCOUNTER — APPOINTMENT (OUTPATIENT)
Dept: MEDICAL GROUP | Facility: PHYSICIAN GROUP | Age: 85
End: 2025-05-19
Payer: MEDICARE

## 2025-05-19 ENCOUNTER — OFFICE VISIT (OUTPATIENT)
Dept: MEDICAL GROUP | Facility: PHYSICIAN GROUP | Age: 85
End: 2025-05-19
Payer: MEDICARE

## 2025-05-19 VITALS
BODY MASS INDEX: 31.82 KG/M2 | WEIGHT: 172.9 LBS | HEIGHT: 62 IN | HEART RATE: 73 BPM | TEMPERATURE: 98 F | SYSTOLIC BLOOD PRESSURE: 118 MMHG | OXYGEN SATURATION: 97 % | RESPIRATION RATE: 22 BRPM | DIASTOLIC BLOOD PRESSURE: 54 MMHG

## 2025-05-19 DIAGNOSIS — N30.01 ACUTE CYSTITIS WITH HEMATURIA: Primary | ICD-10-CM

## 2025-05-19 DIAGNOSIS — M54.2 NECK PAIN: ICD-10-CM

## 2025-05-19 DIAGNOSIS — R30.0 DYSURIA: ICD-10-CM

## 2025-05-19 DIAGNOSIS — N18.4 STAGE 4 CHRONIC KIDNEY DISEASE (HCC): ICD-10-CM

## 2025-05-19 LAB
APPEARANCE UR: NORMAL
BILIRUB UR STRIP-MCNC: NEGATIVE MG/DL
COLOR UR AUTO: YELLOW
GLUCOSE UR STRIP.AUTO-MCNC: NEGATIVE MG/DL
KETONES UR STRIP.AUTO-MCNC: NEGATIVE MG/DL
LEUKOCYTE ESTERASE UR QL STRIP.AUTO: NORMAL
NITRITE UR QL STRIP.AUTO: NEGATIVE
PH UR STRIP.AUTO: 5.5 [PH] (ref 5–8)
PROT UR QL STRIP: 30 MG/DL
RBC UR QL AUTO: NORMAL
SP GR UR STRIP.AUTO: 1.01
UROBILINOGEN UR STRIP-MCNC: 0.2 MG/DL

## 2025-05-19 PROCEDURE — 3074F SYST BP LT 130 MM HG: CPT | Performed by: STUDENT IN AN ORGANIZED HEALTH CARE EDUCATION/TRAINING PROGRAM

## 2025-05-19 PROCEDURE — 3078F DIAST BP <80 MM HG: CPT | Performed by: STUDENT IN AN ORGANIZED HEALTH CARE EDUCATION/TRAINING PROGRAM

## 2025-05-19 PROCEDURE — 87077 CULTURE AEROBIC IDENTIFY: CPT

## 2025-05-19 PROCEDURE — 99214 OFFICE O/P EST MOD 30 MIN: CPT | Performed by: STUDENT IN AN ORGANIZED HEALTH CARE EDUCATION/TRAINING PROGRAM

## 2025-05-19 PROCEDURE — 87186 SC STD MICRODIL/AGAR DIL: CPT

## 2025-05-19 PROCEDURE — 81002 URINALYSIS NONAUTO W/O SCOPE: CPT | Performed by: STUDENT IN AN ORGANIZED HEALTH CARE EDUCATION/TRAINING PROGRAM

## 2025-05-19 PROCEDURE — 87086 URINE CULTURE/COLONY COUNT: CPT

## 2025-05-19 RX ORDER — CEPHALEXIN 500 MG/1
500 CAPSULE ORAL DAILY
Qty: 7 CAPSULE | Refills: 0 | Status: SHIPPED | OUTPATIENT
Start: 2025-05-19 | End: 2025-05-26

## 2025-05-19 ASSESSMENT — FIBROSIS 4 INDEX: FIB4 SCORE: 2.66

## 2025-05-19 ASSESSMENT — PATIENT HEALTH QUESTIONNAIRE - PHQ9: CLINICAL INTERPRETATION OF PHQ2 SCORE: 0

## 2025-05-19 NOTE — PROGRESS NOTES
Subjective:   Verbal consent was acquired by the patient to use [employer] ambient listening note generation during this visit Yes     CC: UTI symptoms    History of Present Illness  Ms. Garcia is a pleasant 85 yo who established patient of Dr. Moreno who presents for evaluation of neck pain and a suspected urinary tract infection (UTI).    She reports experiencing neck pain following an endoscopic procedure. The pain is intermittent, with periods of relief lasting a few days before the discomfort returns. Maintaining a specific position in her recliner alleviates the pain, but any deviation from this position results in a resurgence of discomfort. She has been advised by her pain management specialist to increase her analgesic intake and has found temporary relief through the application of warmth.    She suspects a kidney infection, as evidenced by dysuria at the conclusion of urination. She reports no systemic symptoms such as fever, nausea, or vomiting. Her urinary frequency is increased due to diuretic use. She notes that her current symptoms are reminiscent of a previous UTI she experienced during a hospital stay in 04/2025.     Patient Active Problem List    Diagnosis Date Noted    Acute heart failure with preserved ejection fraction (HFpEF) ([facility]) 05/03/2025    Pneumonia due to infectious agent 05/02/2025    Rash 05/02/2025    Candida rash of groin 05/02/2025    Edema leg 03/28/2025    Personal history of other colon polyps 01/28/2025    Closed fracture of superior ramus of pubis ([facility]) 01/07/2025    Osteopenia of multiple sites 11/09/2024    Mitral regurgitation 10/01/2024    Aortic atherosclerosis ([facility]) 10/01/2024    Delayed gastric emptying 04/10/2024    Gastro-esophageal reflux disease without esophagitis 04/10/2024    Aortic valve regurgitation 09/21/2023    Venous insufficiency 09/21/2023    Atrophic vaginitis 05/08/2023    Secondary hypercoagulable state ([facility]) 09/20/2022    Other emphysema ([facility])  "08/02/2021    Paroxysmal atrial fibrillation (HCC) 07/30/2021    Stage 3b chronic kidney disease 07/23/2021    Renal insufficiency syndrome 02/18/2021    Urge incontinence of urine 12/13/2020    Nonrheumatic aortic valve stenosis 09/16/2020    Chronic midline low back pain without sciatica 09/02/2020    Ischemic bowel disease (HCC) 09/09/2019    Tobacco use 04/29/2019    Generalized edema 04/27/2018    Hyperlipidemia 11/24/2015    Essential hypertension 11/24/2015    Obesity (BMI 30-39.9) 07/20/2015    Sleep apnea 07/20/2015    Dementia (HCC) 05/29/2011    Hypothyroidism 05/29/2011    Peripheral neuropathy 05/29/2011     Health Maintenance: Completed    ROS: Negative, except as noted above      Objective:     Exam:  /54 (BP Location: Left arm, Patient Position: Sitting, BP Cuff Size: Adult)   Pulse 73   Temp 36.7 °C (98 °F) (Temporal)   Resp (!) 22   Ht 1.575 m (5' 2\") Comment: pt reported  Wt 78.4 kg (172 lb 14.4 oz)   SpO2 97%   BMI 31.62 kg/m²  Body mass index is 31.62 kg/m².    Physical Exam  Constitutional:       Appearance: Normal appearance.   Cardiovascular:      Rate and Rhythm: Normal rate and regular rhythm.      Heart sounds: Normal heart sounds.   Pulmonary:      Effort: Pulmonary effort is normal. No respiratory distress.      Breath sounds: Normal breath sounds. No stridor. No wheezing, rhonchi or rales.   Neurological:      Mental Status: She is alert.               Assessment & Plan:     84 y.o. female with the following -     1. Acute cystitis with hematuria (Primary)  2. Dysuria  Acute, ongoing.  Symptoms include pain with urination, particularly at the end, and frequent urination due to diuretic use. A urine test confirmed the presence of leukocyte esterase and a small amount of blood, indicating a UTI. A urine culture will be sent for further analysis to identify the specific bacteria causing the infection. Based on kidney function, Keflex 500 mg once daily for 7 days has been " prescribed and sent to pharmacy. She is advised to maintain adequate hydration and consider cranberry juice as a supplementary measure. If the culture results necessitate a change in antibiotics, she will be notified accordingly.  - cephALEXin (KEFLEX) 500 MG Cap; Take 1 Capsule by mouth every day for 7 days.  Dispense: 7 Capsule; Refill: 0  - URINE CULTURE  - POCT Urinalysis    3. Neck pain  Chronic, ongoing. The neck pain appears to be positional, exacerbated by certain positions and relieved by others. Anti-inflammatory medications and muscle relaxants are not recommended due to kidney function concerns. She is advised to perform light stretches to help loosen the muscles and to avoid staying in one position for extended periods. Physical therapy is also suggested as a potential treatment option. She should follow the pain management plan provided by her pain doctor, which includes taking an extra pain pill as needed.    4. Stage 4 chronic kidney disease (HCC)  Chronic, stable.  GFR from 5/12/2025 was 25 and creatinine and BUN were 1.94 and 30 respectively.  Patient's creatinine clearance was 20, based on patient's creatinine clearance Keflex was dosed as 500 mg once daily for 7 days.        Return if symptoms worsen or fail to improve.    Please note that this dictation was created using voice recognition software. I have made every reasonable attempt to correct obvious errors, but I expect that there are errors of grammar and possibly content that I did not discover before finalizing the note.

## 2025-05-20 ASSESSMENT — ENCOUNTER SYMPTOMS: DYSPNEA ON EXERTION: 0

## 2025-05-21 ENCOUNTER — RESULTS FOLLOW-UP (OUTPATIENT)
Dept: MEDICAL GROUP | Facility: PHYSICIAN GROUP | Age: 85
End: 2025-05-21

## 2025-05-22 ENCOUNTER — HOSPITAL ENCOUNTER (OUTPATIENT)
Dept: RADIOLOGY | Facility: MEDICAL CENTER | Age: 85
End: 2025-05-22
Attending: STUDENT IN AN ORGANIZED HEALTH CARE EDUCATION/TRAINING PROGRAM
Payer: MEDICARE

## 2025-05-22 DIAGNOSIS — M47.812 CERVICAL SPONDYLOSIS WITHOUT MYELOPATHY: ICD-10-CM

## 2025-05-22 PROCEDURE — 72040 X-RAY EXAM NECK SPINE 2-3 VW: CPT

## 2025-06-18 ENCOUNTER — PATIENT MESSAGE (OUTPATIENT)
Dept: MEDICAL GROUP | Facility: PHYSICIAN GROUP | Age: 85
End: 2025-06-18
Payer: MEDICARE

## 2025-06-23 PROCEDURE — RXMED WILLOW AMBULATORY MEDICATION CHARGE: Performed by: INTERNAL MEDICINE

## 2025-06-24 DIAGNOSIS — N18.32 STAGE 3B CHRONIC KIDNEY DISEASE: Primary | ICD-10-CM

## 2025-06-25 ENCOUNTER — PHARMACY VISIT (OUTPATIENT)
Dept: PHARMACY | Facility: MEDICAL CENTER | Age: 85
End: 2025-06-25
Payer: COMMERCIAL

## 2025-06-28 ENCOUNTER — PATIENT MESSAGE (OUTPATIENT)
Dept: MEDICAL GROUP | Facility: PHYSICIAN GROUP | Age: 85
End: 2025-06-28
Payer: MEDICARE

## 2025-06-28 DIAGNOSIS — N39.41 URGE INCONTINENCE OF URINE: ICD-10-CM

## 2025-06-30 RX ORDER — MIRABEGRON 50 MG/1
50 TABLET, FILM COATED, EXTENDED RELEASE ORAL
Qty: 90 TABLET | Refills: 3 | Status: SHIPPED | OUTPATIENT
Start: 2025-06-30

## 2025-07-07 ENCOUNTER — APPOINTMENT (OUTPATIENT)
Dept: LAB | Facility: MEDICAL CENTER | Age: 85
End: 2025-07-07
Payer: MEDICARE

## 2025-07-07 DIAGNOSIS — N18.32 STAGE 3B CHRONIC KIDNEY DISEASE: ICD-10-CM

## 2025-07-07 LAB
ANION GAP SERPL CALC-SCNC: 13 MMOL/L (ref 7–16)
BUN SERPL-MCNC: 30 MG/DL (ref 8–22)
CALCIUM SERPL-MCNC: 9.2 MG/DL (ref 8.5–10.5)
CHLORIDE SERPL-SCNC: 105 MMOL/L (ref 96–112)
CO2 SERPL-SCNC: 20 MMOL/L (ref 20–33)
CREAT SERPL-MCNC: 1.65 MG/DL (ref 0.5–1.4)
GFR SERPLBLD CREATININE-BSD FMLA CKD-EPI: 30 ML/MIN/1.73 M 2
GLUCOSE SERPL-MCNC: 113 MG/DL (ref 65–99)
POTASSIUM SERPL-SCNC: 3.6 MMOL/L (ref 3.6–5.5)
SODIUM SERPL-SCNC: 138 MMOL/L (ref 135–145)

## 2025-07-07 PROCEDURE — 80048 BASIC METABOLIC PNL TOTAL CA: CPT

## 2025-07-07 PROCEDURE — 36415 COLL VENOUS BLD VENIPUNCTURE: CPT

## 2025-07-14 ENCOUNTER — OFFICE VISIT (OUTPATIENT)
Dept: MEDICAL GROUP | Facility: PHYSICIAN GROUP | Age: 85
End: 2025-07-14
Payer: MEDICARE

## 2025-07-14 VITALS
SYSTOLIC BLOOD PRESSURE: 118 MMHG | BODY MASS INDEX: 33.23 KG/M2 | HEART RATE: 84 BPM | HEIGHT: 62 IN | OXYGEN SATURATION: 97 % | WEIGHT: 180.56 LBS | DIASTOLIC BLOOD PRESSURE: 52 MMHG | TEMPERATURE: 98.1 F

## 2025-07-14 DIAGNOSIS — I10 ESSENTIAL HYPERTENSION: Chronic | ICD-10-CM

## 2025-07-14 DIAGNOSIS — R60.0 EDEMA LEG: ICD-10-CM

## 2025-07-14 DIAGNOSIS — N18.32 STAGE 3B CHRONIC KIDNEY DISEASE: ICD-10-CM

## 2025-07-14 DIAGNOSIS — I35.1 AORTIC VALVE INSUFFICIENCY, ETIOLOGY OF CARDIAC VALVE DISEASE UNSPECIFIED: ICD-10-CM

## 2025-07-14 DIAGNOSIS — I87.2 VENOUS INSUFFICIENCY: ICD-10-CM

## 2025-07-14 DIAGNOSIS — E03.9 HYPOTHYROIDISM, UNSPECIFIED TYPE: Primary | Chronic | ICD-10-CM

## 2025-07-14 PROBLEM — I50.31 ACUTE HEART FAILURE WITH PRESERVED EJECTION FRACTION (HFPEF) (HCC): Status: RESOLVED | Noted: 2025-05-03 | Resolved: 2025-07-14

## 2025-07-14 PROCEDURE — 3074F SYST BP LT 130 MM HG: CPT | Performed by: STUDENT IN AN ORGANIZED HEALTH CARE EDUCATION/TRAINING PROGRAM

## 2025-07-14 PROCEDURE — 99214 OFFICE O/P EST MOD 30 MIN: CPT | Performed by: STUDENT IN AN ORGANIZED HEALTH CARE EDUCATION/TRAINING PROGRAM

## 2025-07-14 PROCEDURE — 3078F DIAST BP <80 MM HG: CPT | Performed by: STUDENT IN AN ORGANIZED HEALTH CARE EDUCATION/TRAINING PROGRAM

## 2025-07-14 RX ORDER — TORSEMIDE 20 MG/1
40 TABLET ORAL DAILY
Qty: 180 TABLET | Refills: 3 | Status: SHIPPED | OUTPATIENT
Start: 2025-07-14

## 2025-07-14 ASSESSMENT — FIBROSIS 4 INDEX: FIB4 SCORE: 2.66

## 2025-07-14 NOTE — PROGRESS NOTES
"Subjective:     Chief Complaint   Patient presents with    Follow-Up       History of Present Illness  The patient presents for a 3-month checkup.    She reports feeling well overall. Kidney function has improved slightly, with creatinine levels decreasing from 1.94 to 1.65 and GFR improved from 25 to 30. She is currently taking torsemide 40 mg daily. She has been managing her salt intake effectively, although she experienced swelling in her feet yesterday, which she attributes to not taking her medication that day and being on her feet a lot without elevating them. She uses a recliner at home to elevate her feet. She is also on Kerendia.    She has been receiving treatment for varicose veins with [facility] vascular surgery. She recently had VenaSeal closure of her bilateral great saphenous veins. An ultrasound was performed, which showed no deep vein thrombosis (DVTs) but indicated that the veins were occluded.    Thyroid function remains abnormal despite a dose adjustment by Chiquita. Blood work is needed to reassess thyroid levels before any further dose adjustments.    She has a family history of aortic insufficiency, with her sister and mother having the same condition. April 2025 Echo showed no significant change. She has a scheduled appointment with cardiology in August 2025.       Health Maintenance: Completed    ROS:  Negative except as stated above.      Objective:     Exam:  /52 (BP Location: Right arm, Patient Position: Sitting, BP Cuff Size: Adult)   Pulse 84   Temp 36.7 °C (98.1 °F) (Temporal)   Ht 1.575 m (5' 2\")   Wt 81.9 kg (180 lb 8.9 oz)   SpO2 97%   BMI 33.02 kg/m²  Body mass index is 33.02 kg/m².    Physical Exam    Gen: Alert and oriented, no acute distress.  Lungs: Normal effort, CTAB, no wheezing / rhonchi / rales.  CV: RRR, normal S1 and S2, systolic murmur, trace peripheral edema.      Labs:   Hospital Outpatient Visit on 07/07/2025   Component Date Value Ref Range Status    Sodium " 07/07/2025 138  135 - 145 mmol/L Final    Potassium 07/07/2025 3.6  3.6 - 5.5 mmol/L Final    Chloride 07/07/2025 105  96 - 112 mmol/L Final    Co2 07/07/2025 20  20 - 33 mmol/L Final    Glucose 07/07/2025 113 (H)  65 - 99 mg/dL Final    Bun 07/07/2025 30 (H)  8 - 22 mg/dL Final    Creatinine 07/07/2025 1.65 (H)  0.50 - 1.40 mg/dL Final    Calcium 07/07/2025 9.2  8.5 - 10.5 mg/dL Final    Anion Gap 07/07/2025 13.0  7.0 - 16.0 Final    GFR (CKD-EPI) 07/07/2025 30 (A)  >60 mL/min/1.73 m 2 Final    Comment: Estimated Glomerular Filtration Rate is calculated using  race neutral CKD-EPI 2021 equation per NKF-ASN recommendations.           Assessment & Plan:     84 y.o. female with the following -     1. Essential hypertension  Chronic, controlled.  /64 today.  Continue torsemide 40 mg daily.    2. Hypothyroidism, unspecified type (Primary)  Chronic, uncontrolled.  January 2025 TSH WNL but T4 1.86.  Repeat labs ordered and decreased dose of levothyroxine if T4 still elevated.  Until then continue levothyroxine 100 mcg daily.  - TSH; Future  - FREE THYROXINE; Future    3. Aortic valve insufficiency, etiology of cardiac valve disease unspecified  4. Edema leg  Chronic, stable.  She follows up with cardiology.  April 2025 Echo showed moderate aortic insufficiency and mild stenosis, with no significant change from prior imaging.  Continue torsemide 40 mg daily.  - torsemide (DEMADEX) 20 MG Tab; Take 2 Tablets by mouth every day.  Dispense: 180 Tablet; Refill: 3    5. Stage 3b chronic kidney disease  Chronic, stable.  July 2025 GFR 30 (up from 25) and creatinine 1.65 (down form 1.94).  Jan 2025 microalbumin/creatinine 17.  She follows up with nephrology.  Continue finerenone 10 mg daily.        Return in about 3 months (around 10/14/2025) for Medicare well visit.    Verbal consent was acquired by the patient to use [employer] ambient listening note generation during this visit: Yes.    Please note that this dictation  was created using voice recognition software. I have made every reasonable attempt to correct obvious errors, but I expect that there are errors of grammar and possibly content that I did not discover before finalizing the note.

## 2025-07-18 DIAGNOSIS — E78.2 MIXED HYPERLIPIDEMIA: ICD-10-CM

## 2025-07-22 RX ORDER — FENOFIBRATE 160 MG/1
160 TABLET ORAL
Qty: 90 TABLET | Refills: 3 | Status: SHIPPED | OUTPATIENT
Start: 2025-07-22

## 2025-07-22 NOTE — TELEPHONE ENCOUNTER
Is the patient due for a refill? Yes    Was the patient seen the last 15 months? Yes    Date of last office visit: 5/14/25    Does the patient have an upcoming appointment?  Yes   If yes, When? 8/19/25    Provider to refill:[facility]    Does the patient have senior living Plus and need 100-day supply? (This applies to ALL medications) Patient does not have SCP

## 2025-08-06 ENCOUNTER — HOSPITAL ENCOUNTER (OUTPATIENT)
Dept: LAB | Facility: MEDICAL CENTER | Age: 85
End: 2025-08-06
Attending: STUDENT IN AN ORGANIZED HEALTH CARE EDUCATION/TRAINING PROGRAM
Payer: MEDICARE

## 2025-08-06 DIAGNOSIS — E03.9 HYPOTHYROIDISM, UNSPECIFIED TYPE: Chronic | ICD-10-CM

## 2025-08-06 LAB
T4 FREE SERPL-MCNC: 1.57 NG/DL (ref 0.93–1.7)
TSH SERPL-ACNC: 3.45 UIU/ML (ref 0.38–5.33)

## 2025-08-06 PROCEDURE — 84439 ASSAY OF FREE THYROXINE: CPT

## 2025-08-06 PROCEDURE — 36415 COLL VENOUS BLD VENIPUNCTURE: CPT

## 2025-08-06 PROCEDURE — 84443 ASSAY THYROID STIM HORMONE: CPT

## 2025-08-08 ASSESSMENT — ENCOUNTER SYMPTOMS
LIGHT-HEADEDNESS: 0
HEADACHES: 0
NEAR-SYNCOPE: 0
SHORTNESS OF BREATH: 0
PALPITATIONS: 0
DYSPNEA ON EXERTION: 0
ORTHOPNEA: 0
DIZZINESS: 0
PND: 0
SYNCOPE: 0

## 2025-08-19 ENCOUNTER — OFFICE VISIT (OUTPATIENT)
Dept: CARDIOLOGY | Facility: MEDICAL CENTER | Age: 85
End: 2025-08-19
Payer: MEDICARE

## 2025-08-19 VITALS
SYSTOLIC BLOOD PRESSURE: 124 MMHG | BODY MASS INDEX: 32.76 KG/M2 | RESPIRATION RATE: 16 BRPM | DIASTOLIC BLOOD PRESSURE: 40 MMHG | WEIGHT: 178 LBS | HEART RATE: 78 BPM | HEIGHT: 62 IN | OXYGEN SATURATION: 96 %

## 2025-08-19 DIAGNOSIS — I35.1 AORTIC VALVE INSUFFICIENCY, ETIOLOGY OF CARDIAC VALVE DISEASE UNSPECIFIED: ICD-10-CM

## 2025-08-19 DIAGNOSIS — R60.0 LEG EDEMA: Primary | ICD-10-CM

## 2025-08-19 DIAGNOSIS — N18.32 STAGE 3B CHRONIC KIDNEY DISEASE: ICD-10-CM

## 2025-08-19 DIAGNOSIS — E78.2 MIXED HYPERLIPIDEMIA: ICD-10-CM

## 2025-08-19 DIAGNOSIS — I10 ESSENTIAL HYPERTENSION: ICD-10-CM

## 2025-08-19 DIAGNOSIS — R60.0 EDEMA LEG: ICD-10-CM

## 2025-08-19 PROCEDURE — 3074F SYST BP LT 130 MM HG: CPT

## 2025-08-19 PROCEDURE — 99214 OFFICE O/P EST MOD 30 MIN: CPT

## 2025-08-19 PROCEDURE — 99213 OFFICE O/P EST LOW 20 MIN: CPT

## 2025-08-19 PROCEDURE — 3078F DIAST BP <80 MM HG: CPT

## 2025-08-19 RX ORDER — POTASSIUM CHLORIDE 750 MG/1
10 TABLET, EXTENDED RELEASE ORAL 2 TIMES DAILY
Qty: 180 TABLET | Refills: 3 | Status: SHIPPED | OUTPATIENT
Start: 2025-08-19

## 2025-08-19 RX ORDER — TORSEMIDE 20 MG/1
60 TABLET ORAL DAILY
Qty: 180 TABLET | Refills: 3 | Status: SHIPPED | OUTPATIENT
Start: 2025-08-19

## 2025-08-19 ASSESSMENT — LIFESTYLE VARIABLES
HOW OFTEN DO YOU HAVE A DRINK CONTAINING ALCOHOL: NEVER
SKIP TO QUESTIONS 9-10: 1
HOW OFTEN DO YOU HAVE SIX OR MORE DRINKS ON ONE OCCASION: NEVER
AUDIT-C TOTAL SCORE: 0
HOW MANY STANDARD DRINKS CONTAINING ALCOHOL DO YOU HAVE ON A TYPICAL DAY: PATIENT DOES NOT DRINK

## 2025-08-19 ASSESSMENT — FIBROSIS 4 INDEX: FIB4 SCORE: 2.66

## (undated) DEVICE — ELECTRODE DUAL RETURN W/ CORD - (50/PK)

## (undated) DEVICE — NEPTUNE 4 PORT MANIFOLD - (20/PK)

## (undated) DEVICE — SUCTION INSTRUMENT YANKAUER BULBOUS TIP W/O VENT (50EA/CA)

## (undated) DEVICE — FILM CASSETTE ENDO

## (undated) DEVICE — SYRINGE DISP. 60 CC LL - (30/BX, 12BX/CA)**WHEN THESE ARE GONE ORDER #500206**

## (undated) DEVICE — KIT  I.V. START (100EA/CA)

## (undated) DEVICE — SENSOR SPO2 ADULT LNCS ADTX (20/BX) ORDER ITEM #19593

## (undated) DEVICE — SYRINGE ALLIANCE INFLATION (5EA/BX)

## (undated) DEVICE — BITE BLOCK ADULT 60FR (100EA/CA)

## (undated) DEVICE — TUBE CONNECTING SUCTION - CLEAR PLASTIC STERILE 72 IN (50EA/CA)

## (undated) DEVICE — SENSOR SPO2 NEO LNCS ADHESIVE (20/BX) SEE USER NOTES

## (undated) DEVICE — ELECTRODE 850 FOAM ADHESIVE - HYDROGEL RADIOTRNSPRNT (50/PK)

## (undated) DEVICE — HUMID-VENT HEAT AND MOISTURE EXCHANGE- (50/BX)

## (undated) DEVICE — JELLY SURGILUBE STERILE TUBE 4.25 OZ (1/EA)

## (undated) DEVICE — GLOVE, LITE (PAIR)

## (undated) DEVICE — CANNULA W/ SUPPLY TUBING O2 - (50/CA)

## (undated) DEVICE — GOWN SURGEONS X-LARGE - DISP. (30/CA)

## (undated) DEVICE — BASIN EMESIS DISP. - (250/CA)

## (undated) DEVICE — MASK ANESTHESIA ADULT  - (100/CA)

## (undated) DEVICE — SPONGE GAUZE NON-STERILE 4X4 - (2000/CA 10PK/CA)

## (undated) DEVICE — TUBING CLEARLINK DUO-VENT - C-FLO (48EA/CA)

## (undated) DEVICE — SYRINGE DISP. 50CC LS - (40/BX)

## (undated) DEVICE — DEVICE HEMOSTATIC CLIPPING RESOLUTION 360 DEGREES (20EA/BX)

## (undated) DEVICE — LACTATED RINGERS INJ 1000 ML - (14EA/CA 60CA/PF)

## (undated) DEVICE — KIT ANESTHESIA W/CIRCUIT & 3/LT BAG W/FILTER (20EA/CA)

## (undated) DEVICE — PAD PREP 24 X 48 CUFFED - (100/CA)

## (undated) DEVICE — PROTECTOR ULNA NERVE - (36PR/CA)

## (undated) DEVICE — GOWN SURGEONS LARGE - (32/CA)

## (undated) DEVICE — CANISTER SUCTION RIGID RED 1500CC (40EA/CA)

## (undated) DEVICE — GLOVE SZ 7.5 LF PROTEXIS (50PR/BX)

## (undated) DEVICE — SET EXTENSION WITH 2 PORTS (48EA/CA) ***PART #2C8610 IS A SUBSTITUTE*****